# Patient Record
Sex: FEMALE | Race: WHITE | NOT HISPANIC OR LATINO | Employment: FULL TIME | URBAN - METROPOLITAN AREA
[De-identification: names, ages, dates, MRNs, and addresses within clinical notes are randomized per-mention and may not be internally consistent; named-entity substitution may affect disease eponyms.]

---

## 2017-03-27 ENCOUNTER — DOCUMENTATION ONLY (OUTPATIENT)
Dept: FAMILY MEDICINE | Facility: CLINIC | Age: 57
End: 2017-03-27

## 2017-03-27 NOTE — PROGRESS NOTES
Pre-Visit Chart Review  For Appointment Scheduled on 3-28-17    Health Maintenance Due   Topic Date Due    Influenza Vaccine  08/01/2016

## 2017-03-28 ENCOUNTER — OFFICE VISIT (OUTPATIENT)
Dept: FAMILY MEDICINE | Facility: CLINIC | Age: 57
End: 2017-03-28
Payer: COMMERCIAL

## 2017-03-28 VITALS
BODY MASS INDEX: 37.14 KG/M2 | DIASTOLIC BLOOD PRESSURE: 80 MMHG | TEMPERATURE: 98 F | SYSTOLIC BLOOD PRESSURE: 126 MMHG | HEART RATE: 62 BPM | WEIGHT: 231.06 LBS | HEIGHT: 66 IN

## 2017-03-28 DIAGNOSIS — E55.9 VITAMIN D DEFICIENCY: ICD-10-CM

## 2017-03-28 DIAGNOSIS — I10 UNCONTROLLED HYPERTENSION: ICD-10-CM

## 2017-03-28 DIAGNOSIS — E66.9 OBESITY (BMI 30-39.9): ICD-10-CM

## 2017-03-28 DIAGNOSIS — I10 GOOD HYPERTENSION CONTROL: Primary | ICD-10-CM

## 2017-03-28 DIAGNOSIS — J01.00 ACUTE NON-RECURRENT MAXILLARY SINUSITIS: ICD-10-CM

## 2017-03-28 PROCEDURE — 99999 PR PBB SHADOW E&M-EST. PATIENT-LVL III: CPT | Mod: PBBFAC,,, | Performed by: FAMILY MEDICINE

## 2017-03-28 PROCEDURE — 99214 OFFICE O/P EST MOD 30 MIN: CPT | Mod: 25,S$GLB,, | Performed by: FAMILY MEDICINE

## 2017-03-28 PROCEDURE — 96372 THER/PROPH/DIAG INJ SC/IM: CPT | Mod: S$GLB,,, | Performed by: FAMILY MEDICINE

## 2017-03-28 PROCEDURE — 3074F SYST BP LT 130 MM HG: CPT | Mod: S$GLB,,, | Performed by: FAMILY MEDICINE

## 2017-03-28 PROCEDURE — 1160F RVW MEDS BY RX/DR IN RCRD: CPT | Mod: S$GLB,,, | Performed by: FAMILY MEDICINE

## 2017-03-28 PROCEDURE — 3079F DIAST BP 80-89 MM HG: CPT | Mod: S$GLB,,, | Performed by: FAMILY MEDICINE

## 2017-03-28 RX ORDER — METHYLPREDNISOLONE ACETATE 40 MG/ML
60 INJECTION, SUSPENSION INTRA-ARTICULAR; INTRALESIONAL; INTRAMUSCULAR; SOFT TISSUE
Status: COMPLETED | OUTPATIENT
Start: 2017-03-28 | End: 2017-03-28

## 2017-03-28 RX ORDER — OLMESARTAN MEDOXOMIL AND HYDROCHLOROTHIAZIDE 40/12.5 40; 12.5 MG/1; MG/1
1 TABLET ORAL DAILY
Qty: 90 TABLET | Refills: 4 | Status: SHIPPED | OUTPATIENT
Start: 2017-03-28 | End: 2018-04-06 | Stop reason: SDUPTHER

## 2017-03-28 RX ORDER — POTASSIUM CHLORIDE 750 MG/1
10 CAPSULE, EXTENDED RELEASE ORAL DAILY
Qty: 90 CAPSULE | Refills: 4 | Status: SHIPPED | OUTPATIENT
Start: 2017-03-28 | End: 2018-04-06 | Stop reason: SDUPTHER

## 2017-03-28 RX ORDER — AZITHROMYCIN 250 MG/1
TABLET, FILM COATED ORAL
Qty: 6 TABLET | Refills: 0 | Status: SHIPPED | OUTPATIENT
Start: 2017-03-28 | End: 2017-04-02

## 2017-03-28 RX ADMIN — METHYLPREDNISOLONE ACETATE 60 MG: 40 INJECTION, SUSPENSION INTRA-ARTICULAR; INTRALESIONAL; INTRAMUSCULAR; SOFT TISSUE at 05:03

## 2017-03-28 NOTE — PROGRESS NOTES
2 Patent identifiers used (name and ). Administered Depo Medrol 60mg IM. Patient tolerated well. No bleeding at insertion site noted. Pain scale 1/10. Aseptic technique maintained.

## 2017-03-28 NOTE — PATIENT INSTRUCTIONS

## 2017-03-29 NOTE — PROGRESS NOTES
Subjective:       Patient ID: Mercedes Laura is a 57 y.o. female.    Chief Complaint: Establish Care; Cough; and Nasal Congestion    Patient Active Problem List   Diagnosis    Arthritis    Pedal edema    Vitamin D deficiency    Hiatal hernia with gastroesophageal reflux    Panic attacks    Good hypertension control    Anxiety    GERD (gastroesophageal reflux disease)    Gastroesophageal reflux disease with esophagitis    Cardiovascular risk factor, ASCVD 10-year risk 1.9%, 2015    Abdominal obesity    Obesity (BMI 30-39.9)    Venous reflux   Has a deviated septum.  C/o right cheek pain    Cough   This is a new problem. The current episode started 1 to 4 weeks ago. The problem has been gradually worsening. The cough is productive of sputum. Associated symptoms include headaches, nasal congestion, postnasal drip and rhinorrhea. Pertinent negatives include no chest pain, chills, ear congestion, ear pain, fever, sore throat or shortness of breath.     Review of Systems   Constitutional: Negative for chills and fever.   HENT: Positive for postnasal drip and rhinorrhea. Negative for ear pain and sore throat.    Respiratory: Positive for cough. Negative for shortness of breath.    Cardiovascular: Negative for chest pain.   Neurological: Positive for headaches.       Objective:      Physical Exam   Constitutional: She appears well-developed and well-nourished.   HENT:   Right Ear: Tympanic membrane and ear canal normal.   Left Ear: Tympanic membrane and ear canal normal.   Nose: Mucosal edema present. Right sinus exhibits maxillary sinus tenderness. Right sinus exhibits no frontal sinus tenderness. Left sinus exhibits maxillary sinus tenderness. Left sinus exhibits no frontal sinus tenderness.   Mouth/Throat: Mucous membranes are normal. Posterior oropharyngeal erythema present. No oropharyngeal exudate, posterior oropharyngeal edema or tonsillar abscesses.   Cardiovascular: Normal rate, regular rhythm and normal  heart sounds.    Pulmonary/Chest: Effort normal and breath sounds normal.   Skin: Skin is warm.   Psychiatric: She has a normal mood and affect.   Vitals reviewed.      Assessment:       1. Good hypertension control    2. Vitamin D deficiency    3. Acute non-recurrent maxillary sinusitis    4. Uncontrolled hypertension    5. Obesity (BMI 30-39.9)        Plan:         1. Good hypertension control  Controlled on current medications.  Continue current medications.    - Comprehensive metabolic panel; Future  - Lipid panel; Future  - Magnesium; Future  - olmesartan-hydrochlorothiazide (BENICAR HCT) 40-12.5 mg Tab; Take 1 tablet by mouth once daily.  Dispense: 90 tablet; Refill: 4    2. Vitamin D deficiency  Screen and treat as indicated:    - Vitamin D; Future    3. Acute non-recurrent maxillary sinusitis  Treat with:  - methylPREDNISolone acetate injection 60 mg; Inject 1.5 mLs (60 mg total) into the muscle one time.  - azithromycin (Z-BECKY) 250 MG tablet; Take 2 tablets by mouth on day 1; Take 1 tablet by mouth on days 2-5  Dispense: 6 tablet; Refill: 0  I counseled the patient on general home care guidelines for cough and congestion including increasing fluid intake, getting plenty of rest and use of OTC cough and cold medications.  I recommended guafenesin for congestion and dextromethorphan as directed for cough.  A brand like Mucinex DM is recommended.  Avoidance of decongestants is recommended for patients with heart problems and hypertension.  Extra vitamin C may also benefit.  Return to clinic if symptoms last longer than 10 days or sooner if worsen with symptoms like fever > 100.4, severe sinus pain or headache, thick yellow nasal discharge or sputum, dehydration or lethargy.      4. Uncontrolled hypertension  Controlled on current medications.  Continue current medications.    - potassium chloride (MICRO-K) 10 MEQ CpSR; Take 1 capsule (10 mEq total) by mouth once daily.  Dispense: 90 capsule; Refill: 4    5.  "Obesity (BMI 30-39.9)  Counseled patient on his ideal body weight, health consequences of being obese and current recommendations including weekly exercise and a heart healthy diet.  Current BMI is:Estimated body mass index is 37.29 kg/(m^2) as calculated from the following:    Height as of this encounter: 5' 6" (1.676 m).    Weight as of this encounter: 104.8 kg (231 lb 0.7 oz)..  Patient is aware that ideal BMI < 25 or Weight in (lb) to have BMI = 25: 154.6.    [facility] goal documentation:  Patient readiness: acceptance and barriers:readiness  During the course of the visit the patient was educated and counseled about the following: Hypertension:   Dietary sodium restriction.  Regular aerobic exercise.  Obesity:   General weight loss/lifestyle modification strategies discussed (elicit support from others; identify saboteurs; non-food rewards, etc).  Goals: Hypertension: Reduce Blood Pressure and Obesity: Reduce calorie intake and BMI  Goal/Outcomes met:Hypertension  The following self management tools provided:none  Patient Instructions (the written plan) was given to the patient/family: Yes  Time spent with patient: 40 minutes    Patient with be reevaluated in 6 months or sooner prn    Greater than 50% of this visit was spent counseling as described in above documentation:Yes        "

## 2017-04-01 ENCOUNTER — LAB VISIT (OUTPATIENT)
Dept: LAB | Facility: HOSPITAL | Age: 57
End: 2017-04-01
Attending: FAMILY MEDICINE
Payer: COMMERCIAL

## 2017-04-01 DIAGNOSIS — I10 GOOD HYPERTENSION CONTROL: ICD-10-CM

## 2017-04-01 DIAGNOSIS — E55.9 VITAMIN D DEFICIENCY: ICD-10-CM

## 2017-04-01 LAB
ALBUMIN SERPL BCP-MCNC: 3.7 G/DL
ALP SERPL-CCNC: 62 U/L
ALT SERPL W/O P-5'-P-CCNC: 21 U/L
ANION GAP SERPL CALC-SCNC: 8 MMOL/L
AST SERPL-CCNC: 15 U/L
BILIRUB SERPL-MCNC: 0.6 MG/DL
BUN SERPL-MCNC: 16 MG/DL
CALCIUM SERPL-MCNC: 9.5 MG/DL
CHLORIDE SERPL-SCNC: 107 MMOL/L
CHOLEST/HDLC SERPL: 4 {RATIO}
CO2 SERPL-SCNC: 26 MMOL/L
CREAT SERPL-MCNC: 0.8 MG/DL
EST. GFR  (AFRICAN AMERICAN): >60 ML/MIN/1.73 M^2
EST. GFR  (NON AFRICAN AMERICAN): >60 ML/MIN/1.73 M^2
GLUCOSE SERPL-MCNC: 98 MG/DL
HDL/CHOLESTEROL RATIO: 24.7 %
HDLC SERPL-MCNC: 198 MG/DL
HDLC SERPL-MCNC: 49 MG/DL
LDLC SERPL CALC-MCNC: 132 MG/DL
MAGNESIUM SERPL-MCNC: 1.8 MG/DL
NONHDLC SERPL-MCNC: 149 MG/DL
POTASSIUM SERPL-SCNC: 4 MMOL/L
PROT SERPL-MCNC: 7.3 G/DL
SODIUM SERPL-SCNC: 141 MMOL/L
TRIGL SERPL-MCNC: 85 MG/DL

## 2017-04-01 PROCEDURE — 82306 VITAMIN D 25 HYDROXY: CPT

## 2017-04-01 PROCEDURE — 80053 COMPREHEN METABOLIC PANEL: CPT

## 2017-04-01 PROCEDURE — 80061 LIPID PANEL: CPT

## 2017-04-01 PROCEDURE — 83735 ASSAY OF MAGNESIUM: CPT

## 2017-04-01 PROCEDURE — 36415 COLL VENOUS BLD VENIPUNCTURE: CPT | Mod: PO

## 2017-04-04 LAB — 25(OH)D3+25(OH)D2 SERPL-MCNC: 34 NG/ML

## 2017-05-30 ENCOUNTER — TELEPHONE (OUTPATIENT)
Dept: FAMILY MEDICINE | Facility: CLINIC | Age: 57
End: 2017-05-30

## 2017-05-30 DIAGNOSIS — Z12.31 ENCOUNTER FOR SCREENING MAMMOGRAM FOR MALIGNANT NEOPLASM OF BREAST: ICD-10-CM

## 2017-05-30 DIAGNOSIS — I10 GOOD HYPERTENSION CONTROL: Primary | ICD-10-CM

## 2017-05-30 NOTE — TELEPHONE ENCOUNTER
----- Message from Eneida Obregon sent at 5/30/2017  2:49 PM CDT -----  Contact: self  Patient [phone] is asking if Dr Llamas would enter an order in the computer for her annual mammogram/her last screening mammogram was 06 01 16 and then she had a recall mammogram on her left breast on 06 06 16/patient is wanting to have her mammogram in [place]/please advise

## 2017-05-30 NOTE — TELEPHONE ENCOUNTER
Needs orders for Mammogram. Last mammogram was 2016. May we have orders. Patient was told she can have a screening mammogram again this year. Also wants a CBC

## 2017-06-02 ENCOUNTER — HOSPITAL ENCOUNTER (OUTPATIENT)
Dept: RADIOLOGY | Facility: CLINIC | Age: 57
Discharge: HOME | End: 2017-06-02
Attending: FAMILY MEDICINE
Payer: COMMERCIAL

## 2017-06-02 DIAGNOSIS — Z12.31 ENCOUNTER FOR SCREENING MAMMOGRAM FOR MALIGNANT NEOPLASM OF BREAST: ICD-10-CM

## 2017-06-02 PROCEDURE — 77067 SCR MAMMO BI INCL CAD: CPT | Mod: TC

## 2017-06-02 PROCEDURE — 77067 SCR MAMMO BI INCL CAD: CPT | Mod: 26,,, | Performed by: RADIOLOGY

## 2017-06-02 PROCEDURE — 77063 BREAST TOMOSYNTHESIS BI: CPT | Mod: 26,,, | Performed by: RADIOLOGY

## 2017-08-01 ENCOUNTER — HOSPITAL ENCOUNTER (EMERGENCY)
Facility: HOSPITAL | Age: 57
Discharge: HOME | End: 2017-08-01
Attending: EMERGENCY MEDICINE
Payer: COMMERCIAL

## 2017-08-01 VITALS
HEIGHT: 66 IN | HEART RATE: 71 BPM | OXYGEN SATURATION: 95 % | RESPIRATION RATE: 18 BRPM | DIASTOLIC BLOOD PRESSURE: 75 MMHG | SYSTOLIC BLOOD PRESSURE: 148 MMHG | BODY MASS INDEX: 36.96 KG/M2 | TEMPERATURE: 99 F | WEIGHT: 230 LBS

## 2017-08-01 DIAGNOSIS — R07.9 CHEST PAIN: ICD-10-CM

## 2017-08-01 DIAGNOSIS — K21.9 GASTROESOPHAGEAL REFLUX DISEASE, ESOPHAGITIS PRESENCE NOT SPECIFIED: Primary | ICD-10-CM

## 2017-08-01 LAB
ALBUMIN SERPL BCP-MCNC: 4 G/DL
ALP SERPL-CCNC: 68 U/L
ALT SERPL W/O P-5'-P-CCNC: 20 U/L
ANION GAP SERPL CALC-SCNC: 11 MMOL/L
AST SERPL-CCNC: 17 U/L
BASOPHILS # BLD AUTO: 0 K/UL
BASOPHILS NFR BLD: 0.2 %
BILIRUB SERPL-MCNC: 0.3 MG/DL
BUN SERPL-MCNC: 15 MG/DL
CALCIUM SERPL-MCNC: 10 MG/DL
CHLORIDE SERPL-SCNC: 105 MMOL/L
CO2 SERPL-SCNC: 23 MMOL/L
CREAT SERPL-MCNC: 0.9 MG/DL
DIFFERENTIAL METHOD: ABNORMAL
EOSINOPHIL # BLD AUTO: 0.1 K/UL
EOSINOPHIL NFR BLD: 1.6 %
ERYTHROCYTE [DISTWIDTH] IN BLOOD BY AUTOMATED COUNT: 13.5 %
EST. GFR  (AFRICAN AMERICAN): >60 ML/MIN/1.73 M^2
EST. GFR  (NON AFRICAN AMERICAN): >60 ML/MIN/1.73 M^2
GLUCOSE SERPL-MCNC: 104 MG/DL
HCT VFR BLD AUTO: 39.8 %
HGB BLD-MCNC: 13.3 G/DL
LYMPHOCYTES # BLD AUTO: 3.2 K/UL
LYMPHOCYTES NFR BLD: 40.6 %
MAGNESIUM SERPL-MCNC: 1.9 MG/DL
MCH RBC QN AUTO: 28.6 PG
MCHC RBC AUTO-ENTMCNC: 33.5 G/DL
MCV RBC AUTO: 85 FL
MONOCYTES # BLD AUTO: 0.5 K/UL
MONOCYTES NFR BLD: 6.7 %
NEUTROPHILS # BLD AUTO: 4 K/UL
NEUTROPHILS NFR BLD: 50.9 %
PHOSPHATE SERPL-MCNC: 3 MG/DL
PLATELET # BLD AUTO: 266 K/UL
PMV BLD AUTO: 7.3 FL
POTASSIUM SERPL-SCNC: 3.6 MMOL/L
PROT SERPL-MCNC: 7.5 G/DL
RBC # BLD AUTO: 4.66 M/UL
SODIUM SERPL-SCNC: 139 MMOL/L
TROPONIN I SERPL DL<=0.01 NG/ML-MCNC: <0.006 NG/ML
WBC # BLD AUTO: 7.8 K/UL

## 2017-08-01 PROCEDURE — 84100 ASSAY OF PHOSPHORUS: CPT

## 2017-08-01 PROCEDURE — 99284 EMERGENCY DEPT VISIT MOD MDM: CPT

## 2017-08-01 PROCEDURE — 93010 ELECTROCARDIOGRAM REPORT: CPT | Mod: ,,, | Performed by: INTERNAL MEDICINE

## 2017-08-01 PROCEDURE — 84484 ASSAY OF TROPONIN QUANT: CPT

## 2017-08-01 PROCEDURE — 85025 COMPLETE CBC W/AUTO DIFF WBC: CPT

## 2017-08-01 PROCEDURE — 36415 COLL VENOUS BLD VENIPUNCTURE: CPT

## 2017-08-01 PROCEDURE — 93005 ELECTROCARDIOGRAM TRACING: CPT

## 2017-08-01 PROCEDURE — 83735 ASSAY OF MAGNESIUM: CPT

## 2017-08-01 PROCEDURE — 80053 COMPREHEN METABOLIC PANEL: CPT

## 2017-08-01 PROCEDURE — 25000003 PHARM REV CODE 250: Performed by: EMERGENCY MEDICINE

## 2017-08-01 PROCEDURE — 86677 HELICOBACTER PYLORI ANTIBODY: CPT

## 2017-08-01 RX ORDER — RABEPRAZOLE SODIUM 20 MG/1
20 TABLET, DELAYED RELEASE ORAL DAILY
Qty: 30 TABLET | Refills: 11 | Status: SHIPPED | OUTPATIENT
Start: 2017-08-01 | End: 2018-04-06 | Stop reason: SDUPTHER

## 2017-08-01 RX ORDER — PANTOPRAZOLE SODIUM 40 MG/1
40 TABLET, DELAYED RELEASE ORAL
Status: COMPLETED | OUTPATIENT
Start: 2017-08-01 | End: 2017-08-01

## 2017-08-01 RX ADMIN — PANTOPRAZOLE SODIUM 40 MG: 40 TABLET, DELAYED RELEASE ORAL at 06:08

## 2017-08-01 RX ADMIN — LIDOCAINE HYDROCHLORIDE: 20 SOLUTION ORAL; TOPICAL at 06:08

## 2017-08-01 NOTE — ED NOTES
Presents to the ER with c/o palpitations and burning chest discomfort that occurred after eating lunch today at 2pm. Patient reports taking antacid after without any relief. Denies diaphoresis, nausea, vomiting and area is not reproducible. Mucous membranes are pink and moist. Skin is warm, dry and intact. Lungs are clear bilaterally, respirations are regular and unlabored. Denies cough, congestion, rhinorrhea or SOB. BS active x4, no tenderness with palpation, abd is soft and not distended. Denies any appetite or activity change. S1S2, capillary refill is < 2 seconds. Denies dysuria, difficulty urinating, frequency, numbness, tingling or weakness. CK MASONS

## 2017-08-01 NOTE — ED PROVIDER NOTES
"Encounter Date: 8/1/2017    SCRIBE #1 NOTE: IFranchesca, am scribing for, and in the presence of, Dr. Callejas.       History     Chief Complaint   Patient presents with    Palpitations     Belching, burning in chest after large lunch today.       08/01/2017 5:57 PM     Chief Complaint: Chest pain      Mercedes Laura is a 57 y.o. female who presents to the ED with complaints of chest pain and palpitations with an acute onset after eating lunch today at approximately 2PM. The patient endorses having a "burning sensation" in the center of her chest that does not radiate to any other parts of her body. The patient states she took medication for the acid reflux, with improvement to the pain. However, the pain reoccurred at approximately 5PM that was not improved with the same medication as before. She denies vomiting, diarrhea, chest tightness, abdominal pain, SOB, diaphoresis, and history of smoking. Patient's PMHx includes HTN, GERD, PACs, and parental Hx of heart disease. The patient states of taking Benicar HCT for her high blood pressure. SHx includes cholcystectomy and colonoscopy. Known drug allergies includes Augmentin, Bentyl, Naprosyn, and Omeprazole.           The history is provided by the patient.     Review of patient's allergies indicates:   Allergen Reactions    Augmentin [amoxicillin-pot clavulanate] Nausea And Vomiting     Gi upset    Bentyl [dicyclomine] Other (See Comments)     Worsening anxiety    Naprosyn [naproxen]     Omeprazole      Other reaction(s): ABDOMINAL PAIN    Adhesive Rash    Lexapro [escitalopram oxalate] Anxiety     Bad dreams, feeling of doom, sweats    Sulfa (sulfonamide antibiotics) Rash     Other reaction(s): measle like rash     Past Medical History:   Diagnosis Date    Arthritis     GERD (gastroesophageal reflux disease)     Hypertension     Plantar fasciitis     Sinus disorder      Past Surgical History:   Procedure Laterality Date    CHOLECYSTECTOMY  6-1-2015    " Dr Ashraf    COLONOSCOPY N/A 11/23/2015    Procedure: COLONOSCOPY;  Surgeon: Theo Stinson MD;  Location: [facility];  Service: Endoscopy;  Laterality: N/A;    KNEE ARTHROSCOPY W/ MENISCECTOMY  11/15/2000    right      Family History   Problem Relation Age of Onset    Hypertension Mother     Arthritis Mother     Hypertension Father     Cancer Father      melanoma    Melanoma Father     Arthritis Maternal Aunt     Diabetes Maternal Aunt     Hypertension Maternal Aunt     Arthritis Maternal Uncle     Heart disease Neg Hx     Breast cancer Neg Hx     Ovarian cancer Neg Hx     Psoriasis Neg Hx     Lupus Neg Hx     Eczema Neg Hx      Social History   Substance Use Topics    Smoking status: Never Smoker    Smokeless tobacco: Never Used    Alcohol use Yes      Comment: occ     Review of Systems   Constitutional: Negative for activity change, appetite change, chills, fatigue and fever.   HENT: Negative for sore throat.    Eyes: Negative for visual disturbance.   Respiratory: Negative for apnea, chest tightness and shortness of breath.    Cardiovascular: Positive for chest pain and palpitations.   Gastrointestinal: Negative for abdominal distention, abdominal pain, diarrhea and vomiting.   Genitourinary: Negative for difficulty urinating, flank pain and hematuria.   Musculoskeletal: Negative for neck pain.   Skin: Negative for pallor and rash.   Neurological: Negative for headaches.   Psychiatric/Behavioral: Negative for agitation.       Physical Exam     Initial Vitals [08/01/17 1745]   BP Pulse Resp Temp SpO2   (!) 183/97 90 18 99.3 °F (37.4 °C) 100 %      MAP       125.67         Physical Exam    Nursing note and vitals reviewed.  Constitutional: She appears well-developed and well-nourished.   HENT:   Head: Normocephalic and atraumatic.   Eyes: Conjunctivae are normal.   Neck: Normal range of motion. Neck supple.   Cardiovascular: Normal rate. An irregularly irregular rhythm present. Exam reveals no  gallop.    No murmur heard.  Pulmonary/Chest: Effort normal and breath sounds normal. No respiratory distress. She has no wheezes. She has no rales.   Abdominal: Soft. She exhibits no distension. There is no tenderness.   Musculoskeletal: Normal range of motion.   Neurological: She is alert and oriented to person, place, and time.   Skin: Skin is warm and dry. No erythema.   Psychiatric: She has a normal mood and affect.         ED Course   Procedures  Labs Reviewed   CBC W/ AUTO DIFFERENTIAL - Abnormal; Notable for the following:        Result Value    MPV 7.3 (*)     All other components within normal limits   COMPREHENSIVE METABOLIC PANEL   TROPONIN I   MAGNESIUM   PHOSPHORUS   H. PYLORI ANTIBODY, IGG     EKG Readings: (Independently Interpreted)   Rhythm: Normal Sinus Rhythm. Heart Rate: 89. Ectopy: No Ectopy. Conduction: Normal. ST Segments: Normal ST Segments. T Waves: Normal. Axis: Left Axis Deviation. Clinical Impression: Normal Sinus Rhythm          Medical Decision Making:   Independently Interpreted Test(s):   I have ordered and independently interpreted X-rays - see summary below.       <> Summary of X-Ray Reading(s): chest x-ray interpreted by me reveals no infiltrates, effusions or mediastinal widening  Clinical Tests:   Lab Tests: Ordered and Reviewed  The following lab test(s) were unremarkable: CBC, CMP and Troponin  ED Management:  Mercedes Laura is a 57 y.o. female who presents with  retrosternal burning with palpitations.  She has had PACs on the monitor but has no evidence of ischemia/MI.  She is encouraged to follow-up with her primary care physician for consideration of adding a beta blocker to her antihypertensive regimen.  Her chest discomfort is immediately alleviated with a GI cocktail and although not diagnostic strongly suggestive of esophageal reflux.  She has no evidence of cardiac ischemia with a negative troponin.            Scribe Attestation:   Scribe #1: I performed the above  scribed service and the documentation accurately describes the services I performed. I attest to the accuracy of the note.    Attending Attestation:           Physician Attestation for Scribe:  Physician Attestation Statement for Scribe #1: I, Dr. Callejas, reviewed documentation, as scribed by Franchesca Beard in my presence, and it is both accurate and complete.                 ED Course     Clinical Impression:   Diagnoses of Chest pain and Chest pain were pertinent to this visit.                           Bhanu Callejas III, MD  08/01/17 2022       Bhanu Callejas III, MD  08/01/17 0588

## 2017-08-02 LAB — H PYLORI IGG SERPL QL IA: NEGATIVE

## 2017-08-02 NOTE — ED NOTES
Upon discharge, patient is AAOx4, no cardiac or respiratory complications. Follow up care and  Medications have been reviewed with patient and has been instructed to return to the ER if needed. Patient verbalized understanding and ambulated to the lobby without difficulty. PAULINO STOVER.

## 2017-09-01 ENCOUNTER — OFFICE VISIT (OUTPATIENT)
Dept: DERMATOLOGY | Facility: CLINIC | Age: 57
End: 2017-09-01
Payer: COMMERCIAL

## 2017-09-01 VITALS — HEIGHT: 66 IN | BODY MASS INDEX: 36.96 KG/M2 | WEIGHT: 230 LBS

## 2017-09-01 DIAGNOSIS — B35.6 TINEA CRURIS: ICD-10-CM

## 2017-09-01 DIAGNOSIS — D48.9 NEOPLASM OF UNCERTAIN BEHAVIOR: Primary | ICD-10-CM

## 2017-09-01 DIAGNOSIS — L81.4 SOLAR LENTIGO: ICD-10-CM

## 2017-09-01 DIAGNOSIS — D22.9 MULTIPLE BENIGN NEVI: ICD-10-CM

## 2017-09-01 DIAGNOSIS — L82.1 SEBORRHEIC KERATOSES: ICD-10-CM

## 2017-09-01 PROCEDURE — 99999 PR PBB SHADOW E&M-EST. PATIENT-LVL III: CPT | Mod: PBBFAC,,, | Performed by: DERMATOLOGY

## 2017-09-01 PROCEDURE — 99213 OFFICE O/P EST LOW 20 MIN: CPT | Mod: 25,S$GLB,, | Performed by: DERMATOLOGY

## 2017-09-01 PROCEDURE — 88342 IMHCHEM/IMCYTCHM 1ST ANTB: CPT | Mod: 26,,, | Performed by: PATHOLOGY

## 2017-09-01 PROCEDURE — 3008F BODY MASS INDEX DOCD: CPT | Mod: S$GLB,,, | Performed by: DERMATOLOGY

## 2017-09-01 PROCEDURE — 11100 PR BIOPSY OF SKIN LESION: CPT | Mod: S$GLB,,, | Performed by: DERMATOLOGY

## 2017-09-01 PROCEDURE — 88305 TISSUE EXAM BY PATHOLOGIST: CPT | Performed by: PATHOLOGY

## 2017-09-01 PROCEDURE — 88305 TISSUE EXAM BY PATHOLOGIST: CPT | Mod: 26,,, | Performed by: PATHOLOGY

## 2017-09-01 RX ORDER — ECONAZOLE NITRATE 10 MG/G
CREAM TOPICAL
Qty: 85 G | Refills: 2 | Status: SHIPPED | OUTPATIENT
Start: 2017-09-01 | End: 2019-09-15 | Stop reason: SDUPTHER

## 2017-09-01 NOTE — PROGRESS NOTES
"  Subjective:       Patient ID:  Mercedes Laura is a 57 y.o. female who presents for   Chief Complaint   Patient presents with    Skin Check     TBSE    Rash     upper thighs     Patient last seen 8/2016 for TBSE and benign lesions  No h/o skin cancer  Here for FU skin check and new complaint  Father with h/o melanoma age 78, back, treated with wide excision    C/o rash "i get periodically", itchy red inner thighs, using nystatin cream with improvement          Rash  - Initial  Affected locations: right upper leg and left upper leg  Severity: mild  Timing: constant  Aggravated by: friction and sweating  Relieving factors/Treatments tried: OTC antifungals (Nystatin cream)  Improvement on treatment: significant        Review of Systems   Constitutional: Negative for fever, chills, weight loss, weight gain, fatigue, night sweats and malaise.   Skin: Positive for rash, daily sunscreen use, activity-related sunscreen use and wears hat.   Hematologic/Lymphatic: Does not bruise/bleed easily.        Objective:    Physical Exam   Constitutional: She appears well-developed and well-nourished. No distress.   Neurological: She is alert and oriented to person, place, and time. She is not disoriented.   Psychiatric: She has a normal mood and affect.   Skin:   Areas Examined (abnormalities noted in diagram):   Scalp / Hair Palpated and Inspected  Head / Face Inspection Performed  Neck Inspection Performed  Chest / Axilla Inspection Performed  Abdomen Inspection Performed  Genitals / Buttocks / Groin Inspection Performed  Back Inspection Performed  RUE Inspected  LUE Inspection Performed  RLE Inspected  LLE Inspection Performed  Nails and Digits Inspection Performed                       Diagram Legend     Erythematous scaling macule/papule c/w actinic keratosis       Vascular papule c/w angioma      Pigmented verrucoid papule/plaque c/w seborrheic keratosis      Yellow umbilicated papule c/w sebaceous hyperplasia      Irregularly " shaped tan macule c/w lentigo     1-2 mm smooth white papules consistent with Milia      Movable subcutaneous cyst with punctum c/w epidermal inclusion cyst      Subcutaneous movable cyst c/w pilar cyst      Firm pink to brown papule c/w dermatofibroma      Pedunculated fleshy papule(s) c/w skin tag(s)      Evenly pigmented macule c/w junctional nevus     Mildly variegated pigmented, slightly irregular-bordered macule c/w mildly atypical nevus      Flesh colored to evenly pigmented papule c/w intradermal nevus       Pink pearly papule/plaque c/w basal cell carcinoma      Erythematous hyperkeratotic cursted plaque c/w SCC      Surgical scar with no sign of skin cancer recurrence      Open and closed comedones      Inflammatory papules and pustules      Verrucoid papule consistent consistent with wart     Erythematous eczematous patches and plaques     Dystrophic onycholytic nail with subungual debris c/w onychomycosis     Umbilicated papule    Erythematous-base heme-crusted tan verrucoid plaque consistent with inflamed seborrheic keratosis     Erythematous Silvery Scaling Plaque c/w Psoriasis     See annotation          Assessment / Plan:      Pathology Orders:      Normal Orders This Visit    Tissue Specimen To Pathology, Dermatology     Questions:    Directional Terms:  Other(comment)    Clinical information:  Dark melanocytic macule with eccentric black component, rule out dysplasia    Specific Site:  R abdomen        Neoplasm of uncertain behavior  -     Tissue Specimen To Pathology, Dermatology  Shave biopsy procedure note:    Shave biopsy performed after verbal consent including risk of infection, scar, recurrence, need for additional treatment of site. Area prepped with alcohol, anesthetized with approximately 1.0cc of 1% lidocaine with epinephrine. Lesional tissue shaved with razor blade. Hemostasis achieved with application of aluminum chloride followed by hyfrecation. No complications. Dressing applied.  Wound care explained.    Tinea cruris  -     econazole nitrate 1 % cream; Apply to inner thighs and buttocks BID PRN flare  Dispense: 85 g; Refill: 2    Multiple benign nevi  Discussed ABCDE's of nevi.  Monitor for new mole or moles that are becoming bigger, darker, irritated, or developing irregular borders. Brochure provided.    Solar lentigo  This is a benign hyperpigmented sun induced lesion. Daily sun protection will reduce the number of new lesions. Treatment of these benign lesions are considered cosmetic.    Seborrheic keratoses  These are benign inherited growths without a malignant potential. Reassurance given to patient. No treatment is necessary.     Patient instructed in importance in daily sun protection of at least spf 30. Sun avoidance and topical protection discussed.   Recommend Elta MD (physician office) COTZ sensitive (available online) for daily use on face and neck.  Patient encouraged to wear hat for all outdoor exposure.   Also discussed sun protective clothing.           Return in about 1 year (around 9/1/2018).

## 2017-09-01 NOTE — PATIENT INSTRUCTIONS
Shave Biopsy Wound Care    Your doctor has performed a shave biopsy today.  A band aid and vaseline ointment has been placed over the site.  This should remain in place for 24 hours.  It is recommended that you keep the area dry for the first 24 hours.  After 24 hours, you may remove the band aid and wash the area with warm soap and water and apply Vaseline jelly.  Many patients prefer to use Neosporin or Bacitracin ointment.  This is acceptable; however, know that you can develop an allergy to this medication even if you have used it safely for years.  It is important to keep the area moist.  Letting it dry out and get air slows healing time, and will worsen the scar.  Band aid is optional after first 24 hours.      If you notice increasing redness, tenderness, pain, or yellow drainage at the biopsy site, please notify your doctor.  These are signs of an infection.    If your biopsy site is bleeding, apply firm pressure for 15 minutes straight.  Repeat for another 15 minutes, if it is still bleeding.   If the surgical site continues to bleed, then please contact your doctor.       [facility]  [facility]ER[facility]  [address] [place] [id number]  Dept: [phone]

## 2017-09-22 ENCOUNTER — DOCUMENTATION ONLY (OUTPATIENT)
Dept: FAMILY MEDICINE | Facility: CLINIC | Age: 57
End: 2017-09-22

## 2017-09-22 NOTE — PROGRESS NOTES
Pre-Visit Chart Review  For Appointment Scheduled on 9-25-17    Health Maintenance Due   Topic Date Due    Influenza Vaccine  08/01/2017

## 2017-09-25 ENCOUNTER — OFFICE VISIT (OUTPATIENT)
Dept: FAMILY MEDICINE | Facility: CLINIC | Age: 57
End: 2017-09-25
Payer: COMMERCIAL

## 2017-09-25 VITALS
BODY MASS INDEX: 37.14 KG/M2 | HEART RATE: 59 BPM | SYSTOLIC BLOOD PRESSURE: 123 MMHG | TEMPERATURE: 98 F | HEIGHT: 66 IN | DIASTOLIC BLOOD PRESSURE: 79 MMHG | WEIGHT: 231.06 LBS

## 2017-09-25 DIAGNOSIS — R00.2 PALPITATIONS: ICD-10-CM

## 2017-09-25 DIAGNOSIS — E55.9 VITAMIN D DEFICIENCY: ICD-10-CM

## 2017-09-25 DIAGNOSIS — E66.9 OBESITY (BMI 30-39.9): ICD-10-CM

## 2017-09-25 DIAGNOSIS — I10 GOOD HYPERTENSION CONTROL: Primary | ICD-10-CM

## 2017-09-25 DIAGNOSIS — J30.1 ACUTE SEASONAL ALLERGIC RHINITIS DUE TO POLLEN: ICD-10-CM

## 2017-09-25 PROCEDURE — 3008F BODY MASS INDEX DOCD: CPT | Mod: S$GLB,,, | Performed by: FAMILY MEDICINE

## 2017-09-25 PROCEDURE — 99214 OFFICE O/P EST MOD 30 MIN: CPT | Mod: S$GLB,,, | Performed by: FAMILY MEDICINE

## 2017-09-25 PROCEDURE — 99999 PR PBB SHADOW E&M-EST. PATIENT-LVL III: CPT | Mod: PBBFAC,,, | Performed by: FAMILY MEDICINE

## 2017-09-25 PROCEDURE — 3078F DIAST BP <80 MM HG: CPT | Mod: S$GLB,,, | Performed by: FAMILY MEDICINE

## 2017-09-25 PROCEDURE — 3074F SYST BP LT 130 MM HG: CPT | Mod: S$GLB,,, | Performed by: FAMILY MEDICINE

## 2017-09-25 RX ORDER — MONTELUKAST SODIUM 10 MG/1
10 TABLET ORAL NIGHTLY
Qty: 30 TABLET | Status: SHIPPED | OUTPATIENT
Start: 2017-09-25 | End: 2017-10-25

## 2017-09-26 NOTE — PROGRESS NOTES
Subjective:       Patient ID: Mercedes aLura is a 57 y.o. female.    Chief Complaint: Follow-up    Patient Active Problem List   Diagnosis    Arthritis    Pedal edema    Vitamin D deficiency    Hiatal hernia with gastroesophageal reflux    Panic attacks    Good hypertension control    Anxiety    GERD (gastroesophageal reflux disease)    Gastroesophageal reflux disease with esophagitis    Cardiovascular risk factor, ASCVD 10-year risk 1.9%, 2015    Abdominal obesity    Obesity (BMI 30-39.9)    Venous reflux   Has h/o PACs-palpitations.  Has had stress test.  May be anxiety related.  Bothers her at night mainly.  Not sustained-brief flutter in chest.  No pain or lightheadedness.  Denies caffeine use.      C/o head position mild vertigo, nasal stuffiness, right ear pain, allergies, sore throat.  No fever  HPI  Review of Systems   Constitutional: Negative for fatigue and unexpected weight change.   HENT: Positive for ear pain.    Respiratory: Negative for chest tightness and shortness of breath.    Cardiovascular: Negative for chest pain, palpitations and leg swelling.   Gastrointestinal: Negative for abdominal pain.   Musculoskeletal: Negative for arthralgias.   Neurological: Positive for dizziness. Negative for syncope, light-headedness and headaches.       Objective:      Physical Exam   Constitutional: She appears well-developed and well-nourished.   HENT:   Right Ear: Tympanic membrane and ear canal normal.   Left Ear: Tympanic membrane and ear canal normal.   Nose: Mucosal edema and rhinorrhea present. Right sinus exhibits no maxillary sinus tenderness and no frontal sinus tenderness. Left sinus exhibits no maxillary sinus tenderness and no frontal sinus tenderness.   Mouth/Throat: Posterior oropharyngeal erythema present. No oropharyngeal exudate, posterior oropharyngeal edema or tonsillar abscesses.   Cardiovascular: Normal rate, regular rhythm and normal heart sounds.    Pulmonary/Chest: Effort  "normal and breath sounds normal.   Skin: Skin is warm and dry.   Psychiatric: She has a normal mood and affect.   Nursing note and vitals reviewed.      Assessment:       1. Good hypertension control    2. Vitamin D deficiency    3. Acute seasonal allergic rhinitis due to pollen    4. Obesity (BMI 30-39.9)    5. Palpitations        Plan:       1. Good hypertension control  Controlled on current medications.  Continue current medications.    - CBC auto differential; Future  - Comprehensive metabolic panel; Future  - Lipid panel; Future    2. Vitamin D deficiency  Screen and treat as indicated:    - Vitamin D; Future    3. Acute seasonal allergic rhinitis due to pollen  Recommend otc non-sedating antihistamine such as Loratadine and/or steroid nasal spray such as Flonase as directed and as needed.  Please return to clinic if symptoms persist after these interventions.  add  - montelukast (SINGULAIR) 10 mg tablet; Take 1 tablet (10 mg total) by mouth every evening.  Dispense: 30 tablet; Refill: prn    4. Obesity (BMI 30-39.9)  Counseled patient on his ideal body weight, health consequences of being obese and current recommendations including weekly exercise and a heart healthy diet.  Current BMI is:Estimated body mass index is 37.29 kg/m² as calculated from the following:    Height as of this encounter: 5' 6" (1.676 m).    Weight as of this encounter: 104.8 kg (231 lb 0.7 oz)..  Patient is aware that ideal BMI < 25 or Weight in (lb) to have BMI = 25: 154.6.      5. Palpitations  Likely anxiety related PAC or more likely PVCs.  Declines card referral or additional testing at this time.  Counseled patient on reduction of stress, avoidance of stimulants like caffeine.  Consider holter, card referral and/or B blocker therapy if sx persist/worsen    [facility] goal documentation:  Patient readiness: acceptance and barriers:readiness  During the course of the visit the patient was educated and counseled about the following: " Hypertension:   Dietary sodium restriction.  Regular aerobic exercise.  Obesity:   General weight loss/lifestyle modification strategies discussed (elicit support from others; identify saboteurs; non-food rewards, etc).  Goals: Hypertension: Reduce Blood Pressure and Obesity: Reduce calorie intake and BMI  Goal/Outcomes met:Hypertension  The following self management tools provided:none  Patient Instructions (the written plan) was given to the patient/family: Yes  Time spent with patient: 20 minutes    Patient with be reevaluated in 6 months or sooner prn    Greater than 50% of this visit was spent counseling as described in above documentation:Yes

## 2017-10-03 ENCOUNTER — PATIENT MESSAGE (OUTPATIENT)
Dept: FAMILY MEDICINE | Facility: CLINIC | Age: 57
End: 2017-10-03

## 2017-10-03 DIAGNOSIS — B00.1 HERPES LABIALIS: Primary | ICD-10-CM

## 2017-10-05 ENCOUNTER — TELEPHONE (OUTPATIENT)
Dept: FAMILY MEDICINE | Facility: CLINIC | Age: 57
End: 2017-10-05

## 2017-10-05 RX ORDER — ACYCLOVIR 50 MG/G
CREAM TOPICAL
Qty: 5 G | Status: SHIPPED | OUTPATIENT
Start: 2017-10-05

## 2017-10-05 NOTE — TELEPHONE ENCOUNTER
----- Message from Sandrita Velasco sent at 10/5/2017 12:23 PM CDT -----  Contact: self  Patient called regarding missed call. Please contact [phone] (mzzq)

## 2017-10-05 NOTE — TELEPHONE ENCOUNTER
----- Message from Timi Ferguson sent at 10/4/2017  3:36 PM CDT -----  Contact: same  Patient called in and requested a message be sent over to office regarding getting Zovarix for a fever blister on her lip.      [id number] - [place], [place] - [address]  [address] [address]  Phone: [phone] Fax: [phone]    Patient call back number is [phone]

## 2017-10-05 NOTE — TELEPHONE ENCOUNTER
Spoke to patient, she wants a Zovarix ointment. Has not had to use this for 6 years. Under a lot of stress at work and has broke out with severe cold sore. Wants a rx sent to [facility] [address].

## 2017-12-19 ENCOUNTER — TELEPHONE (OUTPATIENT)
Dept: FAMILY MEDICINE | Facility: CLINIC | Age: 57
End: 2017-12-19

## 2017-12-19 NOTE — TELEPHONE ENCOUNTER
----- Message from Megan Rosales sent at 12/18/2017  2:18 PM CST -----  Contact: Patient  Mercedes, patient [phone], Call for a same day appointment for possible flu. Headaches, sinus, sore throat, ear ache and possible fever. Please advise. Thanks.

## 2017-12-19 NOTE — TELEPHONE ENCOUNTER
Returned patient call to schedule an appointment. Patient stated she went to an urgent care where she was diagnosed with Type A flu and is currently being treated

## 2017-12-27 ENCOUNTER — OFFICE VISIT (OUTPATIENT)
Dept: OBSTETRICS AND GYNECOLOGY | Facility: CLINIC | Age: 57
End: 2017-12-27
Payer: COMMERCIAL

## 2017-12-27 VITALS
BODY MASS INDEX: 37.88 KG/M2 | WEIGHT: 235.69 LBS | HEIGHT: 66 IN | DIASTOLIC BLOOD PRESSURE: 74 MMHG | SYSTOLIC BLOOD PRESSURE: 126 MMHG

## 2017-12-27 DIAGNOSIS — Z01.419 ENCOUNTER FOR GYNECOLOGICAL EXAMINATION WITHOUT ABNORMAL FINDING: Primary | ICD-10-CM

## 2017-12-27 DIAGNOSIS — Z12.39 BREAST CANCER SCREENING: ICD-10-CM

## 2017-12-27 DIAGNOSIS — E66.9 OBESITY (BMI 35.0-39.9 WITHOUT COMORBIDITY): ICD-10-CM

## 2017-12-27 DIAGNOSIS — D25.9 UTERINE LEIOMYOMA, UNSPECIFIED LOCATION: ICD-10-CM

## 2017-12-27 PROCEDURE — 99396 PREV VISIT EST AGE 40-64: CPT | Mod: S$GLB,,, | Performed by: OBSTETRICS & GYNECOLOGY

## 2017-12-27 PROCEDURE — 99999 PR PBB SHADOW E&M-EST. PATIENT-LVL III: CPT | Mod: PBBFAC,,, | Performed by: OBSTETRICS & GYNECOLOGY

## 2017-12-27 RX ORDER — NA MG FL/NA PHO/NACL/HA/NA HYP
GEL (GRAM) TOPICAL 2 TIMES DAILY PRN
Status: ON HOLD | COMMUNITY
Start: 2017-12-26 | End: 2021-07-14 | Stop reason: CLARIF

## 2017-12-27 NOTE — PROGRESS NOTES
Chief Complaint   Patient presents with    Well Woman       History of Present Illness: Mercedes Laura is a 57 y.o. female that presents today 12/27/2017 for well gyn visit.    Past Medical History:   Diagnosis Date    Arthritis     GERD (gastroesophageal reflux disease)     Hypertension     Plantar fasciitis     Sinus disorder        Past Surgical History:   Procedure Laterality Date    CHOLECYSTECTOMY  6-1-2015    Dr Ashraf    COLONOSCOPY N/A 11/23/2015    Procedure: COLONOSCOPY;  Surgeon: Theo Stinson MD;  Location: [facility];  Service: Endoscopy;  Laterality: N/A;    KNEE ARTHROSCOPY W/ MENISCECTOMY  11/15/2000    right        Current Outpatient Prescriptions   Medication Sig Dispense Refill    econazole nitrate 1 % cream Apply to inner thighs and buttocks BID PRN flare 85 g 2    mometasone (NASONEX) 50 mcg/actuation nasal spray 2 sprays by Nasal route daily as needed (rhinitis).       multivitamin (THERAGRAN) per tablet Take 1 tablet by mouth once daily.      olmesartan-hydrochlorothiazide (BENICAR HCT) 40-12.5 mg Tab Take 1 tablet by mouth once daily. 90 tablet 4    potassium chloride (MICRO-K) 10 MEQ CpSR Take 1 capsule (10 mEq total) by mouth once daily. 90 capsule 4    rabeprazole (ACIPHEX) 20 mg tablet Take 1 tablet (20 mg total) by mouth once daily. 30 tablet 11    ACUICYN 0.01 % Spry       acyclovir 5% (ZOVIRAX) 5 % Crea Apply topically 5 (five) times daily. 5 g prn    cetirizine (ZYRTEC) 10 MG tablet Take 10 mg by mouth daily as needed for Allergies or Rhinitis.        No current facility-administered medications for this visit.        Review of patient's allergies indicates:   Allergen Reactions    Augmentin [amoxicillin-pot clavulanate] Nausea And Vomiting     Gi upset    Bentyl [dicyclomine] Other (See Comments)     Worsening anxiety    Naprosyn [naproxen]     Omeprazole      Other reaction(s): ABDOMINAL PAIN    Adhesive Rash    Lexapro [escitalopram oxalate] Anxiety     Bad  "dreams, feeling of doom, sweats    Sulfa (sulfonamide antibiotics) Rash     Other reaction(s): measle like rash       Family History   Problem Relation Age of Onset    Hypertension Mother     Arthritis Mother     Hypertension Father     Cancer Father      melanoma    Melanoma Father     Arthritis Maternal Aunt     Diabetes Maternal Aunt     Hypertension Maternal Aunt     Arthritis Maternal Uncle     Heart disease Neg Hx     Breast cancer Neg Hx     Ovarian cancer Neg Hx     Psoriasis Neg Hx     Lupus Neg Hx     Eczema Neg Hx        Social History     Social History    Marital status:      Spouse name: N/A    Number of children: N/A    Years of education: N/A     Social History Main Topics    Smoking status: Never Smoker    Smokeless tobacco: Never Used    Alcohol use Yes      Comment: occ    Drug use: No    Sexual activity: Yes     Partners: Male     Birth control/ protection: None, Post-menopausal     Other Topics Concern    None     Social History Narrative    None       OB History    Para Term  AB Living   2 2 2     2   SAB TAB Ectopic Multiple Live Births           2      # Outcome Date GA Lbr George/2nd Weight Sex Delivery Anes PTL Lv   2 Term 85   3.345 kg (7 lb 6 oz) F Vag-Spont None N CHRISTIANA   1 Term 84   3.289 kg (7 lb 4 oz) M Vag-Spont EPI N CHRISTIANA          Review of Symptoms:  GENERAL: Denies weight gain or weight loss. Feeling well overall.   SKIN: Denies rash or lesions.   HEAD: Denies head injury or headache.   NODES: Denies enlarged lymph nodes.   CHEST: Denies chest pain or shortness of breath.   CARDIOVASCULAR: Denies palpitations or left sided chest pain.   ABDOMEN: No abdominal pain, constipation, diarrhea, nausea, vomiting or rectal bleeding.   URINARY: No frequency, dysuria, hematuria, or burning on urination.  HEMATOLOGIC: No easy bruisability or excessive bleeding.   MUSCULOSKELETAL: Denies joint pain or swelling.     /74   Ht 5' 6" " (1.676 m)   Wt 106.9 kg (235 lb 10.8 oz)   LMP 10/01/2016   Physical Exam:  APPEARANCE: Well nourished, well developed, in no acute distress.  SKIN: Normal skin turgor, no lesions.  NECK: Neck symmetric without masses   RESPIRATORY: Normal respiratory effort with no retractions or use of accessory muscles  CARDIOVASCULAR: Peripheral vascular system with no swelling no varicosities and palpation of pulses normal  LYMPHATIC: No enlargements of the lymph nodes noted in the neck, axillae, or groin  ABDOMEN: Soft. No tenderness or masses. No hepatosplenomegaly. No hernias.  BREASTS: Symmetrical, no skin changes or visible lesions. No palpable masses, nipple discharge or adenopathy bilaterally.  PELVIC: Normal external female genitalia without lesions. Normal hair distribution. Adequate perineal body, normal urethral meatus. Urethra with no masses.  Bladder nontender. Vagina moist and well rugated without lesions or discharge. Cervix pink and without lesions. No significant cystocele or rectocele. Bimanual exam showed uterus normal size, shape, position, mobile and nontender. Adnexa without masses or tenderness. Urethra and bladder normal.   EXTREMITIES: No clubbing cyanosis or edema.    ASSESSMENT/PLAN:  There are no diagnoses linked to this encounter.      Patient was counseled today on Pap guidelines, recommendation for pelvic exams, mammograms every other year after the age of 40 and annually after the age of 50, Colonoscopy after the age of 50, Dexa Bone Scan and calcium and vitamin D supplementation in menopause and to see her PCP for other health maintenance.   FOLLOW-UP:prn

## 2017-12-29 ENCOUNTER — PATIENT MESSAGE (OUTPATIENT)
Dept: SURGERY | Facility: CLINIC | Age: 57
End: 2017-12-29

## 2017-12-29 ENCOUNTER — PATIENT MESSAGE (OUTPATIENT)
Dept: OBSTETRICS AND GYNECOLOGY | Facility: CLINIC | Age: 57
End: 2017-12-29

## 2018-01-02 RX ORDER — MEDROXYPROGESTERONE ACETATE 10 MG/1
TABLET ORAL
Qty: 30 TABLET | Refills: 10 | Status: SHIPPED | OUTPATIENT
Start: 2018-01-02 | End: 2018-01-20

## 2018-01-12 ENCOUNTER — OFFICE VISIT (OUTPATIENT)
Dept: OBSTETRICS AND GYNECOLOGY | Facility: CLINIC | Age: 58
End: 2018-01-12
Payer: COMMERCIAL

## 2018-01-12 VITALS
DIASTOLIC BLOOD PRESSURE: 76 MMHG | WEIGHT: 233.69 LBS | BODY MASS INDEX: 37.72 KG/M2 | SYSTOLIC BLOOD PRESSURE: 128 MMHG

## 2018-01-12 DIAGNOSIS — N95.0 POSTMENOPAUSAL BLEEDING: Primary | ICD-10-CM

## 2018-01-12 DIAGNOSIS — D25.9 UTERINE LEIOMYOMA, UNSPECIFIED LOCATION: ICD-10-CM

## 2018-01-12 DIAGNOSIS — E66.9 OBESITY (BMI 35.0-39.9 WITHOUT COMORBIDITY): ICD-10-CM

## 2018-01-12 PROCEDURE — 99214 OFFICE O/P EST MOD 30 MIN: CPT | Mod: S$GLB,,, | Performed by: OBSTETRICS & GYNECOLOGY

## 2018-01-12 PROCEDURE — 99999 PR PBB SHADOW E&M-EST. PATIENT-LVL III: CPT | Mod: PBBFAC,,, | Performed by: OBSTETRICS & GYNECOLOGY

## 2018-01-12 NOTE — PROGRESS NOTES
Chief Complaint   Patient presents with    Follow-up     patient states she has been having some bleeding (period) and would like to discuss treatment options; currently taking Provera       History of Present Illness: Mercedes Laura is a 57 y.o. female that presents today 1/12/2018 for   Chief Complaint   Patient presents with    Follow-up     patient states she has been having some bleeding (period) and would like to discuss treatment options; currently taking Provera     She reports another period 1/18. She reports having a period 1 year prior around 10/16.  The previous year she had the EMB 3/16 with supp for hyperplasia and repeat 8/16 was benign. She did provera for 6 months.     Past Medical History:   Diagnosis Date    Arthritis     GERD (gastroesophageal reflux disease)     Hypertension     Plantar fasciitis     Sinus disorder        Past Surgical History:   Procedure Laterality Date    CHOLECYSTECTOMY  6-1-2015    Dr Ashraf    COLONOSCOPY N/A 11/23/2015    Procedure: COLONOSCOPY;  Surgeon: Theo Stinson MD;  [facility];  Service: Endoscopy;  Laterality: N/A;    KNEE ARTHROSCOPY W/ MENISCECTOMY  11/15/2000    right        Current Outpatient Prescriptions   Medication Sig Dispense Refill    ACUICYN 0.01 % Spry       acyclovir 5% (ZOVIRAX) 5 % Crea Apply topically 5 (five) times daily. 5 g prn    cetirizine (ZYRTEC) 10 MG tablet Take 10 mg by mouth daily as needed for Allergies or Rhinitis.       econazole nitrate 1 % cream Apply to inner thighs and buttocks BID PRN flare 85 g 2    medroxyPROGESTERone (PROVERA) 10 MG tablet TAKE ONE TABLET BY MOUTH ONCE DAILY 30 tablet 10    mometasone (NASONEX) 50 mcg/actuation nasal spray 2 sprays by Nasal route daily as needed (rhinitis).       multivitamin (THERAGRAN) per tablet Take 1 tablet by mouth once daily.      olmesartan-hydrochlorothiazide (BENICAR HCT) 40-12.5 mg Tab Take 1 tablet by mouth once daily. 90 tablet 4    potassium chloride  (MICRO-K) 10 MEQ CpSR Take 1 capsule (10 mEq total) by mouth once daily. 90 capsule 4    rabeprazole (ACIPHEX) 20 mg tablet Take 1 tablet (20 mg total) by mouth once daily. 30 tablet 11     No current facility-administered medications for this visit.        Review of patient's allergies indicates:   Allergen Reactions    Augmentin [amoxicillin-pot clavulanate] Nausea And Vomiting     Gi upset    Bentyl [dicyclomine] Other (See Comments)     Worsening anxiety    Naprosyn [naproxen]     Omeprazole      Other reaction(s): ABDOMINAL PAIN    Adhesive Rash    Lexapro [escitalopram oxalate] Anxiety     Bad dreams, feeling of doom, sweats    Sulfa (sulfonamide antibiotics) Rash     Other reaction(s): measle like rash       Family History   Problem Relation Age of Onset    Hypertension Mother     Arthritis Mother     Hypertension Father     Cancer Father      melanoma    Melanoma Father     Arthritis Maternal Aunt     Diabetes Maternal Aunt     Hypertension Maternal Aunt     Arthritis Maternal Uncle     Heart disease Neg Hx     Breast cancer Neg Hx     Ovarian cancer Neg Hx     Psoriasis Neg Hx     Lupus Neg Hx     Eczema Neg Hx        Social History   Substance Use Topics    Smoking status: Never Smoker    Smokeless tobacco: Never Used    Alcohol use Yes      Comment: occ       OB History    Para Term  AB Living   2 2 2     2   SAB TAB Ectopic Multiple Live Births           2      # Outcome Date GA Lbr George/2nd Weight Sex Delivery Anes PTL Lv   2 Term 87   3.345 kg (7 lb 6 oz) F Vag-Spont None N CHRISTIANA   1 Term 84   3.289 kg (7 lb 4 oz) M Vag-Spont EPI N CHRISTIANA          Review of Symptoms:  GENERAL: Denies weight gain or weight loss. Feeling well overall.   SKIN: Denies rash or lesions.   HEAD: Denies head injury or headache.   NODES: Denies enlarged lymph nodes.   CHEST: Denies chest pain or shortness of breath.   CARDIOVASCULAR: Denies palpitations or left sided chest pain.    ABDOMEN: No abdominal pain, constipation, diarrhea, nausea, vomiting or rectal bleeding.   URINARY: No frequency, dysuria, hematuria, or burning on urination.  HEMATOLOGIC: No easy bruisability or excessive bleeding.   MUSCULOSKELETAL: Denies joint pain or swelling.     /76   Wt 106 kg (233 lb 11 oz)   LMP 10/01/2016   Physical Exam:  APPEARANCE: Well nourished, well developed, in no acute distress.  SKIN: Normal skin turgor, no lesions.  NECK: Neck symmetric without masses   RESPIRATORY: Normal respiratory effort with no retractions or use of accessory muscles  CARDIOVASCULAR: Peripheral vascular system with no swelling no varicosities and palpation of pulses normal  LYMPHATIC: No enlargements of the lymph nodes noted in the neck, axillae, or groin  ABDOMEN: Soft. No tenderness or masses. No hepatosplenomegaly. No hernias.    ASSESSMENT/PLAN:  Postmenopausal bleeding  -     US Pelvis Comp with Transvag NON-OB (xpd; Future; Expected date: 01/12/2018    Uterine leiomyoma, unspecified location  -     US Pelvis Comp with Transvag NON-OB (xpd; Future; Expected date: 01/12/2018    Obesity (BMI 35.0-39.9 without comorbidity)    We discussed hysterectomy for her repeated bleeding episodes and enlarging fibroids. She desires hysterectomy with oophorectomy.  We discussed laparoscopic hysterectomy and possible laparotomy.      30 minutes spent today with greater than half in counseling.

## 2018-01-16 ENCOUNTER — HOSPITAL ENCOUNTER (OUTPATIENT)
Dept: RADIOLOGY | Facility: HOSPITAL | Age: 58
Discharge: HOME | End: 2018-01-16
Attending: OBSTETRICS & GYNECOLOGY
Payer: COMMERCIAL

## 2018-01-16 ENCOUNTER — OFFICE VISIT (OUTPATIENT)
Dept: OBSTETRICS AND GYNECOLOGY | Facility: CLINIC | Age: 58
End: 2018-01-16
Payer: COMMERCIAL

## 2018-01-16 DIAGNOSIS — N95.0 POSTMENOPAUSAL BLEEDING: ICD-10-CM

## 2018-01-16 DIAGNOSIS — D25.9 UTERINE LEIOMYOMA, UNSPECIFIED LOCATION: Primary | ICD-10-CM

## 2018-01-16 DIAGNOSIS — E66.9 OBESITY (BMI 35.0-39.9 WITHOUT COMORBIDITY): ICD-10-CM

## 2018-01-16 DIAGNOSIS — D25.9 UTERINE LEIOMYOMA, UNSPECIFIED LOCATION: ICD-10-CM

## 2018-01-16 PROCEDURE — 76830 TRANSVAGINAL US NON-OB: CPT | Mod: 26,,, | Performed by: RADIOLOGY

## 2018-01-16 PROCEDURE — 76856 US EXAM PELVIC COMPLETE: CPT | Mod: 26,,, | Performed by: RADIOLOGY

## 2018-01-16 PROCEDURE — 76830 TRANSVAGINAL US NON-OB: CPT | Mod: TC,PN

## 2018-01-16 PROCEDURE — 99999 PR PBB SHADOW E&M-EST. PATIENT-LVL II: CPT | Mod: PBBFAC,,, | Performed by: OBSTETRICS & GYNECOLOGY

## 2018-01-16 PROCEDURE — 99213 OFFICE O/P EST LOW 20 MIN: CPT | Mod: S$GLB,,, | Performed by: OBSTETRICS & GYNECOLOGY

## 2018-01-16 NOTE — PROGRESS NOTES
Chief Complaint   Patient presents with    Advice Only       History of Present Illness: Mercedes Laura is a 57 y.o. female that presents today 1/16/2018 for   Chief Complaint   Patient presents with    Advice Only         Past Medical History:   Diagnosis Date    Arthritis     GERD (gastroesophageal reflux disease)     Hypertension     Plantar fasciitis     Sinus disorder        Past Surgical History:   Procedure Laterality Date    CHOLECYSTECTOMY  6-1-2015    Dr Ashraf    COLONOSCOPY N/A 11/23/2015    Procedure: COLONOSCOPY;  Surgeon: Theo Stinson MD;  Location: [facility];  Service: Endoscopy;  Laterality: N/A;    KNEE ARTHROSCOPY W/ MENISCECTOMY  11/15/2000    right        Current Outpatient Prescriptions   Medication Sig Dispense Refill    ACUICYN 0.01 % Spry       acyclovir 5% (ZOVIRAX) 5 % Crea Apply topically 5 (five) times daily. 5 g prn    cetirizine (ZYRTEC) 10 MG tablet Take 10 mg by mouth daily as needed for Allergies or Rhinitis.       econazole nitrate 1 % cream Apply to inner thighs and buttocks BID PRN flare 85 g 2    medroxyPROGESTERone (PROVERA) 10 MG tablet TAKE ONE TABLET BY MOUTH ONCE DAILY 30 tablet 10    mometasone (NASONEX) 50 mcg/actuation nasal spray 2 sprays by Nasal route daily as needed (rhinitis).       multivitamin (THERAGRAN) per tablet Take 1 tablet by mouth once daily.      olmesartan-hydrochlorothiazide (BENICAR HCT) 40-12.5 mg Tab Take 1 tablet by mouth once daily. 90 tablet 4    potassium chloride (MICRO-K) 10 MEQ CpSR Take 1 capsule (10 mEq total) by mouth once daily. 90 capsule 4    rabeprazole (ACIPHEX) 20 mg tablet Take 1 tablet (20 mg total) by mouth once daily. 30 tablet 11     No current facility-administered medications for this visit.        Review of patient's allergies indicates:   Allergen Reactions    Augmentin [amoxicillin-pot clavulanate] Nausea And Vomiting     Gi upset    Bentyl [dicyclomine] Other (See Comments)     Worsening anxiety     Naprosyn [naproxen]     Omeprazole      Other reaction(s): ABDOMINAL PAIN    Adhesive Rash    Lexapro [escitalopram oxalate] Anxiety     Bad dreams, feeling of doom, sweats    Sulfa (sulfonamide antibiotics) Rash     Other reaction(s): measle like rash       Family History   Problem Relation Age of Onset    Hypertension Mother     Arthritis Mother     Hypertension Father     Cancer Father      melanoma    Melanoma Father     Arthritis Maternal Aunt     Diabetes Maternal Aunt     Hypertension Maternal Aunt     Arthritis Maternal Uncle     Heart disease Neg Hx     Breast cancer Neg Hx     Ovarian cancer Neg Hx     Psoriasis Neg Hx     Lupus Neg Hx     Eczema Neg Hx        Social History   Substance Use Topics    Smoking status: Never Smoker    Smokeless tobacco: Never Used    Alcohol use Yes      Comment: occ       OB History    Para Term  AB Living   2 2 2     2   SAB TAB Ectopic Multiple Live Births           2      # Outcome Date GA Lbr George/2nd Weight Sex Delivery Anes PTL Lv   2 Term 85   3.345 kg (7 lb 6 oz) F Vag-Spont None N CHRISTIANA   1 Term 84   3.289 kg (7 lb 4 oz) M Vag-Spont EPI N CHRISTIANA          Review of Symptoms:  GENERAL: Denies weight gain or weight loss. Feeling well overall.   SKIN: Denies rash or lesions.   HEAD: Denies head injury or headache.   NODES: Denies enlarged lymph nodes.   CHEST: Denies chest pain or shortness of breath.   CARDIOVASCULAR: Denies palpitations or left sided chest pain.   ABDOMEN: No abdominal pain, constipation, diarrhea, nausea, vomiting or rectal bleeding.   URINARY: No frequency, dysuria, hematuria, or burning on urination.  HEMATOLOGIC: No easy bruisability or excessive bleeding.   MUSCULOSKELETAL: Denies joint pain or swelling.     LMP 10/01/2016       ASSESSMENT/PLAN:  Uterine leiomyoma, unspecified location    Obesity (BMI 35.0-39.9 without comorbidity)    Postmenopausal bleeding        15 minutes spent today with this  patient. Greater than half spent in counseling today.

## 2018-01-18 ENCOUNTER — TELEPHONE (OUTPATIENT)
Dept: OBSTETRICS AND GYNECOLOGY | Facility: CLINIC | Age: 58
End: 2018-01-18

## 2018-01-20 ENCOUNTER — HOSPITAL ENCOUNTER (EMERGENCY)
Facility: HOSPITAL | Age: 58
Discharge: HOME | End: 2018-01-20
Attending: EMERGENCY MEDICINE
Payer: COMMERCIAL

## 2018-01-20 VITALS
BODY MASS INDEX: 37.45 KG/M2 | SYSTOLIC BLOOD PRESSURE: 125 MMHG | TEMPERATURE: 98 F | OXYGEN SATURATION: 96 % | WEIGHT: 232 LBS | HEART RATE: 76 BPM | DIASTOLIC BLOOD PRESSURE: 82 MMHG | RESPIRATION RATE: 18 BRPM

## 2018-01-20 DIAGNOSIS — D25.9 UTERINE LEIOMYOMA, UNSPECIFIED LOCATION: ICD-10-CM

## 2018-01-20 DIAGNOSIS — R10.31 RLQ ABDOMINAL PAIN: Primary | ICD-10-CM

## 2018-01-20 LAB
ALBUMIN SERPL BCP-MCNC: 4.1 G/DL
ALP SERPL-CCNC: 61 U/L
ALT SERPL W/O P-5'-P-CCNC: 20 U/L
ANION GAP SERPL CALC-SCNC: 12 MMOL/L
AST SERPL-CCNC: 17 U/L
BASOPHILS # BLD AUTO: 0 K/UL
BASOPHILS NFR BLD: 0.3 %
BILIRUB SERPL-MCNC: 0.6 MG/DL
BILIRUB UR QL STRIP: NEGATIVE
BUN SERPL-MCNC: 13 MG/DL
CALCIUM SERPL-MCNC: 9.8 MG/DL
CHLORIDE SERPL-SCNC: 104 MMOL/L
CLARITY UR: CLEAR
CO2 SERPL-SCNC: 24 MMOL/L
COLOR UR: YELLOW
CREAT SERPL-MCNC: 0.8 MG/DL
DIFFERENTIAL METHOD: ABNORMAL
EOSINOPHIL # BLD AUTO: 0.2 K/UL
EOSINOPHIL NFR BLD: 1.9 %
ERYTHROCYTE [DISTWIDTH] IN BLOOD BY AUTOMATED COUNT: 14.2 %
EST. GFR  (AFRICAN AMERICAN): >60 ML/MIN/1.73 M^2
EST. GFR  (NON AFRICAN AMERICAN): >60 ML/MIN/1.73 M^2
GLUCOSE SERPL-MCNC: 94 MG/DL
GLUCOSE UR QL STRIP: NEGATIVE
HCT VFR BLD AUTO: 40.4 %
HGB BLD-MCNC: 13.4 G/DL
HGB UR QL STRIP: NEGATIVE
KETONES UR QL STRIP: ABNORMAL
LEUKOCYTE ESTERASE UR QL STRIP: NEGATIVE
LIPASE SERPL-CCNC: 21 U/L
LYMPHOCYTES # BLD AUTO: 3.7 K/UL
LYMPHOCYTES NFR BLD: 41.5 %
MCH RBC QN AUTO: 28.3 PG
MCHC RBC AUTO-ENTMCNC: 33.2 G/DL
MCV RBC AUTO: 85 FL
MONOCYTES # BLD AUTO: 0.6 K/UL
MONOCYTES NFR BLD: 6.9 %
NEUTROPHILS # BLD AUTO: 4.3 K/UL
NEUTROPHILS NFR BLD: 49.4 %
NITRITE UR QL STRIP: NEGATIVE
PH UR STRIP: 7 [PH] (ref 5–8)
PLATELET # BLD AUTO: 276 K/UL
PMV BLD AUTO: 7.8 FL
POTASSIUM SERPL-SCNC: 3.7 MMOL/L
PROT SERPL-MCNC: 7.7 G/DL
PROT UR QL STRIP: NEGATIVE
RBC # BLD AUTO: 4.75 M/UL
SODIUM SERPL-SCNC: 140 MMOL/L
SP GR UR STRIP: 1.01 (ref 1–1.03)
URN SPEC COLLECT METH UR: ABNORMAL
UROBILINOGEN UR STRIP-ACNC: NEGATIVE EU/DL
WBC # BLD AUTO: 8.8 K/UL

## 2018-01-20 PROCEDURE — 25500020 PHARM REV CODE 255

## 2018-01-20 PROCEDURE — 81003 URINALYSIS AUTO W/O SCOPE: CPT

## 2018-01-20 PROCEDURE — 83690 ASSAY OF LIPASE: CPT

## 2018-01-20 PROCEDURE — 36415 COLL VENOUS BLD VENIPUNCTURE: CPT

## 2018-01-20 PROCEDURE — 99284 EMERGENCY DEPT VISIT MOD MDM: CPT

## 2018-01-20 PROCEDURE — 80053 COMPREHEN METABOLIC PANEL: CPT

## 2018-01-20 PROCEDURE — 85025 COMPLETE CBC W/AUTO DIFF WBC: CPT

## 2018-01-20 RX ORDER — SODIUM CHLORIDE 9 MG/ML
INJECTION, SOLUTION INTRAVENOUS
Status: DISCONTINUED
Start: 2018-01-20 | End: 2018-01-21 | Stop reason: HOSPADM

## 2018-01-20 RX ORDER — ONDANSETRON 4 MG/1
4 TABLET, ORALLY DISINTEGRATING ORAL EVERY 8 HOURS PRN
Qty: 12 TABLET | Refills: 0 | Status: SHIPPED | OUTPATIENT
Start: 2018-01-20 | End: 2018-04-06

## 2018-01-20 RX ADMIN — IOHEXOL 100 ML: 350 INJECTION, SOLUTION INTRAVENOUS at 09:01

## 2018-01-21 NOTE — ED PROVIDER NOTES
Encounter Date: 1/20/2018    SCRIBE #1 NOTE: Emilia SHAIKH, am scribing for, and in the presence of, Dr. Gomez.       History     Chief Complaint   Patient presents with    Abdominal Pain     right sided for a couple of days. nausea for months       01/20/2018 8:15 PM     Chief complaint: Abdominal pain      Mercedes Laura is a 57 y.o. female with a PMHx of HTN and GERD who presents to the ED with an onset of constant RLQ abdominal pain that started in the morning today. The pain is described as waxing and waning. The patient reports that she has been experiencing intermittent nausea and headaches since she began taking Provera, but that the symptoms have gradually improved since she stopped taking it. She denies nausea and headache at this time. She endorses anxiety at a severity of 9/10, but denies fever, dysuria, hematuria, diarrhea, and all other symptoms at this time. The patient is beginning menopause, and has a large fibroid which will be removed as part of a procedure scheduled for 2/12/2018. Her SHx includes cholecystectomy.        The history is provided by the patient.     Review of patient's allergies indicates:   Allergen Reactions    Augmentin [amoxicillin-pot clavulanate] Nausea And Vomiting     Gi upset    Bentyl [dicyclomine] Other (See Comments)     Worsening anxiety    Naprosyn [naproxen]     Omeprazole      Other reaction(s): ABDOMINAL PAIN    Adhesive Rash    Lexapro [escitalopram oxalate] Anxiety     Bad dreams, feeling of doom, sweats    Sulfa (sulfonamide antibiotics) Rash     Other reaction(s): measle like rash     Past Medical History:   Diagnosis Date    Arthritis     GERD (gastroesophageal reflux disease)     Hypertension     Plantar fasciitis     Sinus disorder      Past Surgical History:   Procedure Laterality Date    CHOLECYSTECTOMY  6-1-2015    Dr Ashraf    COLONOSCOPY N/A 11/23/2015    Procedure: COLONOSCOPY;  Surgeon: Theo Stinson MD;  Location: [facility];  Service:  Endoscopy;  Laterality: N/A;    KNEE ARTHROSCOPY W/ MENISCECTOMY  11/15/2000    right      Family History   Problem Relation Age of Onset    Hypertension Mother     Arthritis Mother     Hypertension Father     Cancer Father      melanoma    Melanoma Father     Arthritis Maternal Aunt     Diabetes Maternal Aunt     Hypertension Maternal Aunt     Arthritis Maternal Uncle     Heart disease Neg Hx     Breast cancer Neg Hx     Ovarian cancer Neg Hx     Psoriasis Neg Hx     Lupus Neg Hx     Eczema Neg Hx      Social History   Substance Use Topics    Smoking status: Never Smoker    Smokeless tobacco: Never Used    Alcohol use Yes      Comment: occ     Review of Systems   Constitutional: Negative for fever.   HENT: Negative for sore throat.    Respiratory: Negative for shortness of breath.    Cardiovascular: Negative for chest pain.   Gastrointestinal: Positive for abdominal pain ( RLQ). Negative for diarrhea and nausea.   Genitourinary: Negative for dysuria and hematuria.   Musculoskeletal: Negative for back pain.   Skin: Negative for rash.   Neurological: Negative for weakness and headaches.   Hematological: Does not bruise/bleed easily.   Psychiatric/Behavioral: The patient is nervous/anxious.        Physical Exam     Initial Vitals [01/20/18 1839]   BP Pulse Resp Temp SpO2   (!) 175/88 82 17 98.8 °F (37.1 °C) 99 %      MAP       117         Physical Exam    Nursing note and vitals reviewed.  Constitutional: She appears well-developed.   HENT:   Head: Normocephalic and atraumatic.   Eyes: EOM are normal. Pupils are equal, round, and reactive to light.   Neck: Neck supple.   Cardiovascular: Normal rate, regular rhythm, normal heart sounds and intact distal pulses.   Pulmonary/Chest: Breath sounds normal. No respiratory distress. She has no decreased breath sounds.   Abdominal: Soft. Bowel sounds are normal. There is tenderness ( RLQ). There is no rebound and no guarding.   Musculoskeletal: Normal range  of motion.   Neurological: She is alert and oriented to person, place, and time.   Skin: Skin is warm and dry.   Psychiatric: She has a normal mood and affect.         ED Course   Procedures  Labs Reviewed   CBC W/ AUTO DIFFERENTIAL - Abnormal; Notable for the following:        Result Value    MPV 7.8 (*)     All other components within normal limits   URINALYSIS - Abnormal; Notable for the following:     Ketones, UA Trace (*)     All other components within normal limits   COMPREHENSIVE METABOLIC PANEL   LIPASE        Imaging Results          CT Abdomen Pelvis With Contrast (In process)                 X-Rays:   Independently Interpreted Readings:   Abdomen:   Abdomen and Pelvis CT with Contrast - No acute abdominopelvic abnormality.     Medical Decision Making:   History:   Old Medical Records: I decided to obtain old medical records.  Initial Assessment:   57-year-old woman presents emergency department complaining of right lower abdominal pain.  She is a history of cholecystectomy and uterine fibroid.  Negative Rovsing.  Tenderness is very minimal and I have low suspicion for ovarian torsion.  CT obtained to rule out appendicitis and was negative.  She has no fever and no leukocytosis I think this is unlikely.  Pain is likely from a uterine fibroid.  No evidence of acute pancreatitis.  I discussed with patient and/or family/caretaker that evaluation in the ED does not suggest any emergent or life threatening condition medical condition requiring immediate intervention beyond what was provided in the ED, and I believe patient is safe for discharge.  Regardless, an unremarkable evaluation in the ED does not preclude the development or presence of a serious of life threatening condition. As such, patient was instructed to return immediately for any worsening or change in current symptoms    Clinical Tests:   Lab Tests: Ordered and Reviewed  Radiological Study: Ordered and Reviewed            Scribe Attestation:    Scribe #1: I performed the above scribed service and the documentation accurately describes the services I performed. I attest to the accuracy of the note.    I, Jason Woods, personally performed the services described in this documentation. All medical record entries made by the scribe were at my direction and in my presence.  I have reviewed the chart and agree that the record reflects my personal performance and is accurate and complete. Elliott Gomez MD.  12:47 AM 01/21/2018          ED Course      Clinical Impression:   The primary encounter diagnosis was RLQ abdominal pain. A diagnosis of Uterine leiomyoma, unspecified location was also pertinent to this visit.    Disposition:   Disposition: Discharged  Condition: Stable                        Elliott Gomez MD  01/21/18 0047

## 2018-01-23 ENCOUNTER — OFFICE VISIT (OUTPATIENT)
Dept: OBSTETRICS AND GYNECOLOGY | Facility: CLINIC | Age: 58
End: 2018-01-23
Payer: COMMERCIAL

## 2018-01-23 VITALS
WEIGHT: 233.25 LBS | BODY MASS INDEX: 37.65 KG/M2 | SYSTOLIC BLOOD PRESSURE: 118 MMHG | DIASTOLIC BLOOD PRESSURE: 70 MMHG

## 2018-01-23 DIAGNOSIS — N95.0 POSTMENOPAUSAL BLEEDING: Primary | ICD-10-CM

## 2018-01-23 PROCEDURE — 99999 PR PBB SHADOW E&M-EST. PATIENT-LVL III: CPT | Mod: PBBFAC,,, | Performed by: OBSTETRICS & GYNECOLOGY

## 2018-01-23 PROCEDURE — 58100 BIOPSY OF UTERUS LINING: CPT | Mod: S$GLB,,, | Performed by: OBSTETRICS & GYNECOLOGY

## 2018-01-23 PROCEDURE — 99499 UNLISTED E&M SERVICE: CPT | Mod: S$GLB,,, | Performed by: OBSTETRICS & GYNECOLOGY

## 2018-01-23 PROCEDURE — 88305 TISSUE EXAM BY PATHOLOGIST: CPT | Performed by: PATHOLOGY

## 2018-01-23 PROCEDURE — 88305 TISSUE EXAM BY PATHOLOGIST: CPT | Mod: 26,,, | Performed by: PATHOLOGY

## 2018-01-29 ENCOUNTER — TELEPHONE (OUTPATIENT)
Dept: OBSTETRICS AND GYNECOLOGY | Facility: CLINIC | Age: 58
End: 2018-01-29

## 2018-01-29 NOTE — TELEPHONE ENCOUNTER
Patient was calling to give available times for [facility] she can do it after 5/25. Will schedule and call back.

## 2018-01-29 NOTE — TELEPHONE ENCOUNTER
----- Message from Yanira Little sent at 1/29/2018  1:03 PM CST -----  Contact: patient  Patient, Mercedes Laura, [phone] is calling - patient needs to reschedule surgery.  Please advise.  Thanks!

## 2018-03-24 ENCOUNTER — LAB VISIT (OUTPATIENT)
Dept: LAB | Facility: HOSPITAL | Age: 58
End: 2018-03-24
Attending: FAMILY MEDICINE
Payer: COMMERCIAL

## 2018-03-24 DIAGNOSIS — E55.9 VITAMIN D DEFICIENCY: ICD-10-CM

## 2018-03-24 DIAGNOSIS — I10 GOOD HYPERTENSION CONTROL: ICD-10-CM

## 2018-03-24 LAB
25(OH)D3+25(OH)D2 SERPL-MCNC: 34 NG/ML
ALBUMIN SERPL BCP-MCNC: 3.8 G/DL
ALP SERPL-CCNC: 71 U/L
ALT SERPL W/O P-5'-P-CCNC: 22 U/L
ANION GAP SERPL CALC-SCNC: 7 MMOL/L
AST SERPL-CCNC: 16 U/L
BASOPHILS # BLD AUTO: 0.03 K/UL
BASOPHILS NFR BLD: 0.5 %
BILIRUB SERPL-MCNC: 0.5 MG/DL
BUN SERPL-MCNC: 16 MG/DL
CALCIUM SERPL-MCNC: 9.9 MG/DL
CHLORIDE SERPL-SCNC: 106 MMOL/L
CHOLEST SERPL-MCNC: 191 MG/DL
CHOLEST/HDLC SERPL: 3.7 {RATIO}
CO2 SERPL-SCNC: 26 MMOL/L
CREAT SERPL-MCNC: 0.8 MG/DL
DIFFERENTIAL METHOD: NORMAL
EOSINOPHIL # BLD AUTO: 0.1 K/UL
EOSINOPHIL NFR BLD: 2.1 %
ERYTHROCYTE [DISTWIDTH] IN BLOOD BY AUTOMATED COUNT: 13.2 %
EST. GFR  (AFRICAN AMERICAN): >60 ML/MIN/1.73 M^2
EST. GFR  (NON AFRICAN AMERICAN): >60 ML/MIN/1.73 M^2
GLUCOSE SERPL-MCNC: 111 MG/DL
HCT VFR BLD AUTO: 40.5 %
HDLC SERPL-MCNC: 52 MG/DL
HDLC SERPL: 27.2 %
HGB BLD-MCNC: 13 G/DL
IMM GRANULOCYTES # BLD AUTO: 0.01 K/UL
IMM GRANULOCYTES NFR BLD AUTO: 0.2 %
LDLC SERPL CALC-MCNC: 123.2 MG/DL
LYMPHOCYTES # BLD AUTO: 2.3 K/UL
LYMPHOCYTES NFR BLD: 39.7 %
MCH RBC QN AUTO: 27.8 PG
MCHC RBC AUTO-ENTMCNC: 32.1 G/DL
MCV RBC AUTO: 87 FL
MONOCYTES # BLD AUTO: 0.5 K/UL
MONOCYTES NFR BLD: 8 %
NEUTROPHILS # BLD AUTO: 2.8 K/UL
NEUTROPHILS NFR BLD: 49.5 %
NONHDLC SERPL-MCNC: 139 MG/DL
NRBC BLD-RTO: 0 /100 WBC
PLATELET # BLD AUTO: 275 K/UL
PMV BLD AUTO: 9.8 FL
POTASSIUM SERPL-SCNC: 4 MMOL/L
PROT SERPL-MCNC: 7.3 G/DL
RBC # BLD AUTO: 4.67 M/UL
SODIUM SERPL-SCNC: 139 MMOL/L
TRIGL SERPL-MCNC: 79 MG/DL
WBC # BLD AUTO: 5.74 K/UL

## 2018-03-24 PROCEDURE — 80053 COMPREHEN METABOLIC PANEL: CPT

## 2018-03-24 PROCEDURE — 36415 COLL VENOUS BLD VENIPUNCTURE: CPT | Mod: PO

## 2018-03-24 PROCEDURE — 82306 VITAMIN D 25 HYDROXY: CPT

## 2018-03-24 PROCEDURE — 80061 LIPID PANEL: CPT

## 2018-03-24 PROCEDURE — 85025 COMPLETE CBC W/AUTO DIFF WBC: CPT

## 2018-03-27 ENCOUNTER — TELEPHONE (OUTPATIENT)
Dept: FAMILY MEDICINE | Facility: CLINIC | Age: 58
End: 2018-03-27

## 2018-03-27 NOTE — TELEPHONE ENCOUNTER
----- Message from Devin Shrestha sent at 3/27/2018 12:51 PM CDT -----  Contact: Patient  Mercedes, [phone]. Returning nurse's call. Please advise. Thanks.

## 2018-03-27 NOTE — TELEPHONE ENCOUNTER
Please review lab and advise. Questions about cbc results. Mild elevation in immature granulocytes is non specific and   can be seen in a variety of conditions including stress response,   acute inflammation, trauma and pregnancy. Correlation with other   laboratory and clinical findings is essential.

## 2018-03-27 NOTE — TELEPHONE ENCOUNTER
Actually her immature granulocytes are not elevated (this was just a reference if it was).  Her blood counts are completely normal.

## 2018-04-05 ENCOUNTER — DOCUMENTATION ONLY (OUTPATIENT)
Dept: FAMILY MEDICINE | Facility: CLINIC | Age: 58
End: 2018-04-05

## 2018-04-05 NOTE — PROGRESS NOTES
Pre-Visit Chart Review  For Appointment Scheduled on 4-6-18    Health Maintenance Due   Topic Date Due    Influenza Vaccine  08/01/2017

## 2018-04-06 ENCOUNTER — OFFICE VISIT (OUTPATIENT)
Dept: FAMILY MEDICINE | Facility: CLINIC | Age: 58
End: 2018-04-06
Payer: COMMERCIAL

## 2018-04-06 VITALS
SYSTOLIC BLOOD PRESSURE: 117 MMHG | BODY MASS INDEX: 36.99 KG/M2 | TEMPERATURE: 98 F | HEART RATE: 62 BPM | WEIGHT: 230.19 LBS | HEIGHT: 66 IN | DIASTOLIC BLOOD PRESSURE: 75 MMHG

## 2018-04-06 DIAGNOSIS — R00.2 PALPITATIONS: ICD-10-CM

## 2018-04-06 DIAGNOSIS — I10 GOOD HYPERTENSION CONTROL: Primary | ICD-10-CM

## 2018-04-06 DIAGNOSIS — E66.9 OBESITY (BMI 30-39.9): ICD-10-CM

## 2018-04-06 DIAGNOSIS — R73.9 HYPERGLYCEMIA: ICD-10-CM

## 2018-04-06 DIAGNOSIS — K21.9 GASTROESOPHAGEAL REFLUX DISEASE, ESOPHAGITIS PRESENCE NOT SPECIFIED: ICD-10-CM

## 2018-04-06 DIAGNOSIS — I10 UNCONTROLLED HYPERTENSION: ICD-10-CM

## 2018-04-06 DIAGNOSIS — E55.9 VITAMIN D DEFICIENCY: ICD-10-CM

## 2018-04-06 DIAGNOSIS — M17.11 OSTEOARTHRITIS OF RIGHT KNEE, UNSPECIFIED OSTEOARTHRITIS TYPE: ICD-10-CM

## 2018-04-06 PROCEDURE — 99214 OFFICE O/P EST MOD 30 MIN: CPT | Mod: S$GLB,,, | Performed by: FAMILY MEDICINE

## 2018-04-06 PROCEDURE — 3074F SYST BP LT 130 MM HG: CPT | Mod: CPTII,S$GLB,, | Performed by: FAMILY MEDICINE

## 2018-04-06 PROCEDURE — 99999 PR PBB SHADOW E&M-EST. PATIENT-LVL III: CPT | Mod: PBBFAC,,, | Performed by: FAMILY MEDICINE

## 2018-04-06 PROCEDURE — 3078F DIAST BP <80 MM HG: CPT | Mod: CPTII,S$GLB,, | Performed by: FAMILY MEDICINE

## 2018-04-06 RX ORDER — MOMETASONE FUROATE 50 UG/1
2 SPRAY, METERED NASAL DAILY PRN
Qty: 1 EACH | Status: SHIPPED | OUTPATIENT
Start: 2018-04-06 | End: 2019-04-26

## 2018-04-06 RX ORDER — RABEPRAZOLE SODIUM 20 MG/1
20 TABLET, DELAYED RELEASE ORAL DAILY
Qty: 30 TABLET | Refills: 11 | Status: SHIPPED | OUTPATIENT
Start: 2018-04-06 | End: 2018-10-08

## 2018-04-06 RX ORDER — BIOTIN 10 MG
100 TABLET ORAL DAILY
COMMUNITY

## 2018-04-06 RX ORDER — POTASSIUM CHLORIDE 750 MG/1
10 CAPSULE, EXTENDED RELEASE ORAL DAILY
Qty: 90 CAPSULE | Refills: 4 | Status: SHIPPED | OUTPATIENT
Start: 2018-04-06 | End: 2019-04-26

## 2018-04-06 RX ORDER — OLOPATADINE HYDROCHLORIDE 2 MG/ML
1 SOLUTION/ DROPS OPHTHALMIC NIGHTLY PRN
COMMUNITY
Start: 2018-03-25 | End: 2019-04-26

## 2018-04-06 RX ORDER — OLMESARTAN MEDOXOMIL AND HYDROCHLOROTHIAZIDE 40/12.5 40; 12.5 MG/1; MG/1
1 TABLET ORAL DAILY
Qty: 90 TABLET | Refills: 4 | Status: SHIPPED | OUTPATIENT
Start: 2018-04-06 | End: 2019-04-26

## 2018-04-06 NOTE — PROGRESS NOTES
Subjective:       Patient ID: Mercedes Laura is a 58 y.o. female.    Chief Complaint: Annual Exam    Patient Active Problem List   Diagnosis    Arthritis    Pedal edema    Hyperglycemia    Vitamin D deficiency    Hiatal hernia with gastroesophageal reflux    Panic attacks    Good hypertension control    Anxiety    GERD (gastroesophageal reflux disease)    Gastroesophageal reflux disease with esophagitis    Cardiovascular risk factor, ASCVD 10-year risk 1.9%, 2015    Abdominal obesity    Obesity (BMI 30-39.9)    Venous reflux   reviewed recent labs  Lab Visit on 03/24/2018   Component Date Value Ref Range Status    WBC 03/24/2018 5.74  3.90 - 12.70 K/uL Final    RBC 03/24/2018 4.67  4.00 - 5.40 M/uL Final    Hemoglobin 03/24/2018 13.0  12.0 - 16.0 g/dL Final    Hematocrit 03/24/2018 40.5  37.0 - 48.5 % Final    MCV 03/24/2018 87  82 - 98 fL Final    MCH 03/24/2018 27.8  27.0 - 31.0 pg Final    MCHC 03/24/2018 32.1  32.0 - 36.0 g/dL Final    RDW 03/24/2018 13.2  11.5 - 14.5 % Final    Platelets 03/24/2018 275  150 - 350 K/uL Final    MPV 03/24/2018 9.8  9.2 - 12.9 fL Final    Immature Granulocytes 03/24/2018 0.2  0.0 - 0.5 % Final    Gran # (ANC) 03/24/2018 2.8  1.8 - 7.7 K/uL Final    Immature Grans (Abs) 03/24/2018 0.01  0.00 - 0.04 K/uL Final    Lymph # 03/24/2018 2.3  1.0 - 4.8 K/uL Final    Mono # 03/24/2018 0.5  0.3 - 1.0 K/uL Final    Eos # 03/24/2018 0.1  0.0 - 0.5 K/uL Final    Baso # 03/24/2018 0.03  0.00 - 0.20 K/uL Final    nRBC 03/24/2018 0  0 /100 WBC Final    Gran% 03/24/2018 49.5  38.0 - 73.0 % Final    Lymph% 03/24/2018 39.7  18.0 - 48.0 % Final    Mono% 03/24/2018 8.0  4.0 - 15.0 % Final    Eosinophil% 03/24/2018 2.1  0.0 - 8.0 % Final    Basophil% 03/24/2018 0.5  0.0 - 1.9 % Final    Differential Method 03/24/2018 Automated   Final    Sodium 03/24/2018 139  136 - 145 mmol/L Final    Potassium 03/24/2018 4.0  3.5 - 5.1 mmol/L Final    Chloride 03/24/2018 106  95  - 110 mmol/L Final    CO2 03/24/2018 26  23 - 29 mmol/L Final    Glucose 03/24/2018 111* 70 - 110 mg/dL Final    BUN, Bld 03/24/2018 16  6 - 20 mg/dL Final    Creatinine 03/24/2018 0.8  0.5 - 1.4 mg/dL Final    Calcium 03/24/2018 9.9  8.7 - 10.5 mg/dL Final    Total Protein 03/24/2018 7.3  6.0 - 8.4 g/dL Final    Albumin 03/24/2018 3.8  3.5 - 5.2 g/dL Final    Total Bilirubin 03/24/2018 0.5  0.1 - 1.0 mg/dL Final    Alkaline Phosphatase 03/24/2018 71  55 - 135 U/L Final    AST 03/24/2018 16  10 - 40 U/L Final    ALT 03/24/2018 22  10 - 44 U/L Final    Anion Gap 03/24/2018 7* 8 - 16 mmol/L Final    eGFR if African American 03/24/2018 >60.0  >60 mL/min/1.73 m^2 Final    eGFR if non African American 03/24/2018 >60.0  >60 mL/min/1.73 m^2 Final    Cholesterol 03/24/2018 191  120 - 199 mg/dL Final    Triglycerides 03/24/2018 79  30 - 150 mg/dL Final    HDL 03/24/2018 52  40 - 75 mg/dL Final    LDL Cholesterol 03/24/2018 123.2  63.0 - 159.0 mg/dL Final    HDL/Chol Ratio 03/24/2018 27.2  20.0 - 50.0 % Final    Total Cholesterol/HDL Ratio 03/24/2018 3.7  2.0 - 5.0 Final    Non-HDL Cholesterol 03/24/2018 139  mg/dL Final    Vit D, 25-Hydroxy 03/24/2018 34  30 - 96 ng/mL Final   H/o LYNETTE and recurrent bouts of squeezing chest pain and skipped beats that worsen anxiety about being heart attack.  Has had stress test neg 2015.  Has h/o hiatal hernia, gerd and frequent PACs. Sx worsen with stress and caffeine.  Relieved with burp .    C/o tender varicose vein left leg    Pans to have hyst 6/18    C/o right knee pain and instability.  Has h/o bone on bone OA.  Asking for knee brace with hinges.  Has difficulty getting one that fits due to large knee size.  Does not want surgery or shots right now    HPI  Review of Systems   Constitutional: Negative for fatigue and unexpected weight change.   Respiratory: Negative for chest tightness and shortness of breath.    Cardiovascular: Negative for chest pain,  palpitations and leg swelling.   Gastrointestinal: Negative for abdominal pain.   Musculoskeletal: Negative for arthralgias.   Neurological: Negative for dizziness, syncope, light-headedness and headaches.       Objective:      Physical Exam   Constitutional: She is oriented to person, place, and time. She appears well-developed and well-nourished.   Cardiovascular: Normal rate, regular rhythm and normal heart sounds.    Pulmonary/Chest: Effort normal and breath sounds normal.   Musculoskeletal: She exhibits no edema.   Neurological: She is alert and oriented to person, place, and time.   Skin: Skin is warm and dry.   Psychiatric: She has a normal mood and affect.   Nursing note and vitals reviewed.      Assessment:       1. Good hypertension control    2. Hyperglycemia    3. Vitamin D deficiency    4. Obesity (BMI 30-39.9)    5. Gastroesophageal reflux disease, esophagitis presence not specified    6. Uncontrolled hypertension    7. Osteoarthritis of right knee, unspecified osteoarthritis type    8. Palpitations        Plan:       1. Good hypertension control  Controlled on current medications.  Continue current medications.    - Comprehensive metabolic panel; Future  - olmesartan-hydrochlorothiazide (BENICAR HCT) 40-12.5 mg Tab; Take 1 tablet by mouth once daily.  Dispense: 90 tablet; Refill: 4    2. Hyperglycemia   Your blood sugar is borderline high.  This means you are at risk for developing type 2 diabetes mellitus.  To lessen your risk you should exercise regularly, avoid excess carbohydrates and work toward a body mass index of less than 25.      - Hemoglobin A1c; Future    3. Vitamin D deficiency  Screen and treat as indicated:      4. Obesity (BMI 30-39.9)  Counseled patient on his ideal body weight, health consequences of being obese and current recommendations including weekly exercise and a heart healthy diet.  Current BMI is:Estimated body mass index is 37.15 kg/m² as calculated from the following:     "Height as of this encounter: 5' 6" (1.676 m).    Weight as of this encounter: 104.4 kg (230 lb 2.6 oz)..  Patient is aware that ideal BMI < 25 or Weight in (lb) to have BMI = 25: 154.6.      5. Gastroesophageal reflux disease, esophagitis presence not specified  Counseled patient on prevention of reflux with changes in diet and behavior.  I recommended avoidance of greasy and spicy foods, caffeine and eating within 3 hours of bedtime.  I counseled the patient to avoid eating large meals and instead eating more frequent small meals.  I also recommended weight loss and elevation of the head of the bed by 6 inches.  If symptoms persist after these changes medication may be needed to control GERD.        6. Uncontrolled hypertension  Controlled on current medications.  Continue current medications.    - potassium chloride (MICRO-K) 10 MEQ CpSR; Take 1 capsule (10 mEq total) by mouth once daily.  Dispense: 90 capsule; Refill: 4    7. Osteoarthritis of right knee, unspecified osteoarthritis type  Refer for  - KNEE BRACE FOR HOME USE  - Ambulatory referral to Orthopedics    8. Palpitations  Suspect frequent PACs/PVCs. R/o MVp.  Consider b blocker.  Refer for  - Transthoracic echo (TTE) complete; Future  Avoid caffeine    [facility] goal documentation:  Patient readiness: acceptance and barriers:readiness  During the course of the visit the patient was educated and counseled about the following: Hypertension:   Dietary sodium restriction.  Regular aerobic exercise.  Obesity:   General weight loss/lifestyle modification strategies discussed (elicit support from others; identify saboteurs; non-food rewards, etc).  Goals: Hypertension: Reduce Blood Pressure and Obesity: Reduce calorie intake and BMI  Goal/Outcomes met:Hypertension  The following self management tools provided:none  Patient Instructions (the written plan) was given to the patient/family: Yes  Time spent with patient: 20 minutes    Patient with be reevaluated in 6 " months or sooner prn    Greater than 50% of this visit was spent counseling as described in above documentation:Yes

## 2018-04-11 ENCOUNTER — HOSPITAL ENCOUNTER (OUTPATIENT)
Dept: CARDIOLOGY | Facility: HOSPITAL | Age: 58
Discharge: HOME | End: 2018-04-11
Attending: FAMILY MEDICINE
Payer: COMMERCIAL

## 2018-04-11 ENCOUNTER — PATIENT MESSAGE (OUTPATIENT)
Dept: FAMILY MEDICINE | Facility: CLINIC | Age: 58
End: 2018-04-11

## 2018-04-11 VITALS
WEIGHT: 228 LBS | BODY MASS INDEX: 34.56 KG/M2 | SYSTOLIC BLOOD PRESSURE: 129 MMHG | HEIGHT: 68 IN | DIASTOLIC BLOOD PRESSURE: 82 MMHG

## 2018-04-11 DIAGNOSIS — R00.2 PALPITATIONS: ICD-10-CM

## 2018-04-11 PROCEDURE — 93306 TTE W/DOPPLER COMPLETE: CPT | Mod: 26,,, | Performed by: FAMILY MEDICINE

## 2018-04-11 PROCEDURE — C8929 TTE W OR WO FOL WCON,DOPPLER: HCPCS

## 2018-04-12 ENCOUNTER — PATIENT MESSAGE (OUTPATIENT)
Dept: FAMILY MEDICINE | Facility: CLINIC | Age: 58
End: 2018-04-12

## 2018-04-16 NOTE — PROCEDURES
MEASUREMENTS:  Aortic root:  2.6.  Left atrium:  4.1.  AV excursion:  1.6.  LVIDD:  4.7.  LVS:  3.2.  IVSD:  1.0.  LVPW:  1.0.    The left atrium is normal in size.  The left ventricular dimensions are also   normal in size.    The left atrium is mildly dilated.  The other cardiac chambers appear normal in   size.  Left ventricular systolic function is preserved with an estimated   ejection fraction of 65%.  The right atrium and right ventricle appear normal in   size.  The mitral, tricuspid, aortic and pulmonic valves structurally appear   unremarkable.  There is no pericardial effusion.  No intracardiac thrombi,   masses or vegetations seen.    Doppler and color flow imaging shows trace tricuspid regurgitation with normal   estimated PA systolic pressure.    CONCLUSION:  1.  Mildly dilated left atrium.  2.  Normal left ventricular cavity dimensions with preserved systolic function,   estimated ejection fraction of 65%.  3.  Trace tricuspid regurgitation with normal estimated PA systolic pressure.      SA/IN  dd: 04/13/2018 16:59:05 (CDT)  td: 04/13/2018 17:34:29 (CDT)  Doc ID   #[id number]  Job ID #[id number]    CC: Chai Balckwell M.D.

## 2018-04-20 DIAGNOSIS — M25.561 RIGHT KNEE PAIN, UNSPECIFIED CHRONICITY: Primary | ICD-10-CM

## 2018-04-23 ENCOUNTER — OFFICE VISIT (OUTPATIENT)
Dept: ORTHOPEDICS | Facility: CLINIC | Age: 58
End: 2018-04-23
Payer: COMMERCIAL

## 2018-04-23 ENCOUNTER — HOSPITAL ENCOUNTER (OUTPATIENT)
Dept: RADIOLOGY | Facility: HOSPITAL | Age: 58
Discharge: HOME | End: 2018-04-23
Attending: ORTHOPAEDIC SURGERY
Payer: COMMERCIAL

## 2018-04-23 VITALS
BODY MASS INDEX: 34.56 KG/M2 | HEART RATE: 62 BPM | WEIGHT: 228 LBS | SYSTOLIC BLOOD PRESSURE: 137 MMHG | HEIGHT: 68 IN | DIASTOLIC BLOOD PRESSURE: 71 MMHG

## 2018-04-23 DIAGNOSIS — M25.561 RIGHT KNEE PAIN, UNSPECIFIED CHRONICITY: ICD-10-CM

## 2018-04-23 DIAGNOSIS — M19.90 ARTHRITIS: Primary | ICD-10-CM

## 2018-04-23 PROCEDURE — 99243 OFF/OP CNSLTJ NEW/EST LOW 30: CPT | Mod: S$GLB,,, | Performed by: ORTHOPAEDIC SURGERY

## 2018-04-23 PROCEDURE — 73562 X-RAY EXAM OF KNEE 3: CPT | Mod: 26,59,LT, | Performed by: RADIOLOGY

## 2018-04-23 PROCEDURE — 73564 X-RAY EXAM KNEE 4 OR MORE: CPT | Mod: 26,RT,, | Performed by: RADIOLOGY

## 2018-04-23 PROCEDURE — 73562 X-RAY EXAM OF KNEE 3: CPT | Mod: TC,PN,LT

## 2018-04-23 PROCEDURE — 99999 PR PBB SHADOW E&M-EST. PATIENT-LVL III: CPT | Mod: PBBFAC,,, | Performed by: ORTHOPAEDIC SURGERY

## 2018-04-23 PROCEDURE — 97760 ORTHOTIC MGMT&TRAING 1ST ENC: CPT | Mod: S$GLB,,, | Performed by: ORTHOPAEDIC SURGERY

## 2018-04-23 NOTE — LETTER
April 23, 2018      Tory Llamas MD  [address]  [facility] [address]           [address] [facility] [address]  [facility] [address]  Phone: [phone]          Patient: Mercedes Laura   MR Number: [id number]   YOB: 1960   Date of Visit: 4/23/2018       Dear Dr. Tory Llamas:    Thank you for referring Mercedes Laura to me for evaluation. Attached you will find relevant portions of my assessment and plan of care.    If you have questions, please do not hesitate to call me. I look forward to following Mercedes Laura along with you.    Sincerely,    Chu Whitman MD    Enclosure  CC:  No Recipients    If you would like to receive this communication electronically, please contact [email or web address] or [phone] to request more information on [employer] access.    For providers and/or their staff who would like to refer a patient to [facility], please contact us through our one-stop-shop provider referral line, [facility], at [phone].    If you feel you have received this communication in error or would no longer like to receive these types of communications, please e-mail [email or web address]

## 2018-04-23 NOTE — PROGRESS NOTES
Past Medical History:   Diagnosis Date    Arthritis     GERD (gastroesophageal reflux disease)     Hypertension     Plantar fasciitis     Sinus disorder        Past Surgical History:   Procedure Laterality Date    CHOLECYSTECTOMY  6-1-2015    Dr Ashraf    COLONOSCOPY N/A 11/23/2015    Procedure: COLONOSCOPY;  Surgeon: Theo Stinson MD;  Location: [facility];  Service: Endoscopy;  Laterality: N/A;    KNEE ARTHROSCOPY W/ MENISCECTOMY  11/15/2000    right        Current Outpatient Prescriptions   Medication Sig    ACUICYN 0.01 % Spry     acyclovir 5% (ZOVIRAX) 5 % Crea Apply topically 5 (five) times daily.    biotin 10 mg Tab Take by mouth.    cetirizine (ZYRTEC) 10 MG tablet Take 10 mg by mouth daily as needed for Allergies or Rhinitis.     econazole nitrate 1 % cream Apply to inner thighs and buttocks BID PRN flare    mometasone (NASONEX) 50 mcg/actuation nasal spray 2 sprays by Nasal route daily as needed (rhinitis).    multivitamin (THERAGRAN) per tablet Take 1 tablet by mouth once daily.    olmesartan-hydrochlorothiazide (BENICAR HCT) 40-12.5 mg Tab Take 1 tablet by mouth once daily.    olopatadine (PATADAY) 0.2 % Drop     potassium chloride (MICRO-K) 10 MEQ CpSR Take 1 capsule (10 mEq total) by mouth once daily.    RABEprazole (ACIPHEX) 20 mg tablet Take 1 tablet (20 mg total) by mouth once daily.     No current facility-administered medications for this visit.        Review of patient's allergies indicates:   Allergen Reactions    Augmentin [amoxicillin-pot clavulanate] Nausea And Vomiting     Gi upset    Bentyl [dicyclomine] Other (See Comments)     Worsening anxiety    Naprosyn [naproxen]     Omeprazole      Other reaction(s): ABDOMINAL PAIN    Adhesive Rash    Lexapro [escitalopram oxalate] Anxiety     Bad dreams, feeling of doom, sweats    Sulfa (sulfonamide antibiotics) Rash     Other reaction(s): measle like rash       Family History   Problem Relation Age of Onset     Hypertension Mother     Arthritis Mother     Hypertension Father     Cancer Father      melanoma    Melanoma Father     Arthritis Maternal Aunt     Diabetes Maternal Aunt     Hypertension Maternal Aunt     Arthritis Maternal Uncle     Heart disease Neg Hx     Breast cancer Neg Hx     Ovarian cancer Neg Hx     Psoriasis Neg Hx     Lupus Neg Hx     Eczema Neg Hx        Social History     Social History    Marital status:      Spouse name: N/A    Number of children: N/A    Years of education: N/A     Occupational History    Not on file.     Social History Main Topics    Smoking status: Never Smoker    Smokeless tobacco: Never Used    Alcohol use Yes      Comment: occ    Drug use: No    Sexual activity: Yes     Partners: Male     Birth control/ protection: None, Post-menopausal     Other Topics Concern    Not on file     Social History Narrative    No narrative on file       Chief Complaint:   Chief Complaint   Patient presents with    Right Knee - Pain       History of present illness: Is a 58-year-old female seen in consultation for Dr. Llamas.  Patient's had right knee pain for several years now.  Symptoms are stable and mild to moderate.  Pain with running and squatting.  Patient had prior right meniscus surgery several years ago.  Pain is currently a 0 out of 10 but up to a 6 out of 10 with climbing.  Pain is laterally located.      Answers for HPI/ROS submitted by the patient on 4/22/2018   Leg pain  unexpected weight change: No  appetite change : No  sleep disturbance: No  IMMUNOCOMPROMISED: No  nervous/ anxious: No  dysphoric mood: No  rash: No  visual disturbance: No  eye redness: No  eye pain: No  ear pain: No  tinnitus: No  hearing loss: No  sinus pressure : No  nosebleeds: No  enviro allergies: No  food allergies: No  cough: No  shortness of breath: No  sweating: No  dysuria: No  frequency: No  difficulty urinating: No  hematuria: No  painful intercourse: No  chest pain:  No  palpitations: Yes  nausea: No  vomiting: No  diarrhea: No  blood in stool: No  constipation: No  headaches: No  numbness: No  seizures: No  joint swelling: No  myalgia: Yes  weakness: No  back pain: No  Pain Chronicity: recurrent  History of trauma: Yes  Onset: more than 1 year ago  Frequency: intermittently  Progression since onset: unchanged  injury location: at home  pain- numeric: 1/10  pain location: left knee, right knee  pain quality: aching, dull  Radiating Pain: No  Aggravating factors: activity, bending, cold, exercise  fever: No  inability to bear weight: No  itching: No  joint locking: No  limited range of motion: No  stiffness: No  tingling: No  Treatments tried: cold, heat, exercise, movement, OTC pain meds, rest  physical therapy: not tried  Improvement on treatment: moderate        Physical Examination:    Vital Signs:    Vitals:    04/23/18 1257   BP: 137/71   Pulse: 62       Body mass index is 34.67 kg/m².    This a well-developed, well nourished patient in no acute distress.  They are alert and oriented and cooperative to examination.  Pt. walks without an antalgic gait.      Examination of bilateral knees shows no rashes or erythema. There are no masses ecchymosis or effusion. Patient has full range of motion from 0-130°. Patient is moderately tender to palpation over lateral joint line and nontender to palpation over the medial joint line. Patient has a - Lachman exam, - anterior drawer exam, and - posterior drawer exam. - Lata's exam. Knee is stable to varus and valgus stress. 5 out of 5 motor strength. Palpable distal pulses. Intact light touch sensation. Negative Patellofemoral crepitus      X-rays: X-rays of the right knee is ordered and reviewed which show severe valgus narrowing of both knees with the right knee slightly worse     Assessment:: Bilateral valgus knee arthritis    Plan:  I reviewed the findings with her today.  We talked about knee replacements.  Patient elected to  just try a brace today.  Follow-up as needed.    We performed a custom orthotic/brace fitting, adjusting and training with the patient. The patient demonstrated understanding and proper care. This was performed for 15 minutes.          This note was created using Dragon voice recognition software that occasionally misinterpreted phrases or words.    Consult note is delivered via Epic messaging service.

## 2018-04-25 ENCOUNTER — PATIENT MESSAGE (OUTPATIENT)
Dept: FAMILY MEDICINE | Facility: CLINIC | Age: 58
End: 2018-04-25

## 2018-04-27 ENCOUNTER — TELEPHONE (OUTPATIENT)
Dept: FAMILY MEDICINE | Facility: CLINIC | Age: 58
End: 2018-04-27

## 2018-04-27 NOTE — TELEPHONE ENCOUNTER
----- Message from Angelo King sent at 4/27/2018  8:58 AM CDT -----  Contact: self   Patient need echo results, please call back at [phone] (home) if patient doesn't answer in the next few minutes, please call back after 11:30.

## 2018-04-27 NOTE — TELEPHONE ENCOUNTER
----- Message from Tg Zaman sent at 4/27/2018  4:25 PM CDT -----  Contact: Self  Emailed several times and called twice now to get the results !!  Type:  Test Results    Who Called:  Patient  Name of Test (Lab/Mammo/Etc):  ECHO  Date of Test:  4/11  Ordering Provider:  Dr Llamas  Where the test was performed:  [facility]  Best Call Back Number: [phone] (home)   Additional Information:  na

## 2018-04-30 ENCOUNTER — PATIENT MESSAGE (OUTPATIENT)
Dept: FAMILY MEDICINE | Facility: CLINIC | Age: 58
End: 2018-04-30

## 2018-05-11 ENCOUNTER — TELEPHONE (OUTPATIENT)
Dept: FAMILY MEDICINE | Facility: CLINIC | Age: 58
End: 2018-05-11

## 2018-05-11 NOTE — TELEPHONE ENCOUNTER
----- Message from Sydnie Rangel LPN sent at 5/11/2018  1:14 PM CDT -----  Still preliminary reports in system but she is calling for the results.   ----- Message -----  From: Jaqui Pinto RN  Sent: 5/10/2018   5:11 PM  To: Lola Layton MA, Sydnie Rangel LPN        ----- Message -----  From: Christina Rangel  Sent: 5/10/2018   4:08 PM  To: Muriel AUGUST Staff    Asking for results from an echo cardiogram .. Pt states that this was done a month ago ? 04/11  Call pt [phone] (home)

## 2018-05-14 ENCOUNTER — PATIENT MESSAGE (OUTPATIENT)
Dept: FAMILY MEDICINE | Facility: CLINIC | Age: 58
End: 2018-05-14

## 2018-05-15 LAB
AORTIC ROOT ANNULUS: 2.81 CM
AORTIC VALVE CUSP SEPERATION: 1.91 CM
AV MEAN GRADIENT: 3.21 MMHG
AV VALVE AREA: 2.14 CM2
BSA FOR ECHO PROCEDURE: 2.23 M2
CV ECHO LV RWT: 0.49 CM
DOP CALC AO PEAK VEL: 1.21 M/S
DOP CALC AO VTI: 0.26 CM
DOP CALC LVOT AREA: 2.32 CM2
DOP CALC LVOT DIAMETER: 1.72 CM
DOP CALC LVOT STROKE VOLUME: 0.56 CM3
DOP CALCLVOT PEAK VEL VTI: 0.24 CM
E WAVE DECELERATION TIME: 235.54 MSEC
E/A RATIO: 1.17
E/E' RATIO: 7.6
ECHO LV POSTERIOR WALL: 1.15 CM (ref 0.6–1.1)
FRACTIONAL SHORTENING: 37 % (ref 28–44)
INTERVENTRICULAR SEPTUM: 1.11 CM (ref 0.6–1.1)
IVRT: 0.06 MSEC
LEFT ATRIUM SIZE: 4.13 CM
LEFT INTERNAL DIMENSION IN SYSTOLE: 2.95 CM (ref 2.1–4)
LEFT VENTRICULAR INTERNAL DIMENSION IN DIASTOLE: 4.65 CM (ref 3.5–6)
LV LATERAL E/E' RATIO: 7.31
LV SEPTAL E/E' RATIO: 7.92
MV PEAK A VEL: 0.81 M/S
MV PEAK E VEL: 0.95 M/S
MV STENOSIS PRESSURE HALF TIME: 68.31 MS
MV VALVE AREA P 1/2 METHOD: 3.22 CM2
PISA TR MAX VEL: 2.08 M/S
PV PEAK GRADIENT: 5.02 MMHG
PV PEAK VELOCITY: 1.12 CM/S
RIGHT VENTRICULAR END-DIASTOLIC DIMENSION: 2.89 CM
TDI LATERAL: 0.13
TDI SEPTAL: 0.12
TDI: 0.13
TR MAX PG: 17.34 MMHG
TRICUSPID ANNULAR PLANE SYSTOLIC EXCURSION: 0.02 CM

## 2018-05-25 ENCOUNTER — OFFICE VISIT (OUTPATIENT)
Dept: OBSTETRICS AND GYNECOLOGY | Facility: CLINIC | Age: 58
End: 2018-05-25
Payer: COMMERCIAL

## 2018-05-25 DIAGNOSIS — N95.0 POSTMENOPAUSAL BLEEDING: Primary | ICD-10-CM

## 2018-05-25 PROCEDURE — 99999 PR PBB SHADOW E&M-EST. PATIENT-LVL II: CPT | Mod: PBBFAC,,, | Performed by: OBSTETRICS & GYNECOLOGY

## 2018-05-25 PROCEDURE — 99024 POSTOP FOLLOW-UP VISIT: CPT | Mod: S$GLB,,, | Performed by: OBSTETRICS & GYNECOLOGY

## 2018-05-25 RX ORDER — SODIUM CHLORIDE, SODIUM LACTATE, POTASSIUM CHLORIDE, CALCIUM CHLORIDE 600; 310; 30; 20 MG/100ML; MG/100ML; MG/100ML; MG/100ML
INJECTION, SOLUTION INTRAVENOUS CONTINUOUS
Status: CANCELLED | OUTPATIENT
Start: 2018-05-25

## 2018-05-25 NOTE — PROGRESS NOTES
Chief Complaint   Patient presents with    Pre-op Exam       History of Present Illness: Mercedes Laura is a 58 y.o. female that presents today 5/25/2018 for   Chief Complaint   Patient presents with    Pre-op Exam         Past Medical History:   Diagnosis Date    Arthritis     GERD (gastroesophageal reflux disease)     Hypertension     Plantar fasciitis     PMB (postmenopausal bleeding) 05/2018    Sinus disorder     Uterine leiomyoma 05/2018       Past Surgical History:   Procedure Laterality Date    CHOLECYSTECTOMY  6-1-2015    Dr Ashraf    COLONOSCOPY N/A 11/23/2015    Procedure: COLONOSCOPY;  Surgeon: Theo Stinson MD;  Location: [facility];  Service: Endoscopy;  Laterality: N/A;    KNEE ARTHROSCOPY W/ MENISCECTOMY  11/15/2000    right        Current Outpatient Prescriptions   Medication Sig Dispense Refill    ACUICYN 0.01 % Spry       acyclovir 5% (ZOVIRAX) 5 % Crea Apply topically 5 (five) times daily. 5 g prn    biotin 10 mg Tab Take by mouth.      cetirizine (ZYRTEC) 10 MG tablet Take 10 mg by mouth daily as needed for Allergies or Rhinitis.       [START ON 6/5/2018] chlorhexidine (PERIDEX) 0.12 % solution Use as directed 10 mLs in the mouth or throat 2 (two) times daily.      econazole nitrate 1 % cream Apply to inner thighs and buttocks BID PRN flare 85 g 2    mometasone (NASONEX) 50 mcg/actuation nasal spray 2 sprays by Nasal route daily as needed (rhinitis). 1 each prn    multivitamin (THERAGRAN) per tablet Take 1 tablet by mouth once daily.      [START ON 6/5/2018] mupirocin (BACTROBAN) 2 % ointment 1 g by Nasal route 2 (two) times daily.      olmesartan-hydrochlorothiazide (BENICAR HCT) 40-12.5 mg Tab Take 1 tablet by mouth once daily. 90 tablet 4    olopatadine (PATADAY) 0.2 % Drop       potassium chloride (MICRO-K) 10 MEQ CpSR Take 1 capsule (10 mEq total) by mouth once daily. 90 capsule 4    RABEprazole (ACIPHEX) 20 mg tablet Take 1 tablet (20 mg total) by mouth once daily.  30 tablet 11     No current facility-administered medications for this visit.        Review of patient's allergies indicates:   Allergen Reactions    Augmentin [amoxicillin-pot clavulanate] Nausea And Vomiting     Gi upset    Bentyl [dicyclomine] Other (See Comments)     Worsening anxiety    Naprosyn [naproxen]     Omeprazole      Other reaction(s): ABDOMINAL PAIN    Adhesive Rash    Lexapro [escitalopram oxalate] Anxiety     Bad dreams, feeling of doom, sweats    Sulfa (sulfonamide antibiotics) Rash     Other reaction(s): measle like rash       Family History   Problem Relation Age of Onset    Hypertension Mother     Arthritis Mother     Hypertension Father     Cancer Father         melanoma    Melanoma Father     Arthritis Maternal Aunt     Diabetes Maternal Aunt     Hypertension Maternal Aunt     Arthritis Maternal Uncle     Heart disease Neg Hx     Breast cancer Neg Hx     Ovarian cancer Neg Hx     Psoriasis Neg Hx     Lupus Neg Hx     Eczema Neg Hx        Social History   Substance Use Topics    Smoking status: Never Smoker    Smokeless tobacco: Never Used    Alcohol use Yes      Comment: occ       OB History    Para Term  AB Living   2 2 2     2   SAB TAB Ectopic Multiple Live Births           2      # Outcome Date GA Lbr George/2nd Weight Sex Delivery Anes PTL Lv   2 Term 87   3.345 kg (7 lb 6 oz) F Vag-Spont None N CHRISTIANA   1 Term 84   3.289 kg (7 lb 4 oz) M Vag-Spont EPI N CHRISTIANA          Review of Symptoms:  GENERAL: Denies weight gain or weight loss. Feeling well overall.   SKIN: Denies rash or lesions.   HEAD: Denies head injury or headache.   NODES: Denies enlarged lymph nodes.   CHEST: Denies chest pain or shortness of breath.   CARDIOVASCULAR: Denies palpitations or left sided chest pain.   ABDOMEN: No abdominal pain, constipation, diarrhea, nausea, vomiting or rectal bleeding.   URINARY: No frequency, dysuria, hematuria, or burning on urination.  HEMATOLOGIC:  No easy bruisability or excessive bleeding.   MUSCULOSKELETAL: Denies joint pain or swelling.     LMP 10/01/2016   Physical Exam:  APPEARANCE: Well nourished, well developed, in no acute distress.  SKIN: Normal skin turgor, no lesions.  NECK: Neck symmetric without masses   RESPIRATORY: Normal respiratory effort with no retractions or use of accessory muscles  CARDIOVASCULAR: Peripheral vascular system with no swelling no varicosities and palpation of pulses normal  LYMPHATIC: No enlargements of the lymph nodes noted in the neck, axillae, or groin  ABDOMEN: Soft. No tenderness or masses. No hepatosplenomegaly. No hernias.    ASSESSMENT/PLAN:  Postmenopausal bleeding  -     Place in Outpatient; Standing  -     Up ad deon; Standing  -     Insert peripheral IV; Standing  -     Parikh to Gravity; Standing  -     POCT glucose; Standing  -     Notify physician if BS > 180 for hysterectomy patients; Standing  -     Chlorhexidine (CHG) 2% Wipes; Standing  -     Notify Physician/Vital Signs Parameters Urine output less than 0.5mL/kg/hr (with indwelling catheter) or 30 mL/hr (without indwelling catheter) or blood glucose greater than 200 mg/dL; Standing  -     Notify physician; Standing  -     Notify Physician - Potential Need of Opioid Reversal; Standing  -     Chlorohexidine Gluconate Bath; Standing  -     Basic metabolic panel; Standing  -     hCG, quantitative; Standing  -     Type And Screen Preop; Future; Expected date: 05/25/2018  -     EKG 12-lead; Standing  -     X-Ray Chest PA And Lateral; Standing  -     Diet NPO; Standing  -     Place sequential compression device; Standing  -     CBC auto differential; Standing    Other orders  -     lactated ringers infusion; Inject into the vein continuous.  -     IP VTE LOW RISK PATIENT; Standing  -     ceFAZolin (ANCEF) 2 g in dextrose 5 % 50 mL IVPB; Inject 2 g into the vein On call Procedure (Surgery).      Risks and benefits discussed for total laparoscopic hysterectomy  including bleeding, infection, injury to bowel, bladdder, ureters, blood vessels, and nerves.  Risks of DVT, PE, anesthesia, death.  We discussed alternatives including do nothing or medical management.  We also discussed oophorectomy.  We discussed risks of surgical menopause including menopausal symptoms such as hot flashes, night sweats, mood changes, skin and hair changes, insomnia.  We discussed alternatives including leaving the ovaries and the associated risks of ovarian cancer and need for additional surgery to remove the ovary at a later time.  She desires to move forward with hysterectomy and oophorectomy.

## 2018-06-08 ENCOUNTER — TELEPHONE (OUTPATIENT)
Dept: OBSTETRICS AND GYNECOLOGY | Facility: CLINIC | Age: 58
End: 2018-06-08

## 2018-06-08 NOTE — TELEPHONE ENCOUNTER
----- Message from Diya Sampson sent at 6/8/2018  8:46 AM CDT -----  Contact: [facility]  Type:  Sooner Apoointment Request    Caller is requesting a sooner appointment.  Caller declined first available appointment listed below.  Caller will not accept being placed on the waitlist and is requesting a message be sent to doctor.    Name of Caller:  [facility]  When is the first available appointment? 6/21/18  Symptoms:  NA  Best Call Back Number:    Additional Information: Calling to schedule a post op in 1 week-  total Laprascopic Hysterectomy. First avail appt is 6/21/18, but it's over a week. Please advise.

## 2018-06-15 ENCOUNTER — TELEPHONE (OUTPATIENT)
Dept: OBSTETRICS AND GYNECOLOGY | Facility: CLINIC | Age: 58
End: 2018-06-15

## 2018-06-15 NOTE — TELEPHONE ENCOUNTER
Advised pt keep incision sites clean and dry, monitor for signs and symptoms of infection which she denies at this time. Advised activity as tolerated.

## 2018-06-15 NOTE — TELEPHONE ENCOUNTER
----- Message from Juana Gates sent at 6/15/2018  8:29 AM CDT -----  Type: Needs Medical Advice    Who Called: Self   Symptoms (please be specific):  NAHow long has patient had these symptoms:  [facility]  Pharmacy name and phone #:  [facility]  Best Call Back Number: [phone]  Additional Information: Patient had surgery a week ago, the bandage on the belly button is coming off.Patient asking if she need to be seen today. The area is not infected.

## 2018-06-18 ENCOUNTER — PATIENT MESSAGE (OUTPATIENT)
Dept: OBSTETRICS AND GYNECOLOGY | Facility: CLINIC | Age: 58
End: 2018-06-18

## 2018-06-19 ENCOUNTER — TELEPHONE (OUTPATIENT)
Dept: OBSTETRICS AND GYNECOLOGY | Facility: CLINIC | Age: 58
End: 2018-06-19

## 2018-06-19 NOTE — TELEPHONE ENCOUNTER
Spoke with pt who states she is having light pink discharge after urination.   Denies pain or bright red bleeding. Advised minimal activity, monitor for s/s of infection, heavy bleeding. She verb understanding.

## 2018-06-19 NOTE — TELEPHONE ENCOUNTER
----- Message from Sheri Rosales sent at 6/19/2018  2:09 PM CDT -----  Contact: patient  Type: Needs Medical Advice    Who Called:  Patient  (spotting):    How long has patient had these symptoms:  Last day or two  Pharmacy name and phone #:    Best Call Back Number: [phone]  Additional Information: requesting a call back to discuss

## 2018-06-21 ENCOUNTER — OFFICE VISIT (OUTPATIENT)
Dept: OBSTETRICS AND GYNECOLOGY | Facility: CLINIC | Age: 58
End: 2018-06-21
Payer: COMMERCIAL

## 2018-06-21 VITALS — WEIGHT: 228.38 LBS | BODY MASS INDEX: 36.86 KG/M2

## 2018-06-21 DIAGNOSIS — Z09 POSTOP CHECK: Primary | ICD-10-CM

## 2018-06-21 PROCEDURE — 99999 PR PBB SHADOW E&M-EST. PATIENT-LVL II: CPT | Mod: PBBFAC,,, | Performed by: OBSTETRICS & GYNECOLOGY

## 2018-06-21 PROCEDURE — 99024 POSTOP FOLLOW-UP VISIT: CPT | Mod: S$GLB,,, | Performed by: OBSTETRICS & GYNECOLOGY

## 2018-06-21 NOTE — PROGRESS NOTES
POST-OP NOTE    6/21/2018    HPI: no complaints    Past Medical History:   Diagnosis Date    Anxiety     this came on since lap kadeem sgy    Arthritis     Deviated septum     GERD (gastroesophageal reflux disease)     Hypertension     PAC (premature atrial contraction)     Plantar fasciitis     PMB (postmenopausal bleeding) 05/2018    Seasonal allergies     Sinus disorder     Uterine leiomyoma 05/2018    Varicose veins of both lower extremities      Past Surgical History:   Procedure Laterality Date    CHOLECYSTECTOMY  6-1-2015    Dr Ashraf    COLONOSCOPY N/A 11/23/2015    Procedure: COLONOSCOPY;  Surgeon: Theo Stinson MD;  Location: [facility] ENDO;  Service: Endoscopy;  Laterality: N/A;    CYSTOSCOPY N/A 6/7/2018    Procedure: CYSTOSCOPY;  Surgeon: Anaya Caldera MD;  Location: [facility] OR;  Service: OB/GYN;  Laterality: N/A;    HYSTERECTOMY      KNEE ARTHROSCOPY W/ MENISCECTOMY  11/15/2000    right     LAPAROSCOPIC SALPINGO-OOPHORECTOMY Bilateral 6/7/2018    Procedure: [id number];  Surgeon: Anaya Caldera MD;  Location: [facility] OR;  Service: OB/GYN;  Laterality: Bilateral;    LAPAROSCOPIC TOTAL HYSTERECTOMY N/A 6/7/2018    Procedure: HYSTERECTOMY-TOTAL LAPAROSCOPIC (TLH);  Surgeon: Anaya Caldera MD;  Location: [facility] OR;  Service: OB/GYN;  Laterality: N/A;    OOPHORECTOMY       Current Outpatient Prescriptions   Medication Sig Dispense Refill    ACUICYN 0.01 % Spry 2 (two) times daily as needed.       acyclovir 5% (ZOVIRAX) 5 % Crea Apply topically 5 (five) times daily. 5 g prn    biotin 10 mg Tab Take 100 mg by mouth once daily.       bisacodyl (DULCOLAX) 5 mg EC tablet Take 5 mg by mouth daily as needed for Constipation.      cetirizine (ZYRTEC) 10 MG tablet Take 10 mg by mouth daily as needed for Allergies or Rhinitis.       econazole nitrate 1 % cream Apply to inner thighs and buttocks BID PRN flare 85 g 2    loratadine (CLARITIN) 10 mg tablet Take  10 mg by mouth daily as needed for Allergies.      mometasone (NASONEX) 50 mcg/actuation nasal spray 2 sprays by Nasal route daily as needed (rhinitis). 1 each prn    multivitamin (THERAGRAN) per tablet Take 1 tablet by mouth once daily.      olmesartan-hydrochlorothiazide (BENICAR HCT) 40-12.5 mg Tab Take 1 tablet by mouth once daily. 90 tablet 4    olopatadine (PATADAY) 0.2 % Drop Place 1 drop into both eyes nightly as needed.       oxyCODONE-acetaminophen (PERCOCET) 5-325 mg per tablet Take 1 tablet by mouth every 4 (four) hours as needed. 30 tablet 0    potassium chloride (MICRO-K) 10 MEQ CpSR Take 1 capsule (10 mEq total) by mouth once daily. 90 capsule 4    RABEprazole (ACIPHEX) 20 mg tablet Take 1 tablet (20 mg total) by mouth once daily. 30 tablet 11     No current facility-administered medications for this visit.      Review of patient's allergies indicates:   Allergen Reactions    Augmentin [amoxicillin-pot clavulanate] Nausea And Vomiting     Gi upset    Bentyl [dicyclomine] Other (See Comments)     Worsening anxiety    Naprosyn [naproxen]     Omeprazole      Other reaction(s): ABDOMINAL PAIN    Adhesive Rash    Lexapro [escitalopram oxalate] Anxiety     Bad dreams, feeling of doom, sweats    Sulfa (sulfonamide antibiotics) Rash     Other reaction(s): measle like rash     Social History   Substance Use Topics    Smoking status: Never Smoker    Smokeless tobacco: Never Used    Alcohol use Yes      Comment: rarely / few times per yr     Wt 103.6 kg (228 lb 6.3 oz)   [facility] 06/07/2018     ROS:  GENERAL: Denies weight gain, weight loss, fatigue, and malaise.   SKIN: Denies rash or lesions.   HEAD: Denies head injury or headache.   NODES: Denies enlarged lymph nodes.   CHEST: Denies chest pain or shortness of breath.   CARDIOVASCULAR: Denies palpitations or left sided chest pain.   ABDOMEN: No abdominal pain, constipation, diarrhea, nausea, vomiting or rectal bleeding.   URINARY: No frequency,  urgency, dysuria, or hematuria  MUSCULOSKELETAL: Denies joint pain or swelling.   NEUROLOGIC: Denies seizures.    PE:   APPEARANCE: Well nourished, well developed, in no acute distress.  SKIN: Normal skin turgor, no lesions.  ABDOMEN: Incision healing fine. Soft. No tenderness or masses. No hepatosplenomegaly. No hernias.EXTREMITIES: NO clubbing cyanosis or edema    ASSESSMENT  Encounter Diagnoses   Name Primary?    Postop check Yes       PLAN  1. RTC: 4 weeks

## 2018-06-23 ENCOUNTER — NURSE TRIAGE (OUTPATIENT)
Dept: ADMINISTRATIVE | Facility: CLINIC | Age: 58
End: 2018-06-23

## 2018-06-23 NOTE — TELEPHONE ENCOUNTER
Reason for Disposition   Other post-op symptom or question (all triage questions negative)    Protocols used: ST POST-OP SYMPTOMS AND [id number]    Mercedes who had a hysterectomy on 6/7/18 reporting about 1 hour ago she was up making breakfast and she had a panty liner on and felt warmth in her underwear and she saw that she had bleeding which went through panty liner and onto her underwear but did not saturate her clothing. She replaced the panty liner with a thin pad at that time and has not had any bleeding since. She can't think of any strenuous activity or straining that could have contributed to this episode of bleeding. She denies feeling weak dizzy or lightheaded. She denies that her abdomen is rigid or tender. She denies pain or fever. Advised to monitor for now and if she has another episode/more bleeding like before or any other new or worsening symptoms she should call back and nurse can reach out to on call provider if necessary. Further recommended she take it easy for today. She agrees to plan.    Addendum: 1016: Spoke to on call provider Dr. Tavarez who agreed with this plan.

## 2018-06-25 ENCOUNTER — PATIENT MESSAGE (OUTPATIENT)
Dept: OBSTETRICS AND GYNECOLOGY | Facility: CLINIC | Age: 58
End: 2018-06-25

## 2018-06-25 ENCOUNTER — OFFICE VISIT (OUTPATIENT)
Dept: OBSTETRICS AND GYNECOLOGY | Facility: CLINIC | Age: 58
End: 2018-06-25
Payer: COMMERCIAL

## 2018-06-25 ENCOUNTER — TELEPHONE (OUTPATIENT)
Dept: OBSTETRICS AND GYNECOLOGY | Facility: CLINIC | Age: 58
End: 2018-06-25

## 2018-06-25 VITALS
DIASTOLIC BLOOD PRESSURE: 88 MMHG | HEART RATE: 72 BPM | WEIGHT: 226.63 LBS | BODY MASS INDEX: 36.42 KG/M2 | HEIGHT: 66 IN | SYSTOLIC BLOOD PRESSURE: 153 MMHG

## 2018-06-25 DIAGNOSIS — Z09 POSTOP CHECK: Primary | ICD-10-CM

## 2018-06-25 PROCEDURE — 99024 POSTOP FOLLOW-UP VISIT: CPT | Mod: S$GLB,,, | Performed by: OBSTETRICS & GYNECOLOGY

## 2018-06-25 PROCEDURE — 99999 PR PBB SHADOW E&M-EST. PATIENT-LVL III: CPT | Mod: PBBFAC,,, | Performed by: OBSTETRICS & GYNECOLOGY

## 2018-06-25 NOTE — PROGRESS NOTES
POST-OP NOTE    6/25/2018    HPI: reports some red spotting this weekend after going to book store, [employer], and [employer].     Past Medical History:   Diagnosis Date    Anxiety     this came on since lap kadeem sgy    Arthritis     Deviated septum     GERD (gastroesophageal reflux disease)     Hypertension     PAC (premature atrial contraction)     Plantar fasciitis     PMB (postmenopausal bleeding) 05/2018    Seasonal allergies     Sinus disorder     Uterine leiomyoma 05/2018    Varicose veins of both lower extremities      Past Surgical History:   Procedure Laterality Date    CHOLECYSTECTOMY  6-1-2015    Dr Ashraf    COLONOSCOPY N/A 11/23/2015    Procedure: COLONOSCOPY;  Surgeon: Theo Stinson MD;  Location: [facility] ENDO;  Service: Endoscopy;  Laterality: N/A;    CYSTOSCOPY N/A 6/7/2018    Procedure: CYSTOSCOPY;  Surgeon: Anaya Caldera MD;  Location: [facility] OR;  Service: OB/GYN;  Laterality: N/A;    HYSTERECTOMY      KNEE ARTHROSCOPY W/ MENISCECTOMY  11/15/2000    right     LAPAROSCOPIC SALPINGO-OOPHORECTOMY Bilateral 6/7/2018    Procedure: [id number];  Surgeon: Anaya Caldera MD;  Location: [facility] OR;  Service: OB/GYN;  Laterality: Bilateral;    LAPAROSCOPIC TOTAL HYSTERECTOMY N/A 6/7/2018    Procedure: HYSTERECTOMY-TOTAL LAPAROSCOPIC (TLH);  Surgeon: Anaya Caldera MD;  Location: [facility] OR;  Service: OB/GYN;  Laterality: N/A;    OOPHORECTOMY       Current Outpatient Prescriptions   Medication Sig Dispense Refill    ACUICYN 0.01 % Spry 2 (two) times daily as needed.       acyclovir 5% (ZOVIRAX) 5 % Crea Apply topically 5 (five) times daily. 5 g prn    biotin 10 mg Tab Take 100 mg by mouth once daily.       bisacodyl (DULCOLAX) 5 mg EC tablet Take 5 mg by mouth daily as needed for Constipation.      cetirizine (ZYRTEC) 10 MG tablet Take 10 mg by mouth daily as needed for Allergies or Rhinitis.       econazole nitrate 1 % cream Apply to inner thighs  "and buttocks BID PRN flare 85 g 2    loratadine (CLARITIN) 10 mg tablet Take 10 mg by mouth daily as needed for Allergies.      mometasone (NASONEX) 50 mcg/actuation nasal spray 2 sprays by Nasal route daily as needed (rhinitis). 1 each prn    multivitamin (THERAGRAN) per tablet Take 1 tablet by mouth once daily.      olmesartan-hydrochlorothiazide (BENICAR HCT) 40-12.5 mg Tab Take 1 tablet by mouth once daily. 90 tablet 4    olopatadine (PATADAY) 0.2 % Drop Place 1 drop into both eyes nightly as needed.       oxyCODONE-acetaminophen (PERCOCET) 5-325 mg per tablet Take 1 tablet by mouth every 4 (four) hours as needed. 30 tablet 0    potassium chloride (MICRO-K) 10 MEQ CpSR Take 1 capsule (10 mEq total) by mouth once daily. 90 capsule 4    RABEprazole (ACIPHEX) 20 mg tablet Take 1 tablet (20 mg total) by mouth once daily. 30 tablet 11     No current facility-administered medications for this visit.      Review of patient's allergies indicates:   Allergen Reactions    Augmentin [amoxicillin-pot clavulanate] Nausea And Vomiting     Gi upset    Bentyl [dicyclomine] Other (See Comments)     Worsening anxiety    Naprosyn [naproxen]     Omeprazole      Other reaction(s): ABDOMINAL PAIN    Adhesive Rash    Lexapro [escitalopram oxalate] Anxiety     Bad dreams, feeling of doom, sweats    Sulfa (sulfonamide antibiotics) Rash     Other reaction(s): measle like rash     Social History   Substance Use Topics    Smoking status: Never Smoker    Smokeless tobacco: Never Used    Alcohol use Yes      Comment: rarely / few times per yr     BP (!) 153/88   Pulse 72   Ht 5' 6" (1.676 m)   Wt 102.8 kg (226 lb 10.1 oz)   LMP 06/07/2018     ROS:  GENERAL: Denies weight gain, weight loss, fatigue, and malaise.   SKIN: Denies rash or lesions.   HEAD: Denies head injury or headache.   NODES: Denies enlarged lymph nodes.   CHEST: Denies chest pain or shortness of breath.   CARDIOVASCULAR: Denies palpitations or left sided " chest pain.   ABDOMEN: No abdominal pain, constipation, diarrhea, nausea, vomiting or rectal bleeding.   URINARY: No frequency, urgency, dysuria, or hematuria  MUSCULOSKELETAL: Denies joint pain or swelling.   NEUROLOGIC: Denies seizures.    PE:   APPEARANCE: Well nourished, well developed, in no acute distress.  SKIN: Normal skin turgor, no lesions.  ABDOMEN: Incision healing fine. Soft. No tenderness or masses. No hepatosplenomegaly. No hernias.  EXTREMITIES: NO clubbing cyanosis or edema    ASSESSMENT  Encounter Diagnoses   Name Primary?    Postop check Yes       PLAN  1. RTC:2 weeks  2. Precautions discussed at length.

## 2018-06-25 NOTE — TELEPHONE ENCOUNTER
----- Message from Radha Munoz sent at 6/25/2018  7:18 AM CDT -----  Type: Needs Medical Advice    Who Called:  Patient   Symptoms (please be specific):  Bleeding over the weekend following surgery  How long has patient had these symptoms:  N/a  Pharmacy name and phone #:  n/a  Best Call Back Number: [phone]  Additional Information:

## 2018-07-17 ENCOUNTER — HOSPITAL ENCOUNTER (OUTPATIENT)
Dept: RADIOLOGY | Facility: CLINIC | Age: 58
Discharge: HOME | End: 2018-07-17
Attending: OBSTETRICS & GYNECOLOGY
Payer: COMMERCIAL

## 2018-07-17 DIAGNOSIS — Z12.39 BREAST CANCER SCREENING: ICD-10-CM

## 2018-07-17 PROCEDURE — 77063 BREAST TOMOSYNTHESIS BI: CPT | Mod: 26,,, | Performed by: RADIOLOGY

## 2018-07-17 PROCEDURE — 77067 SCR MAMMO BI INCL CAD: CPT | Mod: TC,PO

## 2018-07-17 PROCEDURE — 77067 SCR MAMMO BI INCL CAD: CPT | Mod: 26,,, | Performed by: RADIOLOGY

## 2018-07-19 ENCOUNTER — OFFICE VISIT (OUTPATIENT)
Dept: OBSTETRICS AND GYNECOLOGY | Facility: CLINIC | Age: 58
End: 2018-07-19
Payer: COMMERCIAL

## 2018-07-19 VITALS
DIASTOLIC BLOOD PRESSURE: 78 MMHG | SYSTOLIC BLOOD PRESSURE: 112 MMHG | WEIGHT: 228.38 LBS | BODY MASS INDEX: 36.86 KG/M2

## 2018-07-19 DIAGNOSIS — Z09 POSTOP CHECK: Primary | ICD-10-CM

## 2018-07-19 PROCEDURE — 99999 PR PBB SHADOW E&M-EST. PATIENT-LVL III: CPT | Mod: PBBFAC,,, | Performed by: OBSTETRICS & GYNECOLOGY

## 2018-07-19 PROCEDURE — 99024 POSTOP FOLLOW-UP VISIT: CPT | Mod: S$GLB,,, | Performed by: OBSTETRICS & GYNECOLOGY

## 2018-07-19 NOTE — PROGRESS NOTES
POST-OP NOTE    7/19/2018    HPI: no complaints    Past Medical History:   Diagnosis Date    Anxiety     this came on since lap kadeem sgy    Arthritis     Deviated septum     GERD (gastroesophageal reflux disease)     Hypertension     PAC (premature atrial contraction)     Plantar fasciitis     PMB (postmenopausal bleeding) 05/2018    Seasonal allergies     Sinus disorder     Uterine leiomyoma 05/2018    Varicose veins of both lower extremities      Past Surgical History:   Procedure Laterality Date    BREAST BIOPSY Left 2015    benign    CHOLECYSTECTOMY  6-1-2015    Dr Ashraf    COLONOSCOPY N/A 11/23/2015    Procedure: COLONOSCOPY;  Surgeon: Theo Stinson MD;  Location: [facility] ENDO;  Service: Endoscopy;  Laterality: N/A;    CYSTOSCOPY N/A 6/7/2018    Procedure: CYSTOSCOPY;  Surgeon: Anaya Caldera MD;  Location: [facility] OR;  Service: OB/GYN;  Laterality: N/A;    HYSTERECTOMY      KNEE ARTHROSCOPY W/ MENISCECTOMY  11/15/2000    right     LAPAROSCOPIC SALPINGO-OOPHORECTOMY Bilateral 6/7/2018    Procedure: [id number];  Surgeon: Anaya Caldera MD;  Location: [facility] OR;  Service: OB/GYN;  Laterality: Bilateral;    LAPAROSCOPIC TOTAL HYSTERECTOMY N/A 6/7/2018    Procedure: HYSTERECTOMY-TOTAL LAPAROSCOPIC (TLH);  Surgeon: Anaya Caldera MD;  Location: [facility] OR;  Service: OB/GYN;  Laterality: N/A;    OOPHORECTOMY       Current Outpatient Prescriptions   Medication Sig Dispense Refill    ACUICYN 0.01 % Spry 2 (two) times daily as needed.       acyclovir 5% (ZOVIRAX) 5 % Crea Apply topically 5 (five) times daily. 5 g prn    biotin 10 mg Tab Take 100 mg by mouth once daily.       cetirizine (ZYRTEC) 10 MG tablet Take 10 mg by mouth daily as needed for Allergies or Rhinitis.       econazole nitrate 1 % cream Apply to inner thighs and buttocks BID PRN flare 85 g 2    loratadine (CLARITIN) 10 mg tablet Take 10 mg by mouth daily as needed for Allergies.       mometasone (NASONEX) 50 mcg/actuation nasal spray 2 sprays by Nasal route daily as needed (rhinitis). 1 each prn    multivitamin (THERAGRAN) per tablet Take 1 tablet by mouth once daily.      olmesartan-hydrochlorothiazide (BENICAR HCT) 40-12.5 mg Tab Take 1 tablet by mouth once daily. 90 tablet 4    olopatadine (PATADAY) 0.2 % Drop Place 1 drop into both eyes nightly as needed.       potassium chloride (MICRO-K) 10 MEQ CpSR Take 1 capsule (10 mEq total) by mouth once daily. 90 capsule 4    RABEprazole (ACIPHEX) 20 mg tablet Take 1 tablet (20 mg total) by mouth once daily. 30 tablet 11     No current facility-administered medications for this visit.      Review of patient's allergies indicates:   Allergen Reactions    Augmentin [amoxicillin-pot clavulanate] Nausea And Vomiting     Gi upset    Bentyl [dicyclomine] Other (See Comments)     Worsening anxiety    Naprosyn [naproxen]     Omeprazole      Other reaction(s): ABDOMINAL PAIN    Adhesive Rash    Lexapro [escitalopram oxalate] Anxiety     Bad dreams, feeling of doom, sweats    Sulfa (sulfonamide antibiotics) Rash     Other reaction(s): measle like rash     Social History   Substance Use Topics    Smoking status: Never Smoker    Smokeless tobacco: Never Used    Alcohol use Yes      Comment: rarely / few times per yr     /78   Wt 103.6 kg (228 lb 6.3 oz)   LMP 06/07/2018     ROS:  GENERAL: Denies weight gain, weight loss, fatigue, and malaise.   SKIN: Denies rash or lesions.   HEAD: Denies head injury or headache.   NODES: Denies enlarged lymph nodes.   CHEST: Denies chest pain or shortness of breath.   CARDIOVASCULAR: Denies palpitations or left sided chest pain.   ABDOMEN: No abdominal pain, constipation, diarrhea, nausea, vomiting or rectal bleeding.   URINARY: No frequency, urgency, dysuria, or hematuria  MUSCULOSKELETAL: Denies joint pain or swelling.   NEUROLOGIC: Denies seizures.    PE:   APPEARANCE: Well nourished, well developed, in  no acute distress.  SKIN: Normal skin turgor, no lesions.  ABDOMEN: Incision healing fine. Soft. No tenderness or masses. No hepatosplenomegaly. No hernias.EXTREMITIES: NO clubbing cyanosis or edema    ASSESSMENT  Encounter Diagnoses   Name Primary?    Postop check Yes       PLAN  1. RTC:4 weeks and precautions discussed.

## 2018-08-10 ENCOUNTER — TELEPHONE (OUTPATIENT)
Dept: FAMILY MEDICINE | Facility: CLINIC | Age: 58
End: 2018-08-10

## 2018-08-10 NOTE — TELEPHONE ENCOUNTER
----- Message from Katheryn Mcclellan sent at 8/10/2018  1:07 PM CDT -----  Contact: Patient  Type:  Same Day Appointment Request    Caller is requesting a same day appointment.  Caller declined first available appointment listed below.      Name of Caller:  Mercedes, patient  When is the first available appointment?  8/14/18  Symptoms:  Cough, congestion, ear ache  Best Call Back Number:  [phone] will not be able to answer from 1:46-2:40  Additional Information:

## 2018-08-17 NOTE — TELEPHONE ENCOUNTER
Patient called to follow up on message. Patient went to urgent care and is following up with her ENT.

## 2018-08-21 ENCOUNTER — OFFICE VISIT (OUTPATIENT)
Dept: OBSTETRICS AND GYNECOLOGY | Facility: CLINIC | Age: 58
End: 2018-08-21
Payer: COMMERCIAL

## 2018-08-21 VITALS
WEIGHT: 231.25 LBS | DIASTOLIC BLOOD PRESSURE: 78 MMHG | RESPIRATION RATE: 18 BRPM | BODY MASS INDEX: 37.33 KG/M2 | SYSTOLIC BLOOD PRESSURE: 108 MMHG

## 2018-08-21 DIAGNOSIS — N89.8 VAGINAL DISCHARGE: ICD-10-CM

## 2018-08-21 DIAGNOSIS — Z09 POSTOP CHECK: Primary | ICD-10-CM

## 2018-08-21 LAB
CANDIDA RRNA VAG QL PROBE: POSITIVE
G VAGINALIS RRNA GENITAL QL PROBE: NEGATIVE
T VAGINALIS RRNA GENITAL QL PROBE: NEGATIVE

## 2018-08-21 PROCEDURE — 87660 TRICHOMONAS VAGIN DIR PROBE: CPT

## 2018-08-21 PROCEDURE — 99024 POSTOP FOLLOW-UP VISIT: CPT | Mod: S$GLB,,, | Performed by: OBSTETRICS & GYNECOLOGY

## 2018-08-21 PROCEDURE — 99999 PR PBB SHADOW E&M-EST. PATIENT-LVL IV: CPT | Mod: PBBFAC,,, | Performed by: OBSTETRICS & GYNECOLOGY

## 2018-08-21 NOTE — PROGRESS NOTES
POST-OP NOTE    8/21/2018    HPI: no complaints    Past Medical History:   Diagnosis Date    Anxiety     this came on since lap kadeem sgy    Arthritis     Deviated septum     GERD (gastroesophageal reflux disease)     Hypertension     PAC (premature atrial contraction)     Plantar fasciitis     PMB (postmenopausal bleeding) 05/2018    Seasonal allergies     Sinus disorder     Uterine leiomyoma 05/2018    Varicose veins of both lower extremities      Past Surgical History:   Procedure Laterality Date    BREAST BIOPSY Left 2015    benign    CHOLECYSTECTOMY  6-1-2015    Dr Ashraf    HYSTERECTOMY      KNEE ARTHROSCOPY W/ MENISCECTOMY  11/15/2000    right     OOPHORECTOMY       Current Outpatient Medications   Medication Sig Dispense Refill    ACUICYN 0.01 % Spry 2 (two) times daily as needed.       acyclovir 5% (ZOVIRAX) 5 % Crea Apply topically 5 (five) times daily. 5 g prn    biotin 10 mg Tab Take 100 mg by mouth once daily.       cetirizine (ZYRTEC) 10 MG tablet Take 10 mg by mouth daily as needed for Allergies or Rhinitis.       econazole nitrate 1 % cream Apply to inner thighs and buttocks BID PRN flare 85 g 2    loratadine (CLARITIN) 10 mg tablet Take 10 mg by mouth daily as needed for Allergies.      mometasone (NASONEX) 50 mcg/actuation nasal spray 2 sprays by Nasal route daily as needed (rhinitis). 1 each prn    multivitamin (THERAGRAN) per tablet Take 1 tablet by mouth once daily.      olmesartan-hydrochlorothiazide (BENICAR HCT) 40-12.5 mg Tab Take 1 tablet by mouth once daily. 90 tablet 4    olopatadine (PATADAY) 0.2 % Drop Place 1 drop into both eyes nightly as needed.       potassium chloride (MICRO-K) 10 MEQ CpSR Take 1 capsule (10 mEq total) by mouth once daily. 90 capsule 4    RABEprazole (ACIPHEX) 20 mg tablet Take 1 tablet (20 mg total) by mouth once daily. 30 tablet 11     No current facility-administered medications for this visit.      Review of patient's allergies  indicates:   Allergen Reactions    Augmentin [amoxicillin-pot clavulanate] Nausea And Vomiting     Gi upset    Bentyl [dicyclomine] Other (See Comments)     Worsening anxiety    Naprosyn [naproxen]     Omeprazole      Other reaction(s): ABDOMINAL PAIN    Adhesive Rash    Lexapro [escitalopram oxalate] Anxiety     Bad dreams, feeling of doom, sweats    Sulfa (sulfonamide antibiotics) Rash     Other reaction(s): measle like rash     Social History     Tobacco Use    Smoking status: Never Smoker    Smokeless tobacco: Never Used   Substance Use Topics    Alcohol use: Yes     Comment: rarely / few times per yr    Drug use: No     /78   Resp 18   Wt 104.9 kg (231 lb 4.2 oz)   LMP 06/07/2018     ROS:  GENERAL: Denies weight gain, weight loss, fatigue, and malaise.   SKIN: Denies rash or lesions.   HEAD: Denies head injury or headache.   NODES: Denies enlarged lymph nodes.   CHEST: Denies chest pain or shortness of breath.   CARDIOVASCULAR: Denies palpitations or left sided chest pain.   ABDOMEN: No abdominal pain, constipation, diarrhea, nausea, vomiting or rectal bleeding.   URINARY: No frequency, urgency, dysuria, or hematuria  MUSCULOSKELETAL: Denies joint pain or swelling.   NEUROLOGIC: Denies seizures.    PE:   APPEARANCE: Well nourished, well developed, in no acute distress.  SKIN: Normal skin turgor, no lesions.  ABDOMEN: Incision healing fine. Soft. No tenderness or masses. No hepatosplenomegaly. No hernias.  PELVIC: Normal external female genitalia without lesions. Normal hair distribution. Adequate perineal body, normal urethral meatus. Normal palpable bladder and urethra. Vagina moist and well rugated without lesions or discharge.   EXTREMITIES: NO clubbing cyanosis or edema    ASSESSMENT  Encounter Diagnoses   Name Primary?    Postop check Yes    Vaginal discharge        PLAN  1. RTC:prn

## 2018-08-23 ENCOUNTER — PATIENT MESSAGE (OUTPATIENT)
Dept: OBSTETRICS AND GYNECOLOGY | Facility: CLINIC | Age: 58
End: 2018-08-23

## 2018-08-23 RX ORDER — FLUCONAZOLE 150 MG/1
150 TABLET ORAL ONCE
Qty: 1 TABLET | Refills: 0 | Status: SHIPPED | OUTPATIENT
Start: 2018-08-23 | End: 2018-08-23

## 2018-09-24 ENCOUNTER — OFFICE VISIT (OUTPATIENT)
Dept: DERMATOLOGY | Facility: CLINIC | Age: 58
End: 2018-09-24
Payer: COMMERCIAL

## 2018-09-24 VITALS — BODY MASS INDEX: 37.12 KG/M2 | WEIGHT: 231 LBS | HEIGHT: 66 IN

## 2018-09-24 DIAGNOSIS — Z80.8 FAMILY HISTORY OF MALIGNANT MELANOMA: ICD-10-CM

## 2018-09-24 DIAGNOSIS — L81.4 SOLAR LENTIGO: ICD-10-CM

## 2018-09-24 DIAGNOSIS — L82.1 SEBORRHEIC KERATOSES: ICD-10-CM

## 2018-09-24 DIAGNOSIS — D22.9 MULTIPLE BENIGN NEVI: Primary | ICD-10-CM

## 2018-09-24 DIAGNOSIS — D18.01 CHERRY ANGIOMA: ICD-10-CM

## 2018-09-24 PROCEDURE — 99213 OFFICE O/P EST LOW 20 MIN: CPT | Mod: S$GLB,,, | Performed by: DERMATOLOGY

## 2018-09-24 PROCEDURE — 99999 PR PBB SHADOW E&M-EST. PATIENT-LVL II: CPT | Mod: PBBFAC,,, | Performed by: DERMATOLOGY

## 2018-09-24 PROCEDURE — 3008F BODY MASS INDEX DOCD: CPT | Mod: CPTII,S$GLB,, | Performed by: DERMATOLOGY

## 2018-09-24 NOTE — PROGRESS NOTES
Subjective:       Patient ID:  Mercedes Laura is a 58 y.o. female who presents for   Chief Complaint   Patient presents with    Skin Check     TBSE    Spot     abdomen     Patient last seen 9/2017 for TBSE and benign lesions, bx of mildly atypical nevus on abdomen    No h/o skin cancer  Here for FU skin check and new complaint  Father with h/o melanoma age 78, back, treated with wide excision              Spot  - Initial  Affected locations: abdomen  Duration: years.  Signs / symptoms: redness  Severity: mild  Timing: constant  Aggravated by: nothing  Relieving factors/Treatments tried: nothing        Review of Systems   Constitutional: Negative.  Negative for fever, chills and fatigue.   Skin: Positive for daily sunscreen use, activity-related sunscreen use and wears hat.   Hematologic/Lymphatic: Does not bruise/bleed easily.        Objective:    Physical Exam   Constitutional: She appears well-developed and well-nourished. No distress.   Neurological: She is alert and oriented to person, place, and time. She is not disoriented.   Psychiatric: She has a normal mood and affect.   Skin:   Areas Examined (abnormalities noted in diagram):   Scalp / Hair Palpated and Inspected  Head / Face Inspection Performed  Neck Inspection Performed  Chest / Axilla Inspection Performed  Abdomen Inspection Performed  Genitals / Buttocks / Groin Inspection Performed  Back Inspection Performed  RUE Inspected  LUE Inspection Performed  RLE Inspected  LLE Inspection Performed  Nails and Digits Inspection Performed                       Diagram Legend     Erythematous scaling macule/papule c/w actinic keratosis       Vascular papule c/w angioma      Pigmented verrucoid papule/plaque c/w seborrheic keratosis      Yellow umbilicated papule c/w sebaceous hyperplasia      Irregularly shaped tan macule c/w lentigo     1-2 mm smooth white papules consistent with Milia      Movable subcutaneous cyst with punctum c/w epidermal inclusion cyst       Subcutaneous movable cyst c/w pilar cyst      Firm pink to brown papule c/w dermatofibroma      Pedunculated fleshy papule(s) c/w skin tag(s)      Evenly pigmented macule c/w junctional nevus     Mildly variegated pigmented, slightly irregular-bordered macule c/w mildly atypical nevus      Flesh colored to evenly pigmented papule c/w intradermal nevus       Pink pearly papule/plaque c/w basal cell carcinoma      Erythematous hyperkeratotic cursted plaque c/w SCC      Surgical scar with no sign of skin cancer recurrence      Open and closed comedones      Inflammatory papules and pustules      Verrucoid papule consistent consistent with wart     Erythematous eczematous patches and plaques     Dystrophic onycholytic nail with subungual debris c/w onychomycosis     Umbilicated papule    Erythematous-base heme-crusted tan verrucoid plaque consistent with inflamed seborrheic keratosis     Erythematous Silvery Scaling Plaque c/w Psoriasis     See annotation      Assessment / Plan:        Multiple benign nevi  Discussed ABCDE's of nevi.  Monitor for new mole or moles that are becoming bigger, darker, irritated, or developing irregular borders. Brochure provided.    Solar lentigo  This is a benign hyperpigmented sun induced lesion. Daily sun protection will reduce the number of new lesions. Treatment of these benign lesions are considered cosmetic.    Seborrheic keratoses  These are benign inherited growths without a malignant potential. Reassurance given to patient. No treatment is necessary.     Family history of malignant melanoma (father, 70s)  Total body skin examination performed today including at least 12 points as noted in physical examination. No lesions suspicious for malignancy noted.    Cherry angioma  This is a benign vascular lesion. Reassurance given. No treatment required.     Patient instructed in importance in daily sun protection of at least spf 30. Sun avoidance and topical protection discussed.    Recommend Elta MD (physician office) COTZ sensitive (available online) for daily use on face and neck.  Patient encouraged to wear hat for all outdoor exposure.   Also discussed sun protective clothing.         Follow-up if symptoms worsen or fail to improve.

## 2018-10-06 ENCOUNTER — LAB VISIT (OUTPATIENT)
Dept: LAB | Facility: HOSPITAL | Age: 58
End: 2018-10-06
Attending: FAMILY MEDICINE
Payer: COMMERCIAL

## 2018-10-06 DIAGNOSIS — R73.9 HYPERGLYCEMIA: ICD-10-CM

## 2018-10-06 DIAGNOSIS — I10 GOOD HYPERTENSION CONTROL: ICD-10-CM

## 2018-10-06 LAB
ALBUMIN SERPL BCP-MCNC: 4.1 G/DL
ALP SERPL-CCNC: 69 U/L
ALT SERPL W/O P-5'-P-CCNC: 19 U/L
ANION GAP SERPL CALC-SCNC: 12 MMOL/L
AST SERPL-CCNC: 18 U/L
BILIRUB SERPL-MCNC: 0.7 MG/DL
BUN SERPL-MCNC: 18 MG/DL
CALCIUM SERPL-MCNC: 10.2 MG/DL
CHLORIDE SERPL-SCNC: 106 MMOL/L
CO2 SERPL-SCNC: 22 MMOL/L
CREAT SERPL-MCNC: 0.8 MG/DL
EST. GFR  (AFRICAN AMERICAN): >60 ML/MIN/1.73 M^2
EST. GFR  (NON AFRICAN AMERICAN): >60 ML/MIN/1.73 M^2
ESTIMATED AVG GLUCOSE: 111 MG/DL
GLUCOSE SERPL-MCNC: 107 MG/DL
HBA1C MFR BLD HPLC: 5.5 %
POTASSIUM SERPL-SCNC: 4.1 MMOL/L
PROT SERPL-MCNC: 7.6 G/DL
SODIUM SERPL-SCNC: 140 MMOL/L

## 2018-10-06 PROCEDURE — 80053 COMPREHEN METABOLIC PANEL: CPT

## 2018-10-06 PROCEDURE — 36415 COLL VENOUS BLD VENIPUNCTURE: CPT | Mod: PO

## 2018-10-06 PROCEDURE — 83036 HEMOGLOBIN GLYCOSYLATED A1C: CPT

## 2018-10-08 ENCOUNTER — OFFICE VISIT (OUTPATIENT)
Dept: FAMILY MEDICINE | Facility: CLINIC | Age: 58
End: 2018-10-08
Payer: COMMERCIAL

## 2018-10-08 VITALS
HEART RATE: 58 BPM | OXYGEN SATURATION: 96 % | WEIGHT: 227.06 LBS | BODY MASS INDEX: 36.49 KG/M2 | SYSTOLIC BLOOD PRESSURE: 131 MMHG | HEIGHT: 66 IN | RESPIRATION RATE: 12 BRPM | TEMPERATURE: 99 F | DIASTOLIC BLOOD PRESSURE: 79 MMHG

## 2018-10-08 DIAGNOSIS — R73.9 HYPERGLYCEMIA: ICD-10-CM

## 2018-10-08 DIAGNOSIS — K21.9 GASTROESOPHAGEAL REFLUX DISEASE, ESOPHAGITIS PRESENCE NOT SPECIFIED: ICD-10-CM

## 2018-10-08 DIAGNOSIS — E55.9 VITAMIN D DEFICIENCY: ICD-10-CM

## 2018-10-08 DIAGNOSIS — E66.9 OBESITY (BMI 30-39.9): ICD-10-CM

## 2018-10-08 DIAGNOSIS — I10 GOOD HYPERTENSION CONTROL: Primary | ICD-10-CM

## 2018-10-08 PROCEDURE — 3075F SYST BP GE 130 - 139MM HG: CPT | Mod: CPTII,S$GLB,, | Performed by: FAMILY MEDICINE

## 2018-10-08 PROCEDURE — 3008F BODY MASS INDEX DOCD: CPT | Mod: CPTII,S$GLB,, | Performed by: FAMILY MEDICINE

## 2018-10-08 PROCEDURE — 99999 PR PBB SHADOW E&M-EST. PATIENT-LVL III: CPT | Mod: PBBFAC,,, | Performed by: FAMILY MEDICINE

## 2018-10-08 PROCEDURE — 3078F DIAST BP <80 MM HG: CPT | Mod: CPTII,S$GLB,, | Performed by: FAMILY MEDICINE

## 2018-10-08 PROCEDURE — 99214 OFFICE O/P EST MOD 30 MIN: CPT | Mod: S$GLB,,, | Performed by: FAMILY MEDICINE

## 2018-10-08 RX ORDER — PANTOPRAZOLE SODIUM 40 MG/1
40 TABLET, DELAYED RELEASE ORAL DAILY
Qty: 90 TABLET | Refills: 3 | Status: SHIPPED | OUTPATIENT
Start: 2018-10-08 | End: 2018-12-04

## 2018-10-08 NOTE — PROGRESS NOTES
Subjective:       Patient ID: Mercedes Laura is a 58 y.o. female.    Chief Complaint: No chief complaint on file.    HPI  Review of Systems   Constitutional: Negative for fatigue and unexpected weight change.   Respiratory: Negative for chest tightness and shortness of breath.    Cardiovascular: Negative for chest pain, palpitations and leg swelling.   Gastrointestinal: Negative for abdominal pain.   Musculoskeletal: Negative for arthralgias.   Neurological: Negative for dizziness, syncope, light-headedness and headaches.       Patient Active Problem List   Diagnosis    Arthritis    Pedal edema    Hyperglycemia    Vitamin D deficiency    Hiatal hernia with gastroesophageal reflux    Panic attacks    Good hypertension control    Anxiety    GERD (gastroesophageal reflux disease)    Gastroesophageal reflux disease with esophagitis    Cardiovascular risk factor, ASCVD 10-year risk 1.9%, 2015    Abdominal obesity    Obesity (BMI 30-39.9)    Venous reflux    Postmenopausal bleeding     Patient is here for a chronic conditions follow up.    Lab Visit on 10/06/2018   Component Date Value Ref Range Status    Sodium 10/06/2018 140  136 - 145 mmol/L Final    Potassium 10/06/2018 4.1  3.5 - 5.1 mmol/L Final    Chloride 10/06/2018 106  95 - 110 mmol/L Final    CO2 10/06/2018 22* 23 - 29 mmol/L Final    Glucose 10/06/2018 107  70 - 110 mg/dL Final    BUN, Bld 10/06/2018 18  6 - 20 mg/dL Final    Creatinine 10/06/2018 0.8  0.5 - 1.4 mg/dL Final    Calcium 10/06/2018 10.2  8.7 - 10.5 mg/dL Final    Total Protein 10/06/2018 7.6  6.0 - 8.4 g/dL Final    Albumin 10/06/2018 4.1  3.5 - 5.2 g/dL Final    Total Bilirubin 10/06/2018 0.7  0.1 - 1.0 mg/dL Final    Alkaline Phosphatase 10/06/2018 69  55 - 135 U/L Final    AST 10/06/2018 18  10 - 40 U/L Final    ALT 10/06/2018 19  10 - 44 U/L Final    Anion Gap 10/06/2018 12  8 - 16 mmol/L Final    eGFR if African American 10/06/2018 >60.0  >60 mL/min/1.73 m^2  "Final    eGFR if non African American 10/06/2018 >60.0  >60 mL/min/1.73 m^2 Final    Hemoglobin A1C 10/06/2018 5.5  4.0 - 5.6 % Final    Estimated Avg Glucose 10/06/2018 111  68 - 131 mg/dL Final   }  Objective:      Physical Exam   Constitutional: She is oriented to person, place, and time. She appears well-developed and well-nourished.   Cardiovascular: Normal rate, regular rhythm and normal heart sounds.   Pulmonary/Chest: Effort normal and breath sounds normal.   Musculoskeletal: She exhibits no edema.   Neurological: She is alert and oriented to person, place, and time.   Skin: Skin is warm and dry.   Psychiatric: She has a normal mood and affect.   Nursing note and vitals reviewed.      Assessment:       1. Good hypertension control    2. Hyperglycemia    3. Vitamin D deficiency    4. Obesity (BMI 30-39.9)    5. Gastroesophageal reflux disease, esophagitis presence not specified        Plan:       1. Good hypertension control  Controlled on current medications.  Continue current medications.    - CBC auto differential; Future  - Comprehensive metabolic panel; Future    2. Hyperglycemia   Your blood sugar is borderline high.  This means you are at risk for developing type 2 diabetes mellitus.  To lessen your risk you should exercise regularly, avoid excess carbohydrates and work toward a body mass index of less than 25.      - Hemoglobin A1c; Future    3. Vitamin D deficiency  Screen and treat as indicated:    - Vitamin D; Future    4. Obesity (BMI 30-39.9)  Counseled patient on his ideal body weight, health consequences of being obese and current recommendations including weekly exercise and a heart healthy diet.  Current BMI is:Estimated body mass index is 36.65 kg/m² as calculated from the following:    Height as of this encounter: 5' 6" (1.676 m).    Weight as of this encounter: 103 kg (227 lb 1.2 oz)..  Patient is aware that ideal BMI < 25 or Weight in (lb) to have BMI = 25: 154.6.        5. " Gastroesophageal reflux disease, esophagitis presence not specified  Counseled patient on prevention of reflux with changes in diet and behavior.  I recommended avoidance of greasy and spicy foods, caffeine and eating within 3 hours of bedtime.  I counseled the patient to avoid eating large meals and instead eating more frequent small meals.  I also recommended weight loss and elevation of the head of the bed by 6 inches.  If symptoms persist after these changes medication may be needed to control GERD.  Aciphex not working. Change to     - pantoprazole (PROTONIX) 40 MG tablet; Take 1 tablet (40 mg total) by mouth once daily.  Dispense: 90 tablet; Refill: 3        Time spent with patient: 20 minutes    Patient with be reevaluated in 6 months or sooner prn    Greater than 50% of this visit was spent counseling as described in above documentation:Yes

## 2018-10-08 NOTE — PATIENT INSTRUCTIONS

## 2018-10-27 ENCOUNTER — PATIENT MESSAGE (OUTPATIENT)
Dept: FAMILY MEDICINE | Facility: CLINIC | Age: 58
End: 2018-10-27

## 2018-11-05 ENCOUNTER — TELEPHONE (OUTPATIENT)
Dept: GASTROENTEROLOGY | Facility: CLINIC | Age: 58
End: 2018-11-05

## 2018-11-05 PROBLEM — N95.0 POSTMENOPAUSAL BLEEDING: Status: RESOLVED | Noted: 2018-05-25 | Resolved: 2018-11-05

## 2018-11-05 NOTE — TELEPHONE ENCOUNTER
----- Message from Justyn Pérez sent at 11/5/2018  3:43 PM CST -----  Type:  Sooner Apoointment Request    Caller is requesting a sooner appointment.  Caller declined first available appointment listed below.  Caller will not accept being placed on the waitlist and is requesting a message be sent to doctor.    Name of Caller:  Patient  When is the first available appointment?  12.12  Symptoms:  Acid reflux  Best Call Back Number:  [phone] (home)

## 2018-11-05 NOTE — TELEPHONE ENCOUNTER
Patient offered sooner with NP needs late afternoon appointment because of work given first with NP 12/4 at 330pm. And placed on wait list.

## 2018-12-04 ENCOUNTER — OFFICE VISIT (OUTPATIENT)
Dept: GASTROENTEROLOGY | Facility: CLINIC | Age: 58
End: 2018-12-04
Payer: COMMERCIAL

## 2018-12-04 VITALS
HEIGHT: 67 IN | HEART RATE: 74 BPM | WEIGHT: 221.31 LBS | BODY MASS INDEX: 34.73 KG/M2 | SYSTOLIC BLOOD PRESSURE: 135 MMHG | DIASTOLIC BLOOD PRESSURE: 65 MMHG

## 2018-12-04 DIAGNOSIS — K44.9 HIATAL HERNIA: ICD-10-CM

## 2018-12-04 DIAGNOSIS — R12 HEARTBURN: Primary | ICD-10-CM

## 2018-12-04 PROCEDURE — 3075F SYST BP GE 130 - 139MM HG: CPT | Mod: CPTII,S$GLB,, | Performed by: NURSE PRACTITIONER

## 2018-12-04 PROCEDURE — 99203 OFFICE O/P NEW LOW 30 MIN: CPT | Mod: S$GLB,,, | Performed by: NURSE PRACTITIONER

## 2018-12-04 PROCEDURE — 3008F BODY MASS INDEX DOCD: CPT | Mod: CPTII,S$GLB,, | Performed by: NURSE PRACTITIONER

## 2018-12-04 PROCEDURE — 3078F DIAST BP <80 MM HG: CPT | Mod: CPTII,S$GLB,, | Performed by: NURSE PRACTITIONER

## 2018-12-04 PROCEDURE — 99999 PR PBB SHADOW E&M-EST. PATIENT-LVL IV: CPT | Mod: PBBFAC,,, | Performed by: NURSE PRACTITIONER

## 2018-12-04 RX ORDER — SUCRALFATE 1 G/1
1 TABLET ORAL
Qty: 120 TABLET | Refills: 0 | Status: SHIPPED | OUTPATIENT
Start: 2018-12-04 | End: 2019-02-12 | Stop reason: ALTCHOICE

## 2018-12-04 RX ORDER — RABEPRAZOLE SODIUM 20 MG/1
TABLET, DELAYED RELEASE ORAL
COMMUNITY
Start: 2018-11-18 | End: 2018-12-12 | Stop reason: ALTCHOICE

## 2018-12-04 NOTE — PROGRESS NOTES
"Subjective:       Patient ID: Mercedes Larua is a 58 y.o. female Body mass index is 34.67 kg/m².    Chief Complaint: Gastroesophageal Reflux    This patient is new to me.  Referring Provider:  Dr. Llamas for GERD  Established patient of Dr. Cabrera (last in 11/2015).    Gastroesophageal Reflux   She complains of belching (increased over the past week; chews gum often), globus sensation (occasional), heartburn (rarely), a hoarse voice (with prolonged talking) and a sore throat (mild with prolonged talking). She reports no abdominal pain, no chest pain, no choking, no coughing, no dysphagia, no early satiety or no nausea. This is a chronic problem. The current episode started more than 1 year ago (started several years ago; reports in 8/2018 had ear and throat infection and treated with antibiotics; saw ENT and given clindamycin and symptoms resolved but told had signs of LPRD/GERD). Associated symptoms include weight loss (lost a few since hysterectomy in 6/2018 due to dieting; lost about 7-10 lbs). Pertinent negatives include no fatigue or melena. Risk factors include obesity and hiatal hernia (rare nsaid use). She has tried a histamine-2 antagonist, a PPI, head elevation and a diet change (aciphex 20 mg once daily- restarted about a month ago and GERD is significantly improved, zantac 150 mg once daily for sure and occasional BID use; diet changes no caffeine, no lactose, & no chocolate; PAST: protonix x 4-6 weeks- no relief) for the symptoms. The treatment provided moderate (PAST: omeprazole "gave her gastritis", prevacid) relief. Past procedures include an EGD. saw ENT in the past few months and told had signs of LPRD/GERD.     Review of Systems   Constitutional: Positive for weight loss (lost a few since hysterectomy in 6/2018 due to dieting; lost about 7-10 lbs). Negative for appetite change, chills, fatigue and fever.   HENT: Positive for congestion (occasional), hoarse voice (with prolonged talking) and sore " throat (mild with prolonged talking). Negative for trouble swallowing.    Respiratory: Negative for cough, choking and shortness of breath.    Cardiovascular: Negative for chest pain.   Gastrointestinal: Positive for heartburn (rarely). Negative for abdominal pain, anal bleeding, blood in stool, constipation, diarrhea, dysphagia, melena, nausea, rectal pain and vomiting.   Neurological: Negative for weakness.       Past Medical History:   Diagnosis Date    Anxiety     this came on since lap kadeem sgy    Arthritis     Colon polyp     Deviated septum     GERD (gastroesophageal reflux disease)     Hypertension     PAC (premature atrial contraction)     Plantar fasciitis     PMB (postmenopausal bleeding) 05/2018    Seasonal allergies     Sinus disorder     Uterine leiomyoma 05/2018    Varicose veins of both lower extremities      Past Surgical History:   Procedure Laterality Date    BREAST BIOPSY Left 2015    benign    CHOLECYSTECTOMY  6-1-2015    Dr Ashraf    CHOLECYSTECTOMY-LAPAROSCOPIC N/A 6/1/2015    [facility]    COLONOSCOPY N/A 11/23/2015    Procedure: COLONOSCOPY;  Surgeon: Theo Stinson MD;  Location: [facility];  Service: Endoscopy;  Laterality: N/A; repeat in 5 years for surveillance    COLONOSCOPY N/A 11/23/2015    [facility] by [facility] at [facility] ENDO    CYSTOSCOPY N/A 6/7/2018    Procedure: CYSTOSCOPY;  Surgeon: Anaya Caldera MD;  Location: [facility] OR;  Service: OB/GYN;  Laterality: N/A;    CYSTOSCOPY N/A 6/7/2018    [facility]    ESOPHAGOGASTRODUODENOSCOPY (EGD) N/A 6/22/2015    [facility] ENDO    ESOPHAGOGASTRODUODENOSCOPY (EGD) N/A 6/9/2014    [facility] at [facility] ENDO    HYSTERECTOMY      HYSTERECTOMY-TOTAL LAPAROSCOPIC ([facility]) N/A 6/7/2018    [facility] OR    KNEE ARTHROSCOPY W/ MENISCECTOMY  11/15/2000    right      LAPAROSCOPIC SALPINGO-OOPHORECTOMY Bilateral 6/7/2018    Procedure: [id number];  Surgeon: Anaya Caldera MD;  Location: [facility] OR;  Service: OB/GYN;  Laterality: Bilateral;    LAPAROSCOPIC TOTAL HYSTERECTOMY N/A 6/7/2018    Procedure: HYSTERECTOMY-TOTAL LAPAROSCOPIC (TLH);  Surgeon: Anaya Caldera MD;  Location: [facility] OR;  Service: OB/GYN;  Laterality: N/A;    OOPHORECTOMY      [id number] Bilateral 6/7/2018    Per[facility] at [facility] OR    UPPER GASTROINTESTINAL ENDOSCOPY  06/22/2015    Dr. Cabrera     Family History   Problem Relation Age of Onset    Hypertension Mother     Arthritis Mother     Hypertension Father     Cancer Father         melanoma    Melanoma Father     COPD Father     Heart disease Father         chf    Diverticulitis Maternal Grandmother     Arthritis Maternal Aunt     Diabetes Maternal Aunt     Hypertension Maternal Aunt     Arthritis Maternal Uncle     Breast cancer Neg Hx     Ovarian cancer Neg Hx     Psoriasis Neg Hx     Lupus Neg Hx     Eczema Neg Hx     Colon cancer Neg Hx     Colon polyps Neg Hx     Crohn's disease Neg Hx     Esophageal cancer Neg Hx     Stomach cancer Neg Hx      Wt Readings from Last 10 Encounters:   12/04/18 100.4 kg (221 lb 5.5 oz)   10/08/18 103 kg (227 lb 1.2 oz)   09/24/18 104.8 kg (231 lb)   08/21/18 104.9 kg (231 lb 4.2 oz)   07/19/18 103.6 kg (228 lb 6.3 oz)   06/25/18 102.8 kg (226 lb 10.1 oz)   06/21/18 103.6 kg (228 lb 6.3 oz)   06/07/18 104.8 kg (231 lb)   05/25/18 105.1 kg (231 lb 11.3 oz)   04/23/18 103.4 kg (228 lb)     Lab Results   Component Value Date    WBC 8.43 06/08/2018    HGB 11.5 (L) 06/08/2018    HCT 35.2 (L) 06/08/2018    MCV 86 06/08/2018     06/08/2018     CMP  Sodium   Date Value Ref Range Status   10/06/2018 140 136 - 145 mmol/L Final     Potassium   Date Value Ref Range Status   10/06/2018 4.1 3.5 - 5.1 mmol/L Final      Chloride   Date Value Ref Range Status   10/06/2018 106 95 - 110 mmol/L Final     CO2   Date Value Ref Range Status   10/06/2018 22 (L) 23 - 29 mmol/L Final     Glucose   Date Value Ref Range Status   10/06/2018 107 70 - 110 mg/dL Final     BUN, Bld   Date Value Ref Range Status   10/06/2018 18 6 - 20 mg/dL Final     Creatinine   Date Value Ref Range Status   10/06/2018 0.8 0.5 - 1.4 mg/dL Final     Calcium   Date Value Ref Range Status   10/06/2018 10.2 8.7 - 10.5 mg/dL Final     Total Protein   Date Value Ref Range Status   10/06/2018 7.6 6.0 - 8.4 g/dL Final     Albumin   Date Value Ref Range Status   10/06/2018 4.1 3.5 - 5.2 g/dL Final     Total Bilirubin   Date Value Ref Range Status   10/06/2018 0.7 0.1 - 1.0 mg/dL Final     Comment:     For infants and newborns, interpretation of results should be based  on gestational age, weight and in agreement with clinical  observations.  Premature Infant recommended reference ranges:  Up to 24 hours.............<8.0 mg/dL  Up to 48 hours............<12.0 mg/dL  3-5 days..................<15.0 mg/dL  6-29 days.................<15.0 mg/dL       Alkaline Phosphatase   Date Value Ref Range Status   10/06/2018 69 55 - 135 U/L Final     AST   Date Value Ref Range Status   10/06/2018 18 10 - 40 U/L Final     ALT   Date Value Ref Range Status   10/06/2018 19 10 - 44 U/L Final     Anion Gap   Date Value Ref Range Status   10/06/2018 12 8 - 16 mmol/L Final     eGFR if    Date Value Ref Range Status   10/06/2018 >60.0 >60 mL/min/1.73 m^2 Final     eGFR if non    Date Value Ref Range Status   10/06/2018 >60.0 >60 mL/min/1.73 m^2 Final     Comment:     Calculation used to obtain the estimated glomerular filtration  rate (eGFR) is the CKD-EPI equation.        Lab Results   Component Value Date    AMYLASE 48 05/31/2015     Lab Results   Component Value Date    LIPASE 21 01/20/2018     Lab Results   Component Value Date    TSH 0.851 03/31/2015  "    Reviewed prior medical records including radiology report of 1/20/18 ct abdomen pelvis & endoscopy history (see surgical history).    11/23/15 Colonoscopy was reviewed and procedure report states:   " Findings:       The perianal examination was normal.       Non-bleeding internal hemorrhoids were found during retroflexion.        The hemorrhoids were medium-sized.       A 3 mm polyp was found in the transverse colon. The polyp was        sessile. The polyp was removed with a cold biopsy forceps. Resection        and retrieval were complete.       A 7 mm polyp was found in the ascending colon. The polyp was        sessile. The polyp was removed with a cold snare. Resection and        retrieval were complete.       The rectum, sigmoid colon, descending colon, cecum, appendiceal        orifice and ileocecal valve appeared normal.       The terminal ileum appeared normal.  Impression:          - Non-bleeding internal hemorrhoids.                       - One 3 mm polyp in the transverse colon. Resected                        and retrieved.                       - One 7 mm polyp in the ascending colon. Resected                        and retrieved.                       - The rectum, sigmoid colon, descending colon,                        cecum, appendiceal orifice and ileocecal valve are                        normal.                       - The examined portion of the ileum was normal.  Recommendation:      - Patient has a contact number available for                        emergencies. The signs and symptoms of potential                        delayed complications were discussed with the                        patient. Return to normal activities tomorrow.                        Written discharge instructions were provided to the                        patient.                       - High fiber diet.                       - Continue present medications.                       - Await pathology results.           " "            - Repeat colonoscopy in 5-10 years for surveillance.                       - Discharge patient to home (ambulatory).                       - Return to my office after studies are complete.                       - If the pathology report reveals adenomatous                        tissue, then repeat the colonoscopy for surveillance                        in 5 years. ".  Biopsy results:   "FINAL PATHOLOGIC DIAGNOSIS  1. COLON MUCOSA: TUBULAR ADENOMATOUS POLYP.  2. COLON MUCOSA: TUBULAR ADENOMATOUS POLYP."    6/22/15 EGD was reviewed and procedure report states:   " Findings:       The examined esophagus was normal.       A small hiatus hernia was present.       Patchy mildly erythematous mucosa without bleeding was found in the        gastric body and in the gastric antrum. Biopsies were taken with a        cold forceps for histology.       Diffuse mild inflammation characterized by congestion (edema) and        erythema was found in the duodenal bulb. Biopsies were taken with a        cold forceps for histology.       The 2nd part of the duodenum was normal.  Impression:          - Normal esophagus.                       - Small hiatus hernia.                       - Erythematous mucosa in the gastric body and                        antrum. Biopsied.                       - Duodenitis. Biopsied.                       - Normal 2nd part of the duodenum.  Recommendation:      - Resume regular diet.                       - Continue present medications.                       - No aspirin, ibuprofen, naproxen, or other                        non-steroidal anti-inflammatory drugs.                       - Await pathology results.                       - [facility] care.                       - Use Protonix (pantoprazole) 40 mg PO daily.".  Biopsy results:   "Supplemental Diagnosis  Specimen #2: NEGATIVE for H. pylori species by immunostain. Stain controls are reviewed and are " "appropriately  reactive.  [facility].  (Electronically Signed: 2015-06-25 15:40:01 )  Diagnosed by: Yennifer Fitzgerald M.D.  FINAL PATHOLOGIC DIAGNOSIS  1. Duodenum, biopsy:  Largely unremarkable small bowel mucosa with adequate villi.  Gastric foveolar metaplasia.  Negative for active inflammation.  Negative for dysplasia.  2. Stomach, antrum/body, biopsy:  Chronic nonactive gastritis.  Immunostain for H. pylori is pending; supplemental report to follow.  Negative for dysplasia."  Objective:      Physical Exam   Constitutional: She is oriented to person, place, and time. She appears well-developed and well-nourished. No distress.   HENT:   Mouth/Throat: Oropharynx is clear and moist and mucous membranes are normal. No oral lesions. No oropharyngeal exudate.   Eyes: Conjunctivae are normal. Pupils are equal, round, and reactive to light. No scleral icterus.   Cardiovascular: Normal rate.   Pulmonary/Chest: Effort normal and breath sounds normal. No respiratory distress. She has no wheezes.   Abdominal: Soft. Normal appearance and bowel sounds are normal. She exhibits no distension, no abdominal bruit and no mass. There is no hepatosplenomegaly. There is no tenderness. There is no rigidity, no rebound, no guarding, no tenderness at McBurney's point and negative Long's sign.   Neurological: She is alert and oriented to person, place, and time.   Skin: Skin is warm and dry. No rash noted. She is not diaphoretic. No erythema. No pallor.   Non-jaundiced   Psychiatric: She has a normal mood and affect. Her behavior is normal. Judgment and thought content normal.   Nursing note and vitals reviewed.      Assessment:       1. Heartburn    2. Hiatal hernia        Plan:       Heartburn  -     FL Upper GI Air Contrast With KUB; Future; Expected date: 12/04/2018  -   START  sucralfate (CARAFATE) 1 gram tablet; Take 1 tablet (1 g total) by mouth 4 (four) times daily before meals and nightly.  Dispense: 120 tablet; Refill: 0  -  CHANGE TO  "  ranitidine (ZANTAC) 300 MG tablet; Take 1 tablet (300 mg total) by mouth every evening. 30-60 minutes before bedtime.  Dispense: 30 tablet; Refill: 5  - CONTINUE ACIPHEX 20 MG ONCE DAILY AS DIRECTED, take in the morning 30-60 minutes before breakfast, discussed about possible long term use of medication (prefer to use lowest effective dose or discontinuing if possible) and discussed the risks & benefits with taking a reflux medication long term, and to take OTC calcium and vitamin d supplements as directed (such as Citracal +D), pt verbalized understanding  -discussed about the different types of medications used to treat reflux and how to use them, antacids can be used PRN for breakthrough heartburn symptoms by reducing stomach acid that is already produced, H2 blockers (zantac) work by limiting the amount acid production, & PPI's work to block acid production and are taken daily, patient verbalized understanding.  -Educated patient on lifestyle modifications to help control/reduce reflux/abdominal pain including: avoid large meals, avoid eating within 2-3 hours of bedtime (avoid late night eating & lying down soon after eating), elevate head of bed if nocturnal symptoms are present, smoking cessation (if current smoker), & weight loss (if overweight).   -Educated to avoid known foods which trigger reflux symptoms & to minimize/avoid high-fat foods, chocolate, caffeine, citrus, alcohol, & tomato products.  -Advised to avoid/limit use of NSAID's, since they can cause GI upset, bleeding, and/or ulcers. If needed, take with food.   - schedule EGD, discussed procedure with patient, patient verbalized understanding but patient declined EGD    Hiatal hernia  -     FL Upper GI Air Contrast With KUB; Future; Expected date: 12/04/2018  -  START   sucralfate (CARAFATE) 1 gram tablet; Take 1 tablet (1 g total) by mouth 4 (four) times daily before meals and nightly.  Dispense: 120 tablet; Refill: 0  -  CHANGE TO   ranitidine  (ZANTAC) 300 MG tablet; Take 1 tablet (300 mg total) by mouth every evening. 30-60 minutes before bedtime.  Dispense: 30 tablet; Refill: 5  -discussed diagnosis with patient & that it is usually managed by controlling reflux symptoms, surgery is an option, but usually performed if reflux is uncontrolled by medication management and lifestyle/dietary modifications; if symptoms persist despite medication management and lifestyle/dietary modifications, we can refer to general surgery to consult about surgical options, patient verbalized understanding  - schedule EGD, discussed procedure with patient, patient verbalized understanding but patient declined EGD    Follow-up in about 1 month (around 1/4/2019), or if symptoms worsen or fail to improve.      If no improvement in symptoms or symptoms worsen, call/follow-up at clinic or go to ER.

## 2018-12-04 NOTE — LETTER
December 12, 2018      Tory Llamas MD  [address]  [place] [address]           [facility] [facility]  [address]  [place] [address]  Phone: [phone]          Patient: Mercedes Laura   MR Number: [id number]   YOB: 1960   Date of Visit: 12/4/2018       Dear Dr. Tory Llamas:    Thank you for referring Mercedes Laura to me for evaluation. Attached you will find relevant portions of my assessment and plan of care.    If you have questions, please do not hesitate to call me. I look forward to following Mercedes Laura along with you.    Sincerely,    Marti Sow, [facility]    Enclosure  CC:  No Recipients    If you would like to receive this communication electronically, please contact [email or web address] or [phone] to request more information on [facility] Link access.    For providers and/or their staff who would like to refer a patient to [facility], please contact us through our one-stop-shop provider referral line, [facility], at [phone].    If you feel you have received this communication in error or would no longer like to receive these types of communications, please e-mail [email or web address]

## 2018-12-04 NOTE — PATIENT INSTRUCTIONS

## 2018-12-05 ENCOUNTER — TELEPHONE (OUTPATIENT)
Dept: GASTROENTEROLOGY | Facility: CLINIC | Age: 58
End: 2018-12-05

## 2018-12-05 NOTE — TELEPHONE ENCOUNTER
----- Message from Sheri Rosales sent at 12/5/2018  8:49 AM CST -----  Contact: sandra  Type: Needs Medical Advice    Who Called:  Sandar with [facility]  Symptoms (please be specific):    How long has patient had these symptoms:    [facility] and phone #:  [facility] [phone]  Best Call Back Number:   Additional Information: called to verify if patient medication can be changed to liquid,patient having trouble swallowing tablets sucralfate (CARAFATE) 1 gram tablet.requesting a call back

## 2018-12-11 ENCOUNTER — TELEPHONE (OUTPATIENT)
Dept: GASTROENTEROLOGY | Facility: CLINIC | Age: 58
End: 2018-12-11

## 2018-12-11 NOTE — TELEPHONE ENCOUNTER
----- Message from Leesa Zee sent at 12/11/2018  8:33 AM CST -----  Contact: self  Type: Needs Medical Advice    Who Called:  self  [facility]  Best Call Back Number: [phone]  Additional Information: patient states Dexilant is covered under her insurance and she would like to try it. Please call patient. Thanks!   [id number] - [place] - [address]  [address] [address]  Phone: [phone] Fax: [phone]

## 2018-12-12 ENCOUNTER — PATIENT MESSAGE (OUTPATIENT)
Dept: GASTROENTEROLOGY | Facility: CLINIC | Age: 58
End: 2018-12-12

## 2018-12-12 RX ORDER — DEXLANSOPRAZOLE 60 MG/1
60 CAPSULE, DELAYED RELEASE ORAL DAILY
Qty: 30 CAPSULE | Refills: 2 | Status: SHIPPED | OUTPATIENT
Start: 2018-12-12 | End: 2019-02-12 | Stop reason: ALTCHOICE

## 2019-02-12 ENCOUNTER — OFFICE VISIT (OUTPATIENT)
Dept: GASTROENTEROLOGY | Facility: CLINIC | Age: 59
End: 2019-02-12
Payer: COMMERCIAL

## 2019-02-12 VITALS
SYSTOLIC BLOOD PRESSURE: 140 MMHG | HEART RATE: 61 BPM | DIASTOLIC BLOOD PRESSURE: 79 MMHG | WEIGHT: 217.13 LBS | BODY MASS INDEX: 34.01 KG/M2

## 2019-02-12 DIAGNOSIS — R09.A2 GLOBUS SENSATION: ICD-10-CM

## 2019-02-12 DIAGNOSIS — R12 HEARTBURN: Primary | ICD-10-CM

## 2019-02-12 DIAGNOSIS — K44.9 HIATAL HERNIA: ICD-10-CM

## 2019-02-12 DIAGNOSIS — R49.0 HOARSENESS OF VOICE: ICD-10-CM

## 2019-02-12 DIAGNOSIS — J02.9 SORE THROAT: ICD-10-CM

## 2019-02-12 PROCEDURE — 99214 PR OFFICE/OUTPT VISIT, EST, LEVL IV, 30-39 MIN: ICD-10-PCS | Mod: S$GLB,,, | Performed by: NURSE PRACTITIONER

## 2019-02-12 PROCEDURE — 99999 PR PBB SHADOW E&M-EST. PATIENT-LVL IV: ICD-10-PCS | Mod: PBBFAC,,, | Performed by: NURSE PRACTITIONER

## 2019-02-12 PROCEDURE — 3077F SYST BP >= 140 MM HG: CPT | Mod: CPTII,S$GLB,, | Performed by: NURSE PRACTITIONER

## 2019-02-12 PROCEDURE — 3008F BODY MASS INDEX DOCD: CPT | Mod: CPTII,S$GLB,, | Performed by: NURSE PRACTITIONER

## 2019-02-12 PROCEDURE — 3078F PR MOST RECENT DIASTOLIC BLOOD PRESSURE < 80 MM HG: ICD-10-PCS | Mod: CPTII,S$GLB,, | Performed by: NURSE PRACTITIONER

## 2019-02-12 PROCEDURE — 3077F PR MOST RECENT SYSTOLIC BLOOD PRESSURE >= 140 MM HG: ICD-10-PCS | Mod: CPTII,S$GLB,, | Performed by: NURSE PRACTITIONER

## 2019-02-12 PROCEDURE — 99214 OFFICE O/P EST MOD 30 MIN: CPT | Mod: S$GLB,,, | Performed by: NURSE PRACTITIONER

## 2019-02-12 PROCEDURE — 3008F PR BODY MASS INDEX (BMI) DOCUMENTED: ICD-10-PCS | Mod: CPTII,S$GLB,, | Performed by: NURSE PRACTITIONER

## 2019-02-12 PROCEDURE — 99999 PR PBB SHADOW E&M-EST. PATIENT-LVL IV: CPT | Mod: PBBFAC,,, | Performed by: NURSE PRACTITIONER

## 2019-02-12 PROCEDURE — 3078F DIAST BP <80 MM HG: CPT | Mod: CPTII,S$GLB,, | Performed by: NURSE PRACTITIONER

## 2019-02-12 RX ORDER — RABEPRAZOLE SODIUM 20 MG/1
20 TABLET, DELAYED RELEASE ORAL
Qty: 30 TABLET | Refills: 6 | Status: SHIPPED | OUTPATIENT
Start: 2019-02-12 | End: 2019-04-26

## 2019-02-12 RX ORDER — CALCIUM CARBONATE 200(500)MG
1 TABLET,CHEWABLE ORAL DAILY PRN
COMMUNITY

## 2019-02-12 NOTE — H&P (VIEW-ONLY)
"Subjective:       Patient ID: Mercedes Laura is a 58 y.o. female Body mass index is 34.01 kg/m².    Chief Complaint: Gastroesophageal Reflux (Soreness, Occasional heartburn)    Established patient of Dr. Cabrera & myself.    Gastroesophageal Reflux   She complains of belching (improved; chews gum often), globus sensation (occasional), heartburn (occasional; maybe once a week), a hoarse voice (with prolonged talking- unchanged; reports had hysterectomy 6/2018 & was intubated for it; worse on Mondays) and a sore throat (mild with prolonged talking). She reports no abdominal pain, no chest pain, no choking, no coughing, no dysphagia, no early satiety or no nausea. This is a chronic problem. The current episode started more than 1 year ago (started several years ago; reports in 8/2018 had ear and throat infection and treated with antibiotics; saw ENT and given clindamycin and symptoms resolved but told had signs of LPRD/GERD). The problem has been gradually improving. Associated symptoms include weight loss (lost a few since hysterectomy in 6/2018 due to dieting; lost about 10-15 lbs). Pertinent negatives include no fatigue or melena. Risk factors include obesity and hiatal hernia (rare nsaid use). She has tried a histamine-2 antagonist, a PPI, head elevation and a diet change (dexilant 60 mg once daily; zantac 300 mg once daily at night; diet changes no caffeine, no lactose, & no chocolate; PAST: protonix- no relief, aciphex 20 mg daily- worked best, carafate x 1 month- helped) for the symptoms. The treatment provided moderate (PAST: omeprazole "gave her gastritis", prevacid) relief. Past procedures include an EGD. saw ENT in the past few months and told had signs of LPRD/GERD.     Review of Systems   Constitutional: Positive for weight loss (lost a few since hysterectomy in 6/2018 due to dieting; lost about 10-15 lbs). Negative for appetite change, chills, fatigue and fever.   HENT: Positive for congestion (occasional, " currently), hoarse voice (with prolonged talking- unchanged; reports had hysterectomy 6/2018 & was intubated for it; worse on Mondays), postnasal drip and sore throat (mild with prolonged talking). Negative for trouble swallowing.    Respiratory: Negative for cough, choking and shortness of breath.    Cardiovascular: Negative for chest pain.   Gastrointestinal: Positive for heartburn (occasional; maybe once a week). Negative for abdominal pain, anal bleeding, blood in stool, constipation, diarrhea, dysphagia, melena, nausea, rectal pain and vomiting.   Neurological: Negative for weakness.       Past Medical History:   Diagnosis Date    Anxiety     this came on since lap kadeem sgy    Arthritis     Colon polyp     Deviated septum     GERD (gastroesophageal reflux disease)     Hypertension     PAC (premature atrial contraction)     Plantar fasciitis     PMB (postmenopausal bleeding) 05/2018    Seasonal allergies     Sinus disorder     Uterine leiomyoma 05/2018    Varicose veins of both lower extremities      Past Surgical History:   Procedure Laterality Date    BREAST BIOPSY Left 2015    benign    CHOLECYSTECTOMY  6-1-2015    Dr Ashraf    CHOLECYSTECTOMY-LAPAROSCOPIC N/A 6/1/2015    [facility]    COLONOSCOPY N/A 11/23/2015    [facility] by [facility] ENDO    CYSTOSCOPY N/A 6/7/2018    [facility]    ESOPHAGOGASTRODUODENOSCOPY (EGD) N/A 6/22/2015    [facility] ENDO    ESOPHAGOGASTRODUODENOSCOPY (EGD) N/A 6/9/2014    [facility] ENDO    HYSTERECTOMY      HYSTERECTOMY-TOTAL LAPAROSCOPIC (TLH) N/A 6/7/2018    [facility]    KNEE ARTHROSCOPY W/ MENISCECTOMY  11/15/2000    right     OOPHORECTOMY      [id number] Bilateral 6/7/2018    [facility]    UPPER  GASTROINTESTINAL ENDOSCOPY  06/22/2015    Dr. Cabrera     Family History   Problem Relation Age of Onset    Hypertension Mother     Arthritis Mother     Hypertension Father     Cancer Father         melanoma    Melanoma Father     COPD Father     Heart disease Father         chf    Diverticulitis Maternal Grandmother     Arthritis Maternal Aunt     Diabetes Maternal Aunt     Hypertension Maternal Aunt     Arthritis Maternal Uncle     Breast cancer Neg Hx     Ovarian cancer Neg Hx     Psoriasis Neg Hx     Lupus Neg Hx     Eczema Neg Hx     Colon cancer Neg Hx     Colon polyps Neg Hx     Crohn's disease Neg Hx     Esophageal cancer Neg Hx     Stomach cancer Neg Hx      Wt Readings from Last 10 Encounters:   02/12/19 98.5 kg (217 lb 2.5 oz)   12/04/18 100.4 kg (221 lb 5.5 oz)   10/08/18 103 kg (227 lb 1.2 oz)   09/24/18 104.8 kg (231 lb)   08/21/18 104.9 kg (231 lb 4.2 oz)   07/19/18 103.6 kg (228 lb 6.3 oz)   06/25/18 102.8 kg (226 lb 10.1 oz)   06/21/18 103.6 kg (228 lb 6.3 oz)   06/07/18 104.8 kg (231 lb)   05/25/18 105.1 kg (231 lb 11.3 oz)     Lab Results   Component Value Date    WBC 8.43 06/08/2018    HGB 11.5 (L) 06/08/2018    HCT 35.2 (L) 06/08/2018    MCV 86 06/08/2018     06/08/2018     CMP  Sodium   Date Value Ref Range Status   10/06/2018 140 136 - 145 mmol/L Final     Potassium   Date Value Ref Range Status   10/06/2018 4.1 3.5 - 5.1 mmol/L Final     Chloride   Date Value Ref Range Status   10/06/2018 106 95 - 110 mmol/L Final     CO2   Date Value Ref Range Status   10/06/2018 22 (L) 23 - 29 mmol/L Final     Glucose   Date Value Ref Range Status   10/06/2018 107 70 - 110 mg/dL Final     BUN, Bld   Date Value Ref Range Status   10/06/2018 18 6 - 20 mg/dL Final     Creatinine   Date Value Ref Range Status   10/06/2018 0.8 0.5 - 1.4 mg/dL Final     Calcium   Date Value Ref Range Status   10/06/2018 10.2 8.7 - 10.5 mg/dL Final     Total Protein   Date Value Ref Range Status  "  10/06/2018 7.6 6.0 - 8.4 g/dL Final     Albumin   Date Value Ref Range Status   10/06/2018 4.1 3.5 - 5.2 g/dL Final     Total Bilirubin   Date Value Ref Range Status   10/06/2018 0.7 0.1 - 1.0 mg/dL Final     Comment:     For infants and newborns, interpretation of results should be based  on gestational age, weight and in agreement with clinical  observations.  Premature Infant recommended reference ranges:  Up to 24 hours.............<8.0 mg/dL  Up to 48 hours............<12.0 mg/dL  3-5 days..................<15.0 mg/dL  6-29 days.................<15.0 mg/dL       Alkaline Phosphatase   Date Value Ref Range Status   10/06/2018 69 55 - 135 U/L Final     AST   Date Value Ref Range Status   10/06/2018 18 10 - 40 U/L Final     ALT   Date Value Ref Range Status   10/06/2018 19 10 - 44 U/L Final     Anion Gap   Date Value Ref Range Status   10/06/2018 12 8 - 16 mmol/L Final     eGFR if    Date Value Ref Range Status   10/06/2018 >60.0 >60 mL/min/1.73 m^2 Final     eGFR if non    Date Value Ref Range Status   10/06/2018 >60.0 >60 mL/min/1.73 m^2 Final     Comment:     Calculation used to obtain the estimated glomerular filtration  rate (eGFR) is the CKD-EPI equation.        Lab Results   Component Value Date    AMYLASE 48 05/31/2015     Lab Results   Component Value Date    LIPASE 21 01/20/2018     Lab Results   Component Value Date    TSH 0.851 03/31/2015     Reviewed prior medical records including radiology report of 1/20/18 ct abdomen pelvis & endoscopy history (see surgical history).    11/23/15 Colonoscopy was reviewed and procedure report states:   " Findings:       The perianal examination was normal.       Non-bleeding internal hemorrhoids were found during retroflexion.        The hemorrhoids were medium-sized.       A 3 mm polyp was found in the transverse colon. The polyp was        sessile. The polyp was removed with a cold biopsy forceps. Resection        and retrieval " "were complete.       A 7 mm polyp was found in the ascending colon. The polyp was        sessile. The polyp was removed with a cold snare. Resection and        retrieval were complete.       The rectum, sigmoid colon, descending colon, cecum, appendiceal        orifice and ileocecal valve appeared normal.       The terminal ileum appeared normal.  Impression:          - Non-bleeding internal hemorrhoids.                       - One 3 mm polyp in the transverse colon. Resected                        and retrieved.                       - One 7 mm polyp in the ascending colon. Resected                        and retrieved.                       - The rectum, sigmoid colon, descending colon,                        cecum, appendiceal orifice and ileocecal valve are                        normal.                       - The examined portion of the ileum was normal.  Recommendation:      - Patient has a contact number available for                        emergencies. The signs and symptoms of potential                        delayed complications were discussed with the                        patient. Return to normal activities tomorrow.                        Written discharge instructions were provided to the                        patient.                       - High fiber diet.                       - Continue present medications.                       - Await pathology results.                       - Repeat colonoscopy in 5-10 years for surveillance.                       - Discharge patient to home (ambulatory).                       - Return to my office after studies are complete.                       - If the pathology report reveals adenomatous                        tissue, then repeat the colonoscopy for surveillance                        in 5 years. ".  Biopsy results:   "FINAL PATHOLOGIC DIAGNOSIS  1. COLON MUCOSA: TUBULAR ADENOMATOUS POLYP.  2. COLON MUCOSA: TUBULAR ADENOMATOUS POLYP."    6/22/15 EGD " "was reviewed and procedure report states:   " Findings:       The examined esophagus was normal.       A small hiatus hernia was present.       Patchy mildly erythematous mucosa without bleeding was found in the        gastric body and in the gastric antrum. Biopsies were taken with a        cold forceps for histology.       Diffuse mild inflammation characterized by congestion (edema) and        erythema was found in the duodenal bulb. Biopsies were taken with a        cold forceps for histology.       The 2nd part of the duodenum was normal.  Impression:          - Normal esophagus.                       - Small hiatus hernia.                       - Erythematous mucosa in the gastric body and                        antrum. Biopsied.                       - Duodenitis. Biopsied.                       - Normal 2nd part of the duodenum.  Recommendation:      - Resume regular diet.                       - Continue present medications.                       - No aspirin, ibuprofen, naproxen, or other                        non-steroidal anti-inflammatory drugs.                       - Await pathology results.                       - [facility] to [facility] for [facility] care.                       - Use Protonix (pantoprazole) 40 mg PO daily.".  Biopsy results:   "Supplemental Diagnosis  Specimen #2: NEGATIVE for H. pylori species by immunostain. Stain controls are reviewed and are appropriately  reactive.  [facility].  (Electronically Signed: 2015-06-25 15:40:01 )  Diagnosed by: Yennifer Fitzgerald M.D.  FINAL PATHOLOGIC DIAGNOSIS  1. Duodenum, biopsy:  Largely unremarkable small bowel mucosa with adequate villi.  Gastric foveolar metaplasia.  Negative for active inflammation.  Negative for dysplasia.  2. Stomach, antrum/body, biopsy:  Chronic nonactive gastritis.  Immunostain for H. pylori is pending; supplemental report to follow.  Negative for dysplasia."  Objective:      Physical Exam   Constitutional: She is oriented " to person, place, and time. She appears well-developed and well-nourished. No distress.   HENT:   Mouth/Throat: Mucous membranes are normal. No oral lesions.   Eyes: No scleral icterus.   Cardiovascular: Normal rate.   Pulmonary/Chest: Effort normal and breath sounds normal. No respiratory distress. She has no wheezes.   Abdominal: Soft. Normal appearance and bowel sounds are normal. She exhibits no distension, no abdominal bruit and no mass. There is no tenderness. There is no rigidity, no rebound, no guarding, no tenderness at McBurney's point and negative Long's sign.   Neurological: She is alert and oriented to person, place, and time.   Skin: Skin is warm and dry. No rash noted. She is not diaphoretic. No erythema. No pallor.   Non-jaundiced   Psychiatric: She has a normal mood and affect. Her behavior is normal. Judgment and thought content normal.   Nursing note and vitals reviewed.      Assessment:       1. Heartburn    2. Hiatal hernia    3. Hoarseness of voice    4. Globus sensation    5. Sore throat        Plan:       Heartburn  - DISCONTINUE CARAFATE- THERAPY COMPLETED  - CONTINUE ZANTAC 300 MG NIGHTLY AS DIRECTED  -  RESTART   RABEprazole (ACIPHEX) 20 mg tablet; Take 1 tablet (20 mg total) by mouth before breakfast.  Dispense: 30 tablet; Refill: 6, take in the morning 30-60 minutes before breakfast, discussed about possible long term use of medication (prefer to use lowest effective dose or discontinuing if possible) and discussed the risks & benefits with taking a reflux medication long term, and to take OTC calcium and vitamin d supplements as directed (such as Citracal +D), pt verbalized understanding  -discussed about the different types of medications used to treat reflux and how to use them, antacids can be used PRN for breakthrough heartburn symptoms by reducing stomach acid that is already produced, H2 blockers (zantac) work by limiting the amount acid production, & PPI's work to block acid  production and are taken daily, patient verbalized understanding.  -Educated patient on lifestyle modifications to help control/reduce reflux/abdominal pain including: avoid large meals, avoid eating within 2-3 hours of bedtime (avoid late night eating & lying down soon after eating), elevate head of bed if nocturnal symptoms are present, smoking cessation (if current smoker), & weight loss (if overweight).   -Educated to avoid known foods which trigger reflux symptoms & to minimize/avoid high-fat foods, chocolate, caffeine, citrus, alcohol, & tomato products.  -Advised to avoid/limit use of NSAID's, since they can cause GI upset, bleeding, and/or ulcers. If needed, take with food.   -     FL Upper GI Air Contrast With KUB; Future; Expected date: 12/04/2018  - schedule EGD, discussed procedure with patient, patient verbalized understanding    Hiatal hernia  - RESTART    RABEprazole (ACIPHEX) 20 mg tablet; Take 1 tablet (20 mg total) by mouth before breakfast.  Dispense: 30 tablet; Refill: 6  -discussed diagnosis with patient & that it is usually managed by controlling reflux symptoms, surgery is an option, but usually performed if reflux is uncontrolled by medication management and lifestyle/dietary modifications; if symptoms persist despite medication management and lifestyle/dietary modifications, we can refer to general surgery to consult about surgical options, patient verbalized understanding  - schedule EGD, discussed procedure with patient, patient verbalized understanding    Hoarseness of voice & Sore throat  -     Ambulatory referral to ENT    Globus sensation  - schedule EGD, discussed procedure with patient, patient verbalized understanding    Follow-up in about 1 month (around 3/12/2019), or if symptoms worsen or fail to improve.      If no improvement in symptoms or symptoms worsen, call/follow-up at clinic or go to ER.

## 2019-02-12 NOTE — PATIENT INSTRUCTIONS

## 2019-02-12 NOTE — PROGRESS NOTES
"Subjective:       Patient ID: Mercedes Laura is a 58 y.o. female Body mass index is 34.01 kg/m².    Chief Complaint: Gastroesophageal Reflux (Soreness, Occasional heartburn)    Established patient of Dr. Cabrera & myself.    Gastroesophageal Reflux   She complains of belching (improved; chews gum often), globus sensation (occasional), heartburn (occasional; maybe once a week), a hoarse voice (with prolonged talking- unchanged; reports had hysterectomy 6/2018 & was intubated for it; worse on Mondays) and a sore throat (mild with prolonged talking). She reports no abdominal pain, no chest pain, no choking, no coughing, no dysphagia, no early satiety or no nausea. This is a chronic problem. The current episode started more than 1 year ago (started several years ago; reports in 8/2018 had ear and throat infection and treated with antibiotics; saw ENT and given clindamycin and symptoms resolved but told had signs of LPRD/GERD). The problem has been gradually improving. Associated symptoms include weight loss (lost a few since hysterectomy in 6/2018 due to dieting; lost about 10-15 lbs). Pertinent negatives include no fatigue or melena. Risk factors include obesity and hiatal hernia (rare nsaid use). She has tried a histamine-2 antagonist, a PPI, head elevation and a diet change (dexilant 60 mg once daily; zantac 300 mg once daily at night; diet changes no caffeine, no lactose, & no chocolate; PAST: protonix- no relief, aciphex 20 mg daily- worked best, carafate x 1 month- helped) for the symptoms. The treatment provided moderate (PAST: omeprazole "gave her gastritis", prevacid) relief. Past procedures include an EGD. saw ENT in the past few months and told had signs of LPRD/GERD.     Review of Systems   Constitutional: Positive for weight loss (lost a few since hysterectomy in 6/2018 due to dieting; lost about 10-15 lbs). Negative for appetite change, chills, fatigue and fever.   HENT: Positive for congestion (occasional, " currently), hoarse voice (with prolonged talking- unchanged; reports had hysterectomy 6/2018 & was intubated for it; worse on Mondays), postnasal drip and sore throat (mild with prolonged talking). Negative for trouble swallowing.    Respiratory: Negative for cough, choking and shortness of breath.    Cardiovascular: Negative for chest pain.   Gastrointestinal: Positive for heartburn (occasional; maybe once a week). Negative for abdominal pain, anal bleeding, blood in stool, constipation, diarrhea, dysphagia, melena, nausea, rectal pain and vomiting.   Neurological: Negative for weakness.       Past Medical History:   Diagnosis Date    Anxiety     this came on since lap kadeem sgy    Arthritis     Colon polyp     Deviated septum     GERD (gastroesophageal reflux disease)     Hypertension     PAC (premature atrial contraction)     Plantar fasciitis     PMB (postmenopausal bleeding) 05/2018    Seasonal allergies     Sinus disorder     Uterine leiomyoma 05/2018    Varicose veins of both lower extremities      Past Surgical History:   Procedure Laterality Date    BREAST BIOPSY Left 2015    benign    CHOLECYSTECTOMY  6-1-2015    Dr Ashraf    CHOLECYSTECTOMY-LAPAROSCOPIC N/A 6/1/2015    [facility]    COLONOSCOPY N/A 11/23/2015    [facility]formed [facility] Theo Stinson [facility] at [facility] ENDO    CYSTOSCOPY N/A 6/7/2018    [facility] OR    ESOPHAGOGASTRODUODENOSCOPY (EGD) N/A 6/22/2015    [facility] at [facility] ENDO    ESOPHAGOGASTRODUODENOSCOPY (EGD) N/A 6/9/2014    [facility] at [facility] ENDO    HYSTERECTOMY      HYSTERECTOMY-TOTAL LAPAROSCOPIC (TLH) N/A 6/7/2018    [facility] OR    KNEE ARTHROSCOPY W/ MENISCECTOMY  11/15/2000    right     OOPHORECTOMY      FVJQCENY-VLTCDZPRHRLP-TRROMBPKYSNN Bilateral 6/7/2018    [facility] OR    UPPER  GASTROINTESTINAL ENDOSCOPY  06/22/2015    Dr. Cabrera     Family History   Problem Relation Age of Onset    Hypertension Mother     Arthritis Mother     Hypertension Father     Cancer Father         melanoma    Melanoma Father     COPD Father     Heart disease Father         chf    Diverticulitis Maternal Grandmother     Arthritis Maternal Aunt     Diabetes Maternal Aunt     Hypertension Maternal Aunt     Arthritis Maternal Uncle     Breast cancer Neg Hx     Ovarian cancer Neg Hx     Psoriasis Neg Hx     Lupus Neg Hx     Eczema Neg Hx     Colon cancer Neg Hx     Colon polyps Neg Hx     Crohn's disease Neg Hx     Esophageal cancer Neg Hx     Stomach cancer Neg Hx      Wt Readings from Last 10 Encounters:   02/12/19 98.5 kg (217 lb 2.5 oz)   12/04/18 100.4 kg (221 lb 5.5 oz)   10/08/18 103 kg (227 lb 1.2 oz)   09/24/18 104.8 kg (231 lb)   08/21/18 104.9 kg (231 lb 4.2 oz)   07/19/18 103.6 kg (228 lb 6.3 oz)   06/25/18 102.8 kg (226 lb 10.1 oz)   06/21/18 103.6 kg (228 lb 6.3 oz)   06/07/18 104.8 kg (231 lb)   05/25/18 105.1 kg (231 lb 11.3 oz)     Lab Results   Component Value Date    WBC 8.43 06/08/2018    HGB 11.5 (L) 06/08/2018    HCT 35.2 (L) 06/08/2018    MCV 86 06/08/2018     06/08/2018     CMP  Sodium   Date Value Ref Range Status   10/06/2018 140 136 - 145 mmol/L Final     Potassium   Date Value Ref Range Status   10/06/2018 4.1 3.5 - 5.1 mmol/L Final     Chloride   Date Value Ref Range Status   10/06/2018 106 95 - 110 mmol/L Final     CO2   Date Value Ref Range Status   10/06/2018 22 (L) 23 - 29 mmol/L Final     Glucose   Date Value Ref Range Status   10/06/2018 107 70 - 110 mg/dL Final     BUN, Bld   Date Value Ref Range Status   10/06/2018 18 6 - 20 mg/dL Final     Creatinine   Date Value Ref Range Status   10/06/2018 0.8 0.5 - 1.4 mg/dL Final     Calcium   Date Value Ref Range Status   10/06/2018 10.2 8.7 - 10.5 mg/dL Final     Total Protein   Date Value Ref Range Status  "  10/06/2018 7.6 6.0 - 8.4 g/dL Final     Albumin   Date Value Ref Range Status   10/06/2018 4.1 3.5 - 5.2 g/dL Final     Total Bilirubin   Date Value Ref Range Status   10/06/2018 0.7 0.1 - 1.0 mg/dL Final     Comment:     For infants and newborns, interpretation of results should be based  on gestational age, weight and in agreement with clinical  observations.  Premature Infant recommended reference ranges:  Up to 24 hours.............<8.0 mg/dL  Up to 48 hours............<12.0 mg/dL  3-5 days..................<15.0 mg/dL  6-29 days.................<15.0 mg/dL       Alkaline Phosphatase   Date Value Ref Range Status   10/06/2018 69 55 - 135 U/L Final     AST   Date Value Ref Range Status   10/06/2018 18 10 - 40 U/L Final     ALT   Date Value Ref Range Status   10/06/2018 19 10 - 44 U/L Final     Anion Gap   Date Value Ref Range Status   10/06/2018 12 8 - 16 mmol/L Final     eGFR if    Date Value Ref Range Status   10/06/2018 >60.0 >60 mL/min/1.73 m^2 Final     eGFR if non    Date Value Ref Range Status   10/06/2018 >60.0 >60 mL/min/1.73 m^2 Final     Comment:     Calculation used to obtain the estimated glomerular filtration  rate (eGFR) is the CKD-EPI equation.        Lab Results   Component Value Date    AMYLASE 48 05/31/2015     Lab Results   Component Value Date    LIPASE 21 01/20/2018     Lab Results   Component Value Date    TSH 0.851 03/31/2015     Reviewed prior medical records including radiology report of 1/20/18 ct abdomen pelvis & endoscopy history (see surgical history).    11/23/15 Colonoscopy was reviewed and procedure report states:   " Findings:       The perianal examination was normal.       Non-bleeding internal hemorrhoids were found during retroflexion.        The hemorrhoids were medium-sized.       A 3 mm polyp was found in the transverse colon. The polyp was        sessile. The polyp was removed with a cold biopsy forceps. Resection        and retrieval " "were complete.       A 7 mm polyp was found in the ascending colon. The polyp was        sessile. The polyp was removed with a cold snare. Resection and        retrieval were complete.       The rectum, sigmoid colon, descending colon, cecum, appendiceal        orifice and ileocecal valve appeared normal.       The terminal ileum appeared normal.  Impression:          - Non-bleeding internal hemorrhoids.                       - One 3 mm polyp in the transverse colon. Resected                        and retrieved.                       - One 7 mm polyp in the ascending colon. Resected                        and retrieved.                       - The rectum, sigmoid colon, descending colon,                        cecum, appendiceal orifice and ileocecal valve are                        normal.                       - The examined portion of the ileum was normal.  Recommendation:      - Patient has a contact number available for                        emergencies. The signs and symptoms of potential                        delayed complications were discussed with the                        patient. Return to normal activities tomorrow.                        Written discharge instructions were provided to the                        patient.                       - High fiber diet.                       - Continue present medications.                       - Await pathology results.                       - Repeat colonoscopy in 5-10 years for surveillance.                       - Discharge patient to home (ambulatory).                       - Return to my office after studies are complete.                       - If the pathology report reveals adenomatous                        tissue, then repeat the colonoscopy for surveillance                        in 5 years. ".  Biopsy results:   "FINAL PATHOLOGIC DIAGNOSIS  1. COLON MUCOSA: TUBULAR ADENOMATOUS POLYP.  2. COLON MUCOSA: TUBULAR ADENOMATOUS POLYP."    6/22/15 EGD " "was reviewed and procedure report states:   " Findings:       The examined esophagus was normal.       A small hiatus hernia was present.       Patchy mildly erythematous mucosa without bleeding was found in the        gastric body and in the gastric antrum. Biopsies were taken with a        cold forceps for histology.       Diffuse mild inflammation characterized by congestion (edema) and        erythema was found in the duodenal bulb. Biopsies were taken with a        cold forceps for histology.       The 2nd part of the duodenum was normal.  Impression:          - Normal esophagus.                       - Small hiatus hernia.                       - Erythematous mucosa in the gastric body and                        antrum. Biopsied.                       - Duodenitis. Biopsied.                       - Normal 2nd part of the duodenum.  Recommendation:      - Resume regular diet.                       - Continue present medications.                       - No aspirin, ibuprofen, naproxen, or other                        non-steroidal anti-inflammatory drugs.                       - Await pathology results.                       - [facility] to [facility] for [facility] care.                       - Use Protonix (pantoprazole) 40 mg PO daily.".  Biopsy results:   "Supplemental Diagnosis  Specimen #2: NEGATIVE for H. pylori species by immunostain. Stain controls are reviewed and are appropriately  reactive.  [facility].  (Electronically Signed: 2015-06-25 15:40:01 )  Diagnosed by: Yennifer Fitzgerald M.D.  FINAL PATHOLOGIC DIAGNOSIS  1. Duodenum, biopsy:  Largely unremarkable small bowel mucosa with adequate villi.  Gastric foveolar metaplasia.  Negative for active inflammation.  Negative for dysplasia.  2. Stomach, antrum/body, biopsy:  Chronic nonactive gastritis.  Immunostain for H. pylori is pending; supplemental report to follow.  Negative for dysplasia."  Objective:      Physical Exam   Constitutional: She is oriented " to person, place, and time. She appears well-developed and well-nourished. No distress.   HENT:   Mouth/Throat: Mucous membranes are normal. No oral lesions.   Eyes: No scleral icterus.   Cardiovascular: Normal rate.   Pulmonary/Chest: Effort normal and breath sounds normal. No respiratory distress. She has no wheezes.   Abdominal: Soft. Normal appearance and bowel sounds are normal. She exhibits no distension, no abdominal bruit and no mass. There is no tenderness. There is no rigidity, no rebound, no guarding, no tenderness at McBurney's point and negative Long's sign.   Neurological: She is alert and oriented to person, place, and time.   Skin: Skin is warm and dry. No rash noted. She is not diaphoretic. No erythema. No pallor.   Non-jaundiced   Psychiatric: She has a normal mood and affect. Her behavior is normal. Judgment and thought content normal.   Nursing note and vitals reviewed.      Assessment:       1. Heartburn    2. Hiatal hernia    3. Hoarseness of voice    4. Globus sensation    5. Sore throat        Plan:       Heartburn  - DISCONTINUE CARAFATE- THERAPY COMPLETED  - CONTINUE ZANTAC 300 MG NIGHTLY AS DIRECTED  -  RESTART   RABEprazole (ACIPHEX) 20 mg tablet; Take 1 tablet (20 mg total) by mouth before breakfast.  Dispense: 30 tablet; Refill: 6, take in the morning 30-60 minutes before breakfast, discussed about possible long term use of medication (prefer to use lowest effective dose or discontinuing if possible) and discussed the risks & benefits with taking a reflux medication long term, and to take OTC calcium and vitamin d supplements as directed (such as Citracal +D), pt verbalized understanding  -discussed about the different types of medications used to treat reflux and how to use them, antacids can be used PRN for breakthrough heartburn symptoms by reducing stomach acid that is already produced, H2 blockers (zantac) work by limiting the amount acid production, & PPI's work to block acid  production and are taken daily, patient verbalized understanding.  -Educated patient on lifestyle modifications to help control/reduce reflux/abdominal pain including: avoid large meals, avoid eating within 2-3 hours of bedtime (avoid late night eating & lying down soon after eating), elevate head of bed if nocturnal symptoms are present, smoking cessation (if current smoker), & weight loss (if overweight).   -Educated to avoid known foods which trigger reflux symptoms & to minimize/avoid high-fat foods, chocolate, caffeine, citrus, alcohol, & tomato products.  -Advised to avoid/limit use of NSAID's, since they can cause GI upset, bleeding, and/or ulcers. If needed, take with food.   -     FL Upper GI Air Contrast With KUB; Future; Expected date: 12/04/2018  - schedule EGD, discussed procedure with patient, patient verbalized understanding    Hiatal hernia  - RESTART    RABEprazole (ACIPHEX) 20 mg tablet; Take 1 tablet (20 mg total) by mouth before breakfast.  Dispense: 30 tablet; Refill: 6  -discussed diagnosis with patient & that it is usually managed by controlling reflux symptoms, surgery is an option, but usually performed if reflux is uncontrolled by medication management and lifestyle/dietary modifications; if symptoms persist despite medication management and lifestyle/dietary modifications, we can refer to general surgery to consult about surgical options, patient verbalized understanding  - schedule EGD, discussed procedure with patient, patient verbalized understanding    Hoarseness of voice & Sore throat  -     Ambulatory referral to ENT    Globus sensation  - schedule EGD, discussed procedure with patient, patient verbalized understanding    Follow-up in about 1 month (around 3/12/2019), or if symptoms worsen or fail to improve.      If no improvement in symptoms or symptoms worsen, call/follow-up at clinic or go to ER.

## 2019-03-04 ENCOUNTER — ANESTHESIA (OUTPATIENT)
Dept: ENDOSCOPY | Facility: HOSPITAL | Age: 59
End: 2019-03-04
Payer: COMMERCIAL

## 2019-03-04 ENCOUNTER — HOSPITAL ENCOUNTER (OUTPATIENT)
Facility: HOSPITAL | Age: 59
Discharge: HOME | End: 2019-03-04
Attending: INTERNAL MEDICINE | Admitting: INTERNAL MEDICINE
Payer: COMMERCIAL

## 2019-03-04 ENCOUNTER — ANESTHESIA EVENT (OUTPATIENT)
Dept: ENDOSCOPY | Facility: HOSPITAL | Age: 59
End: 2019-03-04
Payer: COMMERCIAL

## 2019-03-04 DIAGNOSIS — K21.9 ACID REFLUX: ICD-10-CM

## 2019-03-04 DIAGNOSIS — K44.9 HIATAL HERNIA: ICD-10-CM

## 2019-03-04 DIAGNOSIS — K29.70 GASTRITIS, PRESENCE OF BLEEDING UNSPECIFIED, UNSPECIFIED CHRONICITY, UNSPECIFIED GASTRITIS TYPE: Primary | ICD-10-CM

## 2019-03-04 PROCEDURE — 25000003 PHARM REV CODE 250: Performed by: INTERNAL MEDICINE

## 2019-03-04 PROCEDURE — D9220A PRA ANESTHESIA: Mod: ANES,,, | Performed by: ANESTHESIOLOGY

## 2019-03-04 PROCEDURE — D9220A PRA ANESTHESIA: ICD-10-PCS | Mod: CRNA,,, | Performed by: NURSE ANESTHETIST, CERTIFIED REGISTERED

## 2019-03-04 PROCEDURE — 43239 EGD BIOPSY SINGLE/MULTIPLE: CPT | Mod: ,,, | Performed by: INTERNAL MEDICINE

## 2019-03-04 PROCEDURE — 43239 EGD BIOPSY SINGLE/MULTIPLE: CPT | Performed by: INTERNAL MEDICINE

## 2019-03-04 PROCEDURE — 88305 TISSUE EXAM BY PATHOLOGIST: CPT | Performed by: PATHOLOGY

## 2019-03-04 PROCEDURE — 27201012 HC FORCEPS, HOT/COLD, DISP: Performed by: INTERNAL MEDICINE

## 2019-03-04 PROCEDURE — D9220A PRA ANESTHESIA: ICD-10-PCS | Mod: ANES,,, | Performed by: ANESTHESIOLOGY

## 2019-03-04 PROCEDURE — 37000008 HC ANESTHESIA 1ST 15 MINUTES: Performed by: INTERNAL MEDICINE

## 2019-03-04 PROCEDURE — D9220A PRA ANESTHESIA: Mod: CRNA,,, | Performed by: NURSE ANESTHETIST, CERTIFIED REGISTERED

## 2019-03-04 PROCEDURE — 88305 TISSUE SPECIMEN TO PATHOLOGY - SURGERY: ICD-10-PCS | Mod: 26,,, | Performed by: PATHOLOGY

## 2019-03-04 PROCEDURE — 43239 PR EGD, FLEX, W/BIOPSY, SGL/MULTI: ICD-10-PCS | Mod: ,,, | Performed by: INTERNAL MEDICINE

## 2019-03-04 PROCEDURE — 63600175 PHARM REV CODE 636 W HCPCS: Performed by: NURSE ANESTHETIST, CERTIFIED REGISTERED

## 2019-03-04 RX ORDER — LIDOCAINE HCL/PF 100 MG/5ML
SYRINGE (ML) INTRAVENOUS
Status: DISCONTINUED | OUTPATIENT
Start: 2019-03-04 | End: 2019-03-04

## 2019-03-04 RX ORDER — PROPOFOL 10 MG/ML
VIAL (ML) INTRAVENOUS
Status: DISCONTINUED | OUTPATIENT
Start: 2019-03-04 | End: 2019-03-04

## 2019-03-04 RX ORDER — SODIUM CHLORIDE 9 MG/ML
INJECTION, SOLUTION INTRAVENOUS CONTINUOUS
Status: DISCONTINUED | OUTPATIENT
Start: 2019-03-04 | End: 2019-03-04 | Stop reason: HOSPADM

## 2019-03-04 RX ADMIN — SODIUM CHLORIDE 1000 ML: 0.9 INJECTION, SOLUTION INTRAVENOUS at 07:03

## 2019-03-04 RX ADMIN — PROPOFOL 100 MG: 10 INJECTION, EMULSION INTRAVENOUS at 07:03

## 2019-03-04 RX ADMIN — LIDOCAINE HYDROCHLORIDE 75 MG: 20 INJECTION, SOLUTION INTRAVENOUS at 07:03

## 2019-03-04 RX ADMIN — PROPOFOL 30 MG: 10 INJECTION, EMULSION INTRAVENOUS at 07:03

## 2019-03-04 RX ADMIN — PROPOFOL 40 MG: 10 INJECTION, EMULSION INTRAVENOUS at 07:03

## 2019-03-04 NOTE — [employer] PATIENT INSTRUCTIONS
Discharge Summary/Instructions after an Endoscopic Procedure  Patient Name: Mercedes Laura  Patient MRN: [id number]  Patient YOB: 1960     Monday, March 04, 2019  Theo Cabrera MD  RESTRICTIONS:  During your procedure today, you received medications for sedation.  These   medications may affect your judgment, balance and coordination.  Therefore,   for 24 hours, you have the following restrictions:   - DO NOT drive a car, operate machinery, make legal/financial decisions,   sign important papers or drink alcohol.    ACTIVITY:  Today: no heavy lifting, straining or running due to procedural   sedation/anesthesia.  The following day: return to full activity including work.  DIET:  Eat and drink normally unless instructed otherwise.     TREATMENT FOR COMMON SIDE EFFECTS:  - Mild abdominal pain, nausea, belching, bloating or excessive gas:  rest,   eat lightly and use a heating pad.  - Sore Throat: treat with throat lozenges and/or gargle with warm salt   water.  - Because air was used during the procedure, expelling large amounts of air   from your rectum or belching is normal.  - If a bowel prep was taken, you may not have a bowel movement for 1-3 days.    This is normal.  SYMPTOMS TO WATCH FOR AND REPORT TO YOUR PHYSICIAN:  1. Abdominal pain or bloating, other than gas cramps.  2. Chest pain.  3. Back pain.  4. Signs of infection such as: chills or fever occurring within 24 hours   after the procedure.  5. Rectal bleeding, which would show as bright red, maroon, or black stools.   (A tablespoon of blood from the rectum is not serious, especially if   hemorrhoids are present.)  6. Vomiting.  7. Weakness or dizziness.  GO DIRECTLY TO THE NEAREST EMERGENCY ROOM IF YOU HAVE ANY OF THE FOLLOWING:      Difficulty breathing              Chills and/or fever over 101 F   Persistent vomiting and/or vomiting blood   Severe abdominal pain   Severe chest pain   Black, tarry stools   Bleeding- more than one tablespoon   Any  other symptom or condition that you feel may need urgent attention  Your doctor recommends these additional instructions:  If any biopsies were taken, your doctors clinic will contact you in 1 to 2   weeks with any results.  - Patient has a contact number available for emergencies.  The signs and   symptoms of potential delayed complications were discussed with the   patient.  Return to normal activities tomorrow.  Written discharge   instructions were provided to the patient.   - Resume previous diet.   - Continue present medications.   - No aspirin, ibuprofen, naproxen, or other non-steroidal anti-inflammatory   drugs.   - Await pathology results.   - Discharge patient to home (ambulatory).   - Follow an antireflux regimen.   - Return to nurse practitioner in 6 weeks.   - Refer to an ENT specialist at appointment to be scheduled. Symptoms   unlikely to be due to reflux.  For questions, problems or results please call your physician - Theo Cabrera MD at Work:  [phone].  [facility], EMERGENCY ROOM PHONE NUMBER: [phone]  IF A COMPLICATION OR EMERGENCY SITUATION ARISES AND YOU ARE UNABLE TO REACH   YOUR PHYSICIAN - GO DIRECTLY TO THE EMERGENCY ROOM.  Theo Cabrera MD  3/4/2019 7:48:40 AM  This report has been verified and signed electronically.  [employer]

## 2019-03-04 NOTE — INTERVAL H&P NOTE
The patient has been examined and the H&P has been reviewed:    I concur with the findings and no changes have occurred since H&P was written.    Anesthesia/Surgery risks, benefits and alternative options discussed and understood by patient/family.          Active Hospital Problems    Diagnosis  POA    Acid reflux [K21.9]  Yes      Resolved Hospital Problems   No resolved problems to display.

## 2019-03-04 NOTE — ANESTHESIA POSTPROCEDURE EVALUATION
"Anesthesia Post Evaluation    Patient: Mercedes Laura    Procedure(s) Performed: Procedure(s) (LRB):  EGD (ESOPHAGOGASTRODUODENOSCOPY) (N/A)    Final Anesthesia Type: general  Patient location during evaluation: PACU  Patient participation: Yes- Able to Participate  Level of consciousness: awake and alert  Post-procedure vital signs: reviewed and stable  Pain management: adequate  Airway patency: patent  PONV status at discharge: No PONV  Anesthetic complications: no      Cardiovascular status: blood pressure returned to baseline  Respiratory status: unassisted  Hydration status: euvolemic  Follow-up not needed.        Visit Vitals  /71   Pulse (!) 53   Temp 36.3 °C (97.3 °F) (Temporal)   Resp 16   Ht 5' 6" (1.676 m)   Wt 96.6 kg (213 lb)   LMP 06/07/2018   SpO2 98%   Breastfeeding? No   BMI 34.38 kg/m²       Pain/Isabela Score: Isabela Score: 10 (3/4/2019  8:48 AM)        "

## 2019-03-04 NOTE — TRANSFER OF CARE
"Anesthesia Transfer of Care Note    Patient: Mercedes Laura    Procedure(s) Performed: Procedure(s) (LRB):  EGD (ESOPHAGOGASTRODUODENOSCOPY) (N/A)    Patient location: PACU    Anesthesia Type: general    Transport from OR: Transported from OR on room air with adequate spontaneous ventilation    Post pain: adequate analgesia    Post assessment: no apparent anesthetic complications    Post vital signs: stable    Level of consciousness: awake    Nausea/Vomiting: no nausea/vomiting    Complications: none    Transfer of care protocol was followed      Last vitals:   Visit Vitals  BP (!) 148/79 (BP Location: Left arm, Patient Position: Lying)   Pulse 80   Temp 36.3 °C (97.3 °F) (Temporal)   Resp 15   Ht 5' 6" (1.676 m)   Wt 96.6 kg (213 lb)   LMP 06/07/2018   SpO2 100%   Breastfeeding? No   BMI 34.38 kg/m²     "

## 2019-03-04 NOTE — ANESTHESIA PREPROCEDURE EVALUATION
03/04/2019  Mercedes Laura is a 58 y.o., female.    Pre-op Assessment    I have reviewed the Patient Summary Reports.     I have reviewed the Nursing Notes.   I have reviewed the Medications.     Review of Systems  Anesthesia Hx:  No problems with previous Anesthesia  Denies Family Hx of Anesthesia complications.   Denies Personal Hx of Anesthesia complications.   Social:  Non-Smoker, No Alcohol Use    Hematology/Oncology:  Hematology Normal        EENT/Dental:   Intermittent hoarseness   Cardiovascular:   Hypertension    Pulmonary:  Pulmonary Normal    Renal/:  Renal/ Normal     Hepatic/GI:   GERD    Neurological:  Neurology Normal    Endocrine:  Endocrine Normal    Psych:   anxiety          Physical Exam  General:  Obesity    Airway/Jaw/Neck:  Airway Findings: Mouth Opening: Normal Tongue: Normal  General Airway Assessment: Adult, Average  Mallampati: II  Jaw/Neck Findings:  Neck ROM: Normal ROM       Chest/Lungs:  Chest/Lungs Findings: Clear to auscultation, Normal Respiratory Rate     Heart/Vascular:  Heart Findings: Rate: Normal  Rhythm: Regular Rhythm  Sounds: Normal  Heart murmur: negative       Mental Status:  Mental Status Findings:  Cooperative, Alert and Oriented         Anesthesia Plan  Type of Anesthesia, risks & benefits discussed:  Anesthesia Type:  general  Patient's Preference:   Intra-op Monitoring Plan: standard ASA monitors  Intra-op Monitoring Plan Comments:   Post Op Pain Control Plan:   Post Op Pain Control Plan Comments:   Induction:   IV  Beta Blocker:  Patient is not currently on a Beta-Blocker (No further documentation required).       Informed Consent: Patient understands risks and agrees with Anesthesia plan.  Questions answered. Anesthesia consent signed with patient.  ASA Score: 2     Day of Surgery Review of History & Physical:    H&P update referred to the provider.          Ready For Surgery From Anesthesia Perspective.

## 2019-03-04 NOTE — DISCHARGE INSTRUCTIONS
"Discharge Instructions: After Your Surgery/Procedure  Youve just had surgery. During surgery you were given medicine called anesthesia to keep you relaxed and free of pain. After surgery you may have some pain or nausea. This is common. Here are some tips for feeling better and getting well after surgery.     Stay on schedule with your medication.   Going home  Your doctor or nurse will show you how to take care of yourself when you go home. He or she will also answer your questions. Have an adult family member or friend drive you home.      For your safety we recommend these precaution for the first 24 hours after your procedure:  · Do not drive or use heavy equipment.  · Do not make important decisions or sign legal papers.  · Do not drink alcohol.  · Have someone stay with you, if needed. He or she can watch for problems and help keep you safe.  · Your concentration, balance, coordination, and judgement may be impaired for many hours after anesthesia.  Use caution when ambulating or standing up.     · You may feel weak and "washed out" after anesthesia and surgery.      Subtle residual effects of general anesthesia or sedation with regional / local anesthesia can last more than 24 hours.  Rest for the remainder of the day or longer if your Doctor/Surgeon has advised you to do so.  Although you may feel normal within the first 24 hours, your reflexes and mental ability may be impaired without you realizing it.  You may feel dizzy, lightheaded or sleepy for 24 hours or longer.      Be sure to go to all follow-up visits with your doctor. And rest after your surgery for as long as your doctor tells you to.  Coping with pain  If you have pain after surgery, pain medicine will help you feel better. Take it as told, before pain becomes severe. Also, ask your doctor or pharmacist about other ways to control pain. This might be with heat, ice, or relaxation. And follow any other instructions your surgeon or nurse gives " you.  Tips for taking pain medicine  To get the best relief possible, remember these points:  · Pain medicines can upset your stomach. Taking them with a little food may help.  · Most pain relievers taken by mouth need at least 20 to 30 minutes to start to work.  · Taking medicine on a schedule can help you remember to take it. Try to time your medicine so that you can take it before starting an activity. This might be before you get dressed, go for a walk, or sit down for dinner.  · Constipation is a common side effect of pain medicines. Call your doctor before taking any medicines such as laxatives or stool softeners to help ease constipation. Also ask if you should skip any foods. Drinking lots of fluids and eating foods such as fruits and vegetables that are high in fiber can also help. Remember, do not take laxatives unless your surgeon has prescribed them.  · Drinking alcohol and taking pain medicine can cause dizziness and slow your breathing. It can even be deadly. Do not drink alcohol while taking pain medicine.  · Pain medicine can make you react more slowly to things. Do not drive or run machinery while taking pain medicine.  Your health care provider may tell you to take acetaminophen to help ease your pain. Ask him or her how much you are supposed to take each day. Acetaminophen or other pain relievers may interact with your prescription medicines or other over-the-counter (OTC) drugs. Some prescription medicines have acetaminophen and other ingredients. Using both prescription and OTC acetaminophen for pain can cause you to overdose. Read the labels on your OTC medicines with care. This will help you to clearly know the list of ingredients, how much to take, and any warnings. It may also help you not take too much acetaminophen. If you have questions or do not understand the information, ask your pharmacist or health care provider to explain it to you before you take the OTC medicine.    Managing  nausea  Some people have an upset stomach after surgery. This is often because of anesthesia, pain, or pain medicine, or the stress of surgery. These tips will help you handle nausea and eat healthy foods as you get better. If you were on a special food plan before surgery, ask your doctor if you should follow it while you get better. These tips may help:  · Do not push yourself to eat. Your body will tell you when to eat and how much.  · Start off with clear liquids and soup. They are easier to digest.  · Next try semi-solid foods, such as mashed potatoes, applesauce, and gelatin, as you feel ready.  · Slowly move to solid foods. Dont eat fatty, rich, or spicy foods at first.  · Do not force yourself to have 3 large meals a day. Instead eat smaller amounts more often.  · Take pain medicines with a small amount of solid food, such as crackers or toast, to avoid nausea.     Call your surgeon if  · You still have pain an hour after taking medicine. The medicine may not be strong enough.  · You feel too sleepy, dizzy, or groggy. The medicine may be too strong.  · You have side effects like nausea, vomiting, or skin changes, such as rash, itching, or hives.       If you have obstructive sleep apnea  You were given anesthesia medicine during surgery to keep you comfortable and free of pain. After surgery, you may have more apnea spells because of this medicine and other medicines you were given. The spells may last longer than usual.   At home:  · Keep using the continuous positive airway pressure (CPAP) device when you sleep. Unless your health care provider tells you not to, use it when you sleep, day or night. CPAP is a common device used to treat obstructive sleep apnea.  · Talk with your provider before taking any pain medicine, muscle relaxants, or sedatives. Your provider will tell you about the possible dangers of taking these medicines.  © 2438-1126 [employer]. [address] [place] [address]  [place] [address]. All rights reserved. This information is not intended as a substitute for professional medical care. Always follow your healthcare professional's instructions.        Gastritis (Adult)    Gastritis is inflammation and irritation of the stomach lining. It can be present for a short time (acute) or be long lasting (chronic). Gastritis is often caused by infection with bacteria called H pylori. More than a third of people in the [place] have this bacteria in their bodies. In many cases, H pylori causes no problems or symptoms. In some people, though, the infection irritates the stomach lining and causes gastritis. Other causes of stomach irritation include drinking alcohol or taking pain-relieving medicines called NSAIDs (such as aspirin or ibuprofen).   Symptoms of gastritis can include:  · Abdominal pain or bloating  · Loss of appetite  · Nausea or vomiting  · Vomiting blood or having black stools  · Feeling more tired than usual  An inflamed and irritated stomach lining is more likely to develop a sore called an ulcer. To help prevent this, gastritis should be treated.  Home care  If needed, medicines may be prescribed. If you have H pylori infection, treating it will likely relieve your symptoms. Other changes can help reduce stomach irritation and help it heal.  · If you have been prescribed medicines for H pylori infection, take them as directed. Take all of the medicine until it is finished or your healthcare provider tells you to stop, even if you feel better.  · Your healthcare provider may recommend avoiding NSAIDs. If you take daily aspirin for your heart or other medical reasons, do not stop without talking to your healthcare provider first.  · Avoid drinking alcohol.  · Stop smoking. Smoking can irritate the stomach and delay healing. As much as possible, stay away from second hand smoke.  Follow-up care  Follow up with your healthcare provider, or as advised by our staff. Testing may be needed to  check for inflammation or an ulcer.  When to seek medical advice  Call your healthcare provider for any of the following:  · Stomach pain that gets worse or moves to the lower right abdomen (appendix area)  · Chest pain that appears or gets worse, or spreads to the back, neck, shoulder, or arm  · Frequent vomiting (cant keep down liquids)  · Blood in the stool or vomit (red or black in color)  · Feeling weak or dizzy  · Fever of 100.4ºF (38ºC) or higher, or as directed by your healthcare provider  Date Last Reviewed: 6/22/2015 © 2000-2017 [employer]. [address] [place] [address]. All rights reserved. This information is not intended as a substitute for professional medical care. Always follow your healthcare professional's instructions.        Tips to Control Acid Reflux    To control acid reflux, youll need to make some basic diet and lifestyle changes. The simple steps outlined below may be all youll need to ease discomfort.  Watch what you eat  · Avoid fatty foods and spicy foods.  · Eat fewer acidic foods, such as citrus and tomato-based foods. These can increase symptoms.  · Limit drinking alcohol, caffeine, and fizzy beverages. All increase acid reflux.  · Try limiting chocolate, peppermint, and spearmint. These can worsen acid reflux in some people.  Watch when you eat  · Avoid lying down for 3 hours after eating.  · Do not snack before going to bed.  Raise your head  Raising your head and upper body by 4 to 6 inches helps limit reflux when youre lying down. Put blocks under the head of your bed frame to raise it.  Other changes  · Lose weight, if you need to  · Dont exercise near bedtime  · Avoid tight-fitting clothes  · Limit aspirin and ibuprofen  · Stop smoking   Date Last Reviewed: 7/1/2016 © 2000-2017 [employer]. [address] [place] [id number]. All rights reserved. This information is not intended as a substitute for professional medical care.  Always follow your healthcare professional's instructions.      What Is a Hiatal Hernia?    Hiatal hernia is when the area where the stomach and esophagus meet bulges up through the diaphragm into the chest cavity. In some cases, part of the stomach may bulge above the diaphragm. Stomach acid may move up into the esophagus and cause symptoms. The symptoms are often blamed on gastroesophageal reflux disease (GERD). You may only know about the hernia when it shows up on an X-ray taken for other reasons.   What you may feel  The hiatus is a normal hole in the diaphragm. The esophagus passes through this hole and leads to the stomach. In some cases, part of the stomach may bulge above the diaphragm. This bulge is called a hernia. Stomach acid may move up into the esophagus and cause symptoms.  When you eat, the muscle at the hiatus relaxes to allow food to pass into the stomach. It tightens again to keep food and digestive acids in the stomach.  Many people with hiatal hernias have mild symptoms. You may notice the following GERD symptoms:  · Heartburn or other chest discomfort  · A feeling of chest fullness after a meal  · Frequent burping  · Acid taste in the mouth  · Trouble swallowing  Treating symptoms  If you have been diagnosed with hiatal hernia, these suggestions may help improve symptoms:  · Lose excess weight. Extra weight puts pressure on the stomach and esophagus.  · Dont lie down after eating. Sit up for at least an hour after eating. Lying down after eating can increase symptoms.  · Avoid certain foods and drinks. These include fatty foods, chocolate, coffee, mint, and other foods that cause symptoms for you.  · Dont smoke or drink alcohol. These can worsen symptoms.  · Look at your medicines. Discuss your medicines with your healthcare provider. Many medicines can cause symptoms.  · Consider an antacid medicine. Ask your healthcare provider about over-the-counter and prescription medicines that may  help.  · Ask about surgery, if needed. Surgery is a treatment choice for some people. Your healthcare provider can determine if surgery is an option for you.    Date Last Reviewed: 10/1/2016  © 1014-9959 [employer]. [address] [place] [address], [place] [address]. All rights reserved. This information is not intended as a substitute for professional medical care. Always follow your healthcare professional's instructions.      We hope your stay was comfortable as you heal now, mend and rest.    For we have enjoyed taking care of you by giving your our best.    And as you get better, by regaining your health and strength;   We count it as a privilege to have served you and hope your time at [facility] was well spent.      Thank  You!!!

## 2019-03-04 NOTE — PLAN OF CARE
Pt. Awake and alert, vital signs stable.  Tolerated liquids without nausea.  Denies pain. Ambulated readily to bathroom and voided spontaneously. IV removed per policy, catheter intact. Discharge instructions reviewed with patient and written instructions given to patient, who verbalized understanding.  Dr. Stinson spoke with pt. And answered questions and states pt. Is ready to discharge. Discharge home with family per wheelchair to lobby.

## 2019-03-06 VITALS
DIASTOLIC BLOOD PRESSURE: 71 MMHG | HEART RATE: 53 BPM | RESPIRATION RATE: 16 BRPM | HEIGHT: 66 IN | OXYGEN SATURATION: 98 % | TEMPERATURE: 97 F | WEIGHT: 213 LBS | BODY MASS INDEX: 34.23 KG/M2 | SYSTOLIC BLOOD PRESSURE: 124 MMHG

## 2019-03-28 ENCOUNTER — PATIENT MESSAGE (OUTPATIENT)
Dept: GASTROENTEROLOGY | Facility: CLINIC | Age: 59
End: 2019-03-28

## 2019-04-05 ENCOUNTER — PATIENT OUTREACH (OUTPATIENT)
Dept: ADMINISTRATIVE | Facility: HOSPITAL | Age: 59
End: 2019-04-05

## 2019-04-13 ENCOUNTER — LAB VISIT (OUTPATIENT)
Dept: LAB | Facility: HOSPITAL | Age: 59
End: 2019-04-13
Attending: FAMILY MEDICINE
Payer: COMMERCIAL

## 2019-04-13 DIAGNOSIS — I10 GOOD HYPERTENSION CONTROL: ICD-10-CM

## 2019-04-13 DIAGNOSIS — R73.9 HYPERGLYCEMIA: ICD-10-CM

## 2019-04-13 DIAGNOSIS — E55.9 VITAMIN D DEFICIENCY: ICD-10-CM

## 2019-04-13 LAB
25(OH)D3+25(OH)D2 SERPL-MCNC: 38 NG/ML (ref 30–96)
ALBUMIN SERPL BCP-MCNC: 3.9 G/DL (ref 3.5–5.2)
ALP SERPL-CCNC: 69 U/L (ref 55–135)
ALT SERPL W/O P-5'-P-CCNC: 15 U/L (ref 10–44)
ANION GAP SERPL CALC-SCNC: 11 MMOL/L (ref 8–16)
AST SERPL-CCNC: 15 U/L (ref 10–40)
BASOPHILS # BLD AUTO: 0.02 K/UL (ref 0–0.2)
BASOPHILS NFR BLD: 0.3 % (ref 0–1.9)
BILIRUB SERPL-MCNC: 0.7 MG/DL (ref 0.1–1)
BUN SERPL-MCNC: 18 MG/DL (ref 6–20)
CALCIUM SERPL-MCNC: 9.9 MG/DL (ref 8.7–10.5)
CHLORIDE SERPL-SCNC: 108 MMOL/L (ref 95–110)
CO2 SERPL-SCNC: 23 MMOL/L (ref 23–29)
CREAT SERPL-MCNC: 0.8 MG/DL (ref 0.5–1.4)
DIFFERENTIAL METHOD: ABNORMAL
EOSINOPHIL # BLD AUTO: 0.1 K/UL (ref 0–0.5)
EOSINOPHIL NFR BLD: 1.4 % (ref 0–8)
ERYTHROCYTE [DISTWIDTH] IN BLOOD BY AUTOMATED COUNT: 13.2 % (ref 11.5–14.5)
EST. GFR  (AFRICAN AMERICAN): >60 ML/MIN/1.73 M^2
EST. GFR  (NON AFRICAN AMERICAN): >60 ML/MIN/1.73 M^2
ESTIMATED AVG GLUCOSE: 111 MG/DL (ref 68–131)
GLUCOSE SERPL-MCNC: 90 MG/DL (ref 70–110)
HBA1C MFR BLD HPLC: 5.5 % (ref 4–5.6)
HCT VFR BLD AUTO: 40.8 % (ref 37–48.5)
HGB BLD-MCNC: 12.8 G/DL (ref 12–16)
IMM GRANULOCYTES # BLD AUTO: 0.01 K/UL (ref 0–0.04)
IMM GRANULOCYTES NFR BLD AUTO: 0.2 % (ref 0–0.5)
LYMPHOCYTES # BLD AUTO: 2.2 K/UL (ref 1–4.8)
LYMPHOCYTES NFR BLD: 34.4 % (ref 18–48)
MCH RBC QN AUTO: 28.1 PG (ref 27–31)
MCHC RBC AUTO-ENTMCNC: 31.4 G/DL (ref 32–36)
MCV RBC AUTO: 90 FL (ref 82–98)
MONOCYTES # BLD AUTO: 0.4 K/UL (ref 0.3–1)
MONOCYTES NFR BLD: 6.9 % (ref 4–15)
NEUTROPHILS # BLD AUTO: 3.7 K/UL (ref 1.8–7.7)
NEUTROPHILS NFR BLD: 56.8 % (ref 38–73)
NRBC BLD-RTO: 0 /100 WBC
PLATELET # BLD AUTO: 274 K/UL (ref 150–350)
PMV BLD AUTO: 9.9 FL (ref 9.2–12.9)
POTASSIUM SERPL-SCNC: 4.1 MMOL/L (ref 3.5–5.1)
PROT SERPL-MCNC: 7.2 G/DL (ref 6–8.4)
RBC # BLD AUTO: 4.56 M/UL (ref 4–5.4)
SODIUM SERPL-SCNC: 142 MMOL/L (ref 136–145)
WBC # BLD AUTO: 6.42 K/UL (ref 3.9–12.7)

## 2019-04-13 PROCEDURE — 85025 COMPLETE CBC W/AUTO DIFF WBC: CPT

## 2019-04-13 PROCEDURE — 80061 LIPID PANEL: CPT

## 2019-04-13 PROCEDURE — 82306 VITAMIN D 25 HYDROXY: CPT

## 2019-04-13 PROCEDURE — 83036 HEMOGLOBIN GLYCOSYLATED A1C: CPT

## 2019-04-13 PROCEDURE — 80053 COMPREHEN METABOLIC PANEL: CPT

## 2019-04-13 PROCEDURE — 36415 COLL VENOUS BLD VENIPUNCTURE: CPT | Mod: PO

## 2019-04-14 ENCOUNTER — PATIENT MESSAGE (OUTPATIENT)
Dept: FAMILY MEDICINE | Facility: CLINIC | Age: 59
End: 2019-04-14

## 2019-04-14 DIAGNOSIS — Z00.00 ROUTINE ADULT HEALTH MAINTENANCE: Primary | ICD-10-CM

## 2019-04-15 ENCOUNTER — PATIENT MESSAGE (OUTPATIENT)
Dept: FAMILY MEDICINE | Facility: CLINIC | Age: 59
End: 2019-04-15

## 2019-04-15 LAB
CHOLEST SERPL-MCNC: 178 MG/DL (ref 120–199)
CHOLEST/HDLC SERPL: 3.6 {RATIO} (ref 2–5)
HDLC SERPL-MCNC: 49 MG/DL (ref 40–75)
HDLC SERPL: 27.5 % (ref 20–50)
LDLC SERPL CALC-MCNC: 115 MG/DL (ref 63–159)
NONHDLC SERPL-MCNC: 129 MG/DL
TRIGL SERPL-MCNC: 70 MG/DL (ref 30–150)

## 2019-04-22 ENCOUNTER — TELEPHONE (OUTPATIENT)
Dept: FAMILY MEDICINE | Facility: CLINIC | Age: 59
End: 2019-04-22

## 2019-04-22 ENCOUNTER — PATIENT MESSAGE (OUTPATIENT)
Dept: FAMILY MEDICINE | Facility: CLINIC | Age: 59
End: 2019-04-22

## 2019-04-26 ENCOUNTER — LAB VISIT (OUTPATIENT)
Dept: LAB | Facility: HOSPITAL | Age: 59
End: 2019-04-26
Attending: FAMILY MEDICINE
Payer: COMMERCIAL

## 2019-04-26 ENCOUNTER — OFFICE VISIT (OUTPATIENT)
Dept: FAMILY MEDICINE | Facility: CLINIC | Age: 59
End: 2019-04-26
Payer: COMMERCIAL

## 2019-04-26 VITALS
DIASTOLIC BLOOD PRESSURE: 80 MMHG | WEIGHT: 216.06 LBS | BODY MASS INDEX: 34.72 KG/M2 | OXYGEN SATURATION: 97 % | RESPIRATION RATE: 12 BRPM | SYSTOLIC BLOOD PRESSURE: 121 MMHG | TEMPERATURE: 98 F | HEART RATE: 62 BPM | HEIGHT: 66 IN

## 2019-04-26 DIAGNOSIS — R73.9 HYPERGLYCEMIA: ICD-10-CM

## 2019-04-26 DIAGNOSIS — K44.9 HIATAL HERNIA: ICD-10-CM

## 2019-04-26 DIAGNOSIS — R53.83 FATIGUE, UNSPECIFIED TYPE: ICD-10-CM

## 2019-04-26 DIAGNOSIS — R12 HEARTBURN: ICD-10-CM

## 2019-04-26 DIAGNOSIS — Z83.49 FAMILY HISTORY OF THYROID DISEASE IN MOTHER: ICD-10-CM

## 2019-04-26 DIAGNOSIS — N95.9 MENOPAUSAL AND PERIMENOPAUSAL DISORDER: ICD-10-CM

## 2019-04-26 DIAGNOSIS — E55.9 VITAMIN D DEFICIENCY: ICD-10-CM

## 2019-04-26 DIAGNOSIS — I10 ESSENTIAL HYPERTENSION: ICD-10-CM

## 2019-04-26 DIAGNOSIS — E66.9 OBESITY (BMI 30-39.9): ICD-10-CM

## 2019-04-26 DIAGNOSIS — K21.9 GASTROESOPHAGEAL REFLUX DISEASE, ESOPHAGITIS PRESENCE NOT SPECIFIED: ICD-10-CM

## 2019-04-26 DIAGNOSIS — I10 GOOD HYPERTENSION CONTROL: Primary | ICD-10-CM

## 2019-04-26 LAB
T4 FREE SERPL-MCNC: 1.02 NG/DL (ref 0.71–1.51)
TSH SERPL DL<=0.005 MIU/L-ACNC: 1.26 UIU/ML (ref 0.4–4)

## 2019-04-26 PROCEDURE — 3079F PR MOST RECENT DIASTOLIC BLOOD PRESSURE 80-89 MM HG: ICD-10-PCS | Mod: CPTII,S$GLB,, | Performed by: FAMILY MEDICINE

## 2019-04-26 PROCEDURE — 99999 PR PBB SHADOW E&M-EST. PATIENT-LVL IV: ICD-10-PCS | Mod: PBBFAC,,, | Performed by: FAMILY MEDICINE

## 2019-04-26 PROCEDURE — 3008F BODY MASS INDEX DOCD: CPT | Mod: CPTII,S$GLB,, | Performed by: FAMILY MEDICINE

## 2019-04-26 PROCEDURE — 3074F PR MOST RECENT SYSTOLIC BLOOD PRESSURE < 130 MM HG: ICD-10-PCS | Mod: CPTII,S$GLB,, | Performed by: FAMILY MEDICINE

## 2019-04-26 PROCEDURE — 99214 OFFICE O/P EST MOD 30 MIN: CPT | Mod: S$GLB,,, | Performed by: FAMILY MEDICINE

## 2019-04-26 PROCEDURE — 84439 ASSAY OF FREE THYROXINE: CPT

## 2019-04-26 PROCEDURE — 3008F PR BODY MASS INDEX (BMI) DOCUMENTED: ICD-10-PCS | Mod: CPTII,S$GLB,, | Performed by: FAMILY MEDICINE

## 2019-04-26 PROCEDURE — 3074F SYST BP LT 130 MM HG: CPT | Mod: CPTII,S$GLB,, | Performed by: FAMILY MEDICINE

## 2019-04-26 PROCEDURE — 3079F DIAST BP 80-89 MM HG: CPT | Mod: CPTII,S$GLB,, | Performed by: FAMILY MEDICINE

## 2019-04-26 PROCEDURE — 99214 PR OFFICE/OUTPT VISIT, EST, LEVL IV, 30-39 MIN: ICD-10-PCS | Mod: S$GLB,,, | Performed by: FAMILY MEDICINE

## 2019-04-26 PROCEDURE — 36415 COLL VENOUS BLD VENIPUNCTURE: CPT | Mod: PO

## 2019-04-26 PROCEDURE — 84443 ASSAY THYROID STIM HORMONE: CPT

## 2019-04-26 PROCEDURE — 99999 PR PBB SHADOW E&M-EST. PATIENT-LVL IV: CPT | Mod: PBBFAC,,, | Performed by: FAMILY MEDICINE

## 2019-04-26 RX ORDER — RABEPRAZOLE SODIUM 20 MG/1
20 TABLET, DELAYED RELEASE ORAL DAILY
Qty: 90 TABLET | Refills: 3 | Status: SHIPPED | OUTPATIENT
Start: 2019-04-26 | End: 2020-04-28

## 2019-04-26 RX ORDER — OLMESARTAN MEDOXOMIL AND HYDROCHLOROTHIAZIDE 40/12.5 40; 12.5 MG/1; MG/1
1 TABLET ORAL DAILY
Qty: 90 TABLET | Refills: 4 | Status: SHIPPED | OUTPATIENT
Start: 2019-04-26 | End: 2019-04-26

## 2019-04-26 RX ORDER — OLMESARTAN MEDOXOMIL AND HYDROCHLOROTHIAZIDE 40/12.5 40; 12.5 MG/1; MG/1
1 TABLET ORAL DAILY
Qty: 90 TABLET | Refills: 4 | Status: SHIPPED | OUTPATIENT
Start: 2019-04-26 | End: 2020-04-13 | Stop reason: SDUPTHER

## 2019-04-26 RX ORDER — OLOPATADINE HYDROCHLORIDE 2 MG/ML
1 SOLUTION/ DROPS OPHTHALMIC NIGHTLY PRN
Qty: 1 BOTTLE | Status: SHIPPED | OUTPATIENT
Start: 2019-04-26 | End: 2021-01-29

## 2019-04-26 RX ORDER — POTASSIUM CHLORIDE 750 MG/1
10 CAPSULE, EXTENDED RELEASE ORAL DAILY
Qty: 90 CAPSULE | Refills: 4 | Status: SHIPPED | OUTPATIENT
Start: 2019-04-26 | End: 2019-04-26

## 2019-04-26 RX ORDER — RABEPRAZOLE SODIUM 20 MG/1
20 TABLET, DELAYED RELEASE ORAL DAILY
COMMUNITY
End: 2019-04-26

## 2019-04-26 RX ORDER — MOMETASONE FUROATE 50 UG/1
2 SPRAY, METERED NASAL DAILY PRN
Qty: 1 EACH | Status: SHIPPED | OUTPATIENT
Start: 2019-04-26 | End: 2020-08-31

## 2019-04-26 RX ORDER — POTASSIUM CHLORIDE 750 MG/1
10 CAPSULE, EXTENDED RELEASE ORAL DAILY
Qty: 90 CAPSULE | Refills: 4 | Status: SHIPPED | OUTPATIENT
Start: 2019-04-26 | End: 2020-04-13 | Stop reason: SDUPTHER

## 2019-04-26 NOTE — PROGRESS NOTES
Subjective:       Patient ID: Mercedes Laura is a 59 y.o. female.    Chief Complaint: Follow-up (6mth f/u hypertension)    HPI  Review of Systems   Constitutional: Positive for fatigue. Negative for unexpected weight change.   Respiratory: Negative for chest tightness and shortness of breath.    Cardiovascular: Negative for chest pain, palpitations and leg swelling.   Gastrointestinal: Negative for abdominal pain.   Endocrine: Positive for cold intolerance.   Musculoskeletal: Negative for arthralgias.   Neurological: Negative for dizziness, syncope, light-headedness and headaches.       Patient Active Problem List   Diagnosis    Arthritis    Pedal edema    Hyperglycemia    Vitamin D deficiency    Hiatal hernia with gastroesophageal reflux    Panic attacks    Good hypertension control    Anxiety    GERD (gastroesophageal reflux disease)    Cardiovascular risk factor, ASCVD 10-year risk 1.9%, 2015    Obesity (BMI 30-39.9)    Venous reflux     Patient is here for a chronic conditions follow up.    Reviewed labs 4/19    Hyst 1 year ago with BSO.  Hot flashes , sweats, fatigue, cold intolerance  Objective:      Physical Exam   Constitutional: She is oriented to person, place, and time. She appears well-developed and well-nourished.   Cardiovascular: Normal rate, regular rhythm and normal heart sounds.   Pulmonary/Chest: Effort normal and breath sounds normal.   Musculoskeletal: She exhibits no edema.   Neurological: She is alert and oriented to person, place, and time.   Skin: Skin is warm and dry.   Psychiatric: She has a normal mood and affect.   Nursing note and vitals reviewed.      Assessment:       1. Good hypertension control    2. Hyperglycemia    3. Vitamin D deficiency    4. Obesity (BMI 30-39.9)    5. Gastroesophageal reflux disease, esophagitis presence not specified    6. Fatigue, unspecified type    7. Family history of thyroid disease in mother    8. Uncontrolled hypertension    9. Heartburn   "  10. Hiatal hernia    11. Menopausal and perimenopausal disorder        Plan:         1. Good hypertension control  Controlled on current medications.  Continue current medications.    - CBC auto differential; Future  - Comprehensive metabolic panel; Future  - Lipid panel; Future  - olmesartan-hydrochlorothiazide (BENICAR HCT) 40-12.5 mg Tab; Take 1 tablet by mouth once daily.  Dispense: 90 tablet; Refill: 4    2. Hyperglycemia   Your blood sugar is borderline high.  This means you are at risk for developing type 2 diabetes mellitus.  To lessen your risk you should exercise regularly, avoid excess carbohydrates and work toward a body mass index of less than 25.      - Hemoglobin A1c; Future    3. Vitamin D deficiency  Screen and treat as indicated:    - Vitamin D; Future    4. Obesity (BMI 30-39.9)  Counseled patient on his ideal body weight, health consequences of being obese and current recommendations including weekly exercise and a heart healthy diet.  Current BMI is:Estimated body mass index is 34.87 kg/m² as calculated from the following:    Height as of this encounter: 5' 6" (1.676 m).    Weight as of this encounter: 98 kg (216 lb 0.8 oz)..  Patient is aware that ideal BMI < 25 or Weight in (lb) to have BMI = 25: 154.6.        5. Gastroesophageal reflux disease, esophagitis presence not specified  Controlled on current medications.  Continue current medications.      6. Fatigue, unspecified type  Screen and treat as indicated:    - TSH; Future  - T4, free; Future    7. Family history of thyroid disease in mother  Screen and treat as indicated:    - TSH; Future  - T4, free; Future    8. Uncontrolled hypertension  Controlled on current medications.  Continue current medications.    - potassium chloride (MICRO-K) 10 MEQ CpSR; Take 1 capsule (10 mEq total) by mouth once daily.  Dispense: 90 capsule; Refill: 4    9. Heartburn  Controlled on current medications.  Continue current medications.    - ranitidine " (ZANTAC) 300 MG tablet; Take 1 tablet (300 mg total) by mouth every evening. 30-60 minutes before bedtime.  Dispense: 90 tablet; Refill: 3    10. Hiatal hernia  Controlled on current medications.  Continue current medications.    - ranitidine (ZANTAC) 300 MG tablet; Take 1 tablet (300 mg total) by mouth every evening. 30-60 minutes before bedtime.  Dispense: 90 tablet; Refill: 3    11. Menopausal and perimenopausal disorder  otc black cohosh. Discussed pros and cons of HRT including benefits of bone strength and reduction of menopausal symptoms.  Risks include clotting risks such as DVT and PE as well as increased cancer risk-breast and endometrial.  Patient elected not to proceed with HRT therapy.  All questions were answered.      Time spent with patient: 20 minutes    Patient with be reevaluated in 1 year or sooner prn    Greater than 50% of this visit was spent counseling as described in above documentation:Yes

## 2019-06-06 ENCOUNTER — PATIENT MESSAGE (OUTPATIENT)
Dept: OBSTETRICS AND GYNECOLOGY | Facility: CLINIC | Age: 59
End: 2019-06-06

## 2019-06-06 DIAGNOSIS — Z12.31 VISIT FOR SCREENING MAMMOGRAM: Primary | ICD-10-CM

## 2019-06-26 ENCOUNTER — OFFICE VISIT (OUTPATIENT)
Dept: OBSTETRICS AND GYNECOLOGY | Facility: CLINIC | Age: 59
End: 2019-06-26
Payer: COMMERCIAL

## 2019-06-26 VITALS
WEIGHT: 222 LBS | HEIGHT: 66 IN | BODY MASS INDEX: 35.68 KG/M2 | DIASTOLIC BLOOD PRESSURE: 80 MMHG | SYSTOLIC BLOOD PRESSURE: 120 MMHG

## 2019-06-26 DIAGNOSIS — Z01.419 ENCOUNTER FOR GYNECOLOGICAL EXAMINATION WITHOUT ABNORMAL FINDING: Primary | ICD-10-CM

## 2019-06-26 DIAGNOSIS — Z12.39 BREAST CANCER SCREENING: ICD-10-CM

## 2019-06-26 DIAGNOSIS — E66.9 OBESITY (BMI 35.0-39.9 WITHOUT COMORBIDITY): ICD-10-CM

## 2019-06-26 PROCEDURE — 99999 PR PBB SHADOW E&M-EST. PATIENT-LVL III: CPT | Mod: PBBFAC,,, | Performed by: OBSTETRICS & GYNECOLOGY

## 2019-06-26 PROCEDURE — 3074F SYST BP LT 130 MM HG: CPT | Mod: CPTII,S$GLB,, | Performed by: OBSTETRICS & GYNECOLOGY

## 2019-06-26 PROCEDURE — 3074F PR MOST RECENT SYSTOLIC BLOOD PRESSURE < 130 MM HG: ICD-10-PCS | Mod: CPTII,S$GLB,, | Performed by: OBSTETRICS & GYNECOLOGY

## 2019-06-26 PROCEDURE — 3079F PR MOST RECENT DIASTOLIC BLOOD PRESSURE 80-89 MM HG: ICD-10-PCS | Mod: CPTII,S$GLB,, | Performed by: OBSTETRICS & GYNECOLOGY

## 2019-06-26 PROCEDURE — 99999 PR PBB SHADOW E&M-EST. PATIENT-LVL III: ICD-10-PCS | Mod: PBBFAC,,, | Performed by: OBSTETRICS & GYNECOLOGY

## 2019-06-26 PROCEDURE — 3079F DIAST BP 80-89 MM HG: CPT | Mod: CPTII,S$GLB,, | Performed by: OBSTETRICS & GYNECOLOGY

## 2019-06-26 PROCEDURE — 99396 PREV VISIT EST AGE 40-64: CPT | Mod: S$GLB,,, | Performed by: OBSTETRICS & GYNECOLOGY

## 2019-06-26 PROCEDURE — 99396 PR PREVENTIVE VISIT,EST,40-64: ICD-10-PCS | Mod: S$GLB,,, | Performed by: OBSTETRICS & GYNECOLOGY

## 2019-06-26 RX ORDER — ERGOCALCIFEROL 1.25 MG/1
50000 CAPSULE ORAL
COMMUNITY
End: 2020-10-08

## 2019-06-26 RX ORDER — VITAMIN B COMPLEX
1 CAPSULE ORAL DAILY
COMMUNITY

## 2019-06-26 NOTE — PROGRESS NOTES
Chief Complaint   Patient presents with    Well Woman    mmg due after 7/17/19     History of Present Illness: Mercedes Laura is a 59 y.o. female that presents today 6/26/2019 for well gyn visit.    Past Medical History:   Diagnosis Date    Anxiety     this came on since lap kadeem sgy    Arthritis     Colon polyp     Deviated septum     GERD (gastroesophageal reflux disease)     Hypertension     PAC (premature atrial contraction)     Plantar fasciitis     PMB (postmenopausal bleeding) 05/2018    Seasonal allergies     Sinus disorder     Uterine leiomyoma 05/2018    Varicose veins of both lower extremities        Past Surgical History:   Procedure Laterality Date    BREAST BIOPSY Left 2015    benign    CHOLECYSTECTOMY  6-1-2015    Dr Ashraf    CHOLECYSTECTOMY-LAPAROSCOPIC N/A 6/1/2015    [facility]    COLONOSCOPY N/A 11/23/2015    [facility] ENDO    CYSTOSCOPY N/A 6/7/2018    [facility]    EGD (ESOPHAGOGASTRODUODENOSCOPY) N/A 3/4/2019    [facility] ENDO    ESOPHAGOGASTRODUODENOSCOPY (EGD) N/A 6/22/2015    [facility]    ESOPHAGOGASTRODUODENOSCOPY (EGD) N/A 6/9/2014    [facility] END[facility]    HYSTERECTOMY      HYSTERECTOMY-TOTAL LAPAROSCOPIC (TLH) N/A 6/7/2018    [facility]    KNEE ARTHROSCOPY W/ MENISCECTOMY  11/15/2000    right     OOPHORECTOMY      [id number] Bilateral 6/7/2018    [facility]    UPPER GASTROINTESTINAL ENDOSCOPY  06/22/2015    Dr. Stinson       Current Outpatient Medications   Medication Sig Dispense Refill    b complex vitamins capsule Take 1 capsule by mouth once daily.      biotin 10 mg Tab Take 100 mg by mouth once daily.       cetirizine (ZYRTEC) 10 MG tablet Take 10 mg by mouth  daily as needed for Allergies or Rhinitis.       ergocalciferol (VITAMIN D2) 50,000 unit Cap Take 50,000 Units by mouth every 7 days.      Lactobacillus rhamnosus GG (CULTURELLE) 10 billion cell capsule Take 1 capsule by mouth once daily.      mometasone (NASONEX) 50 mcg/actuation nasal spray 2 sprays by Nasal route daily as needed (rhinitis). 1 each prn    multivitamin (THERAGRAN) per tablet Take 1 tablet by mouth once daily.      olmesartan-hydrochlorothiazide (BENICAR HCT) 40-12.5 mg Tab Take 1 tablet by mouth once daily. 90 tablet 4    olopatadine (PATADAY) 0.2 % Drop Place 1 drop into both eyes nightly as needed. 1 Bottle prn    potassium chloride (MICRO-K) 10 MEQ CpSR Take 1 capsule (10 mEq total) by mouth once daily. 90 capsule 4    RABEprazole (ACIPHEX) 20 mg tablet Take 1 tablet (20 mg total) by mouth once daily. 90 tablet 3    ranitidine (ZANTAC) 300 MG tablet Take 1 tablet (300 mg total) by mouth every evening. 30-60 minutes before bedtime. 90 tablet 3    ACUICYN 0.01 % Spry 2 (two) times daily as needed.       acyclovir 5% (ZOVIRAX) 5 % Crea Apply topically 5 (five) times daily. 5 g prn    calcium carbonate (TUMS) 200 mg calcium (500 mg) chewable tablet Take 1 tablet by mouth daily as needed for Heartburn.      econazole nitrate 1 % cream Apply to inner thighs and buttocks BID PRN flare 85 g 2    loratadine (CLARITIN) 10 mg tablet Take 10 mg by mouth daily as needed for Allergies.       No current facility-administered medications for this visit.        Review of patient's allergies indicates:   Allergen Reactions    Augmentin [amoxicillin-pot clavulanate] Nausea And Vomiting     Gi upset    Bentyl [dicyclomine] Other (See Comments)     Worsening anxiety    Naprosyn [naproxen]     Omeprazole      Other reaction(s): ABDOMINAL PAIN    Adhesive Rash    Lexapro [escitalopram oxalate] Anxiety     Bad dreams, feeling of doom, sweats    Sulfa (sulfonamide antibiotics) Rash     Other  reaction(s): measle like rash       Family History   Problem Relation Age of Onset    Hypertension Mother     Arthritis Mother     Hypertension Father     Cancer Father         melanoma    Melanoma Father     COPD Father     Heart disease Father         chf    Diverticulitis Maternal Grandmother     Arthritis Maternal Aunt     Diabetes Maternal Aunt     Hypertension Maternal Aunt     Arthritis Maternal Uncle     Breast cancer Neg Hx     Ovarian cancer Neg Hx     Psoriasis Neg Hx     Lupus Neg Hx     Eczema Neg Hx     Colon cancer Neg Hx     Colon polyps Neg Hx     Crohn's disease Neg Hx     Esophageal cancer Neg Hx     Stomach cancer Neg Hx        Social History     Socioeconomic History    Marital status:      Spouse name: Not on file    Number of children: Not on file    Years of education: Not on file    Highest education level: Not on file   Occupational History    Not on file   Social Needs    Financial resource strain: Not on file    Food insecurity:     Worry: Not on file     Inability: Not on file    Transportation needs:     Medical: Not on file     Non-medical: Not on file   Tobacco Use    Smoking status: Never Smoker    Smokeless tobacco: Never Used   Substance and Sexual Activity    Alcohol use: Yes     Comment: rarely / few times per yr    Drug use: No    Sexual activity: Yes     Partners: Male     Birth control/protection: None, Post-menopausal   Lifestyle    Physical activity:     Days per week: Not on file     Minutes per session: Not on file    Stress: Not on file   Relationships    Social connections:     Talks on phone: Not on file     Gets together: Not on file     Attends Pent[employer]al service: Not on file     Active member of club or organization: Not on file     Attends meetings of clubs or organizations: Not on file     Relationship status: Not on file   Other Topics Concern    Are you pregnant or think you may be? Not Asked    Breast-feeding Not  "Asked   Social History Narrative    Not on file       OB History    Para Term  AB Living   2 2 2     2   SAB TAB Ectopic Multiple Live Births           2      # Outcome Date GA Lbr George/2nd Weight Sex Delivery Anes PTL Lv   2 Term 87   3.345 kg (7 lb 6 oz) F Vag-Spont None N CHRISTIANA   1 Term 84   3.289 kg (7 lb 4 oz) M Vag-Spont EPI N CHRISTIANA       Review of Symptoms:  GENERAL: Denies weight gain or weight loss. Feeling well overall.   SKIN: Denies rash or lesions.   HEAD: Denies head injury or headache.   NODES: Denies enlarged lymph nodes.   CHEST: Denies chest pain or shortness of breath.   CARDIOVASCULAR: Denies palpitations or left sided chest pain.   ABDOMEN: No abdominal pain, constipation, diarrhea, nausea, vomiting or rectal bleeding.   URINARY: No frequency, dysuria, hematuria, or burning on urination.  HEMATOLOGIC: No easy bruisability or excessive bleeding.   MUSCULOSKELETAL: Denies joint pain or swelling.     /80   Ht 5' 6" (1.676 m)   Wt 100.7 kg (222 lb 0.1 oz)   LMP 2018   Physical Exam:  APPEARANCE: Well nourished, well developed, in no acute distress.  SKIN: Normal skin turgor, no lesions.  NECK: Neck symmetric without masses   RESPIRATORY: Normal respiratory effort with no retractions or use of accessory muscles  CARDIOVASCULAR: Peripheral vascular system with no swelling no varicosities and palpation of pulses normal  LYMPHATIC: No enlargements of the lymph nodes noted in the neck, axillae, or groin  ABDOMEN: Soft. No tenderness or masses. No hepatosplenomegaly. No hernias.  BREASTS: Symmetrical, no skin changes or visible lesions. No palpable masses, nipple discharge or adenopathy bilaterally.  PELVIC: Normal external female genitalia without lesions. Normal hair distribution. Adequate perineal body, normal urethral meatus. Urethra with no masses.  Bladder nontender. Vagina moist and well rugated without lesions or discharge. No significant cystocele or " rectocele.  Adnexa without masses or tenderness. Urethra and bladder normal.   EXTREMITIES: No clubbing cyanosis or edema.    ASSESSMENT/PLAN:  Encounter for gynecological examination without abnormal finding    Obesity (BMI 35.0-39.9 without comorbidity)    Breast cancer screening          Patient was counseled today on Pap guidelines. We discussed the discontinuing the pap smear after hysterectomy except in certain high risk cases.  We discussed the need for pelvic exams.   We discussed STD screening if at high risk for an STD.  We discussed breast cancer screening with mammograms every other year after the age of 40 and annually after the age of 50.    We discussed colon cancer screening.   Osteoporosis screening with the Dexa Bone Scan discussed when indicated.   She will see her PCP for other health maintenance.       FOLLOW-UP:prn

## 2019-07-02 DIAGNOSIS — M25.561 PAIN IN BOTH KNEES, UNSPECIFIED CHRONICITY: Primary | ICD-10-CM

## 2019-07-02 DIAGNOSIS — M25.562 PAIN IN BOTH KNEES, UNSPECIFIED CHRONICITY: Primary | ICD-10-CM

## 2019-07-03 ENCOUNTER — HOSPITAL ENCOUNTER (OUTPATIENT)
Dept: RADIOLOGY | Facility: HOSPITAL | Age: 59
Discharge: HOME | End: 2019-07-03
Attending: ORTHOPAEDIC SURGERY
Payer: COMMERCIAL

## 2019-07-03 ENCOUNTER — OFFICE VISIT (OUTPATIENT)
Dept: ORTHOPEDICS | Facility: CLINIC | Age: 59
End: 2019-07-03
Payer: COMMERCIAL

## 2019-07-03 VITALS
DIASTOLIC BLOOD PRESSURE: 79 MMHG | BODY MASS INDEX: 35.68 KG/M2 | HEART RATE: 64 BPM | SYSTOLIC BLOOD PRESSURE: 132 MMHG | HEIGHT: 66 IN | WEIGHT: 222 LBS

## 2019-07-03 DIAGNOSIS — M25.562 PAIN IN BOTH KNEES, UNSPECIFIED CHRONICITY: ICD-10-CM

## 2019-07-03 DIAGNOSIS — M25.561 PAIN IN BOTH KNEES, UNSPECIFIED CHRONICITY: ICD-10-CM

## 2019-07-03 DIAGNOSIS — M19.90 ARTHRITIS: Primary | ICD-10-CM

## 2019-07-03 PROCEDURE — 99213 OFFICE O/P EST LOW 20 MIN: CPT | Mod: 25,S$GLB,, | Performed by: ORTHOPAEDIC SURGERY

## 2019-07-03 PROCEDURE — 3078F DIAST BP <80 MM HG: CPT | Mod: CPTII,S$GLB,, | Performed by: ORTHOPAEDIC SURGERY

## 2019-07-03 PROCEDURE — 99999 PR PBB SHADOW E&M-EST. PATIENT-LVL III: CPT | Mod: PBBFAC,,, | Performed by: ORTHOPAEDIC SURGERY

## 2019-07-03 PROCEDURE — 3075F SYST BP GE 130 - 139MM HG: CPT | Mod: CPTII,S$GLB,, | Performed by: ORTHOPAEDIC SURGERY

## 2019-07-03 PROCEDURE — 3008F BODY MASS INDEX DOCD: CPT | Mod: CPTII,S$GLB,, | Performed by: ORTHOPAEDIC SURGERY

## 2019-07-03 PROCEDURE — 97760 ORTHOTIC MGMT&TRAING 1ST ENC: CPT | Mod: GP,S$GLB,, | Performed by: ORTHOPAEDIC SURGERY

## 2019-07-03 PROCEDURE — 3075F PR MOST RECENT SYSTOLIC BLOOD PRESS GE 130-139MM HG: ICD-10-PCS | Mod: CPTII,S$GLB,, | Performed by: ORTHOPAEDIC SURGERY

## 2019-07-03 PROCEDURE — 73564 X-RAY EXAM KNEE 4 OR MORE: CPT | Mod: 26,,, | Performed by: RADIOLOGY

## 2019-07-03 PROCEDURE — 99999 PR PBB SHADOW E&M-EST. PATIENT-LVL III: ICD-10-PCS | Mod: PBBFAC,,, | Performed by: ORTHOPAEDIC SURGERY

## 2019-07-03 PROCEDURE — 3008F PR BODY MASS INDEX (BMI) DOCUMENTED: ICD-10-PCS | Mod: CPTII,S$GLB,, | Performed by: ORTHOPAEDIC SURGERY

## 2019-07-03 PROCEDURE — 20610 DRAIN/INJ JOINT/BURSA W/O US: CPT | Mod: 50,S$GLB,, | Performed by: ORTHOPAEDIC SURGERY

## 2019-07-03 PROCEDURE — 20610 LARGE JOINT ASPIRATION/INJECTION: R KNEE, L KNEE: ICD-10-PCS | Mod: 50,S$GLB,, | Performed by: ORTHOPAEDIC SURGERY

## 2019-07-03 PROCEDURE — 3078F PR MOST RECENT DIASTOLIC BLOOD PRESSURE < 80 MM HG: ICD-10-PCS | Mod: CPTII,S$GLB,, | Performed by: ORTHOPAEDIC SURGERY

## 2019-07-03 PROCEDURE — 73564 XR KNEE ORTHO BILAT WITH FLEXION: ICD-10-PCS | Mod: 26,,, | Performed by: RADIOLOGY

## 2019-07-03 PROCEDURE — 97760 PR ORTHOTIC MGMT&TRAINJ INITIAL ENC EA 15 MINS: ICD-10-PCS | Mod: GP,S$GLB,, | Performed by: ORTHOPAEDIC SURGERY

## 2019-07-03 PROCEDURE — 99213 PR OFFICE/OUTPT VISIT, EST, LEVL III, 20-29 MIN: ICD-10-PCS | Mod: 25,S$GLB,, | Performed by: ORTHOPAEDIC SURGERY

## 2019-07-03 PROCEDURE — 73564 X-RAY EXAM KNEE 4 OR MORE: CPT | Mod: TC,50,PO

## 2019-07-03 RX ORDER — TRIAMCINOLONE ACETONIDE 40 MG/ML
40 INJECTION, SUSPENSION INTRA-ARTICULAR; INTRAMUSCULAR
Status: DISCONTINUED | OUTPATIENT
Start: 2019-07-03 | End: 2019-07-03 | Stop reason: HOSPADM

## 2019-07-03 RX ADMIN — TRIAMCINOLONE ACETONIDE 40 MG: 40 INJECTION, SUSPENSION INTRA-ARTICULAR; INTRAMUSCULAR at 09:07

## 2019-07-03 NOTE — PROCEDURES
Large Joint Aspiration/Injection: R knee, L knee  Date/Time: 7/3/2019 9:47 AM  Performed by: Chu Whitman MD  Authorized by: Chu Whitman MD     Consent Done?:  Yes (Verbal)  Indications:  Pain  Procedure site marked: Yes    Timeout: Prior to procedure the correct patient, procedure, and site was verified      Location:  Knee  Site:  R knee and L knee  Prep: Patient was prepped and draped in usual sterile fashion    Needle size:  20 G  Approach:  Anterolateral  Medications:  40 mg triamcinolone acetonide 40 mg/mL; 40 mg triamcinolone acetonide 40 mg/mL  Patient tolerance:  Patient tolerated the procedure well with no immediate complications

## 2019-07-03 NOTE — PROGRESS NOTES
Past Medical History:   Diagnosis Date    Anxiety     this came on since lap kadeem sgy    Arthritis     Colon polyp     Deviated septum     GERD (gastroesophageal reflux disease)     Hypertension     PAC (premature atrial contraction)     Plantar fasciitis     PMB (postmenopausal bleeding) 05/2018    Seasonal allergies     Sinus disorder     Uterine leiomyoma 05/2018    Varicose veins of both lower extremities        Past Surgical History:   Procedure Laterality Date    BREAST BIOPSY Left 2015    benign    CHOLECYSTECTOMY  6-1-2015    Dr Ashraf    CHOLECYSTECTOMY-LAPAROSCOPIC N/A 6/1/2015    [facility]    COLONOSCOPY N/A 11/23/2015    [facility] ENDO    CYSTOSCOPY N/A 6/7/2018    [facility]    EGD (ESOPHAGOGASTRODUODENOSCOPY) N/A 3/4/2019    [facility] ENDO    ESOPHAGOGASTRODUODENOSCOPY (EGD) N/A 6/22/2015    [facility] END[facility]    ESOPHAGOGASTRODUODENOSCOPY (EGD) N/A 6/9/2014    [facility] ENDO    HYSTERECTOMY      HYSTERECTOMY-TOTAL LAPAROSCOPIC (TLH) N/A 6/7/2018    [facility] OR    KNEE ARTHROSCOPY W/ MENISCECTOMY  11/15/2000    right     OOPHORECTOMY      [id number] Bilateral 6/7/2018    [facility] OR    UPPER GASTROINTESTINAL ENDOSCOPY  06/22/2015    Dr. Stinson       Current Outpatient Medications   Medication Sig    ACUICYN 0.01 % Spry 2 (two) times daily as needed.     acyclovir 5% (ZOVIRAX) 5 % Crea Apply topically 5 (five) times daily.    b complex vitamins capsule Take 1 capsule by mouth once daily.    biotin 10 mg Tab Take 100 mg by mouth once daily.     calcium carbonate (TUMS) 200 mg calcium (500 mg) chewable tablet Take 1 tablet by mouth daily as needed for Heartburn.    cetirizine (ZYRTEC) 10  MG tablet Take 10 mg by mouth daily as needed for Allergies or Rhinitis.     econazole nitrate 1 % cream Apply to inner thighs and buttocks BID PRN flare    ergocalciferol (VITAMIN D2) 50,000 unit Cap Take 50,000 Units by mouth every 7 days.    Lactobacillus rhamnosus GG (CULTURELLE) 10 billion cell capsule Take 1 capsule by mouth once daily.    loratadine (CLARITIN) 10 mg tablet Take 10 mg by mouth daily as needed for Allergies.    mometasone (NASONEX) 50 mcg/actuation nasal spray 2 sprays by Nasal route daily as needed (rhinitis).    multivitamin (THERAGRAN) per tablet Take 1 tablet by mouth once daily.    olmesartan-hydrochlorothiazide (BENICAR HCT) 40-12.5 mg Tab Take 1 tablet by mouth once daily.    olopatadine (PATADAY) 0.2 % Drop Place 1 drop into both eyes nightly as needed.    potassium chloride (MICRO-K) 10 MEQ CpSR Take 1 capsule (10 mEq total) by mouth once daily.    RABEprazole (ACIPHEX) 20 mg tablet Take 1 tablet (20 mg total) by mouth once daily.    ranitidine (ZANTAC) 300 MG tablet Take 1 tablet (300 mg total) by mouth every evening. 30-60 minutes before bedtime.     No current facility-administered medications for this visit.        Review of patient's allergies indicates:   Allergen Reactions    Augmentin [amoxicillin-pot clavulanate] Nausea And Vomiting     Gi upset    Bentyl [dicyclomine] Other (See Comments)     Worsening anxiety    Naprosyn [naproxen]     Omeprazole      Other reaction(s): ABDOMINAL PAIN    Adhesive Rash    Lexapro [escitalopram oxalate] Anxiety     Bad dreams, feeling of doom, sweats    Sulfa (sulfonamide antibiotics) Rash     Other reaction(s): measle like rash       Family History   Problem Relation Age of Onset    Hypertension Mother     Arthritis Mother     Hypertension Father     Cancer Father         melanoma    Melanoma Father     COPD Father     Heart disease Father         chf    Diverticulitis Maternal Grandmother     Arthritis Maternal  Aunt     Diabetes Maternal Aunt     Hypertension Maternal Aunt     Arthritis Maternal Uncle     Breast cancer Neg Hx     Ovarian cancer Neg Hx     Psoriasis Neg Hx     Lupus Neg Hx     Eczema Neg Hx     Colon cancer Neg Hx     Colon polyps Neg Hx     Crohn's disease Neg Hx     Esophageal cancer Neg Hx     Stomach cancer Neg Hx        Social History     Socioeconomic History    Marital status:      Spouse name: Not on file    Number of children: Not on file    Years of education: Not on file    Highest education level: Not on file   Occupational History    Not on file   Social Needs    Financial resource strain: Not on file    Food insecurity:     Worry: Not on file     Inability: Not on file    Transportation needs:     Medical: Not on file     Non-medical: Not on file   Tobacco Use    Smoking status: Never Smoker    Smokeless tobacco: Never Used   Substance and Sexual Activity    Alcohol use: Yes     Comment: rarely / few times per yr    Drug use: No    Sexual activity: Yes     Partners: Male     Birth control/protection: None, Post-menopausal   Lifestyle    Physical activity:     Days per week: Not on file     Minutes per session: Not on file    Stress: Not on file   Relationships    Social connections:     Talks on phone: Not on file     Gets together: Not on file     Attends [employer] service: Not on file     Active member of club or organization: Not on file     Attends meetings of clubs or organizations: Not on file     Relationship status: Not on file   Other Topics Concern    Are you pregnant or think you may be? Not Asked    Breast-feeding Not Asked   Social History Narrative    Not on file       Chief Complaint:   Chief Complaint   Patient presents with    Knee Pain     bilateral        History of present illness: Is a 59-year-old female seen in consultation for Dr. Llamas.  Patient's had knee pain for several years now.  Symptoms are stable and mild to moderate.   Pain with running and squatting.  Patient had prior right meniscus surgery several years ago.  Pain is currently a 0 out of 10 but up to a 6 out of 10 with climbing.  Pain is laterally located.  She has been in bio skin brace is previously.  Right knee is worse than left.      Answers for HPI/ROS submitted by the patient on 4/22/2018   Leg pain  unexpected weight change: No  appetite change : No  sleep disturbance: No  IMMUNOCOMPROMISED: No  nervous/ anxious: No  dysphoric mood: No  rash: No  visual disturbance: No  eye redness: No  eye pain: No  ear pain: No  tinnitus: No  hearing loss: No  sinus pressure : No  nosebleeds: No  enviro allergies: No  food allergies: No  cough: No  shortness of breath: No  sweating: No  dysuria: No  frequency: No  difficulty urinating: No  hematuria: No  painful intercourse: No  chest pain: No  palpitations: Yes  nausea: No  vomiting: No  diarrhea: No  blood in stool: No  constipation: No  headaches: No  numbness: No  seizures: No  joint swelling: No  myalgia: Yes  weakness: No  back pain: No  Pain Chronicity: recurrent  History of trauma: Yes  Onset: more than 1 year ago  Frequency: intermittently  Progression since onset: unchanged  injury location: at home  pain- numeric: 1/10  pain location: left knee, right knee  pain quality: aching, dull  Radiating Pain: No  Aggravating factors: activity, bending, cold, exercise  fever: No  inability to bear weight: No  itching: No  joint locking: No  limited range of motion: No  stiffness: No  tingling: No  Treatments tried: cold, heat, exercise, movement, OTC pain meds, rest  physical therapy: not tried  Improvement on treatment: moderate        Physical Examination:    Vital Signs:    Vitals:    07/03/19 0907   BP: 132/79   Pulse: 64       Body mass index is 35.83 kg/m².    This a well-developed, well nourished patient in no acute distress.  They are alert and oriented and cooperative to examination.  Pt. walks without an antalgic gait.       Examination of bilateral knees shows no rashes or erythema. There are no masses ecchymosis or effusion. Patient has full range of motion from 0-130°. Patient is moderately tender to palpation over lateral joint line and nontender to palpation over the medial joint line. Patient has a - Lachman exam, - anterior drawer exam, and - posterior drawer exam. - Lata's exam. Knee is stable to varus and valgus stress. 5 out of 5 motor strength. Palpable distal pulses. Intact light touch sensation. Negative Patellofemoral crepitus      X-rays: X-rays of the right knee is ordered and reviewed which show severe valgus narrowing of both knees with the right knee slightly worse.  No significant progression.     Assessment:: Bilateral valgus knee arthritis    Plan:  I reviewed the findings with her today.  We talked about knee replacements.  Patient elected to just try cortisone injections and a new set of braces today.  Follow-up as needed.    We performed a custom orthotic/brace fitting, adjusting and training with the patient. The patient demonstrated understanding and proper care. This was performed for 15 minutes.          This note was created using  voice recognition software that occasionally misinterpreted phrases or words.    Consult note is delivered via Epic messaging service.

## 2019-07-19 ENCOUNTER — HOSPITAL ENCOUNTER (OUTPATIENT)
Dept: RADIOLOGY | Facility: CLINIC | Age: 59
Discharge: HOME | End: 2019-07-19
Attending: OBSTETRICS & GYNECOLOGY
Payer: COMMERCIAL

## 2019-07-19 DIAGNOSIS — Z12.31 VISIT FOR SCREENING MAMMOGRAM: ICD-10-CM

## 2019-07-19 PROCEDURE — 77063 BREAST TOMOSYNTHESIS BI: CPT | Mod: 26,,, | Performed by: RADIOLOGY

## 2019-07-19 PROCEDURE — 77063 MAMMO DIGITAL SCREENING BILAT WITH TOMOSYNTHESIS_CAD: ICD-10-PCS | Mod: 26,,, | Performed by: RADIOLOGY

## 2019-07-19 PROCEDURE — 77067 SCR MAMMO BI INCL CAD: CPT | Mod: TC,PO

## 2019-07-19 PROCEDURE — 77067 MAMMO DIGITAL SCREENING BILAT WITH TOMOSYNTHESIS_CAD: ICD-10-PCS | Mod: 26,,, | Performed by: RADIOLOGY

## 2019-07-19 PROCEDURE — 77067 SCR MAMMO BI INCL CAD: CPT | Mod: 26,,, | Performed by: RADIOLOGY

## 2019-09-15 DIAGNOSIS — B35.6 TINEA CRURIS: ICD-10-CM

## 2019-09-16 RX ORDER — ECONAZOLE NITRATE 10 MG/G
CREAM TOPICAL
Qty: 85 G | Refills: 1 | Status: SHIPPED | OUTPATIENT
Start: 2019-09-16 | End: 2021-01-29

## 2019-10-04 ENCOUNTER — OFFICE VISIT (OUTPATIENT)
Dept: DERMATOLOGY | Facility: CLINIC | Age: 59
End: 2019-10-04
Payer: COMMERCIAL

## 2019-10-04 VITALS — HEIGHT: 66 IN | WEIGHT: 222 LBS | BODY MASS INDEX: 35.68 KG/M2

## 2019-10-04 DIAGNOSIS — L82.1 SEBORRHEIC KERATOSES: ICD-10-CM

## 2019-10-04 DIAGNOSIS — Z86.018 HISTORY OF DYSPLASTIC NEVUS: ICD-10-CM

## 2019-10-04 DIAGNOSIS — D22.9 MULTIPLE BENIGN NEVI: Primary | ICD-10-CM

## 2019-10-04 DIAGNOSIS — L73.8 SEBACEOUS HYPERPLASIA OF FACE: ICD-10-CM

## 2019-10-04 DIAGNOSIS — L81.4 SOLAR LENTIGO: ICD-10-CM

## 2019-10-04 DIAGNOSIS — L30.9 DERMATITIS: ICD-10-CM

## 2019-10-04 PROCEDURE — 3008F PR BODY MASS INDEX (BMI) DOCUMENTED: ICD-10-PCS | Mod: CPTII,S$GLB,, | Performed by: DERMATOLOGY

## 2019-10-04 PROCEDURE — 3008F BODY MASS INDEX DOCD: CPT | Mod: CPTII,S$GLB,, | Performed by: DERMATOLOGY

## 2019-10-04 PROCEDURE — 99214 PR OFFICE/OUTPT VISIT, EST, LEVL IV, 30-39 MIN: ICD-10-PCS | Mod: S$GLB,,, | Performed by: DERMATOLOGY

## 2019-10-04 PROCEDURE — 99999 PR PBB SHADOW E&M-EST. PATIENT-LVL II: CPT | Mod: PBBFAC,,, | Performed by: DERMATOLOGY

## 2019-10-04 PROCEDURE — 99214 OFFICE O/P EST MOD 30 MIN: CPT | Mod: S$GLB,,, | Performed by: DERMATOLOGY

## 2019-10-04 PROCEDURE — 99999 PR PBB SHADOW E&M-EST. PATIENT-LVL II: ICD-10-PCS | Mod: PBBFAC,,, | Performed by: DERMATOLOGY

## 2019-10-04 RX ORDER — TRIAMCINOLONE ACETONIDE 1 MG/G
CREAM TOPICAL
Qty: 80 G | Refills: 3 | Status: SHIPPED | OUTPATIENT
Start: 2019-10-04 | End: 2021-01-29 | Stop reason: ALTCHOICE

## 2019-10-04 NOTE — PROGRESS NOTES
Subjective:       Patient ID:  Mercedes Laura is a 59 y.o. female who presents for   Chief Complaint   Patient presents with    Skin Check     TBSE    Rash     Chest, years, recurring, slightly itchy, previously tx with econazole     Pt last seen 9-24-18 for TBSE with benign lesions    Here today for TBSE and rash to chest for years. Recurring, slight itch, active today.   States appears after concurrent recurring rash on abdomen (not active today) appears, which is itchy.   Possibly exacerbated by heat?    Previously tx with econazole cream and PO Bendaryl, no improvement.  Unable to identify any triggers. Denies changes in topical regimen or medications.    No h/o skin cancer  Here for FU skin check and new complaint  Father with h/o melanoma age 78, back, treated with wide excision    H/o mildly DN abdomen, neg bx margins        Review of Systems   Constitutional: Negative for fever, chills and fatigue.   Skin: Positive for rash and daily sunscreen use. Negative for itching, activity-related sunscreen use and wears hat.   Hematologic/Lymphatic: Does not bruise/bleed easily.        Objective:    Physical Exam   Constitutional: She appears well-developed and well-nourished. No distress.   Neurological: She is alert and oriented to person, place, and time. She is not disoriented.   Psychiatric: She has a normal mood and affect.   Skin:   Areas Examined (abnormalities noted in diagram):   Scalp / Hair Palpated and Inspected  Head / Face Inspection Performed  Neck Inspection Performed  Chest / Axilla Inspection Performed  Abdomen Inspection Performed  Genitals / Buttocks / Groin Inspection Performed  Back Inspection Performed  RUE Inspected  LUE Inspection Performed  RLE Inspected  LLE Inspection Performed  Nails and Digits Inspection Performed                           Diagram Legend     Erythematous scaling macule/papule c/w actinic keratosis       Vascular papule c/w angioma      Pigmented verrucoid papule/plaque  c/w seborrheic keratosis      Yellow umbilicated papule c/w sebaceous hyperplasia      Irregularly shaped tan macule c/w lentigo     1-2 mm smooth white papules consistent with Milia      Movable subcutaneous cyst with punctum c/w epidermal inclusion cyst      Subcutaneous movable cyst c/w pilar cyst      Firm pink to brown papule c/w dermatofibroma      Pedunculated fleshy papule(s) c/w skin tag(s)      Evenly pigmented macule c/w junctional nevus     Mildly variegated pigmented, slightly irregular-bordered macule c/w mildly atypical nevus      Flesh colored to evenly pigmented papule c/w intradermal nevus       Pink pearly papule/plaque c/w basal cell carcinoma      Erythematous hyperkeratotic cursted plaque c/w SCC      Surgical scar with no sign of skin cancer recurrence      Open and closed comedones      Inflammatory papules and pustules      Verrucoid papule consistent consistent with wart     Erythematous eczematous patches and plaques     Dystrophic onycholytic nail with subungual debris c/w onychomycosis     Umbilicated papule    Erythematous-base heme-crusted tan verrucoid plaque consistent with inflamed seborrheic keratosis     Erythematous Silvery Scaling Plaque c/w Psoriasis     See annotation      Assessment / Plan:        Multiple benign nevi  Discussed ABCDE's of nevi.  Monitor for new mole or moles that are becoming bigger, darker, irritated, or developing irregular borders. Brochure provided.    History of dysplastic nevus  Area of previous DN (R abdomen) examined. Site well healed with no signs of recurrence.  Total body skin examination performed today including at least 12 points as noted in physical examination. No lesions suspicious for malignancy noted.    Seborrheic keratoses  These are benign inherited growths without a malignant potential. Reassurance given to patient. No treatment is necessary.     Solar lentigo  This is a benign hyperpigmented sun induced lesion. Daily sun protection  will reduce the number of new lesions. Treatment of these benign lesions are considered cosmetic.    Sebaceous hyperplasia of face  This is a common condition representing benign enlargement of the sebaceous lobule. It typically occurs in adulthood. Reassurance given to patient.     Dermatitis  Mils today, R chest only, slightly itchy, Unable to identify trigger  Follows sensitive skin care routine  TAC cream BID x few days PRN flare    Patient instructed in importance in daily sun protection of at least spf 30. Mineral sunscreen ingredients preferred (Zinc +/- Titanium).   Recommend Elta MD for daily use on face and neck.  Patient encouraged to wear hat for all outdoor exposure.   Also discussed sun avoidance and use of protective clothing.             Follow up in about 1 year (around 10/4/2020), or if symptoms worsen or fail to improve.

## 2020-04-13 ENCOUNTER — OFFICE VISIT (OUTPATIENT)
Dept: FAMILY MEDICINE | Facility: CLINIC | Age: 60
End: 2020-04-13
Payer: COMMERCIAL

## 2020-04-13 VITALS — SYSTOLIC BLOOD PRESSURE: 120 MMHG | DIASTOLIC BLOOD PRESSURE: 78 MMHG

## 2020-04-13 DIAGNOSIS — K21.9 GASTROESOPHAGEAL REFLUX DISEASE, ESOPHAGITIS PRESENCE NOT SPECIFIED: ICD-10-CM

## 2020-04-13 DIAGNOSIS — I10 GOOD HYPERTENSION CONTROL: Primary | ICD-10-CM

## 2020-04-13 DIAGNOSIS — E66.9 OBESITY (BMI 30-39.9): ICD-10-CM

## 2020-04-13 DIAGNOSIS — R73.9 HYPERGLYCEMIA: ICD-10-CM

## 2020-04-13 DIAGNOSIS — I10 ESSENTIAL HYPERTENSION: ICD-10-CM

## 2020-04-13 DIAGNOSIS — E55.9 VITAMIN D DEFICIENCY: ICD-10-CM

## 2020-04-13 PROCEDURE — 3078F PR MOST RECENT DIASTOLIC BLOOD PRESSURE < 80 MM HG: ICD-10-PCS | Mod: CPTII,,, | Performed by: FAMILY MEDICINE

## 2020-04-13 PROCEDURE — 99214 PR OFFICE/OUTPT VISIT, EST, LEVL IV, 30-39 MIN: ICD-10-PCS | Mod: 95,,, | Performed by: FAMILY MEDICINE

## 2020-04-13 PROCEDURE — 3074F PR MOST RECENT SYSTOLIC BLOOD PRESSURE < 130 MM HG: ICD-10-PCS | Mod: CPTII,,, | Performed by: FAMILY MEDICINE

## 2020-04-13 PROCEDURE — 3078F DIAST BP <80 MM HG: CPT | Mod: CPTII,,, | Performed by: FAMILY MEDICINE

## 2020-04-13 PROCEDURE — 99214 OFFICE O/P EST MOD 30 MIN: CPT | Mod: 95,,, | Performed by: FAMILY MEDICINE

## 2020-04-13 PROCEDURE — 3074F SYST BP LT 130 MM HG: CPT | Mod: CPTII,,, | Performed by: FAMILY MEDICINE

## 2020-04-13 RX ORDER — POTASSIUM CHLORIDE 750 MG/1
10 CAPSULE, EXTENDED RELEASE ORAL DAILY
Qty: 90 CAPSULE | Refills: 4 | Status: SHIPPED | OUTPATIENT
Start: 2020-04-13 | End: 2021-06-14

## 2020-04-13 RX ORDER — OLMESARTAN MEDOXOMIL AND HYDROCHLOROTHIAZIDE 40/12.5 40; 12.5 MG/1; MG/1
1 TABLET ORAL DAILY
Qty: 90 TABLET | Refills: 4 | Status: SHIPPED | OUTPATIENT
Start: 2020-04-13 | End: 2021-06-14

## 2020-04-13 NOTE — PROGRESS NOTES
Subjective:       Patient ID: Mercedes Laura is a 60 y.o. female.    Chief Complaint: No chief complaint on file.    HPI  Review of Systems   Constitutional: Negative for fatigue and unexpected weight change.   Respiratory: Negative for chest tightness and shortness of breath.    Cardiovascular: Negative for chest pain, palpitations and leg swelling.   Gastrointestinal: Negative for abdominal pain.   Musculoskeletal: Negative for arthralgias.   Neurological: Negative for dizziness, syncope, light-headedness and headaches.       Patient Active Problem List   Diagnosis    Arthritis    Pedal edema    Hyperglycemia    Vitamin D deficiency    Hiatal hernia with gastroesophageal reflux    Panic attacks    Good hypertension control    Anxiety    GERD (gastroesophageal reflux disease)    Cardiovascular risk factor, ASCVD 10-year risk 1.9%, 2015    Obesity (BMI 30-39.9)    Venous reflux     Patient is here for telemedicine visit  The patient location is: home in [place], [place]  The chief complaint leading to consultation is: f/u chronic conditions  Visit type: Virtual visit with synchronous audio and video  Total time spent with patient: 10-20 min  Each patient to whom he or she provides medical services by telemedicine is:  (1) informed of the relationship between the physician and patient and the respective role of any other health care provider with respect to management of the patient; and (2) notified that he or she may decline to receive medical services by telemedicine and may withdraw from such care at any time.    Notes: see below   AUDIO VISIT ONLY? no      /78. Avg 100-120s/70-80 s  Objective:      Physical Exam   Constitutional: She appears well-developed and well-nourished.   Pulmonary/Chest: Effort normal.   Neurological: She is alert.   Psychiatric: She has a normal mood and affect. Her behavior is normal. Thought content normal.       Assessment:       1. Good hypertension control    2.  "Hyperglycemia    3. Vitamin D deficiency    4. Obesity (BMI 30-39.9)    5. Gastroesophageal reflux disease, esophagitis presence not specified    6. Essential hypertension        Plan:       1. Good hypertension control  Controlled on current medications.  Continue current medications.    - olmesartan-hydrochlorothiazide (BENICAR HCT) 40-12.5 mg Tab; Take 1 tablet by mouth once daily.  Dispense: 90 tablet; Refill: 4    2. Hyperglycemia   Your blood sugar is borderline high.  This means you are at risk for developing type 2 diabetes mellitus.  To lessen your risk you should exercise regularly, avoid excess carbohydrates and work toward a body mass index of less than 25.      - CBC auto differential; Future  - Comprehensive metabolic panel; Future  - Hemoglobin A1c; Future    3. Vitamin D deficiency  Cont supplement and monitor  - Vitamin D; Future    4. Obesity (BMI 30-39.9)  Counseled patient on his ideal body weight, health consequences of being obese and current recommendations including weekly exercise and a heart healthy diet.  Current BMI is:Estimated body mass index is 35.83 kg/m² as calculated from the following:    Height as of 10/4/19: 5' 6" (1.676 m).    Weight as of 10/4/19: 100.7 kg (222 lb)..  Patient is aware that ideal BMI < 25 or  .        5. Gastroesophageal reflux disease, esophagitis presence not specified  Controlled on current medications.  Continue current medications.      6. Essential hypertension  Controlled on current medications.  Continue current medications.    - potassium chloride (MICRO-K) 10 MEQ CpSR; Take 1 capsule (10 mEq total) by mouth once daily.  Dispense: 90 capsule; Refill: 4  - Lipid panel; Future        Time spent with patient: 20 minutes    Patient with be reevaluated in 6 months or sooner prn    Greater than 50% of this visit was spent counseling as described in above documentation:Yes  "

## 2020-04-27 ENCOUNTER — PATIENT MESSAGE (OUTPATIENT)
Dept: OBSTETRICS AND GYNECOLOGY | Facility: CLINIC | Age: 60
End: 2020-04-27

## 2020-04-27 RX ORDER — FLUCONAZOLE 150 MG/1
150 TABLET ORAL ONCE
Qty: 1 TABLET | Refills: 0 | Status: SHIPPED | OUTPATIENT
Start: 2020-04-27 | End: 2020-04-27

## 2020-04-28 RX ORDER — RABEPRAZOLE SODIUM 20 MG/1
TABLET, DELAYED RELEASE ORAL
Qty: 90 TABLET | Refills: 2 | Status: SHIPPED | OUTPATIENT
Start: 2020-04-28 | End: 2020-09-01

## 2020-05-05 ENCOUNTER — PATIENT MESSAGE (OUTPATIENT)
Dept: ADMINISTRATIVE | Facility: HOSPITAL | Age: 60
End: 2020-05-05

## 2020-08-19 ENCOUNTER — PATIENT MESSAGE (OUTPATIENT)
Dept: FAMILY MEDICINE | Facility: CLINIC | Age: 60
End: 2020-08-19

## 2020-08-21 ENCOUNTER — OFFICE VISIT (OUTPATIENT)
Dept: FAMILY MEDICINE | Facility: CLINIC | Age: 60
End: 2020-08-21
Payer: COMMERCIAL

## 2020-08-21 ENCOUNTER — LAB VISIT (OUTPATIENT)
Dept: PRIMARY CARE CLINIC | Facility: CLINIC | Age: 60
End: 2020-08-21
Payer: COMMERCIAL

## 2020-08-21 ENCOUNTER — TELEPHONE (OUTPATIENT)
Dept: FAMILY MEDICINE | Facility: CLINIC | Age: 60
End: 2020-08-21

## 2020-08-21 DIAGNOSIS — R50.9 FEVER: ICD-10-CM

## 2020-08-21 DIAGNOSIS — U07.1 COVID-19 VIRUS INFECTION: Primary | ICD-10-CM

## 2020-08-21 DIAGNOSIS — R11.0 NAUSEA: Primary | ICD-10-CM

## 2020-08-21 DIAGNOSIS — Z20.822 CLOSE EXPOSURE TO COVID-19 VIRUS: ICD-10-CM

## 2020-08-21 PROCEDURE — 99213 OFFICE O/P EST LOW 20 MIN: CPT | Mod: 95,,, | Performed by: NURSE PRACTITIONER

## 2020-08-21 PROCEDURE — U0003 INFECTIOUS AGENT DETECTION BY NUCLEIC ACID (DNA OR RNA); SEVERE ACUTE RESPIRATORY SYNDROME CORONAVIRUS 2 (SARS-COV-2) (CORONAVIRUS DISEASE [COVID-19]), AMPLIFIED PROBE TECHNIQUE, MAKING USE OF HIGH THROUGHPUT TECHNOLOGIES AS DESCRIBED BY CMS-2020-01-R: HCPCS

## 2020-08-21 PROCEDURE — 99213 PR OFFICE/OUTPT VISIT, EST, LEVL III, 20-29 MIN: ICD-10-PCS | Mod: 95,,, | Performed by: NURSE PRACTITIONER

## 2020-08-21 RX ORDER — ONDANSETRON 4 MG/1
4 TABLET, FILM COATED ORAL EVERY 6 HOURS PRN
Qty: 20 TABLET | Refills: 0 | Status: SHIPPED | OUTPATIENT
Start: 2020-08-21 | End: 2021-01-29 | Stop reason: ALTCHOICE

## 2020-08-21 NOTE — TELEPHONE ENCOUNTER
----- Message from Dari Monroy sent at 2020  1:44 PM CDT -----  Regardinnd message  Contact: [phone]  PT would like to get orders put in for Covid tested.  She had fever last night, and her  tested positive for Covid. # [phone]

## 2020-08-21 NOTE — PROGRESS NOTES
Subjective:       Patient ID: Mercedes Laura is a 60 y.o. female.    Chief Complaint: No chief complaint on file.    HPI    The patient location is: home in [place], [place]  The chief complaint leading to consultation is: Illness    Visit type: audiovisual    Face to Face time with patient: 12 minutes  20 minutes of total time spent on the encounter, which includes face to face time and non-face to face time preparing to see the patient (eg, review of tests), Obtaining and/or reviewing separately obtained history, Documenting clinical information in the electronic or other health record, Independently interpreting results (not separately reported) and communicating results to the patient/family/caregiver, or Care coordination (not separately reported).         Each patient to whom he or she provides medical services by telemedicine is:  (1) informed of the relationship between the physician and patient and the respective role of any other health care provider with respect to management of the patient; and (2) notified that he or she may decline to receive medical services by telemedicine and may withdraw from such care at any time.    Ms. Laura is a 61 yo female who presents today via telemedicine visit for concern for Covid-19 infection.  She reports  That her  recently tested positive.  She did have a Covid-19 test completed today that was ordered by another provider.  She reports a temperature of 100-101 yesterday.  Other signs and symptoms include : nausea, loose stools (not diarrhea), itching/runny nose.    She has not taken anything for her symptoms.      There were no vitals filed for this visit.  Review of Systems   Constitutional: Positive for fever. Negative for chills, fatigue and unexpected weight change.   HENT: Positive for congestion and rhinorrhea. Negative for hearing loss, nosebleeds, postnasal drip, sinus pressure, sinus pain and sore throat.    Eyes: Negative for pain, discharge, redness and  visual disturbance.   Respiratory: Negative for cough, chest tightness and shortness of breath.    Cardiovascular: Negative for chest pain and palpitations.   Gastrointestinal: Positive for diarrhea and nausea. Negative for abdominal pain, constipation and vomiting.   Endocrine: Negative for cold intolerance and heat intolerance.   Musculoskeletal: Negative for back pain.   Neurological: Negative for dizziness, syncope, light-headedness and headaches.   Psychiatric/Behavioral: Negative for agitation and dysphoric mood. The patient is not nervous/anxious.        Objective:      Physical Exam  Constitutional:       Appearance: She is well-developed.   HENT:      Head: Normocephalic and atraumatic.      Nose: Nose normal.   Eyes:      General: Lids are normal.      Conjunctiva/sclera: Conjunctivae normal.   Neck:      Musculoskeletal: Full passive range of motion without pain.   Pulmonary:      Effort: Pulmonary effort is normal.   Neurological:      Mental Status: She is alert and oriented to person, place, and time.   Psychiatric:         Speech: Speech normal.         Behavior: Behavior normal.         Assessment & Plan:       Nausea  -     ondansetron (ZOFRAN) 4 MG tablet; Take 1 tablet (4 mg total) by mouth every 6 (six) hours as needed for Nausea.  Dispense: 20 tablet; Refill: 0         Close Exposure to Covid-19 Virus  -Results of Covid-19 testing pending  -Discussed with patient supportive treatments for symptoms including : OTC pain relievers, cough medicine as needed, rest, fluids and  vitamin C  -Discussed with patient that she should remain home and quarantined while awaiting results  -Ed precautions given         Follow up if symptoms worsen or fail to improve.

## 2020-08-21 NOTE — TELEPHONE ENCOUNTER
Pt states that this has been handled already, confimed.     ----- Message from Britney Feliciano sent at 8/21/2020  4:21 PM CDT -----  Contact: self [phone]  Pt is requesting her Rx be sent to the [facility]. Please call and advise

## 2020-08-21 NOTE — TELEPHONE ENCOUNTER
----- Message from Susu Eng sent at 8/21/2020 11:47 AM CDT -----  Regarding: [place]  Contact: PT  PT would like to get orders put in for Covid tested.  She had fever last night, and her  tested positive for Covid.    [phone]

## 2020-08-21 NOTE — TELEPHONE ENCOUNTER
Patient's  was seen today for truly for follow-up of COVID-19.   I was told that patient has developed fever and cough as of this morning.  I am placing an order for COVID-19 testing.  Please call her to schedule at [facility]

## 2020-08-23 DIAGNOSIS — U07.1 COVID-19 VIRUS DETECTED: ICD-10-CM

## 2020-08-23 LAB — SARS-COV-2 RNA RESP QL NAA+PROBE: DETECTED

## 2020-08-24 ENCOUNTER — NURSE TRIAGE (OUTPATIENT)
Dept: ADMINISTRATIVE | Facility: CLINIC | Age: 60
End: 2020-08-24

## 2020-08-24 ENCOUNTER — OFFICE VISIT (OUTPATIENT)
Dept: FAMILY MEDICINE | Facility: CLINIC | Age: 60
End: 2020-08-24
Payer: COMMERCIAL

## 2020-08-24 ENCOUNTER — PATIENT MESSAGE (OUTPATIENT)
Dept: FAMILY MEDICINE | Facility: CLINIC | Age: 60
End: 2020-08-24

## 2020-08-24 DIAGNOSIS — U07.1 COVID-19: Primary | ICD-10-CM

## 2020-08-24 PROCEDURE — 99213 PR OFFICE/OUTPT VISIT, EST, LEVL III, 20-29 MIN: ICD-10-PCS | Mod: 95,,, | Performed by: PHYSICIAN ASSISTANT

## 2020-08-24 PROCEDURE — 99213 OFFICE O/P EST LOW 20 MIN: CPT | Mod: 95,,, | Performed by: PHYSICIAN ASSISTANT

## 2020-08-24 NOTE — TELEPHONE ENCOUNTER
Reason for Disposition   [1] COVID-19 infection diagnosed or suspected AND [2] mild symptoms (fever, cough) AND [3] no trouble breathing or other complications   COVID-19 Home Isolation, questions about   COVID-19 Prevention and Healthy Living, questions about    Additional Information   Negative: Severe difficulty breathing (e.g., struggling for each breath, speaks in single words)   Negative: Difficult to awaken or acting confused (e.g., disoriented, slurred speech)   Negative: Bluish (or gray) lips or face now   Negative: Shock suspected (e.g., cold/pale/clammy skin, too weak to stand, low BP, rapid pulse)   Negative: Sounds like a life-threatening emergency to the triager   Negative: [1] COVID-19 suspected (e.g., cough, fever, shortness of breath) AND [2] mild symptoms AND [3] public health department recommends testing   Negative: [1] COVID-19 exposure AND [2] no symptoms   Negative: COVID-19 and Breastfeeding, questions about   Negative: SEVERE or constant chest pain (Exception: mild central chest pain, present only when coughing)   Negative: MODERATE difficulty breathing (e.g., speaks in phrases, SOB even at rest, pulse 100-120)   Negative: Patient sounds very sick or weak to the triager   Negative: MILD difficulty breathing (e.g., minimal/no SOB at rest, SOB with walking, pulse <100)   Negative: Chest pain   Negative: Fever > 103 F (39.4 C)   Negative: [1] Fever > 101 F (38.3 C) AND [2] age > 60   Negative: [1] Fever > 100.0 F (37.8 C) AND [2] bedridden (e.g., nursing home patient, CVA, chronic illness, recovering from surgery)   Negative: HIGH RISK patient (e.g., age > 64 years, diabetes, heart or lung disease, weak immune system)   Negative: Fever present > 3 days (72 hours)   Negative: [1] Fever returns after gone for over 24 hours AND [2] symptoms worse or not improved   Negative: [1] Continuous (nonstop) coughing interferes with work or school AND [2] no improvement using cough  treatment per protocol   Negative: Cough present > 3 weeks    Protocols used: CORONAVIRUS (COVID-19) - DIAGNOSED OR [id number]

## 2020-08-24 NOTE — TELEPHONE ENCOUNTER
First contact  Conatcted patient on behalf of [facility]'s Covid 19 Surveillance program enrollment and orientation process.  Welcome call completed  Patient chose to participate in program.  Questions answered.  Patient has pulse ox and my chart cindy   Patient stated this morning her temp was 99.1 and she has taken Ibuprofen since.  Patient denies cough, SOB or CP but has been having diarrhea and a sinus headache  Patient rates H/A a 2/10 right now.  Patient has OOC number if needs arise and access to 911 for emergenciesCalled patient to review COVID-19 Surveillance Program enrollment process.    Smartphone: Yes    [facility]: Yes    Program consent: Yes  Pulse oximeter status: Yes  Verified emergency contacts: Verified    Program Overview: Reviewed , no response process, and importance of correct emergency contacts in event that well-being check is warranted. Encouraged patient to call [phone] 24/7 to speak with an OnCall nurse, if needed.    Patient had no further questions.  Sp02: 97     Pulse: 68  Temperature:99.1  SOB at rest (0-5): 0    SOB with activity (0-5): 0    Cough: no    Is cough worsening?:no    Fever symptoms: yes    Increased home oxygen?:no

## 2020-08-24 NOTE — PROGRESS NOTES
Subjective:       Patient ID: Mercedes Laura is a 60 y.o. female.    Chief Complaint: COVID-19 Concerns    [place]  The chief complaint leading to consultation is: covid     Visit type: audiovisual    Face to Face time with patient: 11  15 minutes of total time spent on the encounter, which includes face to face time and non-face to face time preparing to see the patient (eg, review of tests), Obtaining and/or reviewing separately obtained history, Documenting clinical information in the electronic or other health record, Independently interpreting results (not separately reported) and communicating results to the patient/family/caregiver, or Care coordination (not separately reported).         Each patient to whom he or she provides medical services by telemedicine is:  (1) informed of the relationship between the physician and patient and the respective role of any other health care provider with respect to management of the patient; and (2) notified that he or she may decline to receive medical services by telemedicine and may withdraw from such care at any time.    Notes: patient is seen for follow up of covid.  She is on day 5 of illness.  Main complaint is body aches.  Fever resolved.  Mild cough and post nasal drip.  No chest pain. No sob.   home with covid as well.  Home monitoring program initiated and pulse ox dispensed (to )         Review of Systems   Constitutional: Positive for fatigue (slightl). Negative for activity change, appetite change, chills, diaphoresis and fever.   HENT: Positive for congestion and postnasal drip.    Respiratory: Positive for cough (mild). Negative for chest tightness and shortness of breath.    Cardiovascular: Negative for chest pain, palpitations and leg swelling.   Gastrointestinal: Positive for diarrhea (once per day in a m). Negative for abdominal pain.       Objective:      Physical Exam  Constitutional:       General: She is not in acute  distress.     Appearance: She is not ill-appearing.   HENT:      Head: Normocephalic and atraumatic.   Pulmonary:      Effort: Pulmonary effort is normal.   Skin:     Coloration: Skin is not pale.   Neurological:      Mental Status: She is alert and oriented to person, place, and time.   Psychiatric:         Mood and Affect: Mood normal.         Assessment:       1. COVID-19        Plan:       Mercedes was seen today for covid-19 concerns.    Diagnoses and all orders for this visit:    COVID-19           Continue symptomatic care     You should home quarantine until further notice   If you  Develop shortness of breath, chest pain, or any other concerning symptom go to ER

## 2020-08-26 ENCOUNTER — HOSPITAL ENCOUNTER (EMERGENCY)
Facility: HOSPITAL | Age: 60
Discharge: HOME | End: 2020-08-26
Attending: EMERGENCY MEDICINE
Payer: COMMERCIAL

## 2020-08-26 ENCOUNTER — NURSE TRIAGE (OUTPATIENT)
Dept: ADMINISTRATIVE | Facility: CLINIC | Age: 60
End: 2020-08-26

## 2020-08-26 ENCOUNTER — TELEPHONE (OUTPATIENT)
Dept: FAMILY MEDICINE | Facility: CLINIC | Age: 60
End: 2020-08-26

## 2020-08-26 ENCOUNTER — TELEPHONE (OUTPATIENT)
Dept: ADMINISTRATIVE | Facility: CLINIC | Age: 60
End: 2020-08-26

## 2020-08-26 VITALS
BODY MASS INDEX: 36.96 KG/M2 | SYSTOLIC BLOOD PRESSURE: 122 MMHG | HEART RATE: 65 BPM | OXYGEN SATURATION: 97 % | DIASTOLIC BLOOD PRESSURE: 74 MMHG | RESPIRATION RATE: 18 BRPM | TEMPERATURE: 99 F | HEIGHT: 66 IN | WEIGHT: 230 LBS

## 2020-08-26 DIAGNOSIS — U07.1 COVID-19: Primary | ICD-10-CM

## 2020-08-26 PROCEDURE — 99284 EMERGENCY DEPT VISIT MOD MDM: CPT | Mod: 25

## 2020-08-26 PROCEDURE — 93005 ELECTROCARDIOGRAM TRACING: CPT

## 2020-08-26 NOTE — TELEPHONE ENCOUNTER
----- Message from Juana Gates sent at 8/26/2020  3:13 PM CDT -----  Regarding: advice  Contact: self  Type:  Patient Returning Call    Who Called:  self  Who Left Message for Patient:  Jose  Does the patient know what this is regarding?:    Best Call Back Number:  [phone]  Additional Information:

## 2020-08-26 NOTE — ED PROVIDER NOTES
"Encounter Date: 8/26/2020    SCRIBE #1 NOTE: I, Tg Brito, am scribing for, and in the presence of, Dr. Carl.       History     Chief Complaint   Patient presents with    Chest Pain     "may be stress, here with my  who has covid"       Time seen by provider: 10:35 PM on 08/26/2020    Mercedes Laura is a 60 y.o. female with HTN, GERD and PAC who presents to the ED with an onset of unchanged coughing and SOB s/p testing positive for COVID-19 approximately 5 days ago.  She reports while out riding her bike this morning that she experienced worsening SOB and coughing that is better at rest.  She states that she is mainly here because her  is here and had low oxygen levels.  Patient notes some associated SOB and anxiety with her  current condition.  She figured while she was here with him she ought to get checked as well.  Patient denies fever, N/V, chills, abdominal pain, constipation, bloody urine or any other symptoms at this time.  No PSHx.      The history is provided by the patient.     Review of patient's allergies indicates:   Allergen Reactions    Augmentin [amoxicillin-pot clavulanate] Nausea And Vomiting     Gi upset    Bentyl [dicyclomine] Other (See Comments)     Worsening anxiety    Naprosyn [naproxen]     Omeprazole      Other reaction(s): ABDOMINAL PAIN    Adhesive Rash    Lexapro [escitalopram oxalate] Anxiety     Bad dreams, feeling of doom, sweats    Sulfa (sulfonamide antibiotics) Rash     Other reaction(s): measle like rash     Past Medical History:   Diagnosis Date    Anxiety     this came on since lap kadeem sgy    Arthritis     Colon polyp     Deviated septum     GERD (gastroesophageal reflux disease)     Hypertension     PAC (premature atrial contraction)     Plantar fasciitis     PMB (postmenopausal bleeding) 05/2018    Seasonal allergies     Sinus disorder     Uterine leiomyoma 05/2018    Varicose veins of both lower extremities      Past Surgical " History:   Procedure Laterality Date    BREAST BIOPSY Left 2015    benign    CHOLECYSTECTOMY  6-1-2015    Dr Ashraf    COLONOSCOPY N/A 11/23/2015    Procedure: COLONOSCOPY;  Surgeon: Theo Stinson MD;  Location: [facility] ENDO;  Service: Endoscopy;  Laterality: N/A; repeat in 5 years for surveillance    CYSTOSCOPY N/A 6/7/2018    Procedure: CYSTOSCOPY;  Surgeon: Anaya Caldera MD;  Location: [facility] OR;  Service: OB/GYN;  Laterality: N/A;    ESOPHAGOGASTRODUODENOSCOPY N/A 3/4/2019    Procedure: EGD (ESOPHAGOGASTRODUODENOSCOPY);  Surgeon: Theo Stinson MD;  Location: [facility] ENDO;  Service: Endoscopy;  Laterality: N/A;    HYSTERECTOMY      KNEE ARTHROSCOPY W/ MENISCECTOMY  11/15/2000    right     LAPAROSCOPIC SALPINGO-OOPHORECTOMY Bilateral 6/7/2018    Procedure: [id number]-AMPRYOUEIUMD;  Surgeon: Anaya Caldera MD;  Location: [facility] OR;  Service: OB/GYN;  Laterality: Bilateral;    LAPAROSCOPIC TOTAL HYSTERECTOMY N/A 6/7/2018    Procedure: HYSTERECTOMY-TOTAL LAPAROSCOPIC (TLH);  Surgeon: Anaya Caldera MD;  Location: [facility] OR;  Service: OB/GYN;  Laterality: N/A;    OOPHORECTOMY      UPPER GASTROINTESTINAL ENDOSCOPY  06/22/2015    Dr. Stinson     Family History   Problem Relation Age of Onset    Hypertension Mother     Arthritis Mother     Hypertension Father     Cancer Father         melanoma    Melanoma Father     COPD Father     Heart disease Father         chf    Diverticulitis Maternal Grandmother     Arthritis Maternal Aunt     Diabetes Maternal Aunt     Hypertension Maternal Aunt     Arthritis Maternal Uncle     Breast cancer Neg Hx     Ovarian cancer Neg Hx     Psoriasis Neg Hx     Lupus Neg Hx     Eczema Neg Hx     Colon cancer Neg Hx     Colon polyps Neg Hx     Crohn's disease Neg Hx     Esophageal cancer Neg Hx     Stomach cancer Neg Hx      Social History     Tobacco Use    Smoking status: Never Smoker    Smokeless tobacco: Never Used   Substance  Use Topics    Alcohol use: Yes     Comment: rarely / few times per yr    Drug use: No     Review of Systems   Constitutional: Negative for activity change, chills, diaphoresis and fever.   HENT: Negative for ear pain, rhinorrhea, sore throat and trouble swallowing.    Eyes: Negative for pain and visual disturbance.   Respiratory: Positive for cough and shortness of breath. Negative for stridor.    Cardiovascular: Negative for chest pain.   Gastrointestinal: Negative for abdominal pain, blood in stool, constipation, diarrhea, nausea and vomiting.   Genitourinary: Negative for dysuria, hematuria, vaginal bleeding and vaginal discharge.   Musculoskeletal: Negative for gait problem.   Skin: Negative for rash and wound.   Neurological: Negative for seizures and headaches.   Psychiatric/Behavioral: Negative for hallucinations and suicidal ideas. The patient is nervous/anxious.        Physical Exam     Initial Vitals [08/26/20 1039]   BP Pulse Resp Temp SpO2   (!) 140/85 88 18 99.4 °F (37.4 °C) 97 %      MAP       --         Physical Exam    Nursing note and vitals reviewed.  Constitutional: She appears well-developed. She is not diaphoretic. No distress.   HENT:   Head: Normocephalic and atraumatic.   Nose: Nose normal.   Eyes: EOM are normal. No scleral icterus.   Neck: Normal range of motion. Neck supple.   Cardiovascular: Normal rate, regular rhythm, normal heart sounds and intact distal pulses. Exam reveals no gallop and no friction rub.    No murmur heard.  Pulmonary/Chest: Breath sounds normal. No stridor. No respiratory distress. She has no wheezes. She has no rhonchi. She has no rales.   Abdominal: Soft. Bowel sounds are normal. There is no abdominal tenderness.   Musculoskeletal: Normal range of motion.   Neurological: She is alert and oriented to person, place, and time. No cranial nerve deficit.   Skin: Skin is warm and dry. Capillary refill takes less than 2 seconds. No rash noted.   Psychiatric: She has a  normal mood and affect.         ED Course   Procedures  Labs Reviewed - No data to display  EKG Readings: (Independently Interpreted)   Initial Reading: No STEMI. Rhythm: Normal Sinus Rhythm. Heart Rate: 80 bpm. Conduction: Normal. ST Segments: Normal ST Segments. T Waves: Normal. Axis: Normal.       Imaging Results          X-Ray Chest AP Portable (Final result)  Result time 08/26/20 10:56:07    Final result by Mahesh Cervantes MD (08/26/20 10:56:07)                 Impression:      Negative chest.  No significant change      Electronically signed by: Mahesh Cervantes MD  Date:    08/26/2020  Time:    10:56             Narrative:    EXAMINATION:  XR CHEST AP PORTABLE    CLINICAL HISTORY:  COVID-19    TECHNIQUE:  Single frontal view of the chest was performed.    COMPARISON:  05/25/2018    FINDINGS:  The cardiomediastinal silhouette is within normal limits.  The lungs are well expanded without consolidation or pleural effusion.                                 Medical Decision Making:   History:   Old Medical Records: I decided to obtain old medical records.  Independently Interpreted Test(s):   I have ordered and independently interpreted EKG Reading(s) - see prior notes  Clinical Tests:   Radiological Study: Ordered and Reviewed  Medical Tests: Ordered and Reviewed  ED Management:  Patient was seen in the Emergency Department with symptoms consistent with a viral respiratory illness. The patient was tested for covid recently and positive. Chest xray neg. Based on the most recent recommendations from our hospital administration/infectious disease department at this time, the patient does NOT meet admission criteria for COVID-19. This was explained to the patient. Vital signs do not indicate sepsis, hypoxia nor respiratory distress, and in my professional opinion the patient is well enough for discharge home. The patient was provided with discharge instructions on self-care and how to quarantine at home. I  reinforced this advice and the dangers to family and public with failure to comply. We will proceed with symptomatic treatment. The patient was also given a return to work note, if applicable. Return precautions discussed with the patient. The patient expressed understanding to my instructions.             Scribe Attestation:   Scribe #1: I performed the above scribed service and the documentation accurately describes the services I performed. I attest to the accuracy of the note.      Attending Attestation:     Physician Attestation for Scribe:    I, Dr. George Carl, personally performed the services described in this documentation.   All medical record entries made by the scribe were at my direction and in my presence.   I have reviewed the chart and agree that the record is accurate and complete.   George Carl MD  12:17 AM 08/27/2020     DISCLAIMER: This note was prepared with [employer] Naturally Speaking voice recognition transcription software. Garbled syntax, mangled pronouns, and other bizarre constructions may be attributed to that software system.          ED Course as of Aug 27 0018   Wed Aug 26, 2020   1100 Impression:     Negative chest.  No significant change        Electronically signed by: Mahesh Cervantes MD  Date:                                            08/26/2020  Time:                                           10:56    [BD]      ED Course User Index  [BD] George Carl MD                Clinical Impression:       ICD-10-CM ICD-9-CM   1. COVID-19  U07.1          Disposition:   Disposition: Discharged  Condition: Stable     ED Disposition Condition    Discharge Stable        ED Prescriptions     None        Follow-up Information     Follow up With Specialties Details Why Contact Info    Tory Llamas MD Family Medicine Go in 1 day  [address] [facility] [address]  [phone]      [facility] Emergency Medicine Go to  If symptoms worsen, As needed [address] [place] [address]  Drive  [facility] [id number]  [phone]                                     George Carl MD  08/27/20 0019

## 2020-08-26 NOTE — TELEPHONE ENCOUNTER
Pt called and verified. Temp 100.9 now she said that she doesn't feel bad but they admitted her  in the ED for respiratory failure and pneumonia due to COVID. Pt said maybe its warm in the house and she went check and house temp was 73. I will do RN to cindy escalations. Due to symptoms. Pt said that she thinks may have sinus infection. She was seen in the ED earlier today when  was seen as well

## 2020-08-26 NOTE — TELEPHONE ENCOUNTER
"COVID Surveillance Program: Provider Telephone Note    Called patient due to RN escalation in COVID Surveillance program. Pt escalated due to sinus pressure and fever.     Patient location: [place] (home)     Most recent vitals: Sp02: 99%. P: 76. Temp: 100.5 F    60 y.o. female with pertinent PMHx GERD, obesity, anxiety on day 7 or 8 of Covid symptoms. Positive Covid screen 8/21/20. CXR on 8/26/20: "Negative chest.  No significant change." Home oxygen: no. COVID-19 Hospitalization History: none (ED visit today 8/26/20 with negative CXR).    ROS: Denies worsening cough, light headedness, fever, chills, diaphoresis, chest pain, abdominal pain, emesis, diarrhea or further symptoms.     HPI: Patient was seen in the ED today with her  who is also ill with covid-19. Has had diarrhea but none the last two days. She reports that her right ear hurts. States it "feels like a bad head cold". Taking claritin for her allergies which has been helping. Has tried nasonex, saline rinses, and sudafed PE with minimal improvement. Took mucinex and tylenol last night with more improvement and was able to rest. Denies SOB at rest or with activity. Reports increased stress and anxiety due to  being hospitalized.    Assessment: Vitals appear WNL. During phone call, patient appears alert and oriented. Able to speak in full sentences without difficulty. No audible wheezing or increased work of breathing heard during call.     Plan:    Advised patient to continue supportive care at home, increase hydration and get adequate rest. Reviewed with patient the reasons for seeking emergency care. Pt aware that if Sp02 <90%, they need to go to the ED. If any new or worsening symptoms arise, go to ED or Urgent. Otherwise, patient will continue to submit data as scheduled. Reviewed importance of wearing mask if self or family members leave the house.  "

## 2020-08-26 NOTE — TELEPHONE ENCOUNTER
O2 sat 97-98% with breathing air will feel heavy- was able to complete bike ride today.      Frequent coughing present. Clear productive sputum occassionally.     Chest discomfort located center between breast and neck. Is constant. Rated as very mild. -- pt states not sure if is discomfort.      Slept well last night. No headache today.  Is hydrating.    O2 sat drops to 95% with exertion.     Taking mucinex,tylenol,ibuprofen.     protocol advice given and pt verbalizes understanding.     Reason for Disposition   Chest pain    Additional Information   Negative: Severe difficulty breathing (e.g., struggling for each breath, speaks in single words)   Negative: Difficult to awaken or acting confused (e.g., disoriented, slurred speech)   Negative: Bluish (or gray) lips or face now   Negative: Shock suspected (e.g., cold/pale/clammy skin, too weak to stand, low BP, rapid pulse)   Negative: Sounds like a life-threatening emergency to the triager   Negative: SEVERE or constant chest pain or pressure (Exception: mild central chest pain, present only when coughing)   Negative: MODERATE difficulty breathing (e.g., speaks in phrases, SOB even at rest, pulse 100-120)   Negative: MILD difficulty breathing (e.g., minimal/no SOB at rest, SOB with walking, pulse <100)    Protocols used: CORONAVIRUS (COVID-19) DIAGNOSED OR [id number]

## 2020-08-26 NOTE — TELEPHONE ENCOUNTER
Reason for Disposition   [1] COVID-19 infection suspected by caller or triager AND [2] mild symptoms (cough, fever, or others) AND [3] no complications or SOB    Additional Information   Negative: Severe difficulty breathing (e.g., struggling for each breath, speaks in single words)   Negative: Difficult to awaken or acting confused (e.g., disoriented, slurred speech)   Negative: Bluish (or gray) lips or face now   Negative: Shock suspected (e.g., cold/pale/clammy skin, too weak to stand, low BP, rapid pulse)   Negative: Sounds like a life-threatening emergency to the triager   Negative: [1] COVID-19 exposure AND [2] no symptoms   Negative: COVID-19 and Breastfeeding, questions about   Negative: [1] Adult with possible COVID-19 symptoms AND [2] triager concerned about severity of symptoms or other causes   Negative: SEVERE or constant chest pain or pressure (Exception: mild central chest pain, present only when coughing)   Negative: MODERATE difficulty breathing (e.g., speaks in phrases, SOB even at rest, pulse 100-120)   Negative: MILD difficulty breathing (e.g., minimal/no SOB at rest, SOB with walking, pulse <100)   Negative: Chest pain   Negative: Patient sounds very sick or weak to the triager   Negative: Fever > 103 F (39.4 C)   Negative: [1] Fever > 101 F (38.3 C) AND [2] age > 60   Negative: [1] Fever > 100.0 F (37.8 C) AND [2] bedridden (e.g., nursing home patient, CVA, chronic illness, recovering from surgery)   Negative: HIGH RISK patient (e.g., age > 64 years, diabetes, heart or lung disease, weak immune system) (Exception: has already been evaluated by healthcare provider and has no new or worsening symptoms)    Protocols used: CORONAVIRUS (COVID-19) DIAGNOSED OR [id number]

## 2020-08-26 NOTE — TELEPHONE ENCOUNTER
Patient stated she is feeling okay now. She stated her fever is high, but she is waiting on the covid  team to reach back out with suggestions. Patient stated she will call back if she needs anything further.

## 2020-08-29 ENCOUNTER — HOSPITAL ENCOUNTER (EMERGENCY)
Facility: HOSPITAL | Age: 60
Discharge: HOME | End: 2020-08-29
Attending: EMERGENCY MEDICINE
Payer: COMMERCIAL

## 2020-08-29 VITALS
RESPIRATION RATE: 16 BRPM | HEIGHT: 66 IN | WEIGHT: 228 LBS | OXYGEN SATURATION: 98 % | HEART RATE: 71 BPM | TEMPERATURE: 99 F | DIASTOLIC BLOOD PRESSURE: 81 MMHG | BODY MASS INDEX: 36.64 KG/M2 | SYSTOLIC BLOOD PRESSURE: 141 MMHG

## 2020-08-29 DIAGNOSIS — R07.9 CHEST PAIN: ICD-10-CM

## 2020-08-29 DIAGNOSIS — K21.9 GASTROESOPHAGEAL REFLUX DISEASE, ESOPHAGITIS PRESENCE NOT SPECIFIED: Primary | ICD-10-CM

## 2020-08-29 DIAGNOSIS — U07.1 COVID-19 VIRUS INFECTION: ICD-10-CM

## 2020-08-29 PROCEDURE — 99283 EMERGENCY DEPT VISIT LOW MDM: CPT | Mod: 25

## 2020-08-29 PROCEDURE — 93005 ELECTROCARDIOGRAM TRACING: CPT

## 2020-08-29 PROCEDURE — 25000003 PHARM REV CODE 250: Performed by: EMERGENCY MEDICINE

## 2020-08-29 RX ORDER — MAG HYDROX/ALUMINUM HYD/SIMETH 200-200-20
30 SUSPENSION, ORAL (FINAL DOSE FORM) ORAL
Status: COMPLETED | OUTPATIENT
Start: 2020-08-29 | End: 2020-08-29

## 2020-08-29 RX ORDER — SUCRALFATE 1 G/10ML
1 SUSPENSION ORAL 4 TIMES DAILY
Qty: 200 ML | Refills: 0 | Status: SHIPPED | OUTPATIENT
Start: 2020-08-29 | End: 2020-09-03

## 2020-08-29 RX ORDER — LIDOCAINE HYDROCHLORIDE 20 MG/ML
5 SOLUTION OROPHARYNGEAL
Status: COMPLETED | OUTPATIENT
Start: 2020-08-29 | End: 2020-08-29

## 2020-08-29 RX ADMIN — LIDOCAINE HYDROCHLORIDE 5 ML: 20 SOLUTION ORAL; TOPICAL at 01:08

## 2020-08-29 RX ADMIN — ALUMINUM HYDROXIDE, MAGNESIUM HYDROXIDE, AND SIMETHICONE 30 ML: 200; 200; 20 SUSPENSION ORAL at 01:08

## 2020-08-29 NOTE — ED PROVIDER NOTES
Chief complaint:  Heartburn (Day 10 covid +)      HPI:  Mercedes Laura is a 60 y.o. female presenting with chest burning consistent with known history of reflux.  She is on AcipHex and had mild relief with Mylanta at home.  She describes a burning pain from the bottom midline of her chest coming up the chest similar to prior reflux but slightly more persistent, prompting visit.  She denies other migration.  She has no pleuritic pain.  She has improving nonproductive cough with known coronavirus diagnosis about 10 days ago.  She denies hemoptysis.  No persistent fever.  Pain was worse laying down than sitting up.  It is nonexertional.  There is no associated emesis, blood in the stools, or dark stools.  She has had occasional loose nonbloody stools with coronavirus as well.    ROS: As per HPI and below:  No headache, neck pain, syncope, pleuritic pain, abdominal pain, fever, swelling, blood in the stools, rashes, dyspnea.    Review of patient's allergies indicates:   Allergen Reactions    Augmentin [amoxicillin-pot clavulanate] Nausea And Vomiting     Gi upset    Bentyl [dicyclomine] Other (See Comments)     Worsening anxiety    Naprosyn [naproxen]     Omeprazole      Other reaction(s): ABDOMINAL PAIN    Adhesive Rash    Lexapro [escitalopram oxalate] Anxiety     Bad dreams, feeling of doom, sweats    Sulfa (sulfonamide antibiotics) Rash     Other reaction(s): measle like rash       Discharge Medication List as of 8/29/2020  2:01 PM      START taking these medications    Details   sucralfate (CARAFATE) 100 mg/mL suspension Take 10 mLs (1 g total) by mouth 4 (four) times daily. for 5 days, Starting Sat 8/29/2020, Until Thu 9/3/2020, Normal         CONTINUE these medications which have NOT CHANGED    Details   ACUICYN 0.01 % Spry 2 (two) times daily as needed. , Starting Tue 12/26/2017, Historical Med      acyclovir 5% (ZOVIRAX) 5 % Crea Apply topically 5 (five) times daily., Starting Thu 10/5/2017, Normal      b  complex vitamins capsule Take 1 capsule by mouth once daily., Historical Med      biotin 10 mg Tab Take 100 mg by mouth once daily. , Historical Med      calcium carbonate (TUMS) 200 mg calcium (500 mg) chewable tablet Take 1 tablet by mouth daily as needed for Heartburn., Historical Med      cetirizine (ZYRTEC) 10 MG tablet Take 10 mg by mouth daily as needed for Allergies or Rhinitis. , Historical Med      econazole nitrate 1 % cream APPLY TO INNER THIGHS AND BUTTOCKS TWICE A DAY AS NEEDED FOR FLARE., Normal      ergocalciferol (VITAMIN D2) 50,000 unit Cap Take 50,000 Units by mouth every 7 days., Historical Med      Lactobacillus rhamnosus GG (CULTURELLE) 10 billion cell capsule Take 1 capsule by mouth once daily., Historical Med      loratadine (CLARITIN) 10 mg tablet Take 10 mg by mouth daily as needed for Allergies., Historical Med      mometasone (NASONEX) 50 mcg/actuation nasal spray 2 sprays by Nasal route daily as needed (rhinitis)., Starting Fri 4/26/2019, Normal      multivitamin (THERAGRAN) per tablet Take 1 tablet by mouth once daily., Until Discontinued, Historical Med      olmesartan-hydrochlorothiazide (BENICAR HCT) 40-12.5 mg Tab Take 1 tablet by mouth once daily., Starting Mon 4/13/2020, Normal      olopatadine (PATADAY) 0.2 % Drop Place 1 drop into both eyes nightly as needed., Starting Fri 4/26/2019, Normal      ondansetron (ZOFRAN) 4 MG tablet Take 1 tablet (4 mg total) by mouth every 6 (six) hours as needed for Nausea., Starting Fri 8/21/2020, Normal      potassium chloride (MICRO-K) 10 MEQ CpSR Take 1 capsule (10 mEq total) by mouth once daily., Starting Mon 4/13/2020, Normal      RABEprazole (ACIPHEX) 20 mg tablet TAKE ONE TABLET BY MOUTH ONCE DAILY, Normal      ranitidine (ZANTAC) 300 MG tablet Take 1 tablet (300 mg total) by mouth every evening. 30-60 minutes before bedtime., Starting Fri 4/26/2019, Until Sat 4/25/2020, Normal      triamcinolone acetonide 0.1% (KENALOG) 0.1 % cream AAA  body bid PRN flare, Normal             PMH:  As per HPI and below:  Past Medical History:   Diagnosis Date    Anxiety     this came on since lap kadeem sgy    Arthritis     Colon polyp     Deviated septum     GERD (gastroesophageal reflux disease)     Hypertension     PAC (premature atrial contraction)     Plantar fasciitis     PMB (postmenopausal bleeding) 05/2018    Seasonal allergies     Sinus disorder     Uterine leiomyoma 05/2018    Varicose veins of both lower extremities      Past Surgical History:   Procedure Laterality Date    BREAST BIOPSY Left 2015    benign    CHOLECYSTECTOMY  6-1-2015    Dr Ashraf    COLONOSCOPY N/A 11/23/2015    Procedure: COLONOSCOPY;  Surgeon: Theo Stinson MD;  Location: [facility];  Service: Endoscopy;  Laterality: N/A; repeat in 5 years for surveillance    CYSTOSCOPY N/A 6/7/2018    Procedure: CYSTOSCOPY;  Surgeon: Anaya Caldera MD;  Location: [facility] OR;  Service: OB/GYN;  Laterality: N/A;    ESOPHAGOGASTRODUODENOSCOPY N/A 3/4/2019    Procedure: EGD (ESOPHAGOGASTRODUODENOSCOPY);  Surgeon: Theo Stinson MD;  Location: [facility];  Service: Endoscopy;  Laterality: N/A;    HYSTERECTOMY      KNEE ARTHROSCOPY W/ MENISCECTOMY  11/15/2000    right     LAPAROSCOPIC SALPINGO-OOPHORECTOMY Bilateral 6/7/2018    Procedure: [id number];  Surgeon: Anaya Caldera MD;  Location: [facility] OR;  Service: OB/GYN;  Laterality: Bilateral;    LAPAROSCOPIC TOTAL HYSTERECTOMY N/A 6/7/2018    Procedure: HYSTERECTOMY-TOTAL LAPAROSCOPIC (TLH);  Surgeon: Anaya Caldera MD;  Location: [facility] OR;  Service: OB/GYN;  Laterality: N/A;    OOPHORECTOMY      UPPER GASTROINTESTINAL ENDOSCOPY  06/22/2015    Dr. Stinson       Social History     Socioeconomic History    Marital status:      Spouse name: Not on file    Number of children: Not on file    Years of education: Not on file    Highest education level: Not on file   Occupational History     Not on file   Social Needs    Financial resource strain: Not on file    Food insecurity     Worry: Not on file     Inability: Not on file    Transportation needs     Medical: Not on file     Non-medical: Not on file   Tobacco Use    Smoking status: Never Smoker    Smokeless tobacco: Never Used   Substance and Sexual Activity    Alcohol use: Yes     Comment: rarely / few times per yr    Drug use: No    Sexual activity: Yes     Partners: Male     Birth control/protection: None, Post-menopausal   Lifestyle    Physical activity     Days per week: Not on file     Minutes per session: Not on file    Stress: Not on file   Relationships    Social connections     Talks on phone: Not on file     Gets together: Not on file     Attends Bahai service: Not on file     Active member of club or organization: Not on file     Attends meetings of clubs or organizations: Not on file     Relationship status: Not on file   Other Topics Concern    Are you pregnant or think you may be? Not Asked    Breast-feeding Not Asked   Social History Narrative    Not on file       Family History   Problem Relation Age of Onset    Hypertension Mother     Arthritis Mother     Hypertension Father     Cancer Father         melanoma    Melanoma Father     COPD Father     Heart disease Father         chf    Diverticulitis Maternal Grandmother     Arthritis Maternal Aunt     Diabetes Maternal Aunt     Hypertension Maternal Aunt     Arthritis Maternal Uncle     Breast cancer Neg Hx     Ovarian cancer Neg Hx     Psoriasis Neg Hx     Lupus Neg Hx     Eczema Neg Hx     Colon cancer Neg Hx     Colon polyps Neg Hx     Crohn's disease Neg Hx     Esophageal cancer Neg Hx     Stomach cancer Neg Hx        Physical Exam:    Vitals:    08/29/20 1257   BP: (!) 141/81   Pulse: 71   Resp: 16   Temp: 98.9 °F (37.2 °C)     GENERAL:  No apparent distress.  Alert.  Large body habitus.  HEENT:  Moist mucous membranes.  Normocephalic  and atraumatic.    NECK:  No swelling.  Midline trachea.  No JVD.  CARDIOVASCULAR:  Regular rate and rhythm.  2+ radial pulses.  No murmurs or rubs auscultated.  PULMONARY:  Occasional scattered rhonchi with no rales or wheezes.  No increased work of breathing, tachypnea, or accessory muscle use.  ABDOMEN:  Non-tender and non-distended.    EXTREMITIES:  Warm and well perfused.  Brisk capillary refill.  2+ DP and PT pulses.  Leg symmetric and nontender to palpation.  Minimal peripheral edema.  NEUROLOGICAL:  Normal mental status.  Appropriate and conversant.    SKIN:  No rashes or ecchymoses.    BACK:  Atraumatic.  No CVA tenderness to palpation.      Labs Reviewed - No data to display    Discharge Medication List as of 8/29/2020  2:01 PM      START taking these medications    Details   sucralfate (CARAFATE) 100 mg/mL suspension Take 10 mLs (1 g total) by mouth 4 (four) times daily. for 5 days, Starting Sat 8/29/2020, Until Thu 9/3/2020, Normal         CONTINUE these medications which have NOT CHANGED    Details   ACUICYN 0.01 % Spry 2 (two) times daily as needed. , Starting Tue 12/26/2017, Historical Med      acyclovir 5% (ZOVIRAX) 5 % Crea Apply topically 5 (five) times daily., Starting Thu 10/5/2017, Normal      b complex vitamins capsule Take 1 capsule by mouth once daily., Historical Med      biotin 10 mg Tab Take 100 mg by mouth once daily. , Historical Med      calcium carbonate (TUMS) 200 mg calcium (500 mg) chewable tablet Take 1 tablet by mouth daily as needed for Heartburn., Historical Med      cetirizine (ZYRTEC) 10 MG tablet Take 10 mg by mouth daily as needed for Allergies or Rhinitis. , Historical Med      econazole nitrate 1 % cream APPLY TO INNER THIGHS AND BUTTOCKS TWICE A DAY AS NEEDED FOR FLARE., Normal      ergocalciferol (VITAMIN D2) 50,000 unit Cap Take 50,000 Units by mouth every 7 days., Historical Med      Lactobacillus rhamnosus GG (CULTURELLE) 10 billion cell capsule Take 1 capsule by  mouth once daily., Historical Med      loratadine (CLARITIN) 10 mg tablet Take 10 mg by mouth daily as needed for Allergies., Historical Med      mometasone (NASONEX) 50 mcg/actuation nasal spray 2 sprays by Nasal route daily as needed (rhinitis)., Starting Fri 4/26/2019, Normal      multivitamin (THERAGRAN) per tablet Take 1 tablet by mouth once daily., Until Discontinued, Historical Med      olmesartan-hydrochlorothiazide (BENICAR HCT) 40-12.5 mg Tab Take 1 tablet by mouth once daily., Starting Mon 4/13/2020, Normal      olopatadine (PATADAY) 0.2 % Drop Place 1 drop into both eyes nightly as needed., Starting Fri 4/26/2019, Normal      ondansetron (ZOFRAN) 4 MG tablet Take 1 tablet (4 mg total) by mouth every 6 (six) hours as needed for Nausea., Starting Fri 8/21/2020, Normal      potassium chloride (MICRO-K) 10 MEQ CpSR Take 1 capsule (10 mEq total) by mouth once daily., Starting Mon 4/13/2020, Normal      RABEprazole (ACIPHEX) 20 mg tablet TAKE ONE TABLET BY MOUTH ONCE DAILY, Normal      ranitidine (ZANTAC) 300 MG tablet Take 1 tablet (300 mg total) by mouth every evening. 30-60 minutes before bedtime., Starting Fri 4/26/2019, Until Sat 4/25/2020, Normal      triamcinolone acetonide 0.1% (KENALOG) 0.1 % cream AAA body bid PRN flare, Normal             Orders Placed This Encounter   Procedures    EKG 12-lead       Imaging Results    None         ED Course as of Aug 29 1830   Sat Aug 29, 2020   1338 EKG:  Sinus bradycardia, rate of 59, normal intervals ands axis.  There are no acute ST or T wave changes suggestive of acute ischemia or infarction.  No FL depression or other sign of pericarditis.      [MR]      ED Course User Index  [MR] Rishabh Darling MD       MDM:    60 y.o. female with burning chest pain consistent with GERD.  Screening EKG performed. EKG was reviewed with no sign of acute ischemia or infarction.  I doubt ACS.  I do not think further troponin enzyme assay or provocative testing is  indicated.  I have very low suspicion for other emergent intrathoracic process such as myocarditis, PE, CHF.  I doubt bacterial pneumonia.  I do not think further diagnostic studies including chest imaging are indicated.  Aortic dissection is very unlikely.  Antacid given here for symptomatic relief.  Continue PPI with antacids as necessary at home.  Follow-up with PCP or GI.  Sucralfate QID for short course prescribed at patient request. Detailed return precautions reviewed.    Diagnoses:    1. GERD  2. Known COVID-19 infection     Rishabh Darling MD  08/29/20 7955

## 2020-08-31 ENCOUNTER — TELEPHONE (OUTPATIENT)
Dept: FAMILY MEDICINE | Facility: CLINIC | Age: 60
End: 2020-08-31

## 2020-08-31 NOTE — TELEPHONE ENCOUNTER
----- Message from Malathi Holden sent at 8/31/2020 11:41 AM CDT -----  Regarding: schedule appt  Contact: [phone]  Pt is calling to schedule a in office visit. Pt tested positive for COVID -19 8/21. Appointments are available but didn't schedule due to her positive test. Please assist pt

## 2020-09-01 ENCOUNTER — OFFICE VISIT (OUTPATIENT)
Dept: FAMILY MEDICINE | Facility: CLINIC | Age: 60
End: 2020-09-01
Payer: COMMERCIAL

## 2020-09-01 DIAGNOSIS — K21.9 GASTROESOPHAGEAL REFLUX DISEASE, ESOPHAGITIS PRESENCE NOT SPECIFIED: ICD-10-CM

## 2020-09-01 DIAGNOSIS — U07.1 COVID-19: Primary | ICD-10-CM

## 2020-09-01 PROCEDURE — 99213 OFFICE O/P EST LOW 20 MIN: CPT | Mod: 95,,, | Performed by: PHYSICIAN ASSISTANT

## 2020-09-01 PROCEDURE — 99213 PR OFFICE/OUTPT VISIT, EST, LEVL III, 20-29 MIN: ICD-10-PCS | Mod: 95,,, | Performed by: PHYSICIAN ASSISTANT

## 2020-09-01 RX ORDER — RABEPRAZOLE SODIUM 20 MG/1
20 TABLET, DELAYED RELEASE ORAL 2 TIMES DAILY
Qty: 60 TABLET | Refills: 0 | Status: SHIPPED | OUTPATIENT
Start: 2020-09-01 | End: 2021-02-21

## 2020-09-01 NOTE — PROGRESS NOTES
Subjective:       Patient ID: Mercedes Laura is a 60 y.o. female.    Chief Complaint: COVID-19 Concerns    [place]   The chief complaint leading to consultation is: covid follow up     Visit type: audiovisual    Face to Face time with patient: 15 min   20  minutes of total time spent on the encounter, which includes face to face time and non-face to face time preparing to see the patient (eg, review of tests), Obtaining and/or reviewing separately obtained history, Documenting clinical information in the electronic or other health record, Independently interpreting results (not separately reported) and communicating results to the patient/family/caregiver, or Care coordination (not separately reported).         Each patient to whom he or she provides medical services by telemedicine is:  (1) informed of the relationship between the physician and patient and the respective role of any other health care provider with respect to management of the patient; and (2) notified that he or she may decline to receive medical services by telemedicine and may withdraw from such care at any time.    Notes: patient presents for follow  Up  Of covid 19.  She tested positive 8/21/2020.  Her symptoms onset was 8/19/2020.  She has noticed increase in her reflux symptoms since taking azithromycin.  She is taking her PPI and carafate.  States that zantac has worked best in the past but has been off since the med was recalled months ago.   Patients patient medical/surgical, social and family histories have been reviewed        Review of Systems   Constitutional: Negative for activity change, appetite change, chills, diaphoresis, fatigue and fever.   HENT: Negative for congestion.    Respiratory: Negative for cough and shortness of breath.    Cardiovascular: Negative for chest pain, palpitations and leg swelling.   Gastrointestinal:        Positive for heartburn        Objective:      Physical Exam  Constitutional:        General: She is not in acute distress.     Appearance: Normal appearance. She is not ill-appearing.   HENT:      Head: Normocephalic and atraumatic.   Pulmonary:      Effort: Pulmonary effort is normal.   Neurological:      Mental Status: She is alert and oriented to person, place, and time.   Psychiatric:         Mood and Affect: Mood normal.         Speech: Speech normal.         Assessment:       1. COVID-19    2. Gastroesophageal reflux disease, esophagitis presence not specified        Plan:       Mercedes was seen today for covid-19 concerns.    Diagnoses and all orders for this visit:    COVID-19, clinically resolved     Gastroesophageal reflux disease, esophagitis presence not specified    Other orders  -     ranitidine (ZANTAC) 15 mg/mL syrup; Take 10 mLs (150 mg total) by mouth every 12 (twelve) hours.  -  Increase    RABEprazole (ACIPHEX) 20 mg tablet; Take 1 tablet (20 mg total) by mouth 2 (two) times daily.           No follow-ups on file.

## 2020-09-07 ENCOUNTER — NURSE TRIAGE (OUTPATIENT)
Dept: ADMINISTRATIVE | Facility: CLINIC | Age: 60
End: 2020-09-07

## 2020-09-07 NOTE — TELEPHONE ENCOUNTER
Patient initially escalated due to no response, however patient entered vitals prior to phone call. Vital signs and symptoms did not trigger a second escalation; therefore, no follow up call is needed at this time.    Reason for Disposition   Caller has cancelled the call before the first contact    Additional Information   Negative: Caller has already spoken with the PCP (or office), and has no further questions   Negative: Caller has already spoken with another triager and has no further questions   Negative: Caller has already spoken with another triager or PCP (or office), and has further questions and triager able to answer questions.    Protocols used: NO CONTACT OR DUPLICATE CONTACT CALL-A-[place]

## 2020-09-26 ENCOUNTER — LAB VISIT (OUTPATIENT)
Dept: LAB | Facility: HOSPITAL | Age: 60
End: 2020-09-26
Attending: FAMILY MEDICINE
Payer: COMMERCIAL

## 2020-09-26 DIAGNOSIS — I10 ESSENTIAL HYPERTENSION: ICD-10-CM

## 2020-09-26 DIAGNOSIS — E55.9 VITAMIN D DEFICIENCY: ICD-10-CM

## 2020-09-26 DIAGNOSIS — R73.9 HYPERGLYCEMIA: ICD-10-CM

## 2020-09-26 LAB
25(OH)D3+25(OH)D2 SERPL-MCNC: 48 NG/ML (ref 30–96)
ALBUMIN SERPL BCP-MCNC: 4.2 G/DL (ref 3.5–5.2)
ALP SERPL-CCNC: 70 U/L (ref 55–135)
ALT SERPL W/O P-5'-P-CCNC: 24 U/L (ref 10–44)
ANION GAP SERPL CALC-SCNC: 11 MMOL/L (ref 8–16)
AST SERPL-CCNC: 18 U/L (ref 10–40)
BASOPHILS # BLD AUTO: 0.03 K/UL (ref 0–0.2)
BASOPHILS NFR BLD: 0.6 % (ref 0–1.9)
BILIRUB SERPL-MCNC: 0.5 MG/DL (ref 0.1–1)
BUN SERPL-MCNC: 17 MG/DL (ref 6–20)
CALCIUM SERPL-MCNC: 9.7 MG/DL (ref 8.7–10.5)
CHLORIDE SERPL-SCNC: 104 MMOL/L (ref 95–110)
CHOLEST SERPL-MCNC: 194 MG/DL (ref 120–199)
CHOLEST/HDLC SERPL: 3.6 {RATIO} (ref 2–5)
CO2 SERPL-SCNC: 24 MMOL/L (ref 23–29)
CREAT SERPL-MCNC: 0.8 MG/DL (ref 0.5–1.4)
DIFFERENTIAL METHOD: ABNORMAL
EOSINOPHIL # BLD AUTO: 0.1 K/UL (ref 0–0.5)
EOSINOPHIL NFR BLD: 2.3 % (ref 0–8)
ERYTHROCYTE [DISTWIDTH] IN BLOOD BY AUTOMATED COUNT: 13.8 % (ref 11.5–14.5)
EST. GFR  (AFRICAN AMERICAN): >60 ML/MIN/1.73 M^2
EST. GFR  (NON AFRICAN AMERICAN): >60 ML/MIN/1.73 M^2
ESTIMATED AVG GLUCOSE: 123 MG/DL (ref 68–131)
GLUCOSE SERPL-MCNC: 107 MG/DL (ref 70–110)
HBA1C MFR BLD HPLC: 5.9 % (ref 4–5.6)
HCT VFR BLD AUTO: 42.1 % (ref 37–48.5)
HDLC SERPL-MCNC: 54 MG/DL (ref 40–75)
HDLC SERPL: 27.8 % (ref 20–50)
HGB BLD-MCNC: 13.3 G/DL (ref 12–16)
IMM GRANULOCYTES # BLD AUTO: 0.01 K/UL (ref 0–0.04)
IMM GRANULOCYTES NFR BLD AUTO: 0.2 % (ref 0–0.5)
LDLC SERPL CALC-MCNC: 120 MG/DL (ref 63–159)
LYMPHOCYTES # BLD AUTO: 2 K/UL (ref 1–4.8)
LYMPHOCYTES NFR BLD: 38 % (ref 18–48)
MCH RBC QN AUTO: 28.5 PG (ref 27–31)
MCHC RBC AUTO-ENTMCNC: 31.6 G/DL (ref 32–36)
MCV RBC AUTO: 90 FL (ref 82–98)
MONOCYTES # BLD AUTO: 0.4 K/UL (ref 0.3–1)
MONOCYTES NFR BLD: 8.3 % (ref 4–15)
NEUTROPHILS # BLD AUTO: 2.6 K/UL (ref 1.8–7.7)
NEUTROPHILS NFR BLD: 50.6 % (ref 38–73)
NONHDLC SERPL-MCNC: 140 MG/DL
NRBC BLD-RTO: 0 /100 WBC
PLATELET # BLD AUTO: 292 K/UL (ref 150–350)
PMV BLD AUTO: 9.8 FL (ref 9.2–12.9)
POTASSIUM SERPL-SCNC: 3.9 MMOL/L (ref 3.5–5.1)
PROT SERPL-MCNC: 7.6 G/DL (ref 6–8.4)
RBC # BLD AUTO: 4.66 M/UL (ref 4–5.4)
SODIUM SERPL-SCNC: 139 MMOL/L (ref 136–145)
TRIGL SERPL-MCNC: 100 MG/DL (ref 30–150)
WBC # BLD AUTO: 5.21 K/UL (ref 3.9–12.7)

## 2020-09-26 PROCEDURE — 83036 HEMOGLOBIN GLYCOSYLATED A1C: CPT

## 2020-09-26 PROCEDURE — 36415 COLL VENOUS BLD VENIPUNCTURE: CPT | Mod: PO

## 2020-09-26 PROCEDURE — 85025 COMPLETE CBC W/AUTO DIFF WBC: CPT

## 2020-09-26 PROCEDURE — 80061 LIPID PANEL: CPT

## 2020-09-26 PROCEDURE — 80053 COMPREHEN METABOLIC PANEL: CPT

## 2020-09-26 PROCEDURE — 82306 VITAMIN D 25 HYDROXY: CPT

## 2020-10-02 ENCOUNTER — TELEPHONE (OUTPATIENT)
Dept: FAMILY MEDICINE | Facility: CLINIC | Age: 60
End: 2020-10-02

## 2020-10-02 NOTE — TELEPHONE ENCOUNTER
Called patient regarding appointment on today 10/2/2020 with Dr. Llamas, no answer left detail voice message that Dr. Llamas is out today and that her appointment needs to be rescheduled.

## 2020-10-08 ENCOUNTER — OFFICE VISIT (OUTPATIENT)
Dept: FAMILY MEDICINE | Facility: CLINIC | Age: 60
End: 2020-10-08
Payer: COMMERCIAL

## 2020-10-08 VITALS
RESPIRATION RATE: 14 BRPM | HEART RATE: 64 BPM | WEIGHT: 233.69 LBS | SYSTOLIC BLOOD PRESSURE: 120 MMHG | OXYGEN SATURATION: 97 % | TEMPERATURE: 97 F | HEIGHT: 66 IN | DIASTOLIC BLOOD PRESSURE: 77 MMHG | BODY MASS INDEX: 37.56 KG/M2

## 2020-10-08 DIAGNOSIS — U07.1 COVID-19: ICD-10-CM

## 2020-10-08 DIAGNOSIS — E55.9 VITAMIN D DEFICIENCY: ICD-10-CM

## 2020-10-08 DIAGNOSIS — K21.9 GASTROESOPHAGEAL REFLUX DISEASE, UNSPECIFIED WHETHER ESOPHAGITIS PRESENT: ICD-10-CM

## 2020-10-08 DIAGNOSIS — I10 GOOD HYPERTENSION CONTROL: ICD-10-CM

## 2020-10-08 DIAGNOSIS — I10 ESSENTIAL HYPERTENSION: ICD-10-CM

## 2020-10-08 DIAGNOSIS — E66.9 OBESITY (BMI 30-39.9): ICD-10-CM

## 2020-10-08 DIAGNOSIS — Z00.00 ANNUAL PHYSICAL EXAM: Primary | ICD-10-CM

## 2020-10-08 DIAGNOSIS — R73.9 HYPERGLYCEMIA: ICD-10-CM

## 2020-10-08 PROCEDURE — 99214 OFFICE O/P EST MOD 30 MIN: CPT | Mod: S$GLB,,, | Performed by: FAMILY MEDICINE

## 2020-10-08 PROCEDURE — 99214 PR OFFICE/OUTPT VISIT, EST, LEVL IV, 30-39 MIN: ICD-10-PCS | Mod: S$GLB,,, | Performed by: FAMILY MEDICINE

## 2020-10-08 PROCEDURE — 3008F BODY MASS INDEX DOCD: CPT | Mod: CPTII,S$GLB,, | Performed by: FAMILY MEDICINE

## 2020-10-08 PROCEDURE — 3074F SYST BP LT 130 MM HG: CPT | Mod: CPTII,S$GLB,, | Performed by: FAMILY MEDICINE

## 2020-10-08 PROCEDURE — 3008F PR BODY MASS INDEX (BMI) DOCUMENTED: ICD-10-PCS | Mod: CPTII,S$GLB,, | Performed by: FAMILY MEDICINE

## 2020-10-08 PROCEDURE — 99999 PR PBB SHADOW E&M-EST. PATIENT-LVL IV: CPT | Mod: PBBFAC,,, | Performed by: FAMILY MEDICINE

## 2020-10-08 PROCEDURE — 99999 PR PBB SHADOW E&M-EST. PATIENT-LVL IV: ICD-10-PCS | Mod: PBBFAC,,, | Performed by: FAMILY MEDICINE

## 2020-10-08 PROCEDURE — 3078F DIAST BP <80 MM HG: CPT | Mod: CPTII,S$GLB,, | Performed by: FAMILY MEDICINE

## 2020-10-08 PROCEDURE — 3074F PR MOST RECENT SYSTOLIC BLOOD PRESSURE < 130 MM HG: ICD-10-PCS | Mod: CPTII,S$GLB,, | Performed by: FAMILY MEDICINE

## 2020-10-08 PROCEDURE — 3078F PR MOST RECENT DIASTOLIC BLOOD PRESSURE < 80 MM HG: ICD-10-PCS | Mod: CPTII,S$GLB,, | Performed by: FAMILY MEDICINE

## 2020-10-08 NOTE — PROGRESS NOTES
Subjective:       Patient ID: Mercedes Laura is a 60 y.o. female.    Chief Complaint: Follow-up (6mth f/u )    HPI  Review of Systems   Constitutional: Negative for fatigue and unexpected weight change.   Respiratory: Negative for chest tightness and shortness of breath.    Cardiovascular: Negative for chest pain, palpitations and leg swelling.   Gastrointestinal: Negative for abdominal pain.   Musculoskeletal: Negative for arthralgias.   Neurological: Negative for dizziness, syncope, light-headedness and headaches.       Patient Active Problem List   Diagnosis    Arthritis    Pedal edema    Hyperglycemia    Vitamin D deficiency    Hiatal hernia with gastroesophageal reflux    Panic attacks    Good hypertension control    Anxiety    GERD (gastroesophageal reflux disease)    Cardiovascular risk factor, ASCVD 10-year risk 1.9%, 2015    Obesity (BMI 30-39.9)    Venous reflux     Patient is here for a chronic conditions follow up.    Had COVID 19 8/2020    Reviewed labs 9/2020  Objective:      Physical Exam  Vitals signs and nursing note reviewed.   Constitutional:       Appearance: She is well-developed.   Cardiovascular:      Rate and Rhythm: Normal rate and regular rhythm.      Heart sounds: Normal heart sounds.   Pulmonary:      Effort: Pulmonary effort is normal.      Breath sounds: Normal breath sounds.   Skin:     General: Skin is warm and dry.   Neurological:      Mental Status: She is alert and oriented to person, place, and time.         Assessment:       1. Annual physical exam    2. Vitamin D deficiency    3. Obesity (BMI 30-39.9)    4. Essential hypertension    5. COVID-19    6. Hyperglycemia    7. Gastroesophageal reflux disease, unspecified whether esophagitis present    8. Good hypertension control        Plan:         1. Annual physical exam  Discussed health maintenance guidelines appropriate for age.        2. Vitamin D deficiency  Controlled on current medications.  Continue current  "medications.    - Vitamin D; Future    3. Obesity (BMI 30-39.9)  Counseled patient on his ideal body weight, health consequences of being obese and current recommendations including weekly exercise and a heart healthy diet.  Current BMI is:Estimated body mass index is 37.72 kg/m² as calculated from the following:    Height as of this encounter: 5' 6" (1.676 m).    Weight as of this encounter: 106 kg (233 lb 11 oz)..  Patient is aware that ideal BMI < 25 or Weight in (lb) to have BMI = 25: 154.6.        4. Essential hypertension  Controlled on current medications.  Continue current medications.    - CBC auto differential; Future  - Comprehensive Metabolic Panel; Future  - Lipid Panel; Future    5. COVID-19  resolved    6. Hyperglycemia  Cont current mgmt  - Hemoglobin A1C; Future    7. Gastroesophageal reflux disease, unspecified whether esophagitis present  Counseled patient on prevention of reflux with changes in diet and behavior.  I recommended avoidance of greasy and spicy foods, caffeine and eating within 3 hours of bedtime.  I counseled the patient to avoid eating large meals and instead eating more frequent small meals.  I also recommended weight loss and elevation of the head of the bed by 6 inches.  If symptoms persist after these changes medication may be needed to control GERD.        8. Good hypertension control  Controlled on current medications.  Continue current medications.          Time spent with patient: 20 minutes    Patient with be reevaluated in 6 months or sooner prn    Greater than 50% of this visit was spent counseling as described in above documentation:Yes  "

## 2021-01-19 ENCOUNTER — PATIENT MESSAGE (OUTPATIENT)
Dept: OBSTETRICS AND GYNECOLOGY | Facility: CLINIC | Age: 61
End: 2021-01-19

## 2021-01-20 ENCOUNTER — TELEPHONE (OUTPATIENT)
Dept: OBSTETRICS AND GYNECOLOGY | Facility: CLINIC | Age: 61
End: 2021-01-20

## 2021-01-21 DIAGNOSIS — Z12.31 OTHER SCREENING MAMMOGRAM: ICD-10-CM

## 2021-01-28 ENCOUNTER — PATIENT OUTREACH (OUTPATIENT)
Dept: ADMINISTRATIVE | Facility: OTHER | Age: 61
End: 2021-01-28

## 2021-01-29 ENCOUNTER — OFFICE VISIT (OUTPATIENT)
Dept: OBSTETRICS AND GYNECOLOGY | Facility: CLINIC | Age: 61
End: 2021-01-29
Payer: COMMERCIAL

## 2021-01-29 VITALS
DIASTOLIC BLOOD PRESSURE: 80 MMHG | BODY MASS INDEX: 37.23 KG/M2 | HEIGHT: 66 IN | SYSTOLIC BLOOD PRESSURE: 120 MMHG | WEIGHT: 231.69 LBS

## 2021-01-29 DIAGNOSIS — N89.8 VAGINAL DRYNESS: ICD-10-CM

## 2021-01-29 DIAGNOSIS — N89.8 VAGINAL DISCHARGE: Primary | ICD-10-CM

## 2021-01-29 DIAGNOSIS — N93.0 POSTCOITAL BLEEDING: ICD-10-CM

## 2021-01-29 PROCEDURE — 99214 PR OFFICE/OUTPT VISIT, EST, LEVL IV, 30-39 MIN: ICD-10-PCS | Mod: S$GLB,,, | Performed by: OBSTETRICS & GYNECOLOGY

## 2021-01-29 PROCEDURE — 3008F PR BODY MASS INDEX (BMI) DOCUMENTED: ICD-10-PCS | Mod: CPTII,S$GLB,, | Performed by: OBSTETRICS & GYNECOLOGY

## 2021-01-29 PROCEDURE — 3079F PR MOST RECENT DIASTOLIC BLOOD PRESSURE 80-89 MM HG: ICD-10-PCS | Mod: CPTII,S$GLB,, | Performed by: OBSTETRICS & GYNECOLOGY

## 2021-01-29 PROCEDURE — 3074F SYST BP LT 130 MM HG: CPT | Mod: CPTII,S$GLB,, | Performed by: OBSTETRICS & GYNECOLOGY

## 2021-01-29 PROCEDURE — 99999 PR PBB SHADOW E&M-EST. PATIENT-LVL III: ICD-10-PCS | Mod: PBBFAC,,, | Performed by: OBSTETRICS & GYNECOLOGY

## 2021-01-29 PROCEDURE — 3074F PR MOST RECENT SYSTOLIC BLOOD PRESSURE < 130 MM HG: ICD-10-PCS | Mod: CPTII,S$GLB,, | Performed by: OBSTETRICS & GYNECOLOGY

## 2021-01-29 PROCEDURE — 1126F PR PAIN SEVERITY QUANTIFIED, NO PAIN PRESENT: ICD-10-PCS | Mod: S$GLB,,, | Performed by: OBSTETRICS & GYNECOLOGY

## 2021-01-29 PROCEDURE — 99214 OFFICE O/P EST MOD 30 MIN: CPT | Mod: S$GLB,,, | Performed by: OBSTETRICS & GYNECOLOGY

## 2021-01-29 PROCEDURE — 87481 CANDIDA DNA AMP PROBE: CPT | Mod: 59

## 2021-01-29 PROCEDURE — 1126F AMNT PAIN NOTED NONE PRSNT: CPT | Mod: S$GLB,,, | Performed by: OBSTETRICS & GYNECOLOGY

## 2021-01-29 PROCEDURE — 3079F DIAST BP 80-89 MM HG: CPT | Mod: CPTII,S$GLB,, | Performed by: OBSTETRICS & GYNECOLOGY

## 2021-01-29 PROCEDURE — 99999 PR PBB SHADOW E&M-EST. PATIENT-LVL III: CPT | Mod: PBBFAC,,, | Performed by: OBSTETRICS & GYNECOLOGY

## 2021-01-29 PROCEDURE — 3008F BODY MASS INDEX DOCD: CPT | Mod: CPTII,S$GLB,, | Performed by: OBSTETRICS & GYNECOLOGY

## 2021-01-29 RX ORDER — ESTRADIOL 0.1 MG/G
1 CREAM VAGINAL
Qty: 42.5 G | Refills: 1 | Status: SHIPPED | OUTPATIENT
Start: 2021-02-01 | End: 2021-08-05 | Stop reason: SDUPTHER

## 2021-01-29 RX ORDER — LACTOBACILLUS COMBINATION NO.4 3B CELL
1 CAPSULE ORAL 2 TIMES DAILY
COMMUNITY
Start: 2021-01-29

## 2021-01-29 RX ORDER — CHOLECALCIFEROL (VITAMIN D3) 25 MCG
2 TABLET ORAL DAILY
COMMUNITY

## 2021-02-01 LAB
BACTERIAL VAGINOSIS DNA: NEGATIVE
CANDIDA GLABRATA DNA: NEGATIVE
CANDIDA KRUSEI DNA: NEGATIVE
CANDIDA RRNA VAG QL PROBE: NEGATIVE
T VAGINALIS RRNA GENITAL QL PROBE: NEGATIVE

## 2021-02-02 ENCOUNTER — PATIENT MESSAGE (OUTPATIENT)
Dept: OBSTETRICS AND GYNECOLOGY | Facility: CLINIC | Age: 61
End: 2021-02-02

## 2021-02-27 ENCOUNTER — IMMUNIZATION (OUTPATIENT)
Dept: PRIMARY CARE CLINIC | Facility: CLINIC | Age: 61
End: 2021-02-27
Payer: COMMERCIAL

## 2021-02-27 DIAGNOSIS — Z23 NEED FOR VACCINATION: Primary | ICD-10-CM

## 2021-02-27 PROCEDURE — 0001A COVID-19, MRNA, LNP-S, PF, 30 MCG/0.3 ML DOSE VACCINE: ICD-10-PCS | Mod: S$GLB,,, | Performed by: FAMILY MEDICINE

## 2021-02-27 PROCEDURE — 91300 COVID-19, MRNA, LNP-S, PF, 30 MCG/0.3 ML DOSE VACCINE: CPT | Mod: S$GLB,,, | Performed by: FAMILY MEDICINE

## 2021-02-27 PROCEDURE — 91300 COVID-19, MRNA, LNP-S, PF, 30 MCG/0.3 ML DOSE VACCINE: ICD-10-PCS | Mod: S$GLB,,, | Performed by: FAMILY MEDICINE

## 2021-02-27 PROCEDURE — 0001A COVID-19, MRNA, LNP-S, PF, 30 MCG/0.3 ML DOSE VACCINE: CPT | Mod: S$GLB,,, | Performed by: FAMILY MEDICINE

## 2021-03-20 ENCOUNTER — IMMUNIZATION (OUTPATIENT)
Dept: PRIMARY CARE CLINIC | Facility: CLINIC | Age: 61
End: 2021-03-20

## 2021-03-20 DIAGNOSIS — Z23 NEED FOR VACCINATION: Primary | ICD-10-CM

## 2021-03-20 PROCEDURE — 0002A COVID-19, MRNA, LNP-S, PF, 30 MCG/0.3 ML DOSE VACCINE: ICD-10-PCS | Mod: CV19,S$GLB,, | Performed by: FAMILY MEDICINE

## 2021-03-20 PROCEDURE — 0002A COVID-19, MRNA, LNP-S, PF, 30 MCG/0.3 ML DOSE VACCINE: CPT | Mod: CV19,S$GLB,, | Performed by: FAMILY MEDICINE

## 2021-03-20 PROCEDURE — 91300 COVID-19, MRNA, LNP-S, PF, 30 MCG/0.3 ML DOSE VACCINE: CPT | Mod: S$GLB,,, | Performed by: FAMILY MEDICINE

## 2021-03-20 PROCEDURE — 91300 COVID-19, MRNA, LNP-S, PF, 30 MCG/0.3 ML DOSE VACCINE: ICD-10-PCS | Mod: S$GLB,,, | Performed by: FAMILY MEDICINE

## 2021-03-27 ENCOUNTER — LAB VISIT (OUTPATIENT)
Dept: LAB | Facility: HOSPITAL | Age: 61
End: 2021-03-27
Attending: FAMILY MEDICINE
Payer: COMMERCIAL

## 2021-03-27 DIAGNOSIS — R73.9 HYPERGLYCEMIA: ICD-10-CM

## 2021-03-27 DIAGNOSIS — E55.9 VITAMIN D DEFICIENCY: ICD-10-CM

## 2021-03-27 DIAGNOSIS — I10 ESSENTIAL HYPERTENSION: ICD-10-CM

## 2021-03-27 LAB
25(OH)D3+25(OH)D2 SERPL-MCNC: 45 NG/ML (ref 30–96)
ALBUMIN SERPL BCP-MCNC: 3.9 G/DL (ref 3.5–5.2)
ALP SERPL-CCNC: 65 U/L (ref 55–135)
ALT SERPL W/O P-5'-P-CCNC: 22 U/L (ref 10–44)
ANION GAP SERPL CALC-SCNC: 10 MMOL/L (ref 8–16)
AST SERPL-CCNC: 19 U/L (ref 10–40)
BASOPHILS # BLD AUTO: 0.03 K/UL (ref 0–0.2)
BASOPHILS NFR BLD: 0.6 % (ref 0–1.9)
BILIRUB SERPL-MCNC: 0.6 MG/DL (ref 0.1–1)
BUN SERPL-MCNC: 17 MG/DL (ref 6–20)
CALCIUM SERPL-MCNC: 9.4 MG/DL (ref 8.7–10.5)
CHLORIDE SERPL-SCNC: 106 MMOL/L (ref 95–110)
CHOLEST SERPL-MCNC: 205 MG/DL (ref 120–199)
CHOLEST/HDLC SERPL: 3.7 {RATIO} (ref 2–5)
CO2 SERPL-SCNC: 23 MMOL/L (ref 23–29)
CREAT SERPL-MCNC: 0.8 MG/DL (ref 0.5–1.4)
DIFFERENTIAL METHOD: ABNORMAL
EOSINOPHIL # BLD AUTO: 0.1 K/UL (ref 0–0.5)
EOSINOPHIL NFR BLD: 2.5 % (ref 0–8)
ERYTHROCYTE [DISTWIDTH] IN BLOOD BY AUTOMATED COUNT: 13.4 % (ref 11.5–14.5)
EST. GFR  (AFRICAN AMERICAN): >60 ML/MIN/1.73 M^2
EST. GFR  (NON AFRICAN AMERICAN): >60 ML/MIN/1.73 M^2
ESTIMATED AVG GLUCOSE: 128 MG/DL (ref 68–131)
GLUCOSE SERPL-MCNC: 100 MG/DL (ref 70–110)
HBA1C MFR BLD: 6.1 % (ref 4–5.6)
HCT VFR BLD AUTO: 43.2 % (ref 37–48.5)
HDLC SERPL-MCNC: 55 MG/DL (ref 40–75)
HDLC SERPL: 26.8 % (ref 20–50)
HGB BLD-MCNC: 13.8 G/DL (ref 12–16)
IMM GRANULOCYTES # BLD AUTO: 0.01 K/UL (ref 0–0.04)
IMM GRANULOCYTES NFR BLD AUTO: 0.2 % (ref 0–0.5)
LDLC SERPL CALC-MCNC: 133.8 MG/DL (ref 63–159)
LYMPHOCYTES # BLD AUTO: 2.3 K/UL (ref 1–4.8)
LYMPHOCYTES NFR BLD: 42.8 % (ref 18–48)
MCH RBC QN AUTO: 28.5 PG (ref 27–31)
MCHC RBC AUTO-ENTMCNC: 31.9 G/DL (ref 32–36)
MCV RBC AUTO: 89 FL (ref 82–98)
MONOCYTES # BLD AUTO: 0.3 K/UL (ref 0.3–1)
MONOCYTES NFR BLD: 6.3 % (ref 4–15)
NEUTROPHILS # BLD AUTO: 2.5 K/UL (ref 1.8–7.7)
NEUTROPHILS NFR BLD: 47.6 % (ref 38–73)
NONHDLC SERPL-MCNC: 150 MG/DL
NRBC BLD-RTO: 0 /100 WBC
PLATELET # BLD AUTO: 250 K/UL (ref 150–350)
PMV BLD AUTO: 10.5 FL (ref 9.2–12.9)
POTASSIUM SERPL-SCNC: 4.1 MMOL/L (ref 3.5–5.1)
PROT SERPL-MCNC: 7.5 G/DL (ref 6–8.4)
RBC # BLD AUTO: 4.85 M/UL (ref 4–5.4)
SODIUM SERPL-SCNC: 139 MMOL/L (ref 136–145)
TRIGL SERPL-MCNC: 81 MG/DL (ref 30–150)
WBC # BLD AUTO: 5.28 K/UL (ref 3.9–12.7)

## 2021-03-27 PROCEDURE — 80053 COMPREHEN METABOLIC PANEL: CPT | Performed by: FAMILY MEDICINE

## 2021-03-27 PROCEDURE — 36415 COLL VENOUS BLD VENIPUNCTURE: CPT | Mod: PO | Performed by: FAMILY MEDICINE

## 2021-03-27 PROCEDURE — 83036 HEMOGLOBIN GLYCOSYLATED A1C: CPT | Performed by: FAMILY MEDICINE

## 2021-03-27 PROCEDURE — 80061 LIPID PANEL: CPT | Performed by: FAMILY MEDICINE

## 2021-03-27 PROCEDURE — 82306 VITAMIN D 25 HYDROXY: CPT | Performed by: FAMILY MEDICINE

## 2021-03-27 PROCEDURE — 85025 COMPLETE CBC W/AUTO DIFF WBC: CPT | Performed by: FAMILY MEDICINE

## 2021-04-05 ENCOUNTER — OFFICE VISIT (OUTPATIENT)
Dept: FAMILY MEDICINE | Facility: CLINIC | Age: 61
End: 2021-04-05
Payer: COMMERCIAL

## 2021-04-05 ENCOUNTER — PATIENT MESSAGE (OUTPATIENT)
Dept: ADMINISTRATIVE | Facility: HOSPITAL | Age: 61
End: 2021-04-05

## 2021-04-05 VITALS
BODY MASS INDEX: 37.56 KG/M2 | HEART RATE: 60 BPM | WEIGHT: 233.69 LBS | RESPIRATION RATE: 16 BRPM | OXYGEN SATURATION: 95 % | DIASTOLIC BLOOD PRESSURE: 70 MMHG | TEMPERATURE: 98 F | HEIGHT: 66 IN | SYSTOLIC BLOOD PRESSURE: 122 MMHG

## 2021-04-05 DIAGNOSIS — E78.5 HYPERLIPIDEMIA, UNSPECIFIED HYPERLIPIDEMIA TYPE: ICD-10-CM

## 2021-04-05 DIAGNOSIS — Z12.11 COLON CANCER SCREENING: ICD-10-CM

## 2021-04-05 DIAGNOSIS — I10 GOOD HYPERTENSION CONTROL: Primary | ICD-10-CM

## 2021-04-05 DIAGNOSIS — R73.9 HYPERGLYCEMIA: ICD-10-CM

## 2021-04-05 DIAGNOSIS — B35.6 TINEA CRURIS: ICD-10-CM

## 2021-04-05 DIAGNOSIS — Z12.31 ENCOUNTER FOR SCREENING MAMMOGRAM FOR MALIGNANT NEOPLASM OF BREAST: ICD-10-CM

## 2021-04-05 DIAGNOSIS — E66.9 OBESITY (BMI 30-39.9): ICD-10-CM

## 2021-04-05 DIAGNOSIS — E55.9 VITAMIN D DEFICIENCY: ICD-10-CM

## 2021-04-05 PROCEDURE — 3008F BODY MASS INDEX DOCD: CPT | Mod: CPTII,S$GLB,, | Performed by: FAMILY MEDICINE

## 2021-04-05 PROCEDURE — 3078F DIAST BP <80 MM HG: CPT | Mod: CPTII,S$GLB,, | Performed by: FAMILY MEDICINE

## 2021-04-05 PROCEDURE — 3074F PR MOST RECENT SYSTOLIC BLOOD PRESSURE < 130 MM HG: ICD-10-PCS | Mod: CPTII,S$GLB,, | Performed by: FAMILY MEDICINE

## 2021-04-05 PROCEDURE — 3008F PR BODY MASS INDEX (BMI) DOCUMENTED: ICD-10-PCS | Mod: CPTII,S$GLB,, | Performed by: FAMILY MEDICINE

## 2021-04-05 PROCEDURE — 99999 PR PBB SHADOW E&M-EST. PATIENT-LVL IV: ICD-10-PCS | Mod: PBBFAC,,, | Performed by: FAMILY MEDICINE

## 2021-04-05 PROCEDURE — 99999 PR PBB SHADOW E&M-EST. PATIENT-LVL IV: CPT | Mod: PBBFAC,,, | Performed by: FAMILY MEDICINE

## 2021-04-05 PROCEDURE — 99214 OFFICE O/P EST MOD 30 MIN: CPT | Mod: S$GLB,,, | Performed by: FAMILY MEDICINE

## 2021-04-05 PROCEDURE — 1126F PR PAIN SEVERITY QUANTIFIED, NO PAIN PRESENT: ICD-10-PCS | Mod: S$GLB,,, | Performed by: FAMILY MEDICINE

## 2021-04-05 PROCEDURE — 3074F SYST BP LT 130 MM HG: CPT | Mod: CPTII,S$GLB,, | Performed by: FAMILY MEDICINE

## 2021-04-05 PROCEDURE — 3078F PR MOST RECENT DIASTOLIC BLOOD PRESSURE < 80 MM HG: ICD-10-PCS | Mod: CPTII,S$GLB,, | Performed by: FAMILY MEDICINE

## 2021-04-05 PROCEDURE — 1126F AMNT PAIN NOTED NONE PRSNT: CPT | Mod: S$GLB,,, | Performed by: FAMILY MEDICINE

## 2021-04-05 PROCEDURE — 99214 PR OFFICE/OUTPT VISIT, EST, LEVL IV, 30-39 MIN: ICD-10-PCS | Mod: S$GLB,,, | Performed by: FAMILY MEDICINE

## 2021-04-05 RX ORDER — ECONAZOLE NITRATE 10 MG/G
CREAM TOPICAL
Qty: 85 G | Refills: 2 | Status: SHIPPED | OUTPATIENT
Start: 2021-04-05 | End: 2021-10-01 | Stop reason: SDUPTHER

## 2021-04-06 ENCOUNTER — PATIENT MESSAGE (OUTPATIENT)
Dept: GASTROENTEROLOGY | Facility: CLINIC | Age: 61
End: 2021-04-06

## 2021-04-14 ENCOUNTER — PATIENT OUTREACH (OUTPATIENT)
Dept: ADMINISTRATIVE | Facility: OTHER | Age: 61
End: 2021-04-14

## 2021-04-14 DIAGNOSIS — M79.673 PAIN OF FOOT, UNSPECIFIED LATERALITY: Primary | ICD-10-CM

## 2021-04-15 ENCOUNTER — HOSPITAL ENCOUNTER (OUTPATIENT)
Dept: RADIOLOGY | Facility: HOSPITAL | Age: 61
Discharge: HOME | End: 2021-04-15
Attending: ORTHOPAEDIC SURGERY
Payer: COMMERCIAL

## 2021-04-15 ENCOUNTER — OFFICE VISIT (OUTPATIENT)
Dept: ORTHOPEDICS | Facility: CLINIC | Age: 61
End: 2021-04-15
Payer: COMMERCIAL

## 2021-04-15 VITALS — BODY MASS INDEX: 37.45 KG/M2 | WEIGHT: 233 LBS | HEIGHT: 66 IN | RESPIRATION RATE: 18 BRPM

## 2021-04-15 DIAGNOSIS — M79.673 PAIN OF FOOT, UNSPECIFIED LATERALITY: Primary | ICD-10-CM

## 2021-04-15 DIAGNOSIS — M79.673 PAIN OF FOOT, UNSPECIFIED LATERALITY: ICD-10-CM

## 2021-04-15 PROCEDURE — 99213 PR OFFICE/OUTPT VISIT, EST, LEVL III, 20-29 MIN: ICD-10-PCS | Mod: S$GLB,,, | Performed by: ORTHOPAEDIC SURGERY

## 2021-04-15 PROCEDURE — 73630 X-RAY EXAM OF FOOT: CPT | Mod: 26,LT,, | Performed by: RADIOLOGY

## 2021-04-15 PROCEDURE — 1125F PR PAIN SEVERITY QUANTIFIED, PAIN PRESENT: ICD-10-PCS | Mod: S$GLB,,, | Performed by: ORTHOPAEDIC SURGERY

## 2021-04-15 PROCEDURE — 99999 PR PBB SHADOW E&M-EST. PATIENT-LVL III: CPT | Mod: PBBFAC,,, | Performed by: ORTHOPAEDIC SURGERY

## 2021-04-15 PROCEDURE — 3008F BODY MASS INDEX DOCD: CPT | Mod: CPTII,S$GLB,, | Performed by: ORTHOPAEDIC SURGERY

## 2021-04-15 PROCEDURE — 1125F AMNT PAIN NOTED PAIN PRSNT: CPT | Mod: S$GLB,,, | Performed by: ORTHOPAEDIC SURGERY

## 2021-04-15 PROCEDURE — 73630 PR  X-RAY FOOT 3+ VW: ICD-10-PCS | Mod: 26,LT,, | Performed by: RADIOLOGY

## 2021-04-15 PROCEDURE — 99999 PR PBB SHADOW E&M-EST. PATIENT-LVL III: ICD-10-PCS | Mod: PBBFAC,,, | Performed by: ORTHOPAEDIC SURGERY

## 2021-04-15 PROCEDURE — 73630 X-RAY EXAM OF FOOT: CPT | Mod: TC,50,PN

## 2021-04-15 PROCEDURE — 3008F PR BODY MASS INDEX (BMI) DOCUMENTED: ICD-10-PCS | Mod: CPTII,S$GLB,, | Performed by: ORTHOPAEDIC SURGERY

## 2021-04-15 PROCEDURE — 99213 OFFICE O/P EST LOW 20 MIN: CPT | Mod: S$GLB,,, | Performed by: ORTHOPAEDIC SURGERY

## 2021-04-15 PROCEDURE — 73630 X-RAY EXAM OF FOOT: CPT | Mod: 26,RT,, | Performed by: RADIOLOGY

## 2021-05-07 ENCOUNTER — OFFICE VISIT (OUTPATIENT)
Dept: ORTHOPEDICS | Facility: CLINIC | Age: 61
End: 2021-05-07
Payer: COMMERCIAL

## 2021-05-07 VITALS
DIASTOLIC BLOOD PRESSURE: 78 MMHG | SYSTOLIC BLOOD PRESSURE: 139 MMHG | BODY MASS INDEX: 37.45 KG/M2 | WEIGHT: 233 LBS | HEIGHT: 66 IN | HEART RATE: 64 BPM

## 2021-05-07 DIAGNOSIS — G57.62 MORTON'S NEUROMA OF THIRD INTERSPACE OF LEFT FOOT: Primary | ICD-10-CM

## 2021-05-07 PROCEDURE — 99999 PR PBB SHADOW E&M-EST. PATIENT-LVL IV: CPT | Mod: PBBFAC,,, | Performed by: ORTHOPAEDIC SURGERY

## 2021-05-07 PROCEDURE — 99204 OFFICE O/P NEW MOD 45 MIN: CPT | Mod: 25,S$GLB,, | Performed by: ORTHOPAEDIC SURGERY

## 2021-05-07 PROCEDURE — 64455 PR INJECT ANES/STEROID PLANTAR COMMON DIGITAL NERVE: ICD-10-PCS | Mod: LT,S$GLB,, | Performed by: ORTHOPAEDIC SURGERY

## 2021-05-07 PROCEDURE — 64455 NJX AA&/STRD PLTR COM DG NRV: CPT | Mod: LT,S$GLB,, | Performed by: ORTHOPAEDIC SURGERY

## 2021-05-07 PROCEDURE — 1125F AMNT PAIN NOTED PAIN PRSNT: CPT | Mod: S$GLB,,, | Performed by: ORTHOPAEDIC SURGERY

## 2021-05-07 PROCEDURE — 3008F BODY MASS INDEX DOCD: CPT | Mod: CPTII,S$GLB,, | Performed by: ORTHOPAEDIC SURGERY

## 2021-05-07 PROCEDURE — 1125F PR PAIN SEVERITY QUANTIFIED, PAIN PRESENT: ICD-10-PCS | Mod: S$GLB,,, | Performed by: ORTHOPAEDIC SURGERY

## 2021-05-07 PROCEDURE — 3008F PR BODY MASS INDEX (BMI) DOCUMENTED: ICD-10-PCS | Mod: CPTII,S$GLB,, | Performed by: ORTHOPAEDIC SURGERY

## 2021-05-07 PROCEDURE — 99204 PR OFFICE/OUTPT VISIT, NEW, LEVL IV, 45-59 MIN: ICD-10-PCS | Mod: 25,S$GLB,, | Performed by: ORTHOPAEDIC SURGERY

## 2021-05-07 PROCEDURE — 99999 PR PBB SHADOW E&M-EST. PATIENT-LVL IV: ICD-10-PCS | Mod: PBBFAC,,, | Performed by: ORTHOPAEDIC SURGERY

## 2021-05-07 RX ORDER — TRIAMCINOLONE ACETONIDE 40 MG/ML
40 INJECTION, SUSPENSION INTRA-ARTICULAR; INTRAMUSCULAR
Status: COMPLETED | OUTPATIENT
Start: 2021-05-07 | End: 2021-05-07

## 2021-05-07 RX ADMIN — TRIAMCINOLONE ACETONIDE 40 MG: 40 INJECTION, SUSPENSION INTRA-ARTICULAR; INTRAMUSCULAR at 11:05

## 2021-06-14 ENCOUNTER — TELEPHONE (OUTPATIENT)
Dept: GASTROENTEROLOGY | Facility: CLINIC | Age: 61
End: 2021-06-14

## 2021-06-14 DIAGNOSIS — Z86.010 HISTORY OF COLON POLYPS: Primary | ICD-10-CM

## 2021-07-07 ENCOUNTER — PATIENT MESSAGE (OUTPATIENT)
Dept: ENDOSCOPY | Facility: HOSPITAL | Age: 61
End: 2021-07-07

## 2021-07-07 ENCOUNTER — PATIENT MESSAGE (OUTPATIENT)
Dept: ADMINISTRATIVE | Facility: HOSPITAL | Age: 61
End: 2021-07-07

## 2021-07-08 ENCOUNTER — HOSPITAL ENCOUNTER (OUTPATIENT)
Dept: RADIOLOGY | Facility: CLINIC | Age: 61
Discharge: HOME | End: 2021-07-08
Attending: FAMILY MEDICINE
Payer: COMMERCIAL

## 2021-07-08 DIAGNOSIS — Z12.31 ENCOUNTER FOR SCREENING MAMMOGRAM FOR MALIGNANT NEOPLASM OF BREAST: ICD-10-CM

## 2021-07-08 PROCEDURE — 77067 MAMMO DIGITAL SCREENING BILAT WITH TOMO: ICD-10-PCS | Mod: 26,,, | Performed by: RADIOLOGY

## 2021-07-08 PROCEDURE — 77067 SCR MAMMO BI INCL CAD: CPT | Mod: TC,PO

## 2021-07-08 PROCEDURE — 77063 MAMMO DIGITAL SCREENING BILAT WITH TOMO: ICD-10-PCS | Mod: 26,,, | Performed by: RADIOLOGY

## 2021-07-08 PROCEDURE — 77063 BREAST TOMOSYNTHESIS BI: CPT | Mod: 26,,, | Performed by: RADIOLOGY

## 2021-07-08 PROCEDURE — 77067 SCR MAMMO BI INCL CAD: CPT | Mod: 26,,, | Performed by: RADIOLOGY

## 2021-07-14 ENCOUNTER — ANESTHESIA (OUTPATIENT)
Dept: ENDOSCOPY | Facility: HOSPITAL | Age: 61
End: 2021-07-14
Payer: COMMERCIAL

## 2021-07-14 ENCOUNTER — ANESTHESIA EVENT (OUTPATIENT)
Dept: ENDOSCOPY | Facility: HOSPITAL | Age: 61
End: 2021-07-14
Payer: COMMERCIAL

## 2021-07-14 ENCOUNTER — HOSPITAL ENCOUNTER (OUTPATIENT)
Facility: HOSPITAL | Age: 61
Discharge: HOME | End: 2021-07-14
Attending: INTERNAL MEDICINE | Admitting: INTERNAL MEDICINE
Payer: COMMERCIAL

## 2021-07-14 VITALS
WEIGHT: 230 LBS | RESPIRATION RATE: 15 BRPM | OXYGEN SATURATION: 95 % | DIASTOLIC BLOOD PRESSURE: 65 MMHG | TEMPERATURE: 99 F | SYSTOLIC BLOOD PRESSURE: 115 MMHG | BODY MASS INDEX: 37.12 KG/M2 | HEART RATE: 62 BPM

## 2021-07-14 DIAGNOSIS — K64.8 INTERNAL HEMORRHOIDS: ICD-10-CM

## 2021-07-14 DIAGNOSIS — Z86.010 HX OF COLONIC POLYPS: ICD-10-CM

## 2021-07-14 DIAGNOSIS — K63.5 POLYP OF COLON, UNSPECIFIED PART OF COLON, UNSPECIFIED TYPE: Primary | ICD-10-CM

## 2021-07-14 PROBLEM — Z86.0100 HX OF COLONIC POLYPS: Status: ACTIVE | Noted: 2021-07-14

## 2021-07-14 PROCEDURE — D9220A PRA ANESTHESIA: ICD-10-PCS | Mod: 33,,, | Performed by: NURSE ANESTHETIST, CERTIFIED REGISTERED

## 2021-07-14 PROCEDURE — 37000008 HC ANESTHESIA 1ST 15 MINUTES: Performed by: INTERNAL MEDICINE

## 2021-07-14 PROCEDURE — 25000003 PHARM REV CODE 250: Performed by: INTERNAL MEDICINE

## 2021-07-14 PROCEDURE — D9220A PRA ANESTHESIA: Mod: 33,,, | Performed by: ANESTHESIOLOGY

## 2021-07-14 PROCEDURE — 88305 TISSUE EXAM BY PATHOLOGIST: CPT | Mod: 26,,, | Performed by: PATHOLOGY

## 2021-07-14 PROCEDURE — 88305 TISSUE EXAM BY PATHOLOGIST: CPT | Performed by: PATHOLOGY

## 2021-07-14 PROCEDURE — D9220A PRA ANESTHESIA: Mod: 33,,, | Performed by: NURSE ANESTHETIST, CERTIFIED REGISTERED

## 2021-07-14 PROCEDURE — 63600175 PHARM REV CODE 636 W HCPCS: Performed by: NURSE ANESTHETIST, CERTIFIED REGISTERED

## 2021-07-14 PROCEDURE — 37000009 HC ANESTHESIA EA ADD 15 MINS: Performed by: INTERNAL MEDICINE

## 2021-07-14 PROCEDURE — 88305 TISSUE EXAM BY PATHOLOGIST: ICD-10-PCS | Mod: 26,,, | Performed by: PATHOLOGY

## 2021-07-14 PROCEDURE — 45385 COLONOSCOPY W/LESION REMOVAL: CPT | Performed by: INTERNAL MEDICINE

## 2021-07-14 PROCEDURE — 45385 COLONOSCOPY W/LESION REMOVAL: CPT | Mod: 33,,, | Performed by: INTERNAL MEDICINE

## 2021-07-14 PROCEDURE — D9220A PRA ANESTHESIA: ICD-10-PCS | Mod: 33,,, | Performed by: ANESTHESIOLOGY

## 2021-07-14 PROCEDURE — 45385 PR COLONOSCOPY,REMV LESN,SNARE: ICD-10-PCS | Mod: 33,,, | Performed by: INTERNAL MEDICINE

## 2021-07-14 PROCEDURE — 25000003 PHARM REV CODE 250: Performed by: NURSE ANESTHETIST, CERTIFIED REGISTERED

## 2021-07-14 PROCEDURE — 27201089 HC SNARE, DISP (ANY): Performed by: INTERNAL MEDICINE

## 2021-07-14 RX ORDER — PROPOFOL 10 MG/ML
VIAL (ML) INTRAVENOUS
Status: DISCONTINUED | OUTPATIENT
Start: 2021-07-14 | End: 2021-07-14

## 2021-07-14 RX ORDER — LIDOCAINE HCL/PF 100 MG/5ML
SYRINGE (ML) INTRAVENOUS
Status: DISCONTINUED | OUTPATIENT
Start: 2021-07-14 | End: 2021-07-14

## 2021-07-14 RX ORDER — SODIUM CHLORIDE 9 MG/ML
INJECTION, SOLUTION INTRAVENOUS CONTINUOUS
Status: DISCONTINUED | OUTPATIENT
Start: 2021-07-14 | End: 2021-07-14 | Stop reason: HOSPADM

## 2021-07-14 RX ADMIN — LIDOCAINE HYDROCHLORIDE 75 MG: 20 INJECTION INTRAVENOUS at 08:07

## 2021-07-14 RX ADMIN — PROPOFOL 40 MG: 10 INJECTION, EMULSION INTRAVENOUS at 08:07

## 2021-07-14 RX ADMIN — SODIUM CHLORIDE: 0.9 INJECTION, SOLUTION INTRAVENOUS at 07:07

## 2021-07-14 RX ADMIN — PROPOFOL 100 MG: 10 INJECTION, EMULSION INTRAVENOUS at 08:07

## 2021-07-16 LAB
FINAL PATHOLOGIC DIAGNOSIS: NORMAL
GROSS: NORMAL
Lab: NORMAL

## 2021-08-05 DIAGNOSIS — N89.8 VAGINAL DRYNESS: ICD-10-CM

## 2021-08-05 RX ORDER — ESTRADIOL 0.1 MG/G
1 CREAM VAGINAL
Qty: 42.5 G | Refills: 1 | Status: SHIPPED | OUTPATIENT
Start: 2021-08-05 | End: 2022-10-04 | Stop reason: SDUPTHER

## 2021-09-21 DIAGNOSIS — G89.29 BILATERAL CHRONIC KNEE PAIN: ICD-10-CM

## 2021-09-21 DIAGNOSIS — M25.561 PAIN IN BOTH KNEES, UNSPECIFIED CHRONICITY: Primary | ICD-10-CM

## 2021-09-21 DIAGNOSIS — M25.562 PAIN IN BOTH KNEES, UNSPECIFIED CHRONICITY: Primary | ICD-10-CM

## 2021-09-21 DIAGNOSIS — M25.562 BILATERAL CHRONIC KNEE PAIN: ICD-10-CM

## 2021-09-21 DIAGNOSIS — M25.561 BILATERAL CHRONIC KNEE PAIN: ICD-10-CM

## 2021-09-24 ENCOUNTER — PATIENT OUTREACH (OUTPATIENT)
Dept: ADMINISTRATIVE | Facility: OTHER | Age: 61
End: 2021-09-24

## 2021-09-27 ENCOUNTER — OFFICE VISIT (OUTPATIENT)
Dept: ORTHOPEDICS | Facility: CLINIC | Age: 61
End: 2021-09-27
Payer: COMMERCIAL

## 2021-09-27 ENCOUNTER — HOSPITAL ENCOUNTER (OUTPATIENT)
Dept: RADIOLOGY | Facility: HOSPITAL | Age: 61
Discharge: HOME | End: 2021-09-27
Attending: ORTHOPAEDIC SURGERY
Payer: COMMERCIAL

## 2021-09-27 VITALS — WEIGHT: 230 LBS | BODY MASS INDEX: 36.96 KG/M2 | HEIGHT: 66 IN | RESPIRATION RATE: 17 BRPM

## 2021-09-27 DIAGNOSIS — M25.561 BILATERAL CHRONIC KNEE PAIN: ICD-10-CM

## 2021-09-27 DIAGNOSIS — M25.561 PAIN IN BOTH KNEES, UNSPECIFIED CHRONICITY: ICD-10-CM

## 2021-09-27 DIAGNOSIS — M17.10 ARTHRITIS OF KNEE: Primary | ICD-10-CM

## 2021-09-27 DIAGNOSIS — M25.562 PAIN IN BOTH KNEES, UNSPECIFIED CHRONICITY: ICD-10-CM

## 2021-09-27 DIAGNOSIS — G89.29 BILATERAL CHRONIC KNEE PAIN: ICD-10-CM

## 2021-09-27 DIAGNOSIS — M25.562 BILATERAL CHRONIC KNEE PAIN: ICD-10-CM

## 2021-09-27 PROCEDURE — 3008F PR BODY MASS INDEX (BMI) DOCUMENTED: ICD-10-PCS | Mod: CPTII,S$GLB,, | Performed by: ORTHOPAEDIC SURGERY

## 2021-09-27 PROCEDURE — 73564 X-RAY EXAM KNEE 4 OR MORE: CPT | Mod: 26,,, | Performed by: RADIOLOGY

## 2021-09-27 PROCEDURE — 73564 XR KNEE ORTHO BILAT WITH FLEXION: ICD-10-PCS | Mod: 26,,, | Performed by: RADIOLOGY

## 2021-09-27 PROCEDURE — 73564 X-RAY EXAM KNEE 4 OR MORE: CPT | Mod: TC,50,PN

## 2021-09-27 PROCEDURE — 99999 PR PBB SHADOW E&M-EST. PATIENT-LVL III: CPT | Mod: PBBFAC,,, | Performed by: ORTHOPAEDIC SURGERY

## 2021-09-27 PROCEDURE — 99213 OFFICE O/P EST LOW 20 MIN: CPT | Mod: 25,S$GLB,, | Performed by: ORTHOPAEDIC SURGERY

## 2021-09-27 PROCEDURE — 99213 PR OFFICE/OUTPT VISIT, EST, LEVL III, 20-29 MIN: ICD-10-PCS | Mod: 25,S$GLB,, | Performed by: ORTHOPAEDIC SURGERY

## 2021-09-27 PROCEDURE — 1160F RVW MEDS BY RX/DR IN RCRD: CPT | Mod: CPTII,S$GLB,, | Performed by: ORTHOPAEDIC SURGERY

## 2021-09-27 PROCEDURE — 99999 PR PBB SHADOW E&M-EST. PATIENT-LVL III: ICD-10-PCS | Mod: PBBFAC,,, | Performed by: ORTHOPAEDIC SURGERY

## 2021-09-27 PROCEDURE — 3044F PR MOST RECENT HEMOGLOBIN A1C LEVEL <7.0%: ICD-10-PCS | Mod: CPTII,S$GLB,, | Performed by: ORTHOPAEDIC SURGERY

## 2021-09-27 PROCEDURE — 20610 LARGE JOINT ASPIRATION/INJECTION: BILATERAL KNEE: ICD-10-PCS | Mod: 50,S$GLB,, | Performed by: ORTHOPAEDIC SURGERY

## 2021-09-27 PROCEDURE — 3008F BODY MASS INDEX DOCD: CPT | Mod: CPTII,S$GLB,, | Performed by: ORTHOPAEDIC SURGERY

## 2021-09-27 PROCEDURE — 1159F MED LIST DOCD IN RCRD: CPT | Mod: CPTII,S$GLB,, | Performed by: ORTHOPAEDIC SURGERY

## 2021-09-27 PROCEDURE — 1159F PR MEDICATION LIST DOCUMENTED IN MEDICAL RECORD: ICD-10-PCS | Mod: CPTII,S$GLB,, | Performed by: ORTHOPAEDIC SURGERY

## 2021-09-27 PROCEDURE — 3044F HG A1C LEVEL LT 7.0%: CPT | Mod: CPTII,S$GLB,, | Performed by: ORTHOPAEDIC SURGERY

## 2021-09-27 PROCEDURE — 20610 DRAIN/INJ JOINT/BURSA W/O US: CPT | Mod: 50,S$GLB,, | Performed by: ORTHOPAEDIC SURGERY

## 2021-09-27 PROCEDURE — 1160F PR REVIEW ALL MEDS BY PRESCRIBER/CLIN PHARMACIST DOCUMENTED: ICD-10-PCS | Mod: CPTII,S$GLB,, | Performed by: ORTHOPAEDIC SURGERY

## 2021-09-27 RX ORDER — TRIAMCINOLONE ACETONIDE 40 MG/ML
40 INJECTION, SUSPENSION INTRA-ARTICULAR; INTRAMUSCULAR
Status: DISCONTINUED | OUTPATIENT
Start: 2021-09-27 | End: 2021-09-27 | Stop reason: HOSPADM

## 2021-09-27 RX ADMIN — TRIAMCINOLONE ACETONIDE 40 MG: 40 INJECTION, SUSPENSION INTRA-ARTICULAR; INTRAMUSCULAR at 03:09

## 2021-10-01 ENCOUNTER — OFFICE VISIT (OUTPATIENT)
Dept: DERMATOLOGY | Facility: CLINIC | Age: 61
End: 2021-10-01
Payer: COMMERCIAL

## 2021-10-01 DIAGNOSIS — L81.4 SOLAR LENTIGO: ICD-10-CM

## 2021-10-01 DIAGNOSIS — L57.0 ACTINIC KERATOSES: ICD-10-CM

## 2021-10-01 DIAGNOSIS — D22.9 MULTIPLE BENIGN NEVI: ICD-10-CM

## 2021-10-01 DIAGNOSIS — L73.8 SEBACEOUS HYPERPLASIA OF FACE: ICD-10-CM

## 2021-10-01 DIAGNOSIS — L82.1 SEBORRHEIC KERATOSES: ICD-10-CM

## 2021-10-01 DIAGNOSIS — B35.6 TINEA CRURIS: Primary | ICD-10-CM

## 2021-10-01 PROCEDURE — 99214 PR OFFICE/OUTPT VISIT, EST, LEVL IV, 30-39 MIN: ICD-10-PCS | Mod: 25,S$GLB,, | Performed by: DERMATOLOGY

## 2021-10-01 PROCEDURE — 99999 PR PBB SHADOW E&M-EST. PATIENT-LVL III: ICD-10-PCS | Mod: PBBFAC,,, | Performed by: DERMATOLOGY

## 2021-10-01 PROCEDURE — 99999 PR PBB SHADOW E&M-EST. PATIENT-LVL III: CPT | Mod: PBBFAC,,, | Performed by: DERMATOLOGY

## 2021-10-01 PROCEDURE — 17000 DESTRUCT PREMALG LESION: CPT | Mod: S$GLB,,, | Performed by: DERMATOLOGY

## 2021-10-01 PROCEDURE — 3044F PR MOST RECENT HEMOGLOBIN A1C LEVEL <7.0%: ICD-10-PCS | Mod: CPTII,S$GLB,, | Performed by: DERMATOLOGY

## 2021-10-01 PROCEDURE — 1159F PR MEDICATION LIST DOCUMENTED IN MEDICAL RECORD: ICD-10-PCS | Mod: CPTII,S$GLB,, | Performed by: DERMATOLOGY

## 2021-10-01 PROCEDURE — 1160F RVW MEDS BY RX/DR IN RCRD: CPT | Mod: CPTII,S$GLB,, | Performed by: DERMATOLOGY

## 2021-10-01 PROCEDURE — 1160F PR REVIEW ALL MEDS BY PRESCRIBER/CLIN PHARMACIST DOCUMENTED: ICD-10-PCS | Mod: CPTII,S$GLB,, | Performed by: DERMATOLOGY

## 2021-10-01 PROCEDURE — 1159F MED LIST DOCD IN RCRD: CPT | Mod: CPTII,S$GLB,, | Performed by: DERMATOLOGY

## 2021-10-01 PROCEDURE — 3044F HG A1C LEVEL LT 7.0%: CPT | Mod: CPTII,S$GLB,, | Performed by: DERMATOLOGY

## 2021-10-01 PROCEDURE — 99214 OFFICE O/P EST MOD 30 MIN: CPT | Mod: 25,S$GLB,, | Performed by: DERMATOLOGY

## 2021-10-01 PROCEDURE — 17000 PR DESTRUCTION(LASER SURGERY,CRYOSURGERY,CHEMOSURGERY),PREMALIGNANT LESIONS,FIRST LESION: ICD-10-PCS | Mod: S$GLB,,, | Performed by: DERMATOLOGY

## 2021-10-01 RX ORDER — ECONAZOLE NITRATE 10 MG/G
CREAM TOPICAL
Qty: 85 G | Refills: 2 | Status: SHIPPED | OUTPATIENT
Start: 2021-10-01

## 2021-10-01 RX ORDER — TERBINAFINE HYDROCHLORIDE 250 MG/1
250 TABLET ORAL DAILY
Qty: 14 TABLET | Refills: 0 | Status: SHIPPED | OUTPATIENT
Start: 2021-10-01 | End: 2021-10-15

## 2021-10-02 ENCOUNTER — LAB VISIT (OUTPATIENT)
Dept: LAB | Facility: HOSPITAL | Age: 61
End: 2021-10-02
Attending: FAMILY MEDICINE
Payer: COMMERCIAL

## 2021-10-02 DIAGNOSIS — R73.9 HYPERGLYCEMIA: ICD-10-CM

## 2021-10-02 DIAGNOSIS — E55.9 VITAMIN D DEFICIENCY: ICD-10-CM

## 2021-10-02 DIAGNOSIS — E78.5 HYPERLIPIDEMIA, UNSPECIFIED HYPERLIPIDEMIA TYPE: ICD-10-CM

## 2021-10-02 LAB
25(OH)D3+25(OH)D2 SERPL-MCNC: 59 NG/ML (ref 30–96)
ALBUMIN SERPL BCP-MCNC: 3.8 G/DL (ref 3.5–5.2)
ALP SERPL-CCNC: 49 U/L (ref 55–135)
ALT SERPL W/O P-5'-P-CCNC: 24 U/L (ref 10–44)
ANION GAP SERPL CALC-SCNC: 12 MMOL/L (ref 8–16)
AST SERPL-CCNC: 15 U/L (ref 10–40)
BASOPHILS # BLD AUTO: 0.03 K/UL (ref 0–0.2)
BASOPHILS NFR BLD: 0.4 % (ref 0–1.9)
BILIRUB SERPL-MCNC: 0.6 MG/DL (ref 0.1–1)
BUN SERPL-MCNC: 20 MG/DL (ref 6–20)
CALCIUM SERPL-MCNC: 9.6 MG/DL (ref 8.7–10.5)
CHLORIDE SERPL-SCNC: 105 MMOL/L (ref 95–110)
CHOLEST SERPL-MCNC: 189 MG/DL (ref 120–199)
CHOLEST/HDLC SERPL: 3.5 {RATIO} (ref 2–5)
CO2 SERPL-SCNC: 23 MMOL/L (ref 23–29)
CREAT SERPL-MCNC: 0.8 MG/DL (ref 0.5–1.4)
DIFFERENTIAL METHOD: NORMAL
EOSINOPHIL # BLD AUTO: 0.2 K/UL (ref 0–0.5)
EOSINOPHIL NFR BLD: 2.4 % (ref 0–8)
ERYTHROCYTE [DISTWIDTH] IN BLOOD BY AUTOMATED COUNT: 13.4 % (ref 11.5–14.5)
EST. GFR  (AFRICAN AMERICAN): >60 ML/MIN/1.73 M^2
EST. GFR  (NON AFRICAN AMERICAN): >60 ML/MIN/1.73 M^2
ESTIMATED AVG GLUCOSE: 120 MG/DL (ref 68–131)
GLUCOSE SERPL-MCNC: 97 MG/DL (ref 70–110)
HBA1C MFR BLD: 5.8 % (ref 4–5.6)
HCT VFR BLD AUTO: 42.1 % (ref 37–48.5)
HDLC SERPL-MCNC: 54 MG/DL (ref 40–75)
HDLC SERPL: 28.6 % (ref 20–50)
HGB BLD-MCNC: 13.6 G/DL (ref 12–16)
IMM GRANULOCYTES # BLD AUTO: 0.02 K/UL (ref 0–0.04)
IMM GRANULOCYTES NFR BLD AUTO: 0.2 % (ref 0–0.5)
LDLC SERPL CALC-MCNC: 119.2 MG/DL (ref 63–159)
LYMPHOCYTES # BLD AUTO: 2.7 K/UL (ref 1–4.8)
LYMPHOCYTES NFR BLD: 33.2 % (ref 18–48)
MCH RBC QN AUTO: 28.8 PG (ref 27–31)
MCHC RBC AUTO-ENTMCNC: 32.3 G/DL (ref 32–36)
MCV RBC AUTO: 89 FL (ref 82–98)
MONOCYTES # BLD AUTO: 0.5 K/UL (ref 0.3–1)
MONOCYTES NFR BLD: 6.2 % (ref 4–15)
NEUTROPHILS # BLD AUTO: 4.6 K/UL (ref 1.8–7.7)
NEUTROPHILS NFR BLD: 57.6 % (ref 38–73)
NONHDLC SERPL-MCNC: 135 MG/DL
NRBC BLD-RTO: 0 /100 WBC
PLATELET # BLD AUTO: 301 K/UL (ref 150–450)
PMV BLD AUTO: 9.7 FL (ref 9.2–12.9)
POTASSIUM SERPL-SCNC: 4.3 MMOL/L (ref 3.5–5.1)
PROT SERPL-MCNC: 7.2 G/DL (ref 6–8.4)
RBC # BLD AUTO: 4.72 M/UL (ref 4–5.4)
SODIUM SERPL-SCNC: 140 MMOL/L (ref 136–145)
TRIGL SERPL-MCNC: 79 MG/DL (ref 30–150)
WBC # BLD AUTO: 8.01 K/UL (ref 3.9–12.7)

## 2021-10-02 PROCEDURE — 82306 VITAMIN D 25 HYDROXY: CPT | Performed by: FAMILY MEDICINE

## 2021-10-02 PROCEDURE — 80061 LIPID PANEL: CPT | Performed by: FAMILY MEDICINE

## 2021-10-02 PROCEDURE — 85025 COMPLETE CBC W/AUTO DIFF WBC: CPT | Performed by: FAMILY MEDICINE

## 2021-10-02 PROCEDURE — 80053 COMPREHEN METABOLIC PANEL: CPT | Performed by: FAMILY MEDICINE

## 2021-10-02 PROCEDURE — 83036 HEMOGLOBIN GLYCOSYLATED A1C: CPT | Performed by: FAMILY MEDICINE

## 2021-10-02 PROCEDURE — 36415 COLL VENOUS BLD VENIPUNCTURE: CPT | Mod: PO | Performed by: FAMILY MEDICINE

## 2021-10-03 RX ORDER — MOMETASONE FUROATE 50 UG/1
2 SPRAY, METERED NASAL DAILY PRN
Qty: 17 G | Status: SHIPPED | OUTPATIENT
Start: 2021-10-03 | End: 2022-10-04 | Stop reason: SDUPTHER

## 2021-10-05 ENCOUNTER — OFFICE VISIT (OUTPATIENT)
Dept: FAMILY MEDICINE | Facility: CLINIC | Age: 61
End: 2021-10-05
Payer: COMMERCIAL

## 2021-10-05 VITALS
HEART RATE: 62 BPM | SYSTOLIC BLOOD PRESSURE: 130 MMHG | DIASTOLIC BLOOD PRESSURE: 78 MMHG | OXYGEN SATURATION: 98 % | HEIGHT: 66 IN | WEIGHT: 239.44 LBS | TEMPERATURE: 98 F | BODY MASS INDEX: 38.48 KG/M2

## 2021-10-05 DIAGNOSIS — E78.5 HYPERLIPIDEMIA, UNSPECIFIED HYPERLIPIDEMIA TYPE: ICD-10-CM

## 2021-10-05 DIAGNOSIS — K21.9 GASTROESOPHAGEAL REFLUX DISEASE, UNSPECIFIED WHETHER ESOPHAGITIS PRESENT: ICD-10-CM

## 2021-10-05 DIAGNOSIS — R73.9 HYPERGLYCEMIA: ICD-10-CM

## 2021-10-05 DIAGNOSIS — E55.9 VITAMIN D DEFICIENCY: ICD-10-CM

## 2021-10-05 DIAGNOSIS — I10 GOOD HYPERTENSION CONTROL: Primary | ICD-10-CM

## 2021-10-05 DIAGNOSIS — E66.01 SEVERE OBESITY (BMI 35.0-39.9) WITH COMORBIDITY: ICD-10-CM

## 2021-10-05 PROCEDURE — 1159F MED LIST DOCD IN RCRD: CPT | Mod: CPTII,S$GLB,, | Performed by: FAMILY MEDICINE

## 2021-10-05 PROCEDURE — 3078F DIAST BP <80 MM HG: CPT | Mod: CPTII,S$GLB,, | Performed by: FAMILY MEDICINE

## 2021-10-05 PROCEDURE — 99214 OFFICE O/P EST MOD 30 MIN: CPT | Mod: S$GLB,,, | Performed by: FAMILY MEDICINE

## 2021-10-05 PROCEDURE — 99999 PR PBB SHADOW E&M-EST. PATIENT-LVL III: ICD-10-PCS | Mod: PBBFAC,,, | Performed by: FAMILY MEDICINE

## 2021-10-05 PROCEDURE — 3008F PR BODY MASS INDEX (BMI) DOCUMENTED: ICD-10-PCS | Mod: CPTII,S$GLB,, | Performed by: FAMILY MEDICINE

## 2021-10-05 PROCEDURE — 3075F PR MOST RECENT SYSTOLIC BLOOD PRESS GE 130-139MM HG: ICD-10-PCS | Mod: CPTII,S$GLB,, | Performed by: FAMILY MEDICINE

## 2021-10-05 PROCEDURE — 3044F HG A1C LEVEL LT 7.0%: CPT | Mod: CPTII,S$GLB,, | Performed by: FAMILY MEDICINE

## 2021-10-05 PROCEDURE — 3008F BODY MASS INDEX DOCD: CPT | Mod: CPTII,S$GLB,, | Performed by: FAMILY MEDICINE

## 2021-10-05 PROCEDURE — 3075F SYST BP GE 130 - 139MM HG: CPT | Mod: CPTII,S$GLB,, | Performed by: FAMILY MEDICINE

## 2021-10-05 PROCEDURE — 99999 PR PBB SHADOW E&M-EST. PATIENT-LVL III: CPT | Mod: PBBFAC,,, | Performed by: FAMILY MEDICINE

## 2021-10-05 PROCEDURE — 1160F RVW MEDS BY RX/DR IN RCRD: CPT | Mod: CPTII,S$GLB,, | Performed by: FAMILY MEDICINE

## 2021-10-05 PROCEDURE — 3044F PR MOST RECENT HEMOGLOBIN A1C LEVEL <7.0%: ICD-10-PCS | Mod: CPTII,S$GLB,, | Performed by: FAMILY MEDICINE

## 2021-10-05 PROCEDURE — 1160F PR REVIEW ALL MEDS BY PRESCRIBER/CLIN PHARMACIST DOCUMENTED: ICD-10-PCS | Mod: CPTII,S$GLB,, | Performed by: FAMILY MEDICINE

## 2021-10-05 PROCEDURE — 3078F PR MOST RECENT DIASTOLIC BLOOD PRESSURE < 80 MM HG: ICD-10-PCS | Mod: CPTII,S$GLB,, | Performed by: FAMILY MEDICINE

## 2021-10-05 PROCEDURE — 1159F PR MEDICATION LIST DOCUMENTED IN MEDICAL RECORD: ICD-10-PCS | Mod: CPTII,S$GLB,, | Performed by: FAMILY MEDICINE

## 2021-10-05 PROCEDURE — 99214 PR OFFICE/OUTPT VISIT, EST, LEVL IV, 30-39 MIN: ICD-10-PCS | Mod: S$GLB,,, | Performed by: FAMILY MEDICINE

## 2021-10-28 ENCOUNTER — IMMUNIZATION (OUTPATIENT)
Dept: PRIMARY CARE CLINIC | Facility: CLINIC | Age: 61
End: 2021-10-28
Payer: COMMERCIAL

## 2021-10-28 DIAGNOSIS — Z23 NEED FOR VACCINATION: Primary | ICD-10-CM

## 2021-10-28 PROCEDURE — 91300 COVID-19, MRNA, LNP-S, PF, 30 MCG/0.3 ML DOSE VACCINE: ICD-10-PCS | Mod: S$GLB,,, | Performed by: FAMILY MEDICINE

## 2021-10-28 PROCEDURE — 0003A COVID-19, MRNA, LNP-S, PF, 30 MCG/0.3 ML DOSE VACCINE: CPT | Mod: S$GLB,,, | Performed by: FAMILY MEDICINE

## 2021-10-28 PROCEDURE — 0003A COVID-19, MRNA, LNP-S, PF, 30 MCG/0.3 ML DOSE VACCINE: ICD-10-PCS | Mod: S$GLB,,, | Performed by: FAMILY MEDICINE

## 2021-10-28 PROCEDURE — 91300 COVID-19, MRNA, LNP-S, PF, 30 MCG/0.3 ML DOSE VACCINE: CPT | Mod: S$GLB,,, | Performed by: FAMILY MEDICINE

## 2021-11-22 ENCOUNTER — OFFICE VISIT (OUTPATIENT)
Dept: DERMATOLOGY | Facility: CLINIC | Age: 61
End: 2021-11-22
Payer: COMMERCIAL

## 2021-11-22 DIAGNOSIS — L91.8 INFLAMED SKIN TAG: Primary | ICD-10-CM

## 2021-11-22 PROCEDURE — 99499 NO LOS: ICD-10-PCS | Mod: S$GLB,,, | Performed by: DERMATOLOGY

## 2021-11-22 PROCEDURE — 99999 PR PBB SHADOW E&M-EST. PATIENT-LVL III: CPT | Mod: PBBFAC,,, | Performed by: DERMATOLOGY

## 2021-11-22 PROCEDURE — 17110 DESTRUCTION B9 LES UP TO 14: CPT | Mod: S$GLB,,, | Performed by: DERMATOLOGY

## 2021-11-22 PROCEDURE — 17110 PR DESTRUCTION BENIGN LESIONS UP TO 14: ICD-10-PCS | Mod: S$GLB,,, | Performed by: DERMATOLOGY

## 2021-11-22 PROCEDURE — 99999 PR PBB SHADOW E&M-EST. PATIENT-LVL III: ICD-10-PCS | Mod: PBBFAC,,, | Performed by: DERMATOLOGY

## 2021-11-22 PROCEDURE — 99499 UNLISTED E&M SERVICE: CPT | Mod: S$GLB,,, | Performed by: DERMATOLOGY

## 2022-03-30 ENCOUNTER — PATIENT MESSAGE (OUTPATIENT)
Dept: FAMILY MEDICINE | Facility: CLINIC | Age: 62
End: 2022-03-30
Payer: COMMERCIAL

## 2022-03-30 NOTE — TELEPHONE ENCOUNTER
No new care gaps identified.  Powered by [employer]. Reference number: [id number].   3/30/2022 3:47:10 PM CDT   Detail Level: Detailed Wound Evaluated By: Los Martinez MD and Alicia [facility] Additional Comments: Advised patient that he could do filler, laser or do another graft. he understands his options and would like to think about it.

## 2022-04-04 RX ORDER — RABEPRAZOLE SODIUM 20 MG/1
20 TABLET, DELAYED RELEASE ORAL DAILY
Qty: 90 TABLET | Refills: 3 | Status: SHIPPED | OUTPATIENT
Start: 2022-04-04 | End: 2023-08-02

## 2022-04-11 ENCOUNTER — OFFICE VISIT (OUTPATIENT)
Dept: DERMATOLOGY | Facility: CLINIC | Age: 62
End: 2022-04-11
Payer: COMMERCIAL

## 2022-04-11 VITALS — HEIGHT: 66 IN | BODY MASS INDEX: 38.41 KG/M2 | WEIGHT: 239 LBS

## 2022-04-11 DIAGNOSIS — L57.0 ACTINIC KERATOSES: Primary | ICD-10-CM

## 2022-04-11 DIAGNOSIS — L82.1 SEBORRHEIC KERATOSES: ICD-10-CM

## 2022-04-11 PROCEDURE — 1160F RVW MEDS BY RX/DR IN RCRD: CPT | Mod: CPTII,S$GLB,, | Performed by: DERMATOLOGY

## 2022-04-11 PROCEDURE — 1159F PR MEDICATION LIST DOCUMENTED IN MEDICAL RECORD: ICD-10-PCS | Mod: CPTII,S$GLB,, | Performed by: DERMATOLOGY

## 2022-04-11 PROCEDURE — 99999 PR PBB SHADOW E&M-EST. PATIENT-LVL IV: CPT | Mod: PBBFAC,,, | Performed by: DERMATOLOGY

## 2022-04-11 PROCEDURE — 1159F MED LIST DOCD IN RCRD: CPT | Mod: CPTII,S$GLB,, | Performed by: DERMATOLOGY

## 2022-04-11 PROCEDURE — 99212 PR OFFICE/OUTPT VISIT, EST, LEVL II, 10-19 MIN: ICD-10-PCS | Mod: S$GLB,,, | Performed by: DERMATOLOGY

## 2022-04-11 PROCEDURE — 1160F PR REVIEW ALL MEDS BY PRESCRIBER/CLIN PHARMACIST DOCUMENTED: ICD-10-PCS | Mod: CPTII,S$GLB,, | Performed by: DERMATOLOGY

## 2022-04-11 PROCEDURE — 99212 OFFICE O/P EST SF 10 MIN: CPT | Mod: S$GLB,,, | Performed by: DERMATOLOGY

## 2022-04-11 PROCEDURE — 3008F PR BODY MASS INDEX (BMI) DOCUMENTED: ICD-10-PCS | Mod: CPTII,S$GLB,, | Performed by: DERMATOLOGY

## 2022-04-11 PROCEDURE — 99999 PR PBB SHADOW E&M-EST. PATIENT-LVL IV: ICD-10-PCS | Mod: PBBFAC,,, | Performed by: DERMATOLOGY

## 2022-04-11 PROCEDURE — 3008F BODY MASS INDEX DOCD: CPT | Mod: CPTII,S$GLB,, | Performed by: DERMATOLOGY

## 2022-04-11 NOTE — PROGRESS NOTES
Subjective:       Patient ID:  Mercedes Laura is a 62 y.o. female who presents for   Chief Complaint   Patient presents with    Spot     Recheck spot on nose     LOV-11/22/21- inflammed skin tag, tinea cruris  Treated with topical and PO antifungals, cleared    Here today with c/o spot on L inner thigh    C/o spot on nose   Frozen in October ([place])   Would like re checked     Has no hx of NMSC  Has fx of MM      Current Outpatient Medications:     acyclovir 5% (ZOVIRAX) 5 % Crea, Apply topically 5 (five) times daily., Disp: 5 g, Rfl: prn    b complex vitamins capsule, Take 1 capsule by mouth once daily., Disp: , Rfl:     biotin 10 mg Tab, Take 100 mg by mouth once daily. , Disp: , Rfl:     calcium carbonate (TUMS) 200 mg calcium (500 mg) chewable tablet, Take 1 tablet by mouth daily as needed for Heartburn., Disp: , Rfl:     cetirizine (ZYRTEC) 10 MG tablet, Take 10 mg by mouth daily as needed for Allergies or Rhinitis. , Disp: , Rfl:     econazole nitrate 1 % cream, Apply to inner thighs and buttocks BID PRN flare, Disp: 85 g, Rfl: 2    enzymes,digestive (DIGESTIVE ENZYMES ORAL), Take by mouth., Disp: , Rfl:     estradioL (ESTRACE) 0.01 % (0.1 mg/gram) vaginal cream, Place 1 g vaginally every Monday and Thursday., Disp: 42.5 g, Rfl: 1    lactobacillus combination no.4 (PROBIOTIC) 3 billion cell Cap, Take 1 application by mouth 2 (two) times a day., Disp: , Rfl:     Lactobacillus rhamnosus GG (CULTURELLE) 10 billion cell capsule, Take 1 capsule by mouth once daily., Disp: , Rfl:     loratadine (CLARITIN) 10 mg tablet, Take 10 mg by mouth daily as needed for Allergies., Disp: , Rfl:     mometasone (NASONEX) 50 mcg/actuation nasal spray, 2 sprays by Nasal route daily as needed (rhinitis)., Disp: 17 g, Rfl: prn    multivitamin (THERAGRAN) per tablet, Take 1 tablet by mouth once daily., Disp: , Rfl:     olmesartan-hydrochlorothiazide (BENICAR HCT) 40-12.5 mg Tab, TAKE 1 TABLET DAILY, Disp: 90 tablet, Rfl:  3    potassium chloride (MICRO-K) 10 MEQ CpSR, TAKE 1 CAPSULE DAILY, Disp: 90 capsule, Rfl: 3    RABEprazole (ACIPHEX) 20 mg tablet, TAKE ONE TABLET BY MOUTH ONCE DAILY, Disp: 90 tablet, Rfl: 1    RABEprazole (ACIPHEX) 20 mg tablet, Take 1 tablet (20 mg total) by mouth once daily., Disp: 90 tablet, Rfl: 3    tumeric-ging-olive-oreg-capryl 100 mg-150 mg- 50 mg-150 mg Cap, Take by mouth., Disp: , Rfl:     vitamin D (VITAMIN D3) 1000 units Tab, Take 2 Units by mouth once daily. , Disp: , Rfl:         Review of Systems   Constitutional: Negative for fever, chills and fatigue.   Respiratory: Negative for cough and shortness of breath.    Skin: Positive for dry skin, daily sunscreen use and activity-related sunscreen use. Negative for itching and rash.        Objective:    Physical Exam   Constitutional: She appears well-developed and well-nourished. No distress.   Neurological: She is alert and oriented to person, place, and time. She is not disoriented.   Psychiatric: She has a normal mood and affect.   Skin:   Areas Examined (abnormalities noted in diagram):   Head / Face Inspection Performed                           Diagram Legend     Erythematous scaling macule/papule c/w actinic keratosis       Vascular papule c/w angioma      Pigmented verrucoid papule/plaque c/w seborrheic keratosis      Yellow umbilicated papule c/w sebaceous hyperplasia      Irregularly shaped tan macule c/w lentigo     1-2 mm smooth white papules consistent with Milia      Movable subcutaneous cyst with punctum c/w epidermal inclusion cyst      Subcutaneous movable cyst c/w pilar cyst      Firm pink to brown papule c/w dermatofibroma      Pedunculated fleshy papule(s) c/w skin tag(s)      Evenly pigmented macule c/w junctional nevus     Mildly variegated pigmented, slightly irregular-bordered macule c/w mildly atypical nevus      Flesh colored to evenly pigmented papule c/w intradermal nevus       Pink pearly papule/plaque c/w basal cell  carcinoma      Erythematous hyperkeratotic cursted plaque c/w SCC      Surgical scar with no sign of skin cancer recurrence      Open and closed comedones      Inflammatory papules and pustules      Verrucoid papule consistent consistent with wart     Erythematous eczematous patches and plaques     Dystrophic onycholytic nail with subungual debris c/w onychomycosis     Umbilicated papule    Erythematous-base heme-crusted tan verrucoid plaque consistent with inflamed seborrheic keratosis     Erythematous Silvery Scaling Plaque c/w Psoriasis     See annotation      Assessment / Plan:        Actinic keratoses  Nasal tip  Resolved post cryo    Seborrheic keratoses  These are benign inherited growths without a malignant potential. Reassurance given to patient. No treatment is necessary.     Patient instructed in importance in daily broad spectrum sun protection of at least spf 30. Mineral sunscreen ingredients preferred (Zinc +/- Titanium) and can be found OTC.   Recommend Elta MD for daily use on face and neck.  Patient encouraged to wear hat for all outdoor exposure.   Also discussed sun avoidance and use of protective clothing.               No follow-ups on file.

## 2022-06-24 DIAGNOSIS — I10 GOOD HYPERTENSION CONTROL: ICD-10-CM

## 2022-06-24 DIAGNOSIS — I10 ESSENTIAL HYPERTENSION: ICD-10-CM

## 2022-06-24 RX ORDER — OLMESARTAN MEDOXOMIL AND HYDROCHLOROTHIAZIDE 40/12.5 40; 12.5 MG/1; MG/1
TABLET ORAL
Qty: 90 TABLET | Refills: 1 | Status: SHIPPED | OUTPATIENT
Start: 2022-06-24 | End: 2022-10-04 | Stop reason: SDUPTHER

## 2022-06-24 RX ORDER — POTASSIUM CHLORIDE 750 MG/1
CAPSULE, EXTENDED RELEASE ORAL
Qty: 90 CAPSULE | Refills: 1 | Status: SHIPPED | OUTPATIENT
Start: 2022-06-24 | End: 2022-10-04 | Stop reason: SDUPTHER

## 2022-06-24 NOTE — TELEPHONE ENCOUNTER
Refill Decision Note   Mercedes Laura  is requesting a refill authorization.  Brief Assessment and Rationale for Refill:  Approve     Medication Therapy Plan:       Medication Reconciliation Completed: No   Comments:     No Care Gaps recommended.     Note composed:4:52 PM 06/24/2022

## 2022-06-24 NOTE — TELEPHONE ENCOUNTER
No new care gaps identified.  [facility] Embedded Care Gaps. Reference number: [id number]. 6/24/2022   2:35:04 PM CDT

## 2022-07-10 ENCOUNTER — PATIENT MESSAGE (OUTPATIENT)
Dept: FAMILY MEDICINE | Facility: CLINIC | Age: 62
End: 2022-07-10
Payer: COMMERCIAL

## 2022-07-10 DIAGNOSIS — E55.9 VITAMIN D DEFICIENCY: ICD-10-CM

## 2022-07-10 DIAGNOSIS — R73.9 HYPERGLYCEMIA: ICD-10-CM

## 2022-07-10 DIAGNOSIS — E78.5 HYPERLIPIDEMIA, UNSPECIFIED HYPERLIPIDEMIA TYPE: Primary | ICD-10-CM

## 2022-07-13 DIAGNOSIS — Z12.31 OTHER SCREENING MAMMOGRAM: ICD-10-CM

## 2022-07-14 ENCOUNTER — PATIENT MESSAGE (OUTPATIENT)
Dept: FAMILY MEDICINE | Facility: CLINIC | Age: 62
End: 2022-07-14
Payer: COMMERCIAL

## 2022-07-18 ENCOUNTER — PATIENT MESSAGE (OUTPATIENT)
Dept: ADMINISTRATIVE | Facility: HOSPITAL | Age: 62
End: 2022-07-18
Payer: COMMERCIAL

## 2022-07-28 ENCOUNTER — HOSPITAL ENCOUNTER (OUTPATIENT)
Dept: RADIOLOGY | Facility: HOSPITAL | Age: 62
Discharge: HOME | End: 2022-07-28
Attending: FAMILY MEDICINE
Payer: COMMERCIAL

## 2022-07-28 DIAGNOSIS — Z12.31 OTHER SCREENING MAMMOGRAM: ICD-10-CM

## 2022-07-28 PROCEDURE — 77063 MAMMO DIGITAL SCREENING BILAT WITH TOMO: ICD-10-PCS | Mod: 26,,, | Performed by: RADIOLOGY

## 2022-07-28 PROCEDURE — 77067 SCR MAMMO BI INCL CAD: CPT | Mod: TC

## 2022-07-28 PROCEDURE — 77063 BREAST TOMOSYNTHESIS BI: CPT | Mod: 26,,, | Performed by: RADIOLOGY

## 2022-07-28 PROCEDURE — 77067 SCR MAMMO BI INCL CAD: CPT | Mod: 26,,, | Performed by: RADIOLOGY

## 2022-07-28 PROCEDURE — 77067 MAMMO DIGITAL SCREENING BILAT WITH TOMO: ICD-10-PCS | Mod: 26,,, | Performed by: RADIOLOGY

## 2022-07-28 PROCEDURE — 77063 BREAST TOMOSYNTHESIS BI: CPT | Mod: TC

## 2022-08-03 ENCOUNTER — OFFICE VISIT (OUTPATIENT)
Dept: ORTHOPEDICS | Facility: CLINIC | Age: 62
End: 2022-08-03
Payer: COMMERCIAL

## 2022-08-03 DIAGNOSIS — M17.10 ARTHRITIS OF KNEE: Primary | ICD-10-CM

## 2022-08-03 DIAGNOSIS — M25.562 PAIN IN BOTH KNEES, UNSPECIFIED CHRONICITY: ICD-10-CM

## 2022-08-03 DIAGNOSIS — M25.561 PAIN IN BOTH KNEES, UNSPECIFIED CHRONICITY: ICD-10-CM

## 2022-08-03 PROCEDURE — 99999 PR PBB SHADOW E&M-EST. PATIENT-LVL II: ICD-10-PCS | Mod: PBBFAC,,, | Performed by: ORTHOPAEDIC SURGERY

## 2022-08-03 PROCEDURE — 1160F RVW MEDS BY RX/DR IN RCRD: CPT | Mod: CPTII,S$GLB,, | Performed by: ORTHOPAEDIC SURGERY

## 2022-08-03 PROCEDURE — 99213 OFFICE O/P EST LOW 20 MIN: CPT | Mod: 25,S$GLB,, | Performed by: ORTHOPAEDIC SURGERY

## 2022-08-03 PROCEDURE — 99999 PR PBB SHADOW E&M-EST. PATIENT-LVL II: CPT | Mod: PBBFAC,,, | Performed by: ORTHOPAEDIC SURGERY

## 2022-08-03 PROCEDURE — 1159F PR MEDICATION LIST DOCUMENTED IN MEDICAL RECORD: ICD-10-PCS | Mod: CPTII,S$GLB,, | Performed by: ORTHOPAEDIC SURGERY

## 2022-08-03 PROCEDURE — 20610 DRAIN/INJ JOINT/BURSA W/O US: CPT | Mod: 50,S$GLB,, | Performed by: ORTHOPAEDIC SURGERY

## 2022-08-03 PROCEDURE — 99213 PR OFFICE/OUTPT VISIT, EST, LEVL III, 20-29 MIN: ICD-10-PCS | Mod: 25,S$GLB,, | Performed by: ORTHOPAEDIC SURGERY

## 2022-08-03 PROCEDURE — 1159F MED LIST DOCD IN RCRD: CPT | Mod: CPTII,S$GLB,, | Performed by: ORTHOPAEDIC SURGERY

## 2022-08-03 PROCEDURE — 1160F PR REVIEW ALL MEDS BY PRESCRIBER/CLIN PHARMACIST DOCUMENTED: ICD-10-PCS | Mod: CPTII,S$GLB,, | Performed by: ORTHOPAEDIC SURGERY

## 2022-08-03 PROCEDURE — 20610 LARGE JOINT ASPIRATION/INJECTION: BILATERAL KNEE: ICD-10-PCS | Mod: 50,S$GLB,, | Performed by: ORTHOPAEDIC SURGERY

## 2022-08-03 RX ORDER — TRIAMCINOLONE ACETONIDE 40 MG/ML
40 INJECTION, SUSPENSION INTRA-ARTICULAR; INTRAMUSCULAR
Status: DISCONTINUED | OUTPATIENT
Start: 2022-08-03 | End: 2022-08-03 | Stop reason: HOSPADM

## 2022-08-03 RX ADMIN — TRIAMCINOLONE ACETONIDE 40 MG: 40 INJECTION, SUSPENSION INTRA-ARTICULAR; INTRAMUSCULAR at 03:08

## 2022-08-03 NOTE — PROGRESS NOTES
Past Medical History:   Diagnosis Date    Anxiety     this came on since lap kadeem sgy    Arthritis     Colon polyp     Deviated septum     GERD (gastroesophageal reflux disease)     Hypertension     PAC (premature atrial contraction)     Plantar fasciitis     PMB (postmenopausal bleeding) 05/2018    Seasonal allergies     Sinus disorder     Uterine leiomyoma 05/2018    Varicose veins of both lower extremities        Past Surgical History:   Procedure Laterality Date    BREAST BIOPSY Left 2015    benign    CHOLECYSTECTOMY  6-1-2015    Dr Ashraf    COLONOSCOPY N/A 11/23/2015    Procedure: COLONOSCOPY;  Surgeon: Theo Stinson MD;  Location: [facility] ENDO;  Service: Endoscopy;  Laterality: N/A; repeat in 5 years for surveillance    COLONOSCOPY N/A 7/14/2021    Procedure: COLONOSCOPY;  Surgeon: Theo Stinson MD;  Location: [facility] ENDO;  Service: Endoscopy;  Laterality: N/A;    CYSTOSCOPY N/A 6/7/2018    Procedure: CYSTOSCOPY;  Surgeon: Anaya Caldera MD;  Location: [facility] OR;  Service: OB/GYN;  Laterality: N/A;    ESOPHAGOGASTRODUODENOSCOPY N/A 3/4/2019    Procedure: EGD (ESOPHAGOGASTRODUODENOSCOPY);  Surgeon: Theo Stinson MD;  Location: [facility];  Service: Endoscopy;  Laterality: N/A;    HYSTERECTOMY      KNEE ARTHROSCOPY W/ MENISCECTOMY  11/15/2000    right     LAPAROSCOPIC SALPINGO-OOPHORECTOMY Bilateral 6/7/2018    Procedure: [id number];  Surgeon: Anaya Caldera MD;  Location: [facility] OR;  Service: OB/GYN;  Laterality: Bilateral;    LAPAROSCOPIC TOTAL HYSTERECTOMY N/A 6/7/2018    Procedure: HYSTERECTOMY-TOTAL LAPAROSCOPIC (TLH);  Surgeon: Anaya Caldera MD;  Location: [facility] OR;  Service: OB/GYN;  Laterality: N/A;    OOPHORECTOMY      UPPER GASTROINTESTINAL ENDOSCOPY  06/22/2015    Dr. Stinson       Current Outpatient Medications   Medication Sig    acyclovir 5% (ZOVIRAX) 5 % Crea Apply topically 5 (five) times daily.    b complex vitamins  capsule Take 1 capsule by mouth once daily.    biotin 10 mg Tab Take 100 mg by mouth once daily.     calcium carbonate (TUMS) 200 mg calcium (500 mg) chewable tablet Take 1 tablet by mouth daily as needed for Heartburn.    cetirizine (ZYRTEC) 10 MG tablet Take 10 mg by mouth daily as needed for Allergies or Rhinitis.     econazole nitrate 1 % cream Apply to inner thighs and buttocks BID PRN flare    enzymes,digestive (DIGESTIVE ENZYMES ORAL) Take by mouth.    estradioL (ESTRACE) 0.01 % (0.1 mg/gram) vaginal cream Place 1 g vaginally every Monday and Thursday.    lactobacillus combination no.4 (PROBIOTIC) 3 billion cell Cap Take 1 application by mouth 2 (two) times a day.    Lactobacillus rhamnosus GG (CULTURELLE) 10 billion cell capsule Take 1 capsule by mouth once daily.    loratadine (CLARITIN) 10 mg tablet Take 10 mg by mouth daily as needed for Allergies.    mometasone (NASONEX) 50 mcg/actuation nasal spray 2 sprays by Nasal route daily as needed (rhinitis).    multivitamin (THERAGRAN) per tablet Take 1 tablet by mouth once daily.    olmesartan-hydrochlorothiazide (BENICAR HCT) 40-12.5 mg Tab TAKE 1 TABLET DAILY    potassium chloride (MICRO-K) 10 MEQ CpSR TAKE 1 CAPSULE DAILY    RABEprazole (ACIPHEX) 20 mg tablet TAKE ONE TABLET BY MOUTH ONCE DAILY    RABEprazole (ACIPHEX) 20 mg tablet Take 1 tablet (20 mg total) by mouth once daily.    tumeric-ging-olive-oreg-capryl 100 mg-150 mg- 50 mg-150 mg Cap Take by mouth.    vitamin D (VITAMIN D3) 1000 units Tab Take 2 Units by mouth once daily.      No current facility-administered medications for this visit.       Review of patient's allergies indicates:   Allergen Reactions    Augmentin [amoxicillin-pot clavulanate] Nausea And Vomiting     Gi upset    Bentyl [dicyclomine] Other (See Comments)     Worsening anxiety    Naprosyn [naproxen]     Omeprazole      Other reaction(s): ABDOMINAL PAIN    Adhesive Rash    Lexapro [escitalopram oxalate]  Anxiety     Bad dreams, feeling of doom, sweats    Sulfa (sulfonamide antibiotics) Rash     Other reaction(s): measle like rash       Family History   Problem Relation Age of Onset    Hypertension Mother     Arthritis Mother     Hypertension Father     Cancer Father         melanoma    Melanoma Father     COPD Father     Heart disease Father         chf    Diverticulitis Maternal Grandmother     Arthritis Maternal Aunt     Diabetes Maternal Aunt     Hypertension Maternal Aunt     Arthritis Maternal Uncle     Breast cancer Neg Hx     Ovarian cancer Neg Hx     Psoriasis Neg Hx     Lupus Neg Hx     Eczema Neg Hx     Colon cancer Neg Hx     Colon polyps Neg Hx     Crohn's disease Neg Hx     Esophageal cancer Neg Hx     Stomach cancer Neg Hx        Social History     Socioeconomic History    Marital status:    Tobacco Use    Smoking status: Never Smoker    Smokeless tobacco: Never Used   Substance and Sexual Activity    Alcohol use: Yes     Comment: rarely / few times per yr    Drug use: No    Sexual activity: Yes     Partners: Male     Birth control/protection: None, Post-menopausal       Chief Complaint:   No chief complaint on file.      History of present illness: Is a 62-year-old female seen in consultation for Dr. Llamas.  Patient's had knee pain for several years now.  Symptoms are stable and mild to moderate.  Pain with running and squatting.  Patient had prior right meniscus surgery several years ago.   Pain is laterally located.  She has been in bio skin brace is previously.  Right knee is worse than left.  Her last injection was back in 2021.  Worse over the last few months.      Answers for HPI/ROS submitted by the patient on 4/22/2018   Leg pain  unexpected weight change: No  appetite change : No  sleep disturbance: No  IMMUNOCOMPROMISED: No  nervous/ anxious: No  dysphoric mood: No  rash: No  visual disturbance: No  eye redness: No  eye pain: No  ear pain: No  tinnitus:  No  hearing loss: No  sinus pressure : No  nosebleeds: No  enviro allergies: No  food allergies: No  cough: No  shortness of breath: No  sweating: No  dysuria: No  frequency: No  difficulty urinating: No  hematuria: No  painful intercourse: No  chest pain: No  palpitations: Yes  nausea: No  vomiting: No  diarrhea: No  blood in stool: No  constipation: No  headaches: No  numbness: No  seizures: No  joint swelling: No  myalgia: Yes  weakness: No  back pain: No  Pain Chronicity: recurrent  History of trauma: Yes  Onset: more than 1 year ago  Frequency: intermittently  Progression since onset: unchanged  injury location: at home  pain- numeric: 1/10  pain location: left knee, right knee  pain quality: aching, dull  Radiating Pain: No  Aggravating factors: activity, bending, cold, exercise  fever: No  inability to bear weight: No  itching: No  joint locking: No  limited range of motion: No  stiffness: No  tingling: No  Treatments tried: cold, heat, exercise, movement, OTC pain meds, rest  physical therapy: not tried  Improvement on treatment: moderate        Physical Examination:    Vital Signs:    There were no vitals filed for this visit.    There is no height or weight on file to calculate BMI.    This a well-developed, well nourished patient in no acute distress.  They are alert and oriented and cooperative to examination.  Pt. walks without an antalgic gait.      Examination of bilateral knees shows no rashes or erythema. There are no masses ecchymosis or effusion. Patient has full range of motion from 0-130°. Patient is moderately tender to palpation over lateral joint line and nontender to palpation over the medial joint line. Patient has a - Lachman exam, - anterior drawer exam, and - posterior drawer exam. - Lata's exam. Knee is stable to varus and valgus stress. 5 out of 5 motor strength. Palpable distal pulses. Intact light touch sensation. Negative Patellofemoral crepitus      X-rays: X-rays of the right  knee is reviewed which show severe valgus narrowing of both knees with the right knee slightly worse.       Assessment:: Bilateral valgus knee arthritis    Plan:  I reviewed the findings with her today.  We talked about knee replacements.  We talked in detail about possibly doing her left knee this summer.  We talked about possibly doing Iovera preoperatively as well as possible nerve ablation on that foot as well around the time of surgery.  Patient elected to just try cortisone injections today.  Follow-up in March to start surgical scheduling          This note was created using  voice recognition software that occasionally misinterpreted phrases or words.    Consult note is delivered via Epic messaging service.

## 2022-08-03 NOTE — PROCEDURES
Large Joint Aspiration/Injection: bilateral knee    Date/Time: 8/3/2022 3:45 PM  Performed by: Chu Whitman MD  Authorized by: Chu Whitman MD     Consent Done?:  Yes (Verbal)  Indications:  Pain  Site marked: the procedure site was marked    Timeout: prior to procedure the correct patient, procedure, and site was verified    Prep: patient was prepped and draped in usual sterile fashion      Local anesthesia used?: Yes    Anesthesia:  Local infiltration  Local anesthetic:  Bupivacaine 0.25% without epinephrine and lidocaine 2% without epinephrine  Anesthetic total (ml):  6      Details:  Needle Size:  22 G  Ultrasonic Guidance for needle placement?: No    Approach:  Anterolateral  Location:  Knee  Laterality:  Bilateral  Site:  Bilateral knee  Medications (Right):  40 mg triamcinolone acetonide 40 mg/mL  Medications (Left):  40 mg triamcinolone acetonide 40 mg/mL  Patient tolerance:  Patient tolerated the procedure well with no immediate complications

## 2022-10-01 ENCOUNTER — LAB VISIT (OUTPATIENT)
Dept: LAB | Facility: HOSPITAL | Age: 62
End: 2022-10-01
Attending: FAMILY MEDICINE
Payer: COMMERCIAL

## 2022-10-01 DIAGNOSIS — E55.9 VITAMIN D DEFICIENCY: ICD-10-CM

## 2022-10-01 DIAGNOSIS — E78.5 HYPERLIPIDEMIA, UNSPECIFIED HYPERLIPIDEMIA TYPE: ICD-10-CM

## 2022-10-01 DIAGNOSIS — R73.9 HYPERGLYCEMIA: ICD-10-CM

## 2022-10-01 LAB
25(OH)D3+25(OH)D2 SERPL-MCNC: 53 NG/ML (ref 30–96)
ALBUMIN SERPL BCP-MCNC: 4.1 G/DL (ref 3.5–5.2)
ALP SERPL-CCNC: 58 U/L (ref 55–135)
ALT SERPL W/O P-5'-P-CCNC: 27 U/L (ref 10–44)
ANION GAP SERPL CALC-SCNC: 13 MMOL/L (ref 8–16)
AST SERPL-CCNC: 18 U/L (ref 10–40)
BASOPHILS # BLD AUTO: 0.02 K/UL (ref 0–0.2)
BASOPHILS NFR BLD: 0.4 % (ref 0–1.9)
BILIRUB SERPL-MCNC: 0.5 MG/DL (ref 0.1–1)
BUN SERPL-MCNC: 15 MG/DL (ref 6–20)
CALCIUM SERPL-MCNC: 9.7 MG/DL (ref 8.7–10.5)
CHLORIDE SERPL-SCNC: 107 MMOL/L (ref 95–110)
CHOLEST SERPL-MCNC: 199 MG/DL (ref 120–199)
CHOLEST/HDLC SERPL: 4.1 {RATIO} (ref 2–5)
CO2 SERPL-SCNC: 21 MMOL/L (ref 23–29)
CREAT SERPL-MCNC: 0.8 MG/DL (ref 0.5–1.4)
DIFFERENTIAL METHOD: NORMAL
EOSINOPHIL # BLD AUTO: 0.1 K/UL (ref 0–0.5)
EOSINOPHIL NFR BLD: 1.3 % (ref 0–8)
ERYTHROCYTE [DISTWIDTH] IN BLOOD BY AUTOMATED COUNT: 13.4 % (ref 11.5–14.5)
EST. GFR  (NO RACE VARIABLE): >60 ML/MIN/1.73 M^2
ESTIMATED AVG GLUCOSE: 120 MG/DL (ref 68–131)
GLUCOSE SERPL-MCNC: 113 MG/DL (ref 70–110)
HBA1C MFR BLD: 5.8 % (ref 4–5.6)
HCT VFR BLD AUTO: 40.8 % (ref 37–48.5)
HDLC SERPL-MCNC: 49 MG/DL (ref 40–75)
HDLC SERPL: 24.6 % (ref 20–50)
HGB BLD-MCNC: 13.2 G/DL (ref 12–16)
IMM GRANULOCYTES # BLD AUTO: 0.01 K/UL (ref 0–0.04)
IMM GRANULOCYTES NFR BLD AUTO: 0.2 % (ref 0–0.5)
LDLC SERPL CALC-MCNC: 133.8 MG/DL (ref 63–159)
LYMPHOCYTES # BLD AUTO: 1.8 K/UL (ref 1–4.8)
LYMPHOCYTES NFR BLD: 34.2 % (ref 18–48)
MCH RBC QN AUTO: 28.9 PG (ref 27–31)
MCHC RBC AUTO-ENTMCNC: 32.4 G/DL (ref 32–36)
MCV RBC AUTO: 89 FL (ref 82–98)
MONOCYTES # BLD AUTO: 0.4 K/UL (ref 0.3–1)
MONOCYTES NFR BLD: 8.1 % (ref 4–15)
NEUTROPHILS # BLD AUTO: 3 K/UL (ref 1.8–7.7)
NEUTROPHILS NFR BLD: 55.8 % (ref 38–73)
NONHDLC SERPL-MCNC: 150 MG/DL
NRBC BLD-RTO: 0 /100 WBC
PLATELET # BLD AUTO: 270 K/UL (ref 150–450)
PMV BLD AUTO: 9.3 FL (ref 9.2–12.9)
POTASSIUM SERPL-SCNC: 4 MMOL/L (ref 3.5–5.1)
PROT SERPL-MCNC: 6.9 G/DL (ref 6–8.4)
RBC # BLD AUTO: 4.57 M/UL (ref 4–5.4)
SODIUM SERPL-SCNC: 141 MMOL/L (ref 136–145)
TRIGL SERPL-MCNC: 81 MG/DL (ref 30–150)
WBC # BLD AUTO: 5.32 K/UL (ref 3.9–12.7)

## 2022-10-01 PROCEDURE — 80061 LIPID PANEL: CPT | Performed by: FAMILY MEDICINE

## 2022-10-01 PROCEDURE — 83036 HEMOGLOBIN GLYCOSYLATED A1C: CPT | Performed by: FAMILY MEDICINE

## 2022-10-01 PROCEDURE — 80053 COMPREHEN METABOLIC PANEL: CPT | Performed by: FAMILY MEDICINE

## 2022-10-01 PROCEDURE — 82306 VITAMIN D 25 HYDROXY: CPT | Performed by: FAMILY MEDICINE

## 2022-10-01 PROCEDURE — 85025 COMPLETE CBC W/AUTO DIFF WBC: CPT | Performed by: FAMILY MEDICINE

## 2022-10-01 PROCEDURE — 36415 COLL VENOUS BLD VENIPUNCTURE: CPT | Mod: PO | Performed by: FAMILY MEDICINE

## 2022-10-04 ENCOUNTER — OFFICE VISIT (OUTPATIENT)
Dept: FAMILY MEDICINE | Facility: CLINIC | Age: 62
End: 2022-10-04
Payer: COMMERCIAL

## 2022-10-04 VITALS
DIASTOLIC BLOOD PRESSURE: 70 MMHG | BODY MASS INDEX: 38.05 KG/M2 | HEIGHT: 66 IN | OXYGEN SATURATION: 94 % | WEIGHT: 236.75 LBS | SYSTOLIC BLOOD PRESSURE: 126 MMHG | HEART RATE: 70 BPM | TEMPERATURE: 99 F

## 2022-10-04 DIAGNOSIS — Z00.00 ANNUAL PHYSICAL EXAM: Primary | ICD-10-CM

## 2022-10-04 DIAGNOSIS — R73.9 HYPERGLYCEMIA: ICD-10-CM

## 2022-10-04 DIAGNOSIS — E78.5 HYPERLIPIDEMIA, UNSPECIFIED HYPERLIPIDEMIA TYPE: ICD-10-CM

## 2022-10-04 DIAGNOSIS — I10 GOOD HYPERTENSION CONTROL: ICD-10-CM

## 2022-10-04 DIAGNOSIS — N89.8 VAGINAL DRYNESS: ICD-10-CM

## 2022-10-04 DIAGNOSIS — I10 ESSENTIAL HYPERTENSION: ICD-10-CM

## 2022-10-04 DIAGNOSIS — K21.9 GASTROESOPHAGEAL REFLUX DISEASE, UNSPECIFIED WHETHER ESOPHAGITIS PRESENT: ICD-10-CM

## 2022-10-04 DIAGNOSIS — E55.9 VITAMIN D DEFICIENCY: ICD-10-CM

## 2022-10-04 DIAGNOSIS — L65.0 TELOGEN EFFLUVIUM: ICD-10-CM

## 2022-10-04 DIAGNOSIS — E66.01 SEVERE OBESITY (BMI 35.0-39.9) WITH COMORBIDITY: ICD-10-CM

## 2022-10-04 PROCEDURE — 3008F BODY MASS INDEX DOCD: CPT | Mod: CPTII,S$GLB,, | Performed by: FAMILY MEDICINE

## 2022-10-04 PROCEDURE — 1160F RVW MEDS BY RX/DR IN RCRD: CPT | Mod: CPTII,S$GLB,, | Performed by: FAMILY MEDICINE

## 2022-10-04 PROCEDURE — 99396 PREV VISIT EST AGE 40-64: CPT | Mod: S$GLB,,, | Performed by: FAMILY MEDICINE

## 2022-10-04 PROCEDURE — 99999 PR PBB SHADOW E&M-EST. PATIENT-LVL V: ICD-10-PCS | Mod: PBBFAC,,, | Performed by: FAMILY MEDICINE

## 2022-10-04 PROCEDURE — 1159F PR MEDICATION LIST DOCUMENTED IN MEDICAL RECORD: ICD-10-PCS | Mod: CPTII,S$GLB,, | Performed by: FAMILY MEDICINE

## 2022-10-04 PROCEDURE — 1160F PR REVIEW ALL MEDS BY PRESCRIBER/CLIN PHARMACIST DOCUMENTED: ICD-10-PCS | Mod: CPTII,S$GLB,, | Performed by: FAMILY MEDICINE

## 2022-10-04 PROCEDURE — 3008F PR BODY MASS INDEX (BMI) DOCUMENTED: ICD-10-PCS | Mod: CPTII,S$GLB,, | Performed by: FAMILY MEDICINE

## 2022-10-04 PROCEDURE — 99999 PR PBB SHADOW E&M-EST. PATIENT-LVL V: CPT | Mod: PBBFAC,,, | Performed by: FAMILY MEDICINE

## 2022-10-04 PROCEDURE — 3078F DIAST BP <80 MM HG: CPT | Mod: CPTII,S$GLB,, | Performed by: FAMILY MEDICINE

## 2022-10-04 PROCEDURE — 3078F PR MOST RECENT DIASTOLIC BLOOD PRESSURE < 80 MM HG: ICD-10-PCS | Mod: CPTII,S$GLB,, | Performed by: FAMILY MEDICINE

## 2022-10-04 PROCEDURE — 3074F SYST BP LT 130 MM HG: CPT | Mod: CPTII,S$GLB,, | Performed by: FAMILY MEDICINE

## 2022-10-04 PROCEDURE — 1159F MED LIST DOCD IN RCRD: CPT | Mod: CPTII,S$GLB,, | Performed by: FAMILY MEDICINE

## 2022-10-04 PROCEDURE — 99396 PR PREVENTIVE VISIT,EST,40-64: ICD-10-PCS | Mod: S$GLB,,, | Performed by: FAMILY MEDICINE

## 2022-10-04 PROCEDURE — 3044F PR MOST RECENT HEMOGLOBIN A1C LEVEL <7.0%: ICD-10-PCS | Mod: CPTII,S$GLB,, | Performed by: FAMILY MEDICINE

## 2022-10-04 PROCEDURE — 3074F PR MOST RECENT SYSTOLIC BLOOD PRESSURE < 130 MM HG: ICD-10-PCS | Mod: CPTII,S$GLB,, | Performed by: FAMILY MEDICINE

## 2022-10-04 PROCEDURE — 3044F HG A1C LEVEL LT 7.0%: CPT | Mod: CPTII,S$GLB,, | Performed by: FAMILY MEDICINE

## 2022-10-04 RX ORDER — RABEPRAZOLE SODIUM 20 MG/1
20 TABLET, DELAYED RELEASE ORAL DAILY
Qty: 90 TABLET | Refills: 3 | Status: SHIPPED | OUTPATIENT
Start: 2022-10-04 | End: 2023-08-02

## 2022-10-04 RX ORDER — POTASSIUM CHLORIDE 750 MG/1
10 CAPSULE, EXTENDED RELEASE ORAL DAILY
Qty: 90 CAPSULE | Refills: 3 | Status: SHIPPED | OUTPATIENT
Start: 2022-10-04 | End: 2023-10-05 | Stop reason: SDUPTHER

## 2022-10-04 RX ORDER — MOMETASONE FUROATE 50 UG/1
2 SPRAY, METERED NASAL DAILY PRN
Qty: 17 G | Status: SHIPPED | OUTPATIENT
Start: 2022-10-04 | End: 2023-11-24 | Stop reason: SDUPTHER

## 2022-10-04 RX ORDER — OLMESARTAN MEDOXOMIL AND HYDROCHLOROTHIAZIDE 40/12.5 40; 12.5 MG/1; MG/1
1 TABLET ORAL DAILY
Qty: 90 TABLET | Refills: 3 | Status: SHIPPED | OUTPATIENT
Start: 2022-10-04 | End: 2023-10-05 | Stop reason: SDUPTHER

## 2022-10-04 RX ORDER — VIT C/E/ZN/COPPR/LUTEIN/ZEAXAN 250MG-90MG
CAPSULE ORAL
COMMUNITY

## 2022-10-04 RX ORDER — ESTRADIOL 0.1 MG/G
1 CREAM VAGINAL
Qty: 42.5 G | Refills: 1 | Status: SHIPPED | OUTPATIENT
Start: 2022-10-06 | End: 2022-12-06 | Stop reason: SDUPTHER

## 2022-10-04 NOTE — PROGRESS NOTES
Subjective:       Patient ID: Mercedes Laura is a 62 y.o. female.    Chief Complaint: Annual Exam    HPI  Review of Systems   Constitutional:  Positive for fatigue. Negative for unexpected weight change.   Respiratory:  Negative for chest tightness and shortness of breath.    Cardiovascular:  Negative for chest pain, palpitations and leg swelling.   Gastrointestinal:  Negative for abdominal pain.   Musculoskeletal:  Negative for arthralgias.   Neurological:  Negative for dizziness, syncope, light-headedness and headaches.     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    Had COVID end of JUly. Lots of fatigue.  Hair falling out like it did 2 years ago.  Taking care of mother sick and hospitalized at same time for cardiac issues    Reviewed labs 10/2022  mammo 7/2021 neg     Prediabetes A1c 5.8 -diet controlled     GI Dr. Stinson h/o colon polyps -colonoscopy 2021 on 5 year surveillance  Objective:      Physical Exam  Vitals and nursing note reviewed.   Constitutional:       Appearance: She is well-developed.   Cardiovascular:      Rate and Rhythm: Normal rate and regular rhythm.      Heart sounds: Normal heart sounds.   Pulmonary:      Effort: Pulmonary effort is normal.      Breath sounds: Normal breath sounds.   Skin:     General: Skin is warm and dry.   Neurological:      Mental Status: She is alert and oriented to person, place, and time.       Assessment:       1. Annual physical exam    2. Good hypertension control    3.  "Hyperlipidemia, unspecified hyperlipidemia type    4. Vitamin D deficiency    5. Severe obesity (BMI 35.0-39.9) with comorbidity    6. Hyperglycemia    7. Gastroesophageal reflux disease, unspecified whether esophagitis present    8. Telogen effluvium    9. Essential hypertension    10. Vaginal dryness        Plan:         1. Good hypertension control  Controlled on current medications.  Continue current medications.    - olmesartan-hydrochlorothiazide (BENICAR HCT) 40-12.5 mg Tab; Take 1 tablet by mouth once daily.  Dispense: 90 tablet; Refill: 3    2. Hyperlipidemia, unspecified hyperlipidemia type  Controlled on current medications.  Continue current medications.    - CBC Auto Differential; Future  - Comprehensive Metabolic Panel; Future  - Lipid Panel; Future    3. Vitamin D deficiency  Controlled on current medications.  Continue current medications.    - Vitamin D; Future    4. Severe obesity (BMI 35.0-39.9) with comorbidity  Counseled patient on his ideal body weight, health consequences of being obese and current recommendations including weekly exercise and a heart healthy diet.  Current BMI is:Estimated body mass index is 38.22 kg/m² as calculated from the following:    Height as of this encounter: 5' 6" (1.676 m).    Weight as of this encounter: 107.4 kg (236 lb 12.4 oz)..  Patient is aware that ideal BMI < 25 or Weight in (lb) to have BMI = 25: 154.6.      5. Hyperglycemia   Your blood sugar is borderline high.  This means you are at risk for developing type 2 diabetes mellitus.  To lessen your risk you should exercise regularly, avoid excess carbohydrates and work toward a body mass index of less than 25.      - Hemoglobin A1C; Future    6. Gastroesophageal reflux disease, unspecified whether esophagitis present  Counseled patient on prevention of reflux with changes in diet and behavior.  I recommended avoidance of greasy and spicy foods, caffeine and eating within 3 hours of bedtime.  I counseled the " patient to avoid eating large meals and instead eating more frequent small meals.  I also recommended weight loss and elevation of the head of the bed by 6 inches.  If symptoms persist after these changes medication may be needed to control GERD.      7. Telogen effluvium  Due to covid. Reassurance.     8. Essential hypertension  Controlled on current medications.  Continue current medications.    - potassium chloride (MICRO-K) 10 MEQ CpSR; Take 1 capsule (10 mEq total) by mouth once daily.  Dispense: 90 capsule; Refill: 3    9. Vaginal dryness  cont  - estradioL (ESTRACE) 0.01 % (0.1 mg/gram) vaginal cream; Place 1 g vaginally every Monday and Thursday.  Dispense: 42.5 g; Refill: 1      10. Annual physical exam  Discussed health maintenance guidelines appropriate for age.            Time spent with patient: 20 minutes    Patient with be reevaluated in 1 year or sooner prn    Greater than 50% of this visit was spent counseling as described in above documentation:Yes

## 2022-10-07 ENCOUNTER — OFFICE VISIT (OUTPATIENT)
Dept: DERMATOLOGY | Facility: CLINIC | Age: 62
End: 2022-10-07
Payer: COMMERCIAL

## 2022-10-07 VITALS — WEIGHT: 236 LBS | BODY MASS INDEX: 37.93 KG/M2 | HEIGHT: 66 IN

## 2022-10-07 DIAGNOSIS — D22.9 MULTIPLE BENIGN NEVI: ICD-10-CM

## 2022-10-07 DIAGNOSIS — L57.0 ACTINIC KERATOSES: Primary | ICD-10-CM

## 2022-10-07 DIAGNOSIS — L73.8 SEBACEOUS HYPERPLASIA OF FACE: ICD-10-CM

## 2022-10-07 DIAGNOSIS — L82.1 SEBORRHEIC KERATOSES: ICD-10-CM

## 2022-10-07 DIAGNOSIS — L81.4 SOLAR LENTIGO: ICD-10-CM

## 2022-10-07 PROCEDURE — 3008F PR BODY MASS INDEX (BMI) DOCUMENTED: ICD-10-PCS | Mod: CPTII,S$GLB,, | Performed by: DERMATOLOGY

## 2022-10-07 PROCEDURE — 3044F PR MOST RECENT HEMOGLOBIN A1C LEVEL <7.0%: ICD-10-PCS | Mod: CPTII,S$GLB,, | Performed by: DERMATOLOGY

## 2022-10-07 PROCEDURE — 99999 PR PBB SHADOW E&M-EST. PATIENT-LVL IV: ICD-10-PCS | Mod: PBBFAC,,, | Performed by: DERMATOLOGY

## 2022-10-07 PROCEDURE — 1160F PR REVIEW ALL MEDS BY PRESCRIBER/CLIN PHARMACIST DOCUMENTED: ICD-10-PCS | Mod: CPTII,S$GLB,, | Performed by: DERMATOLOGY

## 2022-10-07 PROCEDURE — 3044F HG A1C LEVEL LT 7.0%: CPT | Mod: CPTII,S$GLB,, | Performed by: DERMATOLOGY

## 2022-10-07 PROCEDURE — 17000 DESTRUCT PREMALG LESION: CPT | Mod: S$GLB,,, | Performed by: DERMATOLOGY

## 2022-10-07 PROCEDURE — 3008F BODY MASS INDEX DOCD: CPT | Mod: CPTII,S$GLB,, | Performed by: DERMATOLOGY

## 2022-10-07 PROCEDURE — 1159F PR MEDICATION LIST DOCUMENTED IN MEDICAL RECORD: ICD-10-PCS | Mod: CPTII,S$GLB,, | Performed by: DERMATOLOGY

## 2022-10-07 PROCEDURE — 1160F RVW MEDS BY RX/DR IN RCRD: CPT | Mod: CPTII,S$GLB,, | Performed by: DERMATOLOGY

## 2022-10-07 PROCEDURE — 99999 PR PBB SHADOW E&M-EST. PATIENT-LVL IV: CPT | Mod: PBBFAC,,, | Performed by: DERMATOLOGY

## 2022-10-07 PROCEDURE — 1159F MED LIST DOCD IN RCRD: CPT | Mod: CPTII,S$GLB,, | Performed by: DERMATOLOGY

## 2022-10-07 PROCEDURE — 99213 OFFICE O/P EST LOW 20 MIN: CPT | Mod: 25,S$GLB,, | Performed by: DERMATOLOGY

## 2022-10-07 PROCEDURE — 17000 PR DESTRUCTION(LASER SURGERY,CRYOSURGERY,CHEMOSURGERY),PREMALIGNANT LESIONS,FIRST LESION: ICD-10-PCS | Mod: S$GLB,,, | Performed by: DERMATOLOGY

## 2022-10-07 PROCEDURE — 99213 PR OFFICE/OUTPT VISIT, EST, LEVL III, 20-29 MIN: ICD-10-PCS | Mod: 25,S$GLB,, | Performed by: DERMATOLOGY

## 2022-10-07 NOTE — PATIENT INSTRUCTIONS

## 2022-10-07 NOTE — PROGRESS NOTES
Subjective:       Patient ID:  Mercedes Laura is a 62 y.o. female who presents for   Chief Complaint   Patient presents with    Skin Check     TBSC     LOV 4/11/22- AK,SK    Patient here today for TBSC  C/O spot to nasal tip, frozen on last visit.   Spot to right thumb. Hard, callus like, curious as to reason, Not bothersome  Spot to top of right hand    Derm HX:  Denies Phx of NMSC  Fhx of MM    Current Outpatient Medications:   ·  acyclovir 5% (ZOVIRAX) 5 % Crea, Apply topically 5 (five) times daily., Disp: 5 g, Rfl: prn  ·  b complex vitamins capsule, Take 1 capsule by mouth once daily., Disp: , Rfl:   ·  biotin 10 mg Tab, Take 100 mg by mouth once daily. , Disp: , Rfl:   ·  calcium carbonate (TUMS) 200 mg calcium (500 mg) chewable tablet, Take 1 tablet by mouth daily as needed for Heartburn., Disp: , Rfl:   ·  cetirizine (ZYRTEC) 10 MG tablet, Take 10 mg by mouth daily as needed for Allergies or Rhinitis. , Disp: , Rfl:   ·  econazole nitrate 1 % cream, Apply to inner thighs and buttocks BID PRN flare, Disp: 85 g, Rfl: 2  ·  enzymes,digestive (DIGESTIVE ENZYMES ORAL), Take by mouth., Disp: , Rfl:   ·  estradioL (ESTRACE) 0.01 % (0.1 mg/gram) vaginal cream, Place 1 g vaginally every Monday and Thursday., Disp: 42.5 g, Rfl: 1  ·  lactobacillus combination no.4 (PROBIOTIC) 3 billion cell Cap, Take 1 application by mouth 2 (two) times a day., Disp: , Rfl:   ·  Lactobacillus rhamnosus GG (CULTURELLE) 10 billion cell capsule, Take 1 capsule by mouth once daily., Disp: , Rfl:   ·  loratadine (CLARITIN) 10 mg tablet, Take 10 mg by mouth daily as needed for Allergies., Disp: , Rfl:   ·  mometasone (NASONEX) 50 mcg/actuation nasal spray, 2 sprays by Nasal route daily as needed (rhinitis)., Disp: 17 g, Rfl: prn  ·  multivitamin (THERAGRAN) per tablet, Take 1 tablet by mouth once daily., Disp: , Rfl:   ·  olmesartan-hydrochlorothiazide (BENICAR HCT) 40-12.5 mg Tab, Take 1 tablet by mouth once daily., Disp: 90 tablet, Rfl:  3  ·  potassium chloride (MICRO-K) 10 MEQ CpSR, Take 1 capsule (10 mEq total) by mouth once daily., Disp: 90 capsule, Rfl: 3  ·  RABEprazole (ACIPHEX) 20 mg tablet, Take 1 tablet (20 mg total) by mouth once daily., Disp: 90 tablet, Rfl: 3  ·  RABEprazole (ACIPHEX) 20 mg tablet, Take 1 tablet (20 mg total) by mouth once daily., Disp: 90 tablet, Rfl: 3  ·  tumeric-ging-olive-oreg-capryl 100 mg-150 mg- 50 mg-150 mg Cap, Take by mouth., Disp: , Rfl:   ·  vit C,Q-Gm-rflmp-lutein-zeaxan (PRESERVISION AREDS-2) 250-90-40-1 mg Cap, Take by mouth., Disp: , Rfl:   ·  vitamin D (VITAMIN D3) 1000 units Tab, Take 2 Units by mouth once daily. , Disp: , Rfl:       Review of Systems   Constitutional:  Negative for fever, chills and fatigue.   Respiratory:  Negative for cough and shortness of breath.    Skin:  Positive for dry skin, daily sunscreen use and activity-related sunscreen use. Negative for itching and rash.      Objective:    Physical Exam   Constitutional: She appears well-developed and well-nourished. No distress.   Neurological: She is alert and oriented to person, place, and time. She is not disoriented.   Psychiatric: She has a normal mood and affect.   Skin:   Areas Examined (abnormalities noted in diagram):   Scalp / Hair Palpated and Inspected  Head / Face Inspection Performed  Neck Inspection Performed  Chest / Axilla Inspection Performed  Abdomen Inspection Performed  Genitals / Buttocks / Groin Inspection Performed  Back Inspection Performed  RUE Inspected  LUE Inspection Performed  RLE Inspected  LLE Inspection Performed  Nails and Digits Inspection Performed                         Diagram Legend     Erythematous scaling macule/papule c/w actinic keratosis       Vascular papule c/w angioma      Pigmented verrucoid papule/plaque c/w seborrheic keratosis      Yellow umbilicated papule c/w sebaceous hyperplasia      Irregularly shaped tan macule c/w lentigo     1-2 mm smooth white papules consistent with Milia       Movable subcutaneous cyst with punctum c/w epidermal inclusion cyst      Subcutaneous movable cyst c/w pilar cyst      Firm pink to brown papule c/w dermatofibroma      Pedunculated fleshy papule(s) c/w skin tag(s)      Evenly pigmented macule c/w junctional nevus     Mildly variegated pigmented, slightly irregular-bordered macule c/w mildly atypical nevus      Flesh colored to evenly pigmented papule c/w intradermal nevus       Pink pearly papule/plaque c/w basal cell carcinoma      Erythematous hyperkeratotic cursted plaque c/w SCC      Surgical scar with no sign of skin cancer recurrence      Open and closed comedones      Inflammatory papules and pustules      Verrucoid papule consistent consistent with wart     Erythematous eczematous patches and plaques     Dystrophic onycholytic nail with subungual debris c/w onychomycosis     Umbilicated papule    Erythematous-base heme-crusted tan verrucoid plaque consistent with inflamed seborrheic keratosis     Erythematous Silvery Scaling Plaque c/w Psoriasis     See annotation      Assessment / Plan:        Actinic keratoses, nose, tip, refreeze, tiny lesion  Cryosurgery Procedure Note    Verbal consent from the patient is obtained and the patient is aware of the precancerous quality and need for treatment of these lesions. Liquid nitrogen cryosurgery is applied to the 1 actinic keratoses, as detailed in the physical exam, to produce a freeze injury. The patient is aware that blisters may form and is instructed on wound care with gentle cleansing and use of vaseline ointment to keep moist until healed. The patient is supplied a handout on cryosurgery and is instructed to call if lesions do not completely resolve.    Patient instructed in importance in daily broad spectrum sun protection of at least spf 30. Mineral sunscreen ingredients preferred (Zinc +/- Titanium) and can be found OTC.   Recommend Elta MD for daily use on face and neck.  Patient encouraged to wear  hat for all outdoor exposure.   Also discussed sun avoidance and use of protective clothing.    Multiple benign nevi  Careful dermoscopy evaluation of nevi performed with none identified as needing biopsy today  Monitor for new mole or moles that are becoming bigger, darker, irritated, or developing irregular borders.     Solar lentigo  This is a benign hyperpigmented sun induced lesion. Daily sun protection will reduce the number of new lesions. Treatment of these benign lesions are considered cosmetic.    Seborrheic keratoses  These are benign inherited growths without a malignant potential. Reassurance given to patient. No treatment is necessary.     Sebaceous hyperplasia of face  This is a common condition representing benign enlargement of the sebaceous lobule. It typically occurs in adulthood. Reassurance given to patient.              Follow up in about 6 months (around 4/7/2023).

## 2022-10-19 ENCOUNTER — PATIENT MESSAGE (OUTPATIENT)
Dept: ORTHOPEDICS | Facility: CLINIC | Age: 62
End: 2022-10-19
Payer: COMMERCIAL

## 2022-12-06 DIAGNOSIS — N89.8 VAGINAL DRYNESS: ICD-10-CM

## 2022-12-06 RX ORDER — ESTRADIOL 0.1 MG/G
1 CREAM VAGINAL
Qty: 42.5 G | Refills: 1 | Status: SHIPPED | OUTPATIENT
Start: 2022-12-08 | End: 2024-04-02 | Stop reason: SDUPTHER

## 2023-01-30 ENCOUNTER — TELEPHONE (OUTPATIENT)
Dept: FAMILY MEDICINE | Facility: CLINIC | Age: 63
End: 2023-01-30
Payer: COMMERCIAL

## 2023-01-30 NOTE — TELEPHONE ENCOUNTER
----- Message from Fidelia Escobedo sent at 1/30/2023 10:37 AM CST -----  Contact: pt  Type: Needs Medical Advice  Who Called:  pt   Symptoms (please be specific):  positive covid   How long has patient had these symptoms:  01/22  Pharmacy name and phone #:        Additional Information: pt states she tested positive for covid and wanted to let you guys know. Please advise.

## 2023-01-30 NOTE — TELEPHONE ENCOUNTER
Pt did at home Covid test with positive result states she is showing s/o improvement and does not require intervention at this time.  Call was actually in reference to her mother.

## 2023-03-21 DIAGNOSIS — M17.10 ARTHRITIS OF KNEE: Primary | ICD-10-CM

## 2023-03-27 ENCOUNTER — HOSPITAL ENCOUNTER (OUTPATIENT)
Dept: RADIOLOGY | Facility: HOSPITAL | Age: 63
Discharge: HOME | End: 2023-03-27
Attending: ORTHOPAEDIC SURGERY
Payer: COMMERCIAL

## 2023-03-27 ENCOUNTER — OFFICE VISIT (OUTPATIENT)
Dept: ORTHOPEDICS | Facility: CLINIC | Age: 63
End: 2023-03-27
Payer: COMMERCIAL

## 2023-03-27 DIAGNOSIS — M25.562 PAIN IN BOTH KNEES, UNSPECIFIED CHRONICITY: ICD-10-CM

## 2023-03-27 DIAGNOSIS — M17.10 ARTHRITIS OF KNEE: ICD-10-CM

## 2023-03-27 DIAGNOSIS — M17.10 ARTHRITIS OF KNEE: Primary | ICD-10-CM

## 2023-03-27 DIAGNOSIS — M25.562 BILATERAL CHRONIC KNEE PAIN: ICD-10-CM

## 2023-03-27 DIAGNOSIS — M25.561 BILATERAL CHRONIC KNEE PAIN: ICD-10-CM

## 2023-03-27 DIAGNOSIS — M25.561 PAIN IN BOTH KNEES, UNSPECIFIED CHRONICITY: ICD-10-CM

## 2023-03-27 DIAGNOSIS — G89.29 BILATERAL CHRONIC KNEE PAIN: ICD-10-CM

## 2023-03-27 PROCEDURE — 73564 X-RAY EXAM KNEE 4 OR MORE: CPT | Mod: 26,,, | Performed by: RADIOLOGY

## 2023-03-27 PROCEDURE — 20610 DRAIN/INJ JOINT/BURSA W/O US: CPT | Mod: 50,S$GLB,, | Performed by: ORTHOPAEDIC SURGERY

## 2023-03-27 PROCEDURE — 99999 PR PBB SHADOW E&M-EST. PATIENT-LVL II: ICD-10-PCS | Mod: PBBFAC,,, | Performed by: ORTHOPAEDIC SURGERY

## 2023-03-27 PROCEDURE — 73564 XR KNEE ORTHO BILAT WITH FLEXION: ICD-10-PCS | Mod: 26,,, | Performed by: RADIOLOGY

## 2023-03-27 PROCEDURE — 73564 X-RAY EXAM KNEE 4 OR MORE: CPT | Mod: TC,50,PN

## 2023-03-27 PROCEDURE — 99214 OFFICE O/P EST MOD 30 MIN: CPT | Mod: 25,S$GLB,, | Performed by: ORTHOPAEDIC SURGERY

## 2023-03-27 PROCEDURE — 1159F MED LIST DOCD IN RCRD: CPT | Mod: CPTII,S$GLB,, | Performed by: ORTHOPAEDIC SURGERY

## 2023-03-27 PROCEDURE — 1159F PR MEDICATION LIST DOCUMENTED IN MEDICAL RECORD: ICD-10-PCS | Mod: CPTII,S$GLB,, | Performed by: ORTHOPAEDIC SURGERY

## 2023-03-27 PROCEDURE — 1160F RVW MEDS BY RX/DR IN RCRD: CPT | Mod: CPTII,S$GLB,, | Performed by: ORTHOPAEDIC SURGERY

## 2023-03-27 PROCEDURE — 99214 PR OFFICE/OUTPT VISIT, EST, LEVL IV, 30-39 MIN: ICD-10-PCS | Mod: 25,S$GLB,, | Performed by: ORTHOPAEDIC SURGERY

## 2023-03-27 PROCEDURE — 20610 LARGE JOINT ASPIRATION/INJECTION: BILATERAL KNEE: ICD-10-PCS | Mod: 50,S$GLB,, | Performed by: ORTHOPAEDIC SURGERY

## 2023-03-27 PROCEDURE — 99999 PR PBB SHADOW E&M-EST. PATIENT-LVL II: CPT | Mod: PBBFAC,,, | Performed by: ORTHOPAEDIC SURGERY

## 2023-03-27 PROCEDURE — 1160F PR REVIEW ALL MEDS BY PRESCRIBER/CLIN PHARMACIST DOCUMENTED: ICD-10-PCS | Mod: CPTII,S$GLB,, | Performed by: ORTHOPAEDIC SURGERY

## 2023-03-27 RX ORDER — TRIAMCINOLONE ACETONIDE 40 MG/ML
40 INJECTION, SUSPENSION INTRA-ARTICULAR; INTRAMUSCULAR
Status: DISCONTINUED | OUTPATIENT
Start: 2023-03-27 | End: 2023-03-27 | Stop reason: HOSPADM

## 2023-03-27 RX ADMIN — TRIAMCINOLONE ACETONIDE 40 MG: 40 INJECTION, SUSPENSION INTRA-ARTICULAR; INTRAMUSCULAR at 01:03

## 2023-03-27 NOTE — PROGRESS NOTES
Past Medical History:   Diagnosis Date    Anxiety     this came on since lap kadeem sgy    Arthritis     Colon polyp     Deviated septum     GERD (gastroesophageal reflux disease)     Hypertension     PAC (premature atrial contraction)     Plantar fasciitis     PMB (postmenopausal bleeding) 05/2018    Seasonal allergies     Sinus disorder     Uterine leiomyoma 05/2018    Varicose veins of both lower extremities        Past Surgical History:   Procedure Laterality Date    BREAST BIOPSY Left 2015    benign    CHOLECYSTECTOMY  6-1-2015    Dr Ashraf    COLONOSCOPY N/A 11/23/2015    Procedure: COLONOSCOPY;  Surgeon: Theo Stinson MD;  Location: [facility] ENDO;  Service: Endoscopy;  Laterality: N/A; repeat in 5 years for surveillance    COLONOSCOPY N/A 7/14/2021    Procedure: COLONOSCOPY;  Surgeon: Theo Stinson MD;  Location: [facility] ENDO;  Service: Endoscopy;  Laterality: N/A;    CYSTOSCOPY N/A 6/7/2018    Procedure: CYSTOSCOPY;  Surgeon: Anaya Caldera MD;  Location: [facility] OR;  Service: OB/GYN;  Laterality: N/A;    ESOPHAGOGASTRODUODENOSCOPY N/A 3/4/2019    Procedure: EGD (ESOPHAGOGASTRODUODENOSCOPY);  Surgeon: Theo Stinson MD;  Location: [facility];  Service: Endoscopy;  Laterality: N/A;    HYSTERECTOMY      KNEE ARTHROSCOPY W/ MENISCECTOMY  11/15/2000    right     LAPAROSCOPIC SALPINGO-OOPHORECTOMY Bilateral 6/7/2018    Procedure: [id number];  Surgeon: Anaya Caldera MD;  Location: [facility] OR;  Service: OB/GYN;  Laterality: Bilateral;    LAPAROSCOPIC TOTAL HYSTERECTOMY N/A 6/7/2018    Procedure: HYSTERECTOMY-TOTAL LAPAROSCOPIC (TLH);  Surgeon: Anaya Caldera MD;  Location: [facility] OR;  Service: OB/GYN;  Laterality: N/A;    OOPHORECTOMY      UPPER GASTROINTESTINAL ENDOSCOPY  06/22/2015    Dr. Stinson       Current Outpatient Medications   Medication Sig    acyclovir 5% (ZOVIRAX) 5 % Crea Apply topically 5 (five) times daily.    b complex vitamins capsule Take 1 capsule by mouth  once daily.    biotin 10 mg Tab Take 100 mg by mouth once daily.     calcium carbonate (TUMS) 200 mg calcium (500 mg) chewable tablet Take 1 tablet by mouth daily as needed for Heartburn.    cetirizine (ZYRTEC) 10 MG tablet Take 10 mg by mouth daily as needed for Allergies or Rhinitis.     econazole nitrate 1 % cream Apply to inner thighs and buttocks BID PRN flare    enzymes,digestive (DIGESTIVE ENZYMES ORAL) Take by mouth.    estradioL (ESTRACE) 0.01 % (0.1 mg/gram) vaginal cream Place 1 g vaginally every Monday and Thursday.    lactobacillus combination no.4 (PROBIOTIC) 3 billion cell Cap Take 1 application by mouth 2 (two) times a day.    Lactobacillus rhamnosus GG (CULTURELLE) 10 billion cell capsule Take 1 capsule by mouth once daily.    loratadine (CLARITIN) 10 mg tablet Take 10 mg by mouth daily as needed for Allergies.    mometasone (NASONEX) 50 mcg/actuation nasal spray 2 sprays by Nasal route daily as needed (rhinitis).    multivitamin (THERAGRAN) per tablet Take 1 tablet by mouth once daily.    olmesartan-hydrochlorothiazide (BENICAR HCT) 40-12.5 mg Tab Take 1 tablet by mouth once daily.    potassium chloride (MICRO-K) 10 MEQ CpSR Take 1 capsule (10 mEq total) by mouth once daily.    RABEprazole (ACIPHEX) 20 mg tablet Take 1 tablet (20 mg total) by mouth once daily.    RABEprazole (ACIPHEX) 20 mg tablet Take 1 tablet (20 mg total) by mouth once daily.    tumeric-ging-olive-oreg-capryl 100 mg-150 mg- 50 mg-150 mg Cap Take by mouth.    vit C,Q-Me-ihowf-lutein-zeaxan (PRESERVISION AREDS-2) 250-90-40-1 mg Cap Take by mouth.    vitamin D (VITAMIN D3) 1000 units Tab Take 2 Units by mouth once daily.      No current facility-administered medications for this visit.       Review of patient's allergies indicates:   Allergen Reactions    Augmentin [amoxicillin-pot clavulanate] Nausea And Vomiting     Gi upset    Bentyl [dicyclomine] Other (See Comments)     Worsening anxiety    Naprosyn [naproxen]     Omeprazole       Other reaction(s): ABDOMINAL PAIN    Adhesive Rash    Lexapro [escitalopram oxalate] Anxiety     Bad dreams, feeling of doom, sweats    Sulfa (sulfonamide antibiotics) Rash     Other reaction(s): measle like rash       Family History   Problem Relation Age of Onset    Hypertension Mother     Arthritis Mother     Hypertension Father     Cancer Father         melanoma    Melanoma Father     COPD Father     Heart disease Father         chf    Diverticulitis Maternal Grandmother     Arthritis Maternal Aunt     Diabetes Maternal Aunt     Hypertension Maternal Aunt     Arthritis Maternal Uncle     Breast cancer Neg Hx     Ovarian cancer Neg Hx     Psoriasis Neg Hx     Lupus Neg Hx     Eczema Neg Hx     Colon cancer Neg Hx     Colon polyps Neg Hx     Crohn's disease Neg Hx     Esophageal cancer Neg Hx     Stomach cancer Neg Hx        Social History     Socioeconomic History    Marital status:    Tobacco Use    Smoking status: Never    Smokeless tobacco: Never   Substance and Sexual Activity    Alcohol use: Yes     Comment: rarely / few times per yr    Drug use: No    Sexual activity: Yes     Partners: Male     Birth control/protection: None, Post-menopausal       Chief Complaint:   No chief complaint on file.      History of present illness: Is a 63-year-old female seen in consultation for Dr. Llamas.  Patient's had knee pain for several years now.  Symptoms are stable and mild to moderate.  Pain with running and squatting.  Patient had prior right meniscus surgery several years ago.   Pain is laterally located.  She has been in bio skin brace is previously.  Right knee is worse than left.   Worse over the last few months.      Answers for HPI/ROS submitted by the patient on 4/22/2018   Leg pain  unexpected weight change: No  appetite change : No  sleep disturbance: No  IMMUNOCOMPROMISED: No  nervous/ anxious: No  dysphoric mood: No  rash: No  visual disturbance: No  eye redness: No  eye pain: No  ear pain:  No  tinnitus: No  hearing loss: No  sinus pressure : No  nosebleeds: No  enviro allergies: No  food allergies: No  cough: No  shortness of breath: No  sweating: No  dysuria: No  frequency: No  difficulty urinating: No  hematuria: No  painful intercourse: No  chest pain: No  palpitations: Yes  nausea: No  vomiting: No  diarrhea: No  blood in stool: No  constipation: No  headaches: No  numbness: No  seizures: No  joint swelling: No  myalgia: Yes  weakness: No  back pain: No  Pain Chronicity: recurrent  History of trauma: Yes  Onset: more than 1 year ago  Frequency: intermittently  Progression since onset: unchanged  injury location: at home  pain- numeric: 1/10  pain location: left knee, right knee  pain quality: aching, dull  Radiating Pain: No  Aggravating factors: activity, bending, cold, exercise  fever: No  inability to bear weight: No  itching: No  joint locking: No  limited range of motion: No  stiffness: No  tingling: No  Treatments tried: cold, heat, exercise, movement, OTC pain meds, rest  physical therapy: not tried  Improvement on treatment: moderate        Physical Examination:    Vital Signs:    There were no vitals filed for this visit.    There is no height or weight on file to calculate BMI.    This a well-developed, well nourished patient in no acute distress.  They are alert and oriented and cooperative to examination.  Pt. walks without an antalgic gait.      Examination of bilateral knees shows no rashes or erythema. There are no masses ecchymosis or effusion. Patient has full range of motion from 0-130°. Patient is moderately tender to palpation over lateral joint line and nontender to palpation over the medial joint line.Knee is stable to varus and valgus stress. 5 out of 5 motor strength. Palpable distal pulses. Intact light touch sensation. Negative Patellofemoral crepitus      X-rays: X-rays of the right knee is ordered and reviewed which show severe valgus narrowing of both knees       Assessment:: Bilateral valgus knee arthritis    Plan:  I reviewed the findings with her today.  We talked about knee replacements.  We talked in detail about possibly doing her left knee.  We will likely do this pretty soon.  I agreed to inject both knees today.  Patient is also concerned about metal allergies.  We will run a metal allergy test on her as well.          This note was created using  voice recognition software that occasionally misinterpreted phrases or words.    Consult note is delivered via Epic messaging service.

## 2023-03-27 NOTE — PROCEDURES
Large Joint Aspiration/Injection: bilateral knee    Date/Time: 3/27/2023 1:00 PM  Performed by: Chu Whitman MD  Authorized by: Chu Whitman MD     Consent Done?:  Yes (Verbal)  Indications:  Pain  Site marked: the procedure site was marked    Timeout: prior to procedure the correct patient, procedure, and site was verified    Prep: patient was prepped and draped in usual sterile fashion      Local anesthesia used?: Yes    Anesthesia:  Local infiltration  Local anesthetic:  Bupivacaine 0.25% without epinephrine and lidocaine 2% without epinephrine  Anesthetic total (ml):  6      Details:  Needle Size:  22 G  Ultrasonic Guidance for needle placement?: No    Approach:  Anterolateral  Location:  Knee  Laterality:  Bilateral  Site:  Bilateral knee  Medications (Right):  40 mg triamcinolone acetonide 40 mg/mL  Medications (Left):  40 mg triamcinolone acetonide 40 mg/mL  Patient tolerance:  Patient tolerated the procedure well with no immediate complications

## 2023-03-28 ENCOUNTER — PATIENT MESSAGE (OUTPATIENT)
Dept: ORTHOPEDICS | Facility: CLINIC | Age: 63
End: 2023-03-28
Payer: COMMERCIAL

## 2023-04-10 ENCOUNTER — OFFICE VISIT (OUTPATIENT)
Dept: DERMATOLOGY | Facility: CLINIC | Age: 63
End: 2023-04-10
Payer: COMMERCIAL

## 2023-04-10 VITALS — WEIGHT: 219 LBS | BODY MASS INDEX: 35.2 KG/M2 | HEIGHT: 66 IN

## 2023-04-10 DIAGNOSIS — L73.8 SEBACEOUS HYPERPLASIA OF FACE: ICD-10-CM

## 2023-04-10 DIAGNOSIS — L57.8 OTHER SKIN CHANGES DUE TO CHRONIC EXPOSURE TO NONIONIZING RADIATION: ICD-10-CM

## 2023-04-10 DIAGNOSIS — L70.0 ACNE VULGARIS: Primary | ICD-10-CM

## 2023-04-10 DIAGNOSIS — L82.1 SEBORRHEIC KERATOSES: ICD-10-CM

## 2023-04-10 DIAGNOSIS — D22.9 MULTIPLE BENIGN NEVI: ICD-10-CM

## 2023-04-10 DIAGNOSIS — L81.4 SOLAR LENTIGO: ICD-10-CM

## 2023-04-10 PROCEDURE — 3008F BODY MASS INDEX DOCD: CPT | Mod: CPTII,S$GLB,, | Performed by: DERMATOLOGY

## 2023-04-10 PROCEDURE — 3008F PR BODY MASS INDEX (BMI) DOCUMENTED: ICD-10-PCS | Mod: CPTII,S$GLB,, | Performed by: DERMATOLOGY

## 2023-04-10 PROCEDURE — 99999 PR PBB SHADOW E&M-EST. PATIENT-LVL IV: ICD-10-PCS | Mod: PBBFAC,,, | Performed by: DERMATOLOGY

## 2023-04-10 PROCEDURE — 99214 PR OFFICE/OUTPT VISIT, EST, LEVL IV, 30-39 MIN: ICD-10-PCS | Mod: S$GLB,,, | Performed by: DERMATOLOGY

## 2023-04-10 PROCEDURE — 99999 PR PBB SHADOW E&M-EST. PATIENT-LVL IV: CPT | Mod: PBBFAC,,, | Performed by: DERMATOLOGY

## 2023-04-10 PROCEDURE — 1160F PR REVIEW ALL MEDS BY PRESCRIBER/CLIN PHARMACIST DOCUMENTED: ICD-10-PCS | Mod: CPTII,S$GLB,, | Performed by: DERMATOLOGY

## 2023-04-10 PROCEDURE — 99214 OFFICE O/P EST MOD 30 MIN: CPT | Mod: S$GLB,,, | Performed by: DERMATOLOGY

## 2023-04-10 PROCEDURE — 1159F PR MEDICATION LIST DOCUMENTED IN MEDICAL RECORD: ICD-10-PCS | Mod: CPTII,S$GLB,, | Performed by: DERMATOLOGY

## 2023-04-10 PROCEDURE — 1159F MED LIST DOCD IN RCRD: CPT | Mod: CPTII,S$GLB,, | Performed by: DERMATOLOGY

## 2023-04-10 PROCEDURE — 1160F RVW MEDS BY RX/DR IN RCRD: CPT | Mod: CPTII,S$GLB,, | Performed by: DERMATOLOGY

## 2023-04-10 RX ORDER — TRETINOIN 0.5 MG/G
CREAM TOPICAL
Qty: 45 G | Refills: 5 | Status: SHIPPED | OUTPATIENT
Start: 2023-04-10

## 2023-04-10 RX ORDER — TRETINOIN 0.5 MG/G
CREAM TOPICAL
Qty: 45 G | Refills: 5 | Status: SHIPPED | OUTPATIENT
Start: 2023-04-10 | End: 2023-04-10 | Stop reason: SDUPTHER

## 2023-04-10 NOTE — PROGRESS NOTES
Subjective:      Patient ID:  Mercedes Laura is a 63 y.o. female who presents for   Chief Complaint   Patient presents with    Skin Check     UBSC     LOV 10/7/22- AK, Benign Nevi, Lentigo    Patient here today for UBSC  C/O nasal tip, frozen at last visit, resolved    Derm Hx:  Denies Phx of NMSC  Fhx of MM    Current Outpatient Medications:   ·  acyclovir 5% (ZOVIRAX) 5 % Crea, Apply topically 5 (five) times daily., Disp: 5 g, Rfl: prn  ·  b complex vitamins capsule, Take 1 capsule by mouth once daily., Disp: , Rfl:   ·  biotin 10 mg Tab, Take 100 mg by mouth once daily. , Disp: , Rfl:   ·  calcium carbonate (TUMS) 200 mg calcium (500 mg) chewable tablet, Take 1 tablet by mouth daily as needed for Heartburn., Disp: , Rfl:   ·  cetirizine (ZYRTEC) 10 MG tablet, Take 10 mg by mouth daily as needed for Allergies or Rhinitis. , Disp: , Rfl:   ·  econazole nitrate 1 % cream, Apply to inner thighs and buttocks BID PRN flare, Disp: 85 g, Rfl: 2  ·  enzymes,digestive (DIGESTIVE ENZYMES ORAL), Take by mouth., Disp: , Rfl:   ·  estradioL (ESTRACE) 0.01 % (0.1 mg/gram) vaginal cream, Place 1 g vaginally every Monday and Thursday., Disp: 42.5 g, Rfl: 1  ·  lactobacillus combination no.4 (PROBIOTIC) 3 billion cell Cap, Take 1 application by mouth 2 (two) times a day., Disp: , Rfl:   ·  Lactobacillus rhamnosus GG (CULTURELLE) 10 billion cell capsule, Take 1 capsule by mouth once daily., Disp: , Rfl:   ·  loratadine (CLARITIN) 10 mg tablet, Take 10 mg by mouth daily as needed for Allergies., Disp: , Rfl:   ·  mometasone (NASONEX) 50 mcg/actuation nasal spray, 2 sprays by Nasal route daily as needed (rhinitis)., Disp: 17 g, Rfl: prn  ·  multivitamin (THERAGRAN) per tablet, Take 1 tablet by mouth once daily., Disp: , Rfl:   ·  olmesartan-hydrochlorothiazide (BENICAR HCT) 40-12.5 mg Tab, Take 1 tablet by mouth once daily., Disp: 90 tablet, Rfl: 3  ·  potassium chloride (MICRO-K) 10 MEQ CpSR, Take 1 capsule (10 mEq total) by mouth  once daily., Disp: 90 capsule, Rfl: 3  ·  RABEprazole (ACIPHEX) 20 mg tablet, Take 1 tablet (20 mg total) by mouth once daily., Disp: 90 tablet, Rfl: 3  ·  RABEprazole (ACIPHEX) 20 mg tablet, Take 1 tablet (20 mg total) by mouth once daily., Disp: 90 tablet, Rfl: 3  ·  tumeric-ging-olive-oreg-capryl 100 mg-150 mg- 50 mg-150 mg Cap, Take by mouth., Disp: , Rfl:   ·  vit C,D-Jv-rfpez-lutein-zeaxan (PRESERVISION AREDS-2) 250-90-40-1 mg Cap, Take by mouth., Disp: , Rfl:   ·  vitamin D (VITAMIN D3) 1000 units Tab, Take 2 Units by mouth once daily. , Disp: , Rfl:        Review of Systems   Constitutional:  Negative for fever, chills and fatigue.   Respiratory:  Negative for cough and shortness of breath.    Skin:  Positive for dry skin, daily sunscreen use and activity-related sunscreen use. Negative for itching and rash.     Objective:   Physical Exam   Constitutional: She appears well-developed and well-nourished. No distress.   Neurological: She is alert and oriented to person, place, and time. She is not disoriented.   Psychiatric: She has a normal mood and affect.   Skin:   Areas Examined (abnormalities noted in diagram):   Scalp / Hair Palpated and Inspected  Head / Face Inspection Performed  Neck Inspection Performed  Chest / Axilla Inspection Performed  Abdomen Inspection Performed  Back Inspection Performed  RUE Inspected  LUE Inspection Performed  Nails and Digits Inspection Performed                       Diagram Legend     Erythematous scaling macule/papule c/w actinic keratosis       Vascular papule c/w angioma      Pigmented verrucoid papule/plaque c/w seborrheic keratosis      Yellow umbilicated papule c/w sebaceous hyperplasia      Irregularly shaped tan macule c/w lentigo     1-2 mm smooth white papules consistent with Milia      Movable subcutaneous cyst with punctum c/w epidermal inclusion cyst      Subcutaneous movable cyst c/w pilar cyst      Firm pink to brown papule c/w dermatofibroma       Pedunculated fleshy papule(s) c/w skin tag(s)      Evenly pigmented macule c/w junctional nevus     Mildly variegated pigmented, slightly irregular-bordered macule c/w mildly atypical nevus      Flesh colored to evenly pigmented papule c/w intradermal nevus       Pink pearly papule/plaque c/w basal cell carcinoma      Erythematous hyperkeratotic cursted plaque c/w SCC      Surgical scar with no sign of skin cancer recurrence      Open and closed comedones      Inflammatory papules and pustules      Verrucoid papule consistent consistent with wart     Erythematous eczematous patches and plaques     Dystrophic onycholytic nail with subungual debris c/w onychomycosis     Umbilicated papule    Erythematous-base heme-crusted tan verrucoid plaque consistent with inflamed seborrheic keratosis     Erythematous Silvery Scaling Plaque c/w Psoriasis     See annotation      Assessment / Plan:        Acne vulgaris  -     tretinoin (RETIN-A) 0.05 % cream; Thin film to entire face at bedtime  Dispense: 45 g; Refill: 5  Comedonal with mild inflammatory component  Handout in AVS    Solar lentigo  This is a benign hyperpigmented sun induced lesion. Daily sun protection will reduce the number of new lesions. Treatment of these benign lesions are considered cosmetic.    Multiple benign nevi  Careful dermoscopy evaluation of nevi performed with none identified as needing biopsy today  Monitor for new mole or moles that are becoming bigger, darker, irritated, or developing irregular borders.     Seborrheic keratoses  These are benign inherited growths without a malignant potential. Reassurance given to patient. No treatment is necessary.     Sebaceous hyperplasia of face  This is a common condition representing benign enlargement of the sebaceous lobule. It typically occurs in adulthood. Reassurance given to patient.     Actinic skin damage  Patient instructed in importance in daily broad spectrum sun protection of at least spf 30.  Mineral sunscreen ingredients preferred (Zinc +/- Titanium) and can be found OTC.   Recommend Elta MD for daily use on face and neck.  Patient encouraged to wear hat for all outdoor exposure.   Also discussed sun avoidance and use of protective clothing.             Follow up if symptoms worsen or fail to improve.

## 2023-04-10 NOTE — PATIENT INSTRUCTIONS
RETINOIDS     Your doctor has prescribed a topical retinoid for your skin.  A retinoid is a derivative of Vitamin A used to treat a variety of skin conditions including acne, keratoses, uneven pigmentation from sun damage, fine lines and wrinkles, enlarged oil glands, and enlarged pores.      HOW DO THEY WORK?   Retinoids increase skin cell turnover from the normal 30 days to 5-6 days, thereby minimizing clogged pores, the initiating factor in acne. Retinoids can also promote repair of DNA in cells damaged by the gale, even out skin pigmentation and prevent development of pre-cancers. They can shrink oil glands and minimize appearance of pores.  These effects cannot be appreciated unless the medication is used consistently!    HOW DO I USE A RETINOID?   Wash your face with a mild cleanser (purpose, vanicream facial cleanser, cetaphil gentle cleanser), then towel dry gently. Apply a thin film to the entire face (a green-pea size amount should suffice) at night. A gentle non medicated moisturizer (cerave pm) may be applied before the retinoid to improve tolerability.    WHAT IF MY SKIN APPEARS RED, DRY, AND PEELING?   Retinoids cause the top layer of your skin to shed, giving an appearance of dry skin. In fact, new healthy skin cells are replacing older damaged cells on the surface. This usually occurs in the first few weeks as the skin is adjusting to the new medication. It is reasonable to use the medication every other night or even every 2 nights until your skin adjusts.   You can apply a moisturizer throughout the day as needed. Retinoids come in a variety of strength and vehicles and your doctor can find one best for you. IF you cannot tolerate prescription strength retinoids, over the counter products with retinol may be beneficial (Olay proX, MARYURI deep wrinkle cream, Green cream)    WILL MY SKIN BE MORE SENSITIVE TO THE SUN?   You will need to use a sunscreen with SPF 30+ daily. As retinoids thin the outermost  dead layer of the skin, they make the skin more sensitive to UV rays. However, remember that over time, retinoids actually make the skin thicker by enhancing collagen deposition, which protects the skin from sun damage.    WHEN WILL I SEE RESULTS?   If using for acne, you should see improvement in 6-8 weeks; acne may appear to flare in the initial weeks of treatment: don't be alarmed and KEEP USING THE MEDICATION. This is normal and will lead to improved skin if you stick with it. Likewise, DO NOT STOP using once your acne improves; this treatment will PREVENT to appearance of new acne lesions.   If using for anti-aging and dyspigmentation, you may begin to see results in 3 months, but most effects are visible after 6 months of CONSISTENT USE.    Remember that retinoids should not be used if you are pregnant.  Discontinue use 1 week prior to waxing, as skin is more likely to tear.

## 2023-04-11 ENCOUNTER — LAB VISIT (OUTPATIENT)
Dept: LAB | Facility: HOSPITAL | Age: 63
End: 2023-04-11
Attending: ORTHOPAEDIC SURGERY
Payer: COMMERCIAL

## 2023-04-11 DIAGNOSIS — M17.10 ARTHRITIS OF KNEE: ICD-10-CM

## 2023-04-11 PROCEDURE — 36415 COLL VENOUS BLD VENIPUNCTURE: CPT | Performed by: ORTHOPAEDIC SURGERY

## 2023-04-11 PROCEDURE — 86353 LYMPHOCYTE TRANSFORMATION: CPT | Mod: 91 | Performed by: ORTHOPAEDIC SURGERY

## 2023-05-01 LAB — METAL SENSITIVITY TESTING, METALS ONLY: NORMAL

## 2023-05-01 NOTE — PROGRESS NOTES
Results noted. Pt was not highly reactive to any of the metals tested.  Could proceed with standard knee replacement.

## 2023-05-04 ENCOUNTER — TELEPHONE (OUTPATIENT)
Dept: ORTHOPEDICS | Facility: CLINIC | Age: 63
End: 2023-05-04
Payer: COMMERCIAL

## 2023-05-04 NOTE — TELEPHONE ENCOUNTER
Called and spoke to patient.  Informed her that metal sensitivity results were reviewed by Dr. Whitman and that we can proceed with our normal TKA implant.  She understood and will call us back to schedule a follow up to discuss in more detail when she is ready to pursue having her TKA.       Asa

## 2023-08-02 ENCOUNTER — OFFICE VISIT (OUTPATIENT)
Dept: FAMILY MEDICINE | Facility: CLINIC | Age: 63
End: 2023-08-02
Payer: COMMERCIAL

## 2023-08-02 DIAGNOSIS — I10 GOOD HYPERTENSION CONTROL: ICD-10-CM

## 2023-08-02 DIAGNOSIS — E78.5 HYPERLIPIDEMIA, UNSPECIFIED HYPERLIPIDEMIA TYPE: ICD-10-CM

## 2023-08-02 DIAGNOSIS — K29.70 GASTRITIS, PRESENCE OF BLEEDING UNSPECIFIED, UNSPECIFIED CHRONICITY, UNSPECIFIED GASTRITIS TYPE: Primary | ICD-10-CM

## 2023-08-02 DIAGNOSIS — Z12.31 OTHER SCREENING MAMMOGRAM: ICD-10-CM

## 2023-08-02 DIAGNOSIS — K21.9 GASTROESOPHAGEAL REFLUX DISEASE, UNSPECIFIED WHETHER ESOPHAGITIS PRESENT: ICD-10-CM

## 2023-08-02 DIAGNOSIS — J30.1 SEASONAL ALLERGIC RHINITIS DUE TO POLLEN: ICD-10-CM

## 2023-08-02 PROCEDURE — 99999 PR PBB SHADOW E&M-EST. PATIENT-LVL II: ICD-10-PCS | Mod: PBBFAC,,, | Performed by: FAMILY MEDICINE

## 2023-08-02 PROCEDURE — 1159F PR MEDICATION LIST DOCUMENTED IN MEDICAL RECORD: ICD-10-PCS | Mod: CPTII,S$GLB,, | Performed by: FAMILY MEDICINE

## 2023-08-02 PROCEDURE — 1159F MED LIST DOCD IN RCRD: CPT | Mod: CPTII,S$GLB,, | Performed by: FAMILY MEDICINE

## 2023-08-02 PROCEDURE — 99214 OFFICE O/P EST MOD 30 MIN: CPT | Mod: S$GLB,,, | Performed by: FAMILY MEDICINE

## 2023-08-02 PROCEDURE — 1160F RVW MEDS BY RX/DR IN RCRD: CPT | Mod: CPTII,S$GLB,, | Performed by: FAMILY MEDICINE

## 2023-08-02 PROCEDURE — 1160F PR REVIEW ALL MEDS BY PRESCRIBER/CLIN PHARMACIST DOCUMENTED: ICD-10-PCS | Mod: CPTII,S$GLB,, | Performed by: FAMILY MEDICINE

## 2023-08-02 PROCEDURE — 99214 PR OFFICE/OUTPT VISIT, EST, LEVL IV, 30-39 MIN: ICD-10-PCS | Mod: S$GLB,,, | Performed by: FAMILY MEDICINE

## 2023-08-02 PROCEDURE — 99999 PR PBB SHADOW E&M-EST. PATIENT-LVL II: CPT | Mod: PBBFAC,,, | Performed by: FAMILY MEDICINE

## 2023-08-02 RX ORDER — DOXYCYCLINE HYCLATE 100 MG
100 TABLET ORAL 2 TIMES DAILY
COMMUNITY

## 2023-08-02 RX ORDER — RABEPRAZOLE SODIUM 20 MG/1
20 TABLET, DELAYED RELEASE ORAL DAILY
Qty: 90 TABLET | Refills: 3 | COMMUNITY
Start: 2023-08-02 | End: 2023-11-24 | Stop reason: SDUPTHER

## 2023-08-02 RX ORDER — SUCRALFATE 1 G/10ML
1 SUSPENSION ORAL
Qty: 414 ML | Refills: 0 | Status: SHIPPED | OUTPATIENT
Start: 2023-08-02

## 2023-08-02 NOTE — PROGRESS NOTES
Subjective:       Patient ID: Mercedes Laura is a 63 y.o. female.    Chief Complaint: Gastroesophageal Reflux    HPI  Review of Systems   Constitutional:  Positive for fatigue. Negative for chills and fever.   HENT:  Positive for congestion, postnasal drip, rhinorrhea, sneezing and sore throat. Negative for ear discharge, ear pain and sinus pressure.    Eyes:  Positive for redness and itching.   Respiratory:  Positive for cough. Negative for shortness of breath and wheezing.    Gastrointestinal:  Positive for diarrhea and nausea.       Patient Active Problem List   Diagnosis    Arthritis    Pedal edema    Hyperglycemia    Vitamin D deficiency    Hiatal hernia with gastroesophageal reflux    Panic attacks    Good hypertension control    Anxiety    GERD (gastroesophageal reflux disease)    Cardiovascular risk factor, ASCVD 10-year risk 1.9%, 2015    Venous reflux    Raygoza's neuroma of third interspace of left foot    Hx of colonic polyps    Severe obesity (BMI 35.0-39.9) with comorbidity    Hyperlipidemia     Patient is here for a problem visit    C/o uri sx. Allergy flare x 2 weeks.  Heartburn and dyspepsia.  Zantac and carafate helped in past. Has mild nausea. Seen in UC.    Doxycycline, rocephin and cortisone shot.  Now has mild diarrhea. Feels ear stuffiness. Using pataday for allergy eye.  Mometasone NS and claritin for allergies. Ent dr. Schultz.  Lots of stress at home. 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         Reviewed labs 10/2022  mammo 7/2021 neg     Prediabetes A1c 5.8 -diet controlled     GI Dr. Stinson h/o colon polyps -colonoscopy 2021 on 5 year surveillance  Objective:      Physical Exam  Vitals and nursing note reviewed.   Constitutional:       Appearance: She is well-developed.   HENT:      Right Ear: Tympanic membrane and ear canal normal.      Left Ear: Tympanic membrane and ear canal normal.      Nose: Mucosal edema and  rhinorrhea present.      Right Sinus: No maxillary sinus tenderness or frontal sinus tenderness.      Left Sinus: No maxillary sinus tenderness or frontal sinus tenderness.      Mouth/Throat:      Pharynx: Posterior oropharyngeal erythema present. No oropharyngeal exudate.      Tonsils: No tonsillar abscesses.   Cardiovascular:      Rate and Rhythm: Normal rate and regular rhythm.      Heart sounds: Normal heart sounds.   Pulmonary:      Effort: Pulmonary effort is normal.      Breath sounds: Normal breath sounds.   Skin:     General: Skin is warm and dry.         Assessment:       1. Gastritis, presence of bleeding unspecified, unspecified chronicity, unspecified gastritis type    2. Good hypertension control    3. Hyperlipidemia, unspecified hyperlipidemia type    4. Gastroesophageal reflux disease, unspecified whether esophagitis present    5. Seasonal allergic rhinitis due to pollen        Plan:       1. Gastritis, presence of bleeding unspecified, unspecified chronicity, unspecified gastritis type  add  - sucralfate (CARAFATE) 100 mg/mL suspension; Take 10 mLs (1 g total) by mouth 4 (four) times daily before meals and nightly.  Dispense: 414 mL; Refill: 0    2. Good hypertension control  Controlled on current medications.  Continue current medications.      3. Hyperlipidemia, unspecified hyperlipidemia type  Stable condition.  Continue current medications.  Will adjust based on lab findings or if condition changes.      4. Gastroesophageal reflux disease, unspecified whether esophagitis present  Cont aciphex bid until sx improve then resume daily    5. Seasonal allergic rhinitis due to pollen  Recommend otc non-sedating antihistamine such as Loratadine and/or steroid nasal spray such as Flonase as directed and as needed.  Please return to clinic if symptoms persist after these interventions.  Cont current regimen        Reeval as scheduled

## 2023-09-09 ENCOUNTER — LAB VISIT (OUTPATIENT)
Dept: LAB | Facility: HOSPITAL | Age: 63
End: 2023-09-09
Attending: FAMILY MEDICINE
Payer: COMMERCIAL

## 2023-09-09 DIAGNOSIS — R73.9 HYPERGLYCEMIA: ICD-10-CM

## 2023-09-09 DIAGNOSIS — E78.5 HYPERLIPIDEMIA, UNSPECIFIED HYPERLIPIDEMIA TYPE: ICD-10-CM

## 2023-09-09 DIAGNOSIS — E55.9 VITAMIN D DEFICIENCY: ICD-10-CM

## 2023-09-09 LAB
25(OH)D3+25(OH)D2 SERPL-MCNC: 61 NG/ML (ref 30–96)
ALBUMIN SERPL BCP-MCNC: 4 G/DL (ref 3.5–5.2)
ALP SERPL-CCNC: 56 U/L (ref 55–135)
ALT SERPL W/O P-5'-P-CCNC: 15 U/L (ref 10–44)
ANION GAP SERPL CALC-SCNC: 9 MMOL/L (ref 8–16)
AST SERPL-CCNC: 17 U/L (ref 10–40)
BASOPHILS # BLD AUTO: 0.02 K/UL (ref 0–0.2)
BASOPHILS NFR BLD: 0.4 % (ref 0–1.9)
BILIRUB SERPL-MCNC: 0.6 MG/DL (ref 0.1–1)
BUN SERPL-MCNC: 18 MG/DL (ref 8–23)
CALCIUM SERPL-MCNC: 9.7 MG/DL (ref 8.7–10.5)
CHLORIDE SERPL-SCNC: 107 MMOL/L (ref 95–110)
CHOLEST SERPL-MCNC: 203 MG/DL (ref 120–199)
CHOLEST/HDLC SERPL: 4.1 {RATIO} (ref 2–5)
CO2 SERPL-SCNC: 26 MMOL/L (ref 23–29)
CREAT SERPL-MCNC: 0.7 MG/DL (ref 0.5–1.4)
DIFFERENTIAL METHOD: NORMAL
EOSINOPHIL # BLD AUTO: 0.1 K/UL (ref 0–0.5)
EOSINOPHIL NFR BLD: 1.9 % (ref 0–8)
ERYTHROCYTE [DISTWIDTH] IN BLOOD BY AUTOMATED COUNT: 13.3 % (ref 11.5–14.5)
EST. GFR  (NO RACE VARIABLE): >60 ML/MIN/1.73 M^2
ESTIMATED AVG GLUCOSE: 117 MG/DL (ref 68–131)
GLUCOSE SERPL-MCNC: 90 MG/DL (ref 70–110)
HBA1C MFR BLD: 5.7 % (ref 4–5.6)
HCT VFR BLD AUTO: 40.3 % (ref 37–48.5)
HDLC SERPL-MCNC: 50 MG/DL (ref 40–75)
HDLC SERPL: 24.6 % (ref 20–50)
HGB BLD-MCNC: 12.9 G/DL (ref 12–16)
IMM GRANULOCYTES # BLD AUTO: 0.01 K/UL (ref 0–0.04)
IMM GRANULOCYTES NFR BLD AUTO: 0.2 % (ref 0–0.5)
LDLC SERPL CALC-MCNC: 135 MG/DL (ref 63–159)
LYMPHOCYTES # BLD AUTO: 2 K/UL (ref 1–4.8)
LYMPHOCYTES NFR BLD: 38.8 % (ref 18–48)
MCH RBC QN AUTO: 28.7 PG (ref 27–31)
MCHC RBC AUTO-ENTMCNC: 32 G/DL (ref 32–36)
MCV RBC AUTO: 90 FL (ref 82–98)
MONOCYTES # BLD AUTO: 0.4 K/UL (ref 0.3–1)
MONOCYTES NFR BLD: 7.4 % (ref 4–15)
NEUTROPHILS # BLD AUTO: 2.6 K/UL (ref 1.8–7.7)
NEUTROPHILS NFR BLD: 51.3 % (ref 38–73)
NONHDLC SERPL-MCNC: 153 MG/DL
NRBC BLD-RTO: 0 /100 WBC
PLATELET # BLD AUTO: 284 K/UL (ref 150–450)
PMV BLD AUTO: 9.7 FL (ref 9.2–12.9)
POTASSIUM SERPL-SCNC: 4.3 MMOL/L (ref 3.5–5.1)
PROT SERPL-MCNC: 7.2 G/DL (ref 6–8.4)
RBC # BLD AUTO: 4.49 M/UL (ref 4–5.4)
SODIUM SERPL-SCNC: 142 MMOL/L (ref 136–145)
TRIGL SERPL-MCNC: 90 MG/DL (ref 30–150)
WBC # BLD AUTO: 5.15 K/UL (ref 3.9–12.7)

## 2023-09-09 PROCEDURE — 80061 LIPID PANEL: CPT | Performed by: FAMILY MEDICINE

## 2023-09-09 PROCEDURE — 83036 HEMOGLOBIN GLYCOSYLATED A1C: CPT | Performed by: FAMILY MEDICINE

## 2023-09-09 PROCEDURE — 82306 VITAMIN D 25 HYDROXY: CPT | Performed by: FAMILY MEDICINE

## 2023-09-09 PROCEDURE — 85025 COMPLETE CBC W/AUTO DIFF WBC: CPT | Performed by: FAMILY MEDICINE

## 2023-09-09 PROCEDURE — 36415 COLL VENOUS BLD VENIPUNCTURE: CPT | Mod: PO | Performed by: FAMILY MEDICINE

## 2023-09-09 PROCEDURE — 80053 COMPREHEN METABOLIC PANEL: CPT | Performed by: FAMILY MEDICINE

## 2023-09-18 ENCOUNTER — OFFICE VISIT (OUTPATIENT)
Dept: ORTHOPEDICS | Facility: CLINIC | Age: 63
End: 2023-09-18
Payer: COMMERCIAL

## 2023-09-18 VITALS — HEIGHT: 66 IN | WEIGHT: 219 LBS | BODY MASS INDEX: 35.2 KG/M2 | RESPIRATION RATE: 18 BRPM

## 2023-09-18 DIAGNOSIS — M17.10 ARTHRITIS OF KNEE: Primary | ICD-10-CM

## 2023-09-18 PROCEDURE — 3008F PR BODY MASS INDEX (BMI) DOCUMENTED: ICD-10-PCS | Mod: CPTII,S$GLB,, | Performed by: ORTHOPAEDIC SURGERY

## 2023-09-18 PROCEDURE — 3044F HG A1C LEVEL LT 7.0%: CPT | Mod: CPTII,S$GLB,, | Performed by: ORTHOPAEDIC SURGERY

## 2023-09-18 PROCEDURE — 99213 PR OFFICE/OUTPT VISIT, EST, LEVL III, 20-29 MIN: ICD-10-PCS | Mod: 25,S$GLB,, | Performed by: ORTHOPAEDIC SURGERY

## 2023-09-18 PROCEDURE — 1159F PR MEDICATION LIST DOCUMENTED IN MEDICAL RECORD: ICD-10-PCS | Mod: CPTII,S$GLB,, | Performed by: ORTHOPAEDIC SURGERY

## 2023-09-18 PROCEDURE — 20610 LARGE JOINT ASPIRATION/INJECTION: BILATERAL KNEE: ICD-10-PCS | Mod: 50,S$GLB,, | Performed by: ORTHOPAEDIC SURGERY

## 2023-09-18 PROCEDURE — 3044F PR MOST RECENT HEMOGLOBIN A1C LEVEL <7.0%: ICD-10-PCS | Mod: CPTII,S$GLB,, | Performed by: ORTHOPAEDIC SURGERY

## 2023-09-18 PROCEDURE — 99999 PR PBB SHADOW E&M-EST. PATIENT-LVL IV: CPT | Mod: PBBFAC,,, | Performed by: ORTHOPAEDIC SURGERY

## 2023-09-18 PROCEDURE — 3008F BODY MASS INDEX DOCD: CPT | Mod: CPTII,S$GLB,, | Performed by: ORTHOPAEDIC SURGERY

## 2023-09-18 PROCEDURE — 1159F MED LIST DOCD IN RCRD: CPT | Mod: CPTII,S$GLB,, | Performed by: ORTHOPAEDIC SURGERY

## 2023-09-18 PROCEDURE — 99999 PR PBB SHADOW E&M-EST. PATIENT-LVL IV: ICD-10-PCS | Mod: PBBFAC,,, | Performed by: ORTHOPAEDIC SURGERY

## 2023-09-18 PROCEDURE — 20610 DRAIN/INJ JOINT/BURSA W/O US: CPT | Mod: 50,S$GLB,, | Performed by: ORTHOPAEDIC SURGERY

## 2023-09-18 PROCEDURE — 99213 OFFICE O/P EST LOW 20 MIN: CPT | Mod: 25,S$GLB,, | Performed by: ORTHOPAEDIC SURGERY

## 2023-09-18 RX ORDER — TRIAMCINOLONE ACETONIDE 40 MG/ML
40 INJECTION, SUSPENSION INTRA-ARTICULAR; INTRAMUSCULAR
Status: DISCONTINUED | OUTPATIENT
Start: 2023-09-18 | End: 2023-09-18 | Stop reason: HOSPADM

## 2023-09-18 RX ORDER — MELOXICAM 7.5 MG/1
7.5 TABLET ORAL DAILY
Qty: 30 TABLET | Refills: 0 | Status: SHIPPED | OUTPATIENT
Start: 2023-09-18

## 2023-09-18 RX ADMIN — TRIAMCINOLONE ACETONIDE 40 MG: 40 INJECTION, SUSPENSION INTRA-ARTICULAR; INTRAMUSCULAR at 04:09

## 2023-09-18 NOTE — PROGRESS NOTES
Past Medical History:   Diagnosis Date    Anxiety     this came on since lap kadeem sgy    Arthritis     Colon polyp     Deviated septum     GERD (gastroesophageal reflux disease)     Hypertension     PAC (premature atrial contraction)     Plantar fasciitis     PMB (postmenopausal bleeding) 05/2018    Seasonal allergies     Sinus disorder     Uterine leiomyoma 05/2018    Varicose veins of both lower extremities        Past Surgical History:   Procedure Laterality Date    BREAST BIOPSY Left 2015    benign    CHOLECYSTECTOMY  6-1-2015    Dr Ashraf    COLONOSCOPY N/A 11/23/2015    Procedure: COLONOSCOPY;  Surgeon: Theo Stinson MD;  Location: [facility] ENDO;  Service: Endoscopy;  Laterality: N/A; repeat in 5 years for surveillance    COLONOSCOPY N/A 7/14/2021    Procedure: COLONOSCOPY;  Surgeon: Theo Stinson MD;  Location: [facility] ENDO;  Service: Endoscopy;  Laterality: N/A;    CYSTOSCOPY N/A 6/7/2018    Procedure: CYSTOSCOPY;  Surgeon: Anaya Caldera MD;  Location: [facility] OR;  Service: OB/GYN;  Laterality: N/A;    ESOPHAGOGASTRODUODENOSCOPY N/A 3/4/2019    Procedure: EGD (ESOPHAGOGASTRODUODENOSCOPY);  Surgeon: Theo Stinson MD;  Location: [facility];  Service: Endoscopy;  Laterality: N/A;    HYSTERECTOMY      KNEE ARTHROSCOPY W/ MENISCECTOMY  11/15/2000    right     LAPAROSCOPIC SALPINGO-OOPHORECTOMY Bilateral 6/7/2018    Procedure: CAMGREAS-[id number]-[id number];  Surgeon: Anaya Caldera MD;  Location: [facility] OR;  Service: OB/GYN;  Laterality: Bilateral;    LAPAROSCOPIC TOTAL HYSTERECTOMY N/A 6/7/2018    Procedure: HYSTERECTOMY-TOTAL LAPAROSCOPIC (TLH);  Surgeon: Anaya Caldera MD;  Location: [facility] OR;  Service: OB/GYN;  Laterality: N/A;    OOPHORECTOMY      UPPER GASTROINTESTINAL ENDOSCOPY  06/22/2015    Dr. Stinson       Current Outpatient Medications   Medication Sig    acyclovir 5% (ZOVIRAX) 5 % Crea Apply topically 5 (five) times daily.    b complex vitamins capsule Take 1 capsule by mouth  once daily.    biotin 10 mg Tab Take 100 mg by mouth once daily.     calcium carbonate (TUMS) 200 mg calcium (500 mg) chewable tablet Take 1 tablet by mouth daily as needed for Heartburn.    cetirizine (ZYRTEC) 10 MG tablet Take 10 mg by mouth daily as needed for Allergies or Rhinitis.     doxycycline (VIBRA-TABS) 100 MG tablet Take 100 mg by mouth 2 (two) times daily.    econazole nitrate 1 % cream Apply to inner thighs and buttocks BID PRN flare    enzymes,digestive (DIGESTIVE ENZYMES ORAL) Take by mouth.    estradioL (ESTRACE) 0.01 % (0.1 mg/gram) vaginal cream Place 1 g vaginally every Monday and Thursday.    lactobacillus combination no.4 (PROBIOTIC) 3 billion cell Cap Take 1 application by mouth 2 (two) times a day.    Lactobacillus rhamnosus GG (CULTURELLE) 10 billion cell capsule Take 1 capsule by mouth once daily.    loratadine (CLARITIN) 10 mg tablet Take 10 mg by mouth daily as needed for Allergies.    mometasone (NASONEX) 50 mcg/actuation nasal spray 2 sprays by Nasal route daily as needed (rhinitis).    multivitamin (THERAGRAN) per tablet Take 1 tablet by mouth once daily.    olmesartan-hydrochlorothiazide (BENICAR HCT) 40-12.5 mg Tab Take 1 tablet by mouth once daily.    potassium chloride (MICRO-K) 10 MEQ CpSR Take 1 capsule (10 mEq total) by mouth once daily.    RABEprazole (ACIPHEX) 20 mg tablet Take 1 tablet (20 mg total) by mouth once daily.    sucralfate (CARAFATE) 100 mg/mL suspension Take 10 mLs (1 g total) by mouth 4 (four) times daily before meals and nightly.    tretinoin (RETIN-A) 0.05 % cream Thin film to entire face at bedtime    tumeric-ging-olive-oreg-capryl 100 mg-150 mg- 50 mg-150 mg Cap Take by mouth.    vit C,Q-Vl-nexkk-lutein-zeaxan (PRESERVISION AREDS-2) 250-90-40-1 mg Cap Take by mouth.    vitamin D (VITAMIN D3) 1000 units Tab Take 2 Units by mouth once daily.      No current facility-administered medications for this visit.       Review of patient's allergies indicates:    Allergen Reactions    Augmentin [amoxicillin-pot clavulanate] Nausea And Vomiting     Gi upset    Bentyl [dicyclomine] Other (See Comments)     Worsening anxiety    Naprosyn [naproxen]     Omeprazole      Other reaction(s): ABDOMINAL PAIN    Adhesive Rash    Lexapro [escitalopram oxalate] Anxiety     Bad dreams, feeling of doom, sweats    Sulfa (sulfonamide antibiotics) Rash     Other reaction(s): measle like rash       Family History   Problem Relation Age of Onset    Hypertension Mother     Arthritis Mother     Hypertension Father     Cancer Father         melanoma    Melanoma Father     COPD Father     Heart disease Father         chf    Diverticulitis Maternal Grandmother     Arthritis Maternal Aunt     Diabetes Maternal Aunt     Hypertension Maternal Aunt     Arthritis Maternal Uncle     Breast cancer Neg Hx     Ovarian cancer Neg Hx     Psoriasis Neg Hx     Lupus Neg Hx     Eczema Neg Hx     Colon cancer Neg Hx     Colon polyps Neg Hx     Crohn's disease Neg Hx     Esophageal cancer Neg Hx     Stomach cancer Neg Hx        Social History     Socioeconomic History    Marital status:    Tobacco Use    Smoking status: Never    Smokeless tobacco: Never   Substance and Sexual Activity    Alcohol use: Yes     Comment: rarely / few times per yr    Drug use: No    Sexual activity: Yes     Partners: Male     Birth control/protection: None, Post-menopausal     Social Determinants of Health     Physical Activity: Insufficiently Active (9/1/2020)    Exercise Vital Sign     Days of Exercise per Week: 7 days     Minutes of Exercise per Session: 10 min   Stress: No Stress Concern Present (9/1/2020)    [employer] [place] [place] [employer] - Occupational Stress Questionnaire     Feeling of Stress : Not at all   Social Connections: Unknown (9/1/2020)    Social Connection and Isolation Panel [NHANES]     Frequency of Communication with Friends and Family: Patient refused     Frequency of Social Gatherings with  Friends and Family: Patient refused     Active Member of Clubs or Organizations: Patient refused     Attends Club or Organization Meetings: Patient refused     Marital Status:        Chief Complaint:   No chief complaint on file.      History of present illness: Is a 63-year-old female seen in consultation for Dr. Llamas.  Patient's had knee pain for several years now.  Symptoms are stable and mild to moderate.  Pain with running and squatting.  Patient had prior right meniscus surgery several years ago.   Pain is laterally located.  She has been in bio skin brace is previously.  Right knee is worse than left.   Worse over the last few months.  Last treatment was back in March.      Answers for HPI/ROS submitted by the patient on 4/22/2018   Leg pain  unexpected weight change: No  appetite change : No  sleep disturbance: No  IMMUNOCOMPROMISED: No  nervous/ anxious: No  dysphoric mood: No  rash: No  visual disturbance: No  eye redness: No  eye pain: No  ear pain: No  tinnitus: No  hearing loss: No  sinus pressure : No  nosebleeds: No  enviro allergies: No  food allergies: No  cough: No  shortness of breath: No  sweating: No  dysuria: No  frequency: No  difficulty urinating: No  hematuria: No  painful intercourse: No  chest pain: No  palpitations: Yes  nausea: No  vomiting: No  diarrhea: No  blood in stool: No  constipation: No  headaches: No  numbness: No  seizures: No  joint swelling: No  myalgia: Yes  weakness: No  back pain: No  Pain Chronicity: recurrent  History of trauma: Yes  Onset: more than 1 year ago  Frequency: intermittently  Progression since onset: unchanged  injury location: at home  pain- numeric: 1/10  pain location: left knee, right knee  pain quality: aching, dull  Radiating Pain: No  Aggravating factors: activity, bending, cold, exercise  fever: No  inability to bear weight: No  itching: No  joint locking: No  limited range of motion: No  stiffness: No  tingling: No  Treatments tried:  cold, heat, exercise, movement, OTC pain meds, rest  physical therapy: not tried  Improvement on treatment: moderate        Physical Examination:    Vital Signs:    There were no vitals filed for this visit.    There is no height or weight on file to calculate BMI.    This a well-developed, well nourished patient in no acute distress.  They are alert and oriented and cooperative to examination.  Pt. walks without an antalgic gait.      Examination of bilateral knees shows no rashes or erythema. There are no masses ecchymosis or effusion. Patient has full range of motion from 0-130°. Patient is moderately tender to palpation over lateral joint line and nontender to palpation over the medial joint line.Knee is stable to varus and valgus stress. 5 out of 5 motor strength. Palpable distal pulses. Intact light touch sensation. Negative Patellofemoral crepitus      X-rays: X-rays of the right knee is  reviewed which show severe valgus narrowing of both knees      Assessment:: Bilateral valgus knee arthritis    Plan:  I reviewed the findings with her today.  We talked about knee replacements.  We talked in detail about possibly doing her left knee.  We will likely do this pretty soon.  I agreed to inject both knees today.  Patient is also concerned about metal allergies.  We will also give her Mobic 7.5 milligrams.  She has a history of gastritis so will take it sparingly with a PPI as well.          This note was created using  voice recognition software that occasionally misinterpreted phrases or words.    Consult note is delivered via Epic messaging service.

## 2023-09-18 NOTE — PROCEDURES
Large Joint Aspiration/Injection: bilateral knee    Date/Time: 9/18/2023 4:00 PM    Performed by: Chu Whitman MD  Authorized by: Chu Whitman MD    Consent Done?:  Yes (Verbal)  Indications:  Pain  Site marked: the procedure site was marked    Timeout: prior to procedure the correct patient, procedure, and site was verified    Prep: patient was prepped and draped in usual sterile fashion      Local anesthesia used?: Yes    Anesthesia:  Local infiltration  Local anesthetic: Ropivicaine.  Anesthetic total (ml):  3      Details:  Needle Size:  22 G  Ultrasonic Guidance for needle placement?: No    Approach:  Anterolateral  Location:  Knee  Laterality:  Bilateral  Site:  Bilateral knee  Medications (Right):  40 mg triamcinolone acetonide 40 mg/mL  Medications (Left):  40 mg triamcinolone acetonide 40 mg/mL  Patient tolerance:  Patient tolerated the procedure well with no immediate complications

## 2023-09-21 NOTE — MR AVS SNAPSHOT
[facility]  [address]  [place] [address]  Phone: [phone]  Fax: [phone]                  Mercedes Laura   3/28/2017 4:00 PM   Office Visit    Description:  Female : 1960   Provider:  Tory Llamas MD   Department:  [place] [facility]           Reason for Visit     Establish Care     Cough     Nasal Congestion           Diagnoses this Visit        Comments    Good hypertension control    -  Primary     Vitamin D deficiency         Acute non-recurrent maxillary sinusitis         Uncontrolled hypertension                To Do List           Future Appointments        Provider Department Dept Phone    2017 10:00 AM [facility] [phone]      Goals (5 Years of Data)     None      Follow-Up and Disposition     Return in about 6 months (around 2017).       These Medications        Disp Refills Start End    azithromycin (Z-BECKY) 250 MG tablet 6 tablet 0 3/28/2017 2017    Take 2 tablets by mouth on day 1; Take 1 tablet by mouth on days 2-5    [facility]: [id number] [place] - [address] Ph #: [phone]       potassium chloride (MICRO-K) 10 MEQ CpSR 90 capsule 4 3/28/2017     Take 1 capsule (10 mEq total) by mouth once daily. - Oral    Pharmacy: [facility] - [address] Ph #: [phone]       olmesartan-hydrochlorothiazide (BENICAR HCT) 40-12.5 mg Tab 90 tablet 4 3/28/2017     Take 1 tablet by mouth once daily. - Oral    Pharmacy: [facility] - [address] Ph #: [phone]         [facility] On Call     [facility] On Call Nurse Care Line -  Assistance  Registered nurses in the [facility] On Call Center provide clinical advisement, health education, appointment booking, and other advisory services.  Call for this free service at [phone].             Medications           Message regarding Medications     Verify the  changes and/or additions to your medication regime listed below are the same as discussed with your clinician today.  If any of these changes or additions are incorrect, please notify your healthcare provider.        START taking these NEW medications        Refills    azithromycin (Z-BECKY) 250 MG tablet 0    Sig: Take 2 tablets by mouth on day 1; Take 1 tablet by mouth on days 2-5    Class: Normal      These medications were administered today        Dose Freq    methylPREDNISolone acetate injection 60 mg 60 mg Clinic/HOD 1 time    Sig: Inject 1.5 mLs (60 mg total) into the muscle one time.    Class: Normal    Route: Intramuscular      STOP taking these medications     FLUARIX QUAD 6559-8733, PF, 60 mcg (15 mcg x 4)/0.5 mL Syrg     famotidine (PEPCID) 40 MG tablet Take 40 mg by mouth 2 (two) times daily.    medroxyPROGESTERone (PROVERA) 10 MG tablet Take 1 tablet (10 mg total) by mouth once daily.           Verify that the below list of medications is an accurate representation of the medications you are currently taking.  If none reported, the list may be blank. If incorrect, please contact your healthcare provider. Carry this list with you in case of emergency.           Current Medications     cetirizine (ZYRTEC) 10 MG tablet Take 10 mg by mouth once daily.    mometasone (NASONEX) 50 mcg/actuation nasal spray 2 sprays by Nasal route once daily.    multivitamin (THERAGRAN) per tablet Take 1 tablet by mouth once daily.    nystatin-triamcinolone (MYCOLOG II) cream     olmesartan-hydrochlorothiazide (BENICAR HCT) 40-12.5 mg Tab Take 1 tablet by mouth once daily.    potassium chloride (MICRO-K) 10 MEQ CpSR Take 1 capsule (10 mEq total) by mouth once daily.    azithromycin (Z-BECKY) 250 MG tablet Take 2 tablets by mouth on day 1; Take 1 tablet by mouth on days 2-5           Clinical Reference Information           Your Vitals Were     BP Pulse Temp Height Weight BMI    126/80 (BP Location: Right arm, Patient Position:  "Sitting, BP Method: Automatic) 62 98.2 °F (36.8 °C) (Oral) 5' 6" (1.676 m) 104.8 kg (231 lb 0.7 oz) 37.29 kg/m2      Blood Pressure          Most Recent Value    BP  126/80      Allergies as of 3/28/2017     Augmentin [Amoxicillin-pot Clavulanate]    Bentyl [Dicyclomine]    Naprosyn [Naproxen]    Omeprazole    Adhesive    Lexapro [Escitalopram Oxalate]    Sulfa (Sulfonamide Antibiotics)      Immunizations Administered on Date of Encounter - 3/28/2017     None      Orders Placed During Today's Visit     Future Labs/Procedures Expected by Expires    Comprehensive metabolic panel  3/28/2017 5/27/2018    Lipid panel  3/28/2017 5/27/2018    Magnesium  3/28/2017 5/27/2018    Vitamin D  3/28/2017 5/27/2018      Instructions      Walking for Fitness  Fitness walking has something for everyone, even people who are already fit. Walking is one of the safest ways to condition your body aerobically. It can boost energy, help you lose weight, and reduce stress.    Physical benefits  · Walking strengthens your heart and lungs, and tones your muscles.  · When walking, your feet land with less impact than in other sports. This reduces chances of muscle, bone, and joint injury.  · Regular walking improves your cholesterol levels and lowers your risk of heart disease. And it helps you control your blood sugar if you have diabetes.  · Walking is a weight-bearing activity, which helps maintain bone density. This can help prevent osteoporosis.  Personal rewards  · Taking walks can help you relax and manage stress. And fitness walking may make you feel better about yourself.  · Walking can help you sleep better at night and make you less likely to be depressed.  · Regular walking may help maintain your memory as you get older.  · Walking is a great way to spend extra time with friends and family members. Be sure to invite your dog along!  Q&A about fitness walking  Q: Will walking keep me fit?  A: Yes. Regular walking at the right pace " gives you all the benefits of other aerobic activities, such as jogging and swimming.  Q: Will walking help me lose weight and keep it off?  A: Yes. Per mile, walking can burn as many calories as jogging. Your health care provider can help work walking into your weight-loss plan.  Q: Is walking safe for my health?  A: Yes. Walking is safe if you have high blood pressure, diabetes, heart disease, or other conditions. Talk to your health care provider before you start.  Date Last Reviewed: 5/9/2015 © 2000-2016 [employer]. [address] [place] [id number]. All rights reserved. This information is not intended as a substitute for professional medical care. Always follow your healthcare professional's instructions.        Weight Management: Getting Started  Healthy bodies come in all shapes and sizes. Not all bodies are made to be thin. For some people, a healthy weight is higher than the average weight listed on weight charts. Your healthcare provider can help you decide on a healthy weight for you.    Reasons to lose weight  Losing weight can help with some health problems, such as high blood pressure, heart disease, diabetes, sleep apnea, and arthritis. You may also feel more energy.  Set your long-term goal  Your goal doesn't even have to be a specific weight. You may decide on a fitness goal (such as being able to walk 10 miles a week), or a health goal (such as lowering your blood pressure). Choose a goal that is measurable and reasonable, so you know when you've reached it. A goal of reaching a BMI of less than 25 is not always reasonable (or possible).   Make an action plan  Habits dont change overnight. Setting your goals too high can leave you feeling discouraged if you cant reach them. Be realistic. Choose one or two small changes you can make now. Set an action plan for how you are going to make these changes. When you can stick to this plan, keep making a few more small changes.  Taking small steps will help you stay on the path to success.  Track your progress  Write down your goals. Then, keep a daily record of your progress. Write down what you eat and how active you are. This record lets you look back on how much youve done. It may also help when youre feeling frustrated. Reward yourself for success. Even if you dont reach every goal, give yourself credit for what you do get done.  Get support  Encouragement from others can help make losing weight easier. Ask your family members and friends for support. They may even want to join you. Also look to your healthcare provider, registered dietitian, and  for help. Your local hospital can give you more information about nutrition, exercise, and weight loss.  Date Last Reviewed: 1/31/2016  © 3421-1184 [employer]. [address] [place] [address]. All rights reserved. This information is not intended as a substitute for professional medical care. Always follow your healthcare professional's instructions.             Language Assistance Services     ATTENTION: Language assistance services are available, free of charge. Please call [phone].      ATENCIÓN: Si habla español, tiene a gale disposición servicios gratuitos de asistencia lingüística. Llame al [phone].     NELSON Ý: N?u b?n nói Ti?ng Vi?t, có các d?ch v? h? tr? ngôn ng? mi?n phí dành cho b?n. G?i s? [phone].         [facility] complies with applicable Federal civil rights laws and does not discriminate on the basis of race, color, national origin, age, disability, or sex.         No

## 2023-10-05 ENCOUNTER — OFFICE VISIT (OUTPATIENT)
Dept: FAMILY MEDICINE | Facility: CLINIC | Age: 63
End: 2023-10-05
Payer: COMMERCIAL

## 2023-10-05 VITALS
WEIGHT: 212.5 LBS | SYSTOLIC BLOOD PRESSURE: 128 MMHG | RESPIRATION RATE: 16 BRPM | HEIGHT: 66 IN | HEART RATE: 67 BPM | BODY MASS INDEX: 34.15 KG/M2 | TEMPERATURE: 98 F | DIASTOLIC BLOOD PRESSURE: 70 MMHG | OXYGEN SATURATION: 96 %

## 2023-10-05 DIAGNOSIS — I10 ESSENTIAL HYPERTENSION: ICD-10-CM

## 2023-10-05 DIAGNOSIS — E55.9 VITAMIN D DEFICIENCY: ICD-10-CM

## 2023-10-05 DIAGNOSIS — R73.9 HYPERGLYCEMIA: ICD-10-CM

## 2023-10-05 DIAGNOSIS — J30.1 SEASONAL ALLERGIC RHINITIS DUE TO POLLEN: ICD-10-CM

## 2023-10-05 DIAGNOSIS — Z00.00 ANNUAL PHYSICAL EXAM: Primary | ICD-10-CM

## 2023-10-05 DIAGNOSIS — K21.9 GASTROESOPHAGEAL REFLUX DISEASE, UNSPECIFIED WHETHER ESOPHAGITIS PRESENT: ICD-10-CM

## 2023-10-05 DIAGNOSIS — Z12.31 ENCOUNTER FOR SCREENING MAMMOGRAM FOR MALIGNANT NEOPLASM OF BREAST: ICD-10-CM

## 2023-10-05 DIAGNOSIS — I10 GOOD HYPERTENSION CONTROL: ICD-10-CM

## 2023-10-05 DIAGNOSIS — E78.5 HYPERLIPIDEMIA, UNSPECIFIED HYPERLIPIDEMIA TYPE: ICD-10-CM

## 2023-10-05 DIAGNOSIS — E66.01 SEVERE OBESITY (BMI 35.0-39.9) WITH COMORBIDITY: ICD-10-CM

## 2023-10-05 PROCEDURE — 1160F RVW MEDS BY RX/DR IN RCRD: CPT | Mod: CPTII,S$GLB,, | Performed by: FAMILY MEDICINE

## 2023-10-05 PROCEDURE — 99396 PR PREVENTIVE VISIT,EST,40-64: ICD-10-PCS | Mod: S$GLB,,, | Performed by: FAMILY MEDICINE

## 2023-10-05 PROCEDURE — 3044F PR MOST RECENT HEMOGLOBIN A1C LEVEL <7.0%: ICD-10-PCS | Mod: CPTII,S$GLB,, | Performed by: FAMILY MEDICINE

## 2023-10-05 PROCEDURE — 3078F DIAST BP <80 MM HG: CPT | Mod: CPTII,S$GLB,, | Performed by: FAMILY MEDICINE

## 2023-10-05 PROCEDURE — 3078F PR MOST RECENT DIASTOLIC BLOOD PRESSURE < 80 MM HG: ICD-10-PCS | Mod: CPTII,S$GLB,, | Performed by: FAMILY MEDICINE

## 2023-10-05 PROCEDURE — 3074F PR MOST RECENT SYSTOLIC BLOOD PRESSURE < 130 MM HG: ICD-10-PCS | Mod: CPTII,S$GLB,, | Performed by: FAMILY MEDICINE

## 2023-10-05 PROCEDURE — 1159F PR MEDICATION LIST DOCUMENTED IN MEDICAL RECORD: ICD-10-PCS | Mod: CPTII,S$GLB,, | Performed by: FAMILY MEDICINE

## 2023-10-05 PROCEDURE — 3044F HG A1C LEVEL LT 7.0%: CPT | Mod: CPTII,S$GLB,, | Performed by: FAMILY MEDICINE

## 2023-10-05 PROCEDURE — 99999 PR PBB SHADOW E&M-EST. PATIENT-LVL III: ICD-10-PCS | Mod: PBBFAC,,, | Performed by: FAMILY MEDICINE

## 2023-10-05 PROCEDURE — 99999 PR PBB SHADOW E&M-EST. PATIENT-LVL III: CPT | Mod: PBBFAC,,, | Performed by: FAMILY MEDICINE

## 2023-10-05 PROCEDURE — 1160F PR REVIEW ALL MEDS BY PRESCRIBER/CLIN PHARMACIST DOCUMENTED: ICD-10-PCS | Mod: CPTII,S$GLB,, | Performed by: FAMILY MEDICINE

## 2023-10-05 PROCEDURE — 3008F BODY MASS INDEX DOCD: CPT | Mod: CPTII,S$GLB,, | Performed by: FAMILY MEDICINE

## 2023-10-05 PROCEDURE — 1159F MED LIST DOCD IN RCRD: CPT | Mod: CPTII,S$GLB,, | Performed by: FAMILY MEDICINE

## 2023-10-05 PROCEDURE — 99396 PREV VISIT EST AGE 40-64: CPT | Mod: S$GLB,,, | Performed by: FAMILY MEDICINE

## 2023-10-05 PROCEDURE — 3074F SYST BP LT 130 MM HG: CPT | Mod: CPTII,S$GLB,, | Performed by: FAMILY MEDICINE

## 2023-10-05 PROCEDURE — 3008F PR BODY MASS INDEX (BMI) DOCUMENTED: ICD-10-PCS | Mod: CPTII,S$GLB,, | Performed by: FAMILY MEDICINE

## 2023-10-05 RX ORDER — OLMESARTAN MEDOXOMIL AND HYDROCHLOROTHIAZIDE 40/12.5 40; 12.5 MG/1; MG/1
1 TABLET ORAL DAILY
Qty: 90 TABLET | Refills: 3 | Status: SHIPPED | OUTPATIENT
Start: 2023-10-05

## 2023-10-05 RX ORDER — POTASSIUM CHLORIDE 750 MG/1
10 CAPSULE, EXTENDED RELEASE ORAL DAILY
Qty: 90 CAPSULE | Refills: 3 | Status: SHIPPED | OUTPATIENT
Start: 2023-10-05

## 2023-10-05 NOTE — PROGRESS NOTES
Subjective:       Patient ID: Mercedes Laura is a 63 y.o. female.    Chief Complaint: Annual Exam    HPI  Review of Systems   Constitutional:  Negative for fatigue and unexpected weight change.   Respiratory:  Negative for chest tightness and shortness of breath.    Cardiovascular:  Negative for chest pain, palpitations and leg swelling.   Gastrointestinal:  Negative for abdominal pain.   Musculoskeletal:  Negative for arthralgias.   Neurological:  Negative for dizziness, syncope, light-headedness and headaches.       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    Reviewed labs 9/23    Ortho Dr. Whitman knee arthritis    Derm Dr. Crane acne vulgaris      Heartburn and dyspepsia.  Zantac and carafate helped in past. Has mild nausea.     Allergic rhinitis -Feels ear stuffiness. Using pataday for allergy eye.  Mometasone NS and claritin for allergies. Ent dr. Schultz.      Lots of stress at home. 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           Prediabetes A1c 5.7 -diet controlled     GI Dr. Stinson h/o colon polyps -colonoscopy 2021 on 5 year surveillance  Objective:      Physical Exam  Vitals and nursing note reviewed.   Constitutional:       Appearance: She is well-developed.   Cardiovascular:      Rate and Rhythm: Normal rate and regular rhythm.      Heart sounds: Normal heart sounds.   Pulmonary:      Effort: Pulmonary effort is normal.      Breath sounds:  Normal breath sounds.   Skin:     General: Skin is warm and dry.   Neurological:      Mental Status: She is alert and oriented to person, place, and time.         Assessment:       1. Annual physical exam    2. Good hypertension control    3. Hyperlipidemia, unspecified hyperlipidemia type    4. Seasonal allergic rhinitis due to pollen    5. Vitamin D deficiency    6. Hyperglycemia    7. Severe obesity (BMI 35.0-39.9) with comorbidity    8. Gastroesophageal reflux disease, unspecified whether esophagitis present    9. Encounter for screening mammogram for malignant neoplasm of breast    10. Essential hypertension        Plan:         1. Annual physical exam  Discussed health maintenance guidelines appropriate for age.      - CBC Auto Differential; Future  - Comprehensive Metabolic Panel; Future  - Hemoglobin A1C; Future  - Lipid Panel; Future    2. Good hypertension control  Controlled on current medications.  Continue current medications.    - olmesartan-hydrochlorothiazide (BENICAR HCT) 40-12.5 mg Tab; Take 1 tablet by mouth once daily.  Dispense: 90 tablet; Refill: 3    3. Hyperlipidemia, unspecified hyperlipidemia type  I recommend a heart healthy diet rich in fiber, fresh vegetables and fruit and low in saturated fats (fried foods, red meat, etc.).  I also recommend regular exercise including a minimum of 150 minutes of cardio exercise per week and 2-30 minute workouts of strength training like light weights, yoga, pilates, etc.  You should work toward a body mass index of < 25.      - Lipid Panel; Future    4. Seasonal allergic rhinitis due to pollen  Controlled on current medications.  Continue current medications.      5. Vitamin D deficiency  Normal. Continue 7578-7734 units otc d3 a day    6. Hyperglycemia   Your blood sugar is borderline high.  This means you are at risk for developing type 2 diabetes mellitus.  To lessen your risk you should exercise regularly, avoid excess carbohydrates and work toward  "a body mass index of less than 25.        7. Severe obesity (BMI 35.0-39.9) with comorbidity  Counseled patient on his ideal body weight, health consequences of being obese and current recommendations including weekly exercise and a heart healthy diet.  Current BMI is:Estimated body mass index is 34.3 kg/m² as calculated from the following:    Height as of this encounter: 5' 6" (1.676 m).    Weight as of this encounter: 96.4 kg (212 lb 8.4 oz)..  Patient is aware that ideal BMI < 25 or Weight in (lb) to have BMI = 25: 154.6.      8. Gastroesophageal reflux disease, unspecified whether esophagitis present  Counseled patient on prevention of reflux with changes in diet and behavior.  I recommended avoidance of greasy and spicy foods, caffeine and eating within 3 hours of bedtime.  I counseled the patient to avoid eating large meals and instead eating more frequent small meals.  I also recommended weight loss and elevation of the head of the bed by 6 inches.  If symptoms persist after these changes medication may be needed to control GERD.      9. Encounter for screening mammogram for malignant neoplasm of breast  Screen and treat as indicated:    - Mammo Digital Screening Bilat w/ Jacob; Future    10. Essential hypertension  Controlled on current medications.  Continue current medications.    - potassium chloride (MICRO-K) 10 MEQ CpSR; Take 1 capsule (10 mEq total) by mouth once daily.  Dispense: 90 capsule; Refill: 3        Time spent with patient: 20 minutes    Patient with be reevaluated in 1 year or sooner prn    Greater than 50% of this visit was spent counseling as described in above documentation:Yes  "

## 2023-11-24 ENCOUNTER — PATIENT MESSAGE (OUTPATIENT)
Dept: FAMILY MEDICINE | Facility: CLINIC | Age: 63
End: 2023-11-24
Payer: COMMERCIAL

## 2023-11-24 NOTE — TELEPHONE ENCOUNTER
No care due was identified.  [facility] Embedded Care Due Messages. Reference number: [id number].   11/24/2023 3:09:22 PM CST

## 2023-11-26 RX ORDER — MOMETASONE FUROATE 50 UG/1
2 SPRAY, METERED NASAL DAILY PRN
Qty: 17 G | Status: SHIPPED | OUTPATIENT
Start: 2023-11-26

## 2023-11-26 RX ORDER — RABEPRAZOLE SODIUM 20 MG/1
20 TABLET, DELAYED RELEASE ORAL DAILY
Qty: 90 TABLET | Refills: 3 | Status: SHIPPED | OUTPATIENT
Start: 2023-11-26

## 2023-12-18 DIAGNOSIS — R93.89 ABNORMAL FINDINGS ON DIAGNOSTIC IMAGING OF OTHER SPECIFIED BODY STRUCTURES: ICD-10-CM

## 2023-12-18 DIAGNOSIS — R05.1 ACUTE COUGH: Primary | ICD-10-CM

## 2023-12-19 ENCOUNTER — HOSPITAL ENCOUNTER (OUTPATIENT)
Dept: RADIOLOGY | Facility: HOSPITAL | Age: 63
Discharge: HOME | End: 2023-12-19
Attending: EMERGENCY MEDICINE
Payer: COMMERCIAL

## 2023-12-19 DIAGNOSIS — R05.1 ACUTE COUGH: ICD-10-CM

## 2023-12-19 DIAGNOSIS — R93.89 ABNORMAL FINDINGS ON DIAGNOSTIC IMAGING OF OTHER SPECIFIED BODY STRUCTURES: ICD-10-CM

## 2023-12-19 PROCEDURE — 25500020 PHARM REV CODE 255

## 2023-12-19 PROCEDURE — 70491 CT SOFT TISSUE NECK W/DYE: CPT | Mod: TC

## 2023-12-19 RX ADMIN — IOHEXOL 100 ML: 350 INJECTION, SOLUTION INTRAVENOUS at 06:12

## 2024-02-29 ENCOUNTER — TELEPHONE (OUTPATIENT)
Dept: FAMILY MEDICINE | Facility: CLINIC | Age: 64
End: 2024-02-29
Payer: COMMERCIAL

## 2024-02-29 ENCOUNTER — OFFICE VISIT (OUTPATIENT)
Dept: ORTHOPEDICS | Facility: CLINIC | Age: 64
End: 2024-02-29
Payer: COMMERCIAL

## 2024-02-29 VITALS — WEIGHT: 212 LBS | HEIGHT: 66 IN | BODY MASS INDEX: 34.07 KG/M2

## 2024-02-29 DIAGNOSIS — M25.561 PAIN IN BOTH KNEES, UNSPECIFIED CHRONICITY: ICD-10-CM

## 2024-02-29 DIAGNOSIS — M17.10 ARTHRITIS OF KNEE: Primary | ICD-10-CM

## 2024-02-29 DIAGNOSIS — M25.562 PAIN IN BOTH KNEES, UNSPECIFIED CHRONICITY: ICD-10-CM

## 2024-02-29 PROCEDURE — 99214 OFFICE O/P EST MOD 30 MIN: CPT | Mod: 25,S$GLB,, | Performed by: ORTHOPAEDIC SURGERY

## 2024-02-29 PROCEDURE — 1160F RVW MEDS BY RX/DR IN RCRD: CPT | Mod: CPTII,S$GLB,, | Performed by: ORTHOPAEDIC SURGERY

## 2024-02-29 PROCEDURE — 99999 PR PBB SHADOW E&M-EST. PATIENT-LVL III: CPT | Mod: PBBFAC,,, | Performed by: ORTHOPAEDIC SURGERY

## 2024-02-29 PROCEDURE — 1159F MED LIST DOCD IN RCRD: CPT | Mod: CPTII,S$GLB,, | Performed by: ORTHOPAEDIC SURGERY

## 2024-02-29 PROCEDURE — 3008F BODY MASS INDEX DOCD: CPT | Mod: CPTII,S$GLB,, | Performed by: ORTHOPAEDIC SURGERY

## 2024-02-29 PROCEDURE — 20610 DRAIN/INJ JOINT/BURSA W/O US: CPT | Mod: 50,S$GLB,, | Performed by: ORTHOPAEDIC SURGERY

## 2024-02-29 RX ORDER — TRIAMCINOLONE ACETONIDE 40 MG/ML
40 INJECTION, SUSPENSION INTRA-ARTICULAR; INTRAMUSCULAR
Status: DISCONTINUED | OUTPATIENT
Start: 2024-02-29 | End: 2024-02-29 | Stop reason: HOSPADM

## 2024-02-29 RX ADMIN — TRIAMCINOLONE ACETONIDE 40 MG: 40 INJECTION, SUSPENSION INTRA-ARTICULAR; INTRAMUSCULAR at 03:02

## 2024-02-29 NOTE — TELEPHONE ENCOUNTER
----- Message from Joni Malik sent at 2/29/2024  4:36 PM CST -----  Regarding: pre op clearance  Patient was seen today by Dr. Whitman and consented for right total knee arthroplasty.  Please advise on pre op medical clearance for this patient.  Once clearance has been given we will contact the patient to schedule surgery.      Thank you,     Asa

## 2024-02-29 NOTE — TELEPHONE ENCOUNTER
Called patient to schedule surgery pre-op appointment in regards to a surgery clearance. No answer, left voicemail to return call.

## 2024-02-29 NOTE — PROCEDURES
Large Joint Aspiration/Injection: bilateral knee    Date/Time: 2/29/2024 3:45 PM    Performed by: Chu Whitman MD  Authorized by: Chu Whitman MD    Consent Done?:  Yes (Verbal)  Indications:  Pain  Site marked: the procedure site was marked    Timeout: prior to procedure the correct patient, procedure, and site was verified    Prep: patient was prepped and draped in usual sterile fashion      Local anesthesia used?: Yes    Anesthesia:  Local infiltration  Local anesthetic: Ropivicaine.  Anesthetic total (ml):  3      Details:  Needle Size:  22 G  Ultrasonic Guidance for needle placement?: No    Approach:  Anterolateral  Location:  Knee  Laterality:  Bilateral  Site:  Bilateral knee  Medications (Right):  40 mg triamcinolone acetonide 40 mg/mL  Medications (Left):  40 mg triamcinolone acetonide 40 mg/mL  Patient tolerance:  Patient tolerated the procedure well with no immediate complications

## 2024-04-01 ENCOUNTER — TELEPHONE (OUTPATIENT)
Dept: ORTHOPEDICS | Facility: CLINIC | Age: 64
End: 2024-04-01
Payer: COMMERCIAL

## 2024-04-01 DIAGNOSIS — N89.8 VAGINAL DRYNESS: ICD-10-CM

## 2024-04-01 NOTE — TELEPHONE ENCOUNTER
Called and left VM with patient to discuss.  Needs clearance from PCP.  Message in chart where they tried to reach out to schedule pre op appointment.  Asked patient to follow up with PCP for clearance then we can schedule surgery.     Asa

## 2024-04-01 NOTE — TELEPHONE ENCOUNTER
----- Message from Raven Gleason sent at 4/1/2024  9:42 AM CDT -----  Regarding: Pt Advice  Needs Medical Advice      Who Called:  Pt         Would the patient rather a call back or a response via [facility]? Call back    Best Call Back Number:  [phone]      Additional Information: [facility] is trying to schedule a surgery for this summer and wanting to know an updated statues on that. Would like a call from the office.   Please Advise - Thank you

## 2024-04-01 NOTE — TELEPHONE ENCOUNTER
No care due was identified.  [facility] Embedded Care Due Messages. Reference number: [id number].   4/01/2024 9:50:17 AM CDT

## 2024-04-02 ENCOUNTER — OFFICE VISIT (OUTPATIENT)
Dept: FAMILY MEDICINE | Facility: CLINIC | Age: 64
End: 2024-04-02
Payer: COMMERCIAL

## 2024-04-02 ENCOUNTER — HOSPITAL ENCOUNTER (OUTPATIENT)
Dept: RADIOLOGY | Facility: HOSPITAL | Age: 64
Discharge: HOME | End: 2024-04-02
Attending: NURSE PRACTITIONER
Payer: COMMERCIAL

## 2024-04-02 ENCOUNTER — LAB VISIT (OUTPATIENT)
Dept: LAB | Facility: HOSPITAL | Age: 64
End: 2024-04-02
Attending: NURSE PRACTITIONER
Payer: COMMERCIAL

## 2024-04-02 VITALS
OXYGEN SATURATION: 98 % | HEIGHT: 66 IN | WEIGHT: 224.19 LBS | DIASTOLIC BLOOD PRESSURE: 82 MMHG | HEART RATE: 54 BPM | TEMPERATURE: 98 F | BODY MASS INDEX: 36.03 KG/M2 | SYSTOLIC BLOOD PRESSURE: 140 MMHG

## 2024-04-02 DIAGNOSIS — Z76.0 MEDICATION REFILL: ICD-10-CM

## 2024-04-02 DIAGNOSIS — Z01.818 PRE-OP EVALUATION: ICD-10-CM

## 2024-04-02 DIAGNOSIS — Z01.818 PRE-OP EVALUATION: Primary | ICD-10-CM

## 2024-04-02 DIAGNOSIS — M17.10 ARTHRITIS OF KNEE: ICD-10-CM

## 2024-04-02 LAB
ALBUMIN SERPL BCP-MCNC: 4 G/DL (ref 3.5–5.2)
ALP SERPL-CCNC: 60 U/L (ref 55–135)
ALT SERPL W/O P-5'-P-CCNC: 20 U/L (ref 10–44)
ANION GAP SERPL CALC-SCNC: 10 MMOL/L (ref 8–16)
AST SERPL-CCNC: 17 U/L (ref 10–40)
BASOPHILS # BLD AUTO: 0.04 K/UL (ref 0–0.2)
BASOPHILS NFR BLD: 0.5 % (ref 0–1.9)
BILIRUB SERPL-MCNC: 0.3 MG/DL (ref 0.1–1)
BUN SERPL-MCNC: 16 MG/DL (ref 8–23)
CALCIUM SERPL-MCNC: 10.2 MG/DL (ref 8.7–10.5)
CHLORIDE SERPL-SCNC: 104 MMOL/L (ref 95–110)
CO2 SERPL-SCNC: 25 MMOL/L (ref 23–29)
CREAT SERPL-MCNC: 0.8 MG/DL (ref 0.5–1.4)
DIFFERENTIAL METHOD BLD: ABNORMAL
EOSINOPHIL # BLD AUTO: 0.2 K/UL (ref 0–0.5)
EOSINOPHIL NFR BLD: 2.2 % (ref 0–8)
ERYTHROCYTE [DISTWIDTH] IN BLOOD BY AUTOMATED COUNT: 13.1 % (ref 11.5–14.5)
EST. GFR  (NO RACE VARIABLE): >60 ML/MIN/1.73 M^2
GLUCOSE SERPL-MCNC: 84 MG/DL (ref 70–110)
HCT VFR BLD AUTO: 42.1 % (ref 37–48.5)
HGB BLD-MCNC: 13.4 G/DL (ref 12–16)
IMM GRANULOCYTES # BLD AUTO: 0.01 K/UL (ref 0–0.04)
IMM GRANULOCYTES NFR BLD AUTO: 0.1 % (ref 0–0.5)
LYMPHOCYTES # BLD AUTO: 3.5 K/UL (ref 1–4.8)
LYMPHOCYTES NFR BLD: 40.6 % (ref 18–48)
MCH RBC QN AUTO: 29.1 PG (ref 27–31)
MCHC RBC AUTO-ENTMCNC: 31.8 G/DL (ref 32–36)
MCV RBC AUTO: 91 FL (ref 82–98)
MONOCYTES # BLD AUTO: 0.6 K/UL (ref 0.3–1)
MONOCYTES NFR BLD: 6.8 % (ref 4–15)
NEUTROPHILS # BLD AUTO: 4.3 K/UL (ref 1.8–7.7)
NEUTROPHILS NFR BLD: 49.8 % (ref 38–73)
NRBC BLD-RTO: 0 /100 WBC
PLATELET # BLD AUTO: 300 K/UL (ref 150–450)
PMV BLD AUTO: 9.5 FL (ref 9.2–12.9)
POTASSIUM SERPL-SCNC: 3.6 MMOL/L (ref 3.5–5.1)
PROT SERPL-MCNC: 7.5 G/DL (ref 6–8.4)
RBC # BLD AUTO: 4.61 M/UL (ref 4–5.4)
SODIUM SERPL-SCNC: 139 MMOL/L (ref 136–145)
WBC # BLD AUTO: 8.63 K/UL (ref 3.9–12.7)

## 2024-04-02 PROCEDURE — 1159F MED LIST DOCD IN RCRD: CPT | Mod: CPTII,S$GLB,, | Performed by: NURSE PRACTITIONER

## 2024-04-02 PROCEDURE — 80053 COMPREHEN METABOLIC PANEL: CPT | Performed by: NURSE PRACTITIONER

## 2024-04-02 PROCEDURE — 3008F BODY MASS INDEX DOCD: CPT | Mod: CPTII,S$GLB,, | Performed by: NURSE PRACTITIONER

## 2024-04-02 PROCEDURE — 93010 ELECTROCARDIOGRAM REPORT: CPT | Mod: S$GLB,,, | Performed by: INTERNAL MEDICINE

## 2024-04-02 PROCEDURE — 71046 X-RAY EXAM CHEST 2 VIEWS: CPT | Mod: TC

## 2024-04-02 PROCEDURE — 1160F RVW MEDS BY RX/DR IN RCRD: CPT | Mod: CPTII,S$GLB,, | Performed by: NURSE PRACTITIONER

## 2024-04-02 PROCEDURE — 99999 PR PBB SHADOW E&M-EST. PATIENT-LVL V: CPT | Mod: PBBFAC,,, | Performed by: NURSE PRACTITIONER

## 2024-04-02 PROCEDURE — 71046 X-RAY EXAM CHEST 2 VIEWS: CPT | Mod: 26,,, | Performed by: RADIOLOGY

## 2024-04-02 PROCEDURE — 36415 COLL VENOUS BLD VENIPUNCTURE: CPT | Mod: PO | Performed by: NURSE PRACTITIONER

## 2024-04-02 PROCEDURE — 85025 COMPLETE CBC W/AUTO DIFF WBC: CPT | Performed by: NURSE PRACTITIONER

## 2024-04-02 PROCEDURE — 93005 ELECTROCARDIOGRAM TRACING: CPT | Mod: S$GLB,,, | Performed by: NURSE PRACTITIONER

## 2024-04-02 PROCEDURE — 3077F SYST BP >= 140 MM HG: CPT | Mod: CPTII,S$GLB,, | Performed by: NURSE PRACTITIONER

## 2024-04-02 PROCEDURE — 99213 OFFICE O/P EST LOW 20 MIN: CPT | Mod: S$GLB,,, | Performed by: NURSE PRACTITIONER

## 2024-04-02 PROCEDURE — 3079F DIAST BP 80-89 MM HG: CPT | Mod: CPTII,S$GLB,, | Performed by: NURSE PRACTITIONER

## 2024-04-02 RX ORDER — ESTRADIOL 0.1 MG/G
1 CREAM VAGINAL
Qty: 42.5 G | Refills: 1 | Status: SHIPPED | OUTPATIENT
Start: 2024-04-04 | End: 2025-04-04

## 2024-04-02 NOTE — PROGRESS NOTES
Patient ID: Mercedes Laura is a 64 y.o. female.    Chief Complaint: Pre-op Exam    Mercedes Laura is in the office     HPI  65 y/o female patient with medical problems listed below presents for pre op of right total knee arthroplasty which is not scheduled yet.   She requests refill of estradiol cream.   Denies smoking.   Denies hx of anesthetic complications.     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 , Disp: , Rfl:     calcium carbonate (TUMS) 200 mg calcium (500 mg) chewable tablet, Take 1 tablet by mouth daily as needed for Heartburn., Disp: , Rfl:     cetirizine (ZYRTEC) 10 MG tablet, Take 10 mg by mouth daily as needed for Allergies or Rhinitis. , Disp: , Rfl:     doxycycline (VIBRA-TABS) 100 MG tablet, Take 100 mg by mouth 2 (two) times daily., Disp: , Rfl:     econazole nitrate 1 % cream, Apply to inner thighs and buttocks BID PRN flare, Disp: 85 g, Rfl: 2    enzymes,digestive (DIGESTIVE ENZYMES ORAL), Take by mouth., Disp: , Rfl:     lactobacillus combination no.4 (PROBIOTIC) 3 billion cell Cap, Take 1 application by mouth 2 (two) times a day., Disp: , Rfl:     Lactobacillus rhamnosus GG (CULTURELLE) 10 billion cell capsule, Take 1 capsule by mouth once daily., Disp: , Rfl:     loratadine (CLARITIN) 10 mg  tablet, Take 10 mg by mouth daily as needed for Allergies., Disp: , Rfl:     meloxicam (MOBIC) 7.5 MG tablet, Take 1 tablet (7.5 mg total) by mouth once daily., Disp: 30 tablet, Rfl: 0    mometasone (NASONEX) 50 mcg/actuation nasal spray, 2 sprays by Nasal route daily as needed (rhinitis)., Disp: 17 g, Rfl: prn    multivitamin (THERAGRAN) per tablet, Take 1 tablet by mouth once daily., Disp: , Rfl:     olmesartan-hydrochlorothiazide (BENICAR HCT) 40-12.5 mg Tab, Take 1 tablet by mouth once daily., Disp: 90 tablet, Rfl: 3    potassium chloride (MICRO-K) 10 MEQ CpSR, Take 1 capsule (10 mEq total) by mouth once daily., Disp: 90 capsule, Rfl: 3    RABEprazole (ACIPHEX) 20 mg tablet, Take 1 tablet (20 mg total) by mouth once daily., Disp: 90 tablet, Rfl: 3    sucralfate (CARAFATE) 100 mg/mL suspension, Take 10 mLs (1 g total) by mouth 4 (four) times daily before meals and nightly., Disp: 414 mL, Rfl: 0    tretinoin (RETIN-A) 0.05 % cream, Thin film to entire face at bedtime, Disp: 45 g, Rfl: 5    tumeric-ging-olive-oreg-capryl 100 mg-150 mg- 50 mg-150 mg Cap, Take by mouth., Disp: , Rfl:     vit C,S-Ju-fgxvx-lutein-zeaxan (PRESERVISION AREDS-2) 250-90-40-1 mg Cap, Take by mouth., Disp: , Rfl:     vitamin D (VITAMIN D3) 1000 units Tab, Take 2 Units by mouth once daily. , Disp: , Rfl:     [START ON 4/4/2024] estradioL (ESTRACE) 0.01 % (0.1 mg/gram) vaginal cream, Place 1 g vaginally every Monday and Thursday., Disp: 42.5 g, Rfl: 1    The 10-year ASCVD risk score (Debby STERLING, et al., 2019) is: 6.9%    Values used to calculate the score:      Age: 64 years      Sex: Female      Is Non- : No      Diabetic: No      Tobacco smoker: No      Systolic Blood Pressure: 140 mmHg      Is BP treated: Yes      HDL Cholesterol: 50 mg/dL      Total Cholesterol: 203 mg/dL     Wt Readings from Last 3 Encounters:   04/02/24 101.7 kg (224 lb 3.3 oz)   02/29/24 96.2 kg (212 lb)   10/05/23 96.4 kg (212 lb 8.4 oz)     Temp  Readings from Last 3 Encounters:   04/02/24 97.9 °F (36.6 °C) (Temporal)   10/05/23 98.2 °F (36.8 °C) (Oral)   10/04/22 98.6 °F (37 °C) (Oral)     BP Readings from Last 3 Encounters:   04/02/24 (!) 140/82   10/05/23 128/70   10/04/22 126/70     Pulse Readings from Last 3 Encounters:   04/02/24 (!) 54   10/05/23 67   10/04/22 70     Resp Readings from Last 3 Encounters:   10/05/23 16   09/18/23 18   09/27/21 17     PF Readings from Last 3 Encounters:   No data found for PF     SpO2 Readings from Last 3 Encounters:   04/02/24 98%   10/05/23 96%   10/04/22 (!) 94%        Lab Results   Component Value Date    HGBA1C 5.7 (H) 09/09/2023    HGBA1C 5.8 (H) 10/01/2022    HGBA1C 5.8 (H) 10/02/2021     Lab Results   Component Value Date    LDLCALC 135.0 09/09/2023    CREATININE 0.7 12/19/2023     Review of Systems   Constitutional:  Negative for chills and fever.   Respiratory:  Negative for cough and shortness of breath.    Cardiovascular:  Negative for chest pain and palpitations.   Gastrointestinal:  Negative for abdominal pain.   Neurological:  Negative for dizziness and headaches.         Objective:      Physical Exam  Constitutional:       General: She is not in acute distress.     Appearance: Normal appearance.   HENT:      Head: Atraumatic.   Cardiovascular:      Rate and Rhythm: Normal rate and regular rhythm.      Pulses: Normal pulses.      Heart sounds: Normal heart sounds.   Pulmonary:      Effort: Pulmonary effort is normal.      Breath sounds: Normal breath sounds.   Abdominal:      General: Abdomen is flat. Bowel sounds are normal.      Palpations: Abdomen is soft.   Neurological:      Mental Status: She is oriented to person, place, and time.           Screening recommendations appropriate to age and health status were reviewed.    Pre-op evaluation  -     CBC Auto Differential; Future; Expected date: 04/02/2024  -     Comprehensive Metabolic Panel; Future; Expected date: 04/02/2024  -     EKG 12-lead  -      X-Ray Chest PA And Lateral; Future; Expected date: 04/02/2024    Medication refill  -     estradioL (ESTRACE) 0.01 % (0.1 mg/gram) vaginal cream; Place 1 g vaginally every Monday and Thursday.  Dispense: 42.5 g; Refill: 1    Arthritis of knee      RCRI risk factors include: no known RCRI risk factors. As such, per RCRI the risk of cardiac death, nonfatal myocardial infarction, or nonfatal cardiac arrest is 0.4% and the risk of myocardial infarction, pulmonary edema, ventricular fibrillation, primary cardiac arrest, or complete heart block is 0.5%.  Overall this patient can be considered intermediate risk for this intermediate risk procedure. No further cardiac testing is recommended at this time.     Patient denies any symptoms (as per HPI) concerning for undiagnosed lung disease including JAZIEL. Would not recommend obtaining sleep study, or PFTs at this time. Patient is a non-smoker. We discussed the benefits of early mobilization and deep breathing after surgery.      Screened patient for alcohol misuse, use of illicit drugs, and personal or family history of anesthetic complications or bleeding diathesis and no substantial concerns were identified.     All current medications were reviewed and at this time no changes to medications are recommended prior to surgery. Advised to hold aspirin for 7 days prior to the surgery.    I recommend use of standard pre-op and post-op precautions for this patient. In my opinion, she is medically optimized for this procedure, and can proceed without further evaluation.

## 2024-04-03 LAB
OHS QRS DURATION: 90 MS
OHS QTC CALCULATION: 410 MS

## 2024-04-03 RX ORDER — ESTRADIOL 0.1 MG/G
1 CREAM VAGINAL
Qty: 42.5 G | Refills: 1 | Status: SHIPPED | OUTPATIENT
Start: 2024-04-04 | End: 2025-04-04

## 2024-04-04 ENCOUNTER — TELEPHONE (OUTPATIENT)
Dept: FAMILY MEDICINE | Facility: CLINIC | Age: 64
End: 2024-04-04
Payer: COMMERCIAL

## 2024-04-04 ENCOUNTER — PATIENT MESSAGE (OUTPATIENT)
Dept: ORTHOPEDICS | Facility: CLINIC | Age: 64
End: 2024-04-04
Payer: COMMERCIAL

## 2024-04-04 ENCOUNTER — OFFICE VISIT (OUTPATIENT)
Dept: DERMATOLOGY | Facility: CLINIC | Age: 64
End: 2024-04-04
Payer: COMMERCIAL

## 2024-04-04 VITALS — HEIGHT: 66 IN | BODY MASS INDEX: 36.03 KG/M2 | WEIGHT: 224.19 LBS

## 2024-04-04 DIAGNOSIS — M17.11 PRIMARY OSTEOARTHRITIS OF RIGHT KNEE: Primary | ICD-10-CM

## 2024-04-04 DIAGNOSIS — L81.4 SOLAR LENTIGO: ICD-10-CM

## 2024-04-04 DIAGNOSIS — B07.8 COMMON WART: Primary | ICD-10-CM

## 2024-04-04 DIAGNOSIS — L73.8 SEBACEOUS HYPERPLASIA OF FACE: ICD-10-CM

## 2024-04-04 DIAGNOSIS — L82.1 SEBORRHEIC KERATOSES: ICD-10-CM

## 2024-04-04 DIAGNOSIS — L57.8 OTHER SKIN CHANGES DUE TO CHRONIC EXPOSURE TO NONIONIZING RADIATION: ICD-10-CM

## 2024-04-04 DIAGNOSIS — D22.9 MULTIPLE BENIGN NEVI: ICD-10-CM

## 2024-04-04 DIAGNOSIS — L70.0 ACNE VULGARIS: ICD-10-CM

## 2024-04-04 PROCEDURE — 1159F MED LIST DOCD IN RCRD: CPT | Mod: CPTII,S$GLB,, | Performed by: DERMATOLOGY

## 2024-04-04 PROCEDURE — 17110 DESTRUCTION B9 LES UP TO 14: CPT | Mod: S$GLB,,, | Performed by: DERMATOLOGY

## 2024-04-04 PROCEDURE — 99213 OFFICE O/P EST LOW 20 MIN: CPT | Mod: 25,S$GLB,, | Performed by: DERMATOLOGY

## 2024-04-04 PROCEDURE — 1160F RVW MEDS BY RX/DR IN RCRD: CPT | Mod: CPTII,S$GLB,, | Performed by: DERMATOLOGY

## 2024-04-04 PROCEDURE — 3008F BODY MASS INDEX DOCD: CPT | Mod: CPTII,S$GLB,, | Performed by: DERMATOLOGY

## 2024-04-04 RX ORDER — TRETINOIN 0.5 MG/G
CREAM TOPICAL
Qty: 45 G | Refills: 5 | Status: SHIPPED | OUTPATIENT
Start: 2024-04-04

## 2024-04-04 NOTE — PATIENT INSTRUCTIONS

## 2024-04-04 NOTE — PROGRESS NOTES
Subjective:      Patient ID:  Mercedes Laura is a 64 y.o. female who presents for   Chief Complaint   Patient presents with    Skin Check     TBSE     LOV 4/10/ 23 - Acne vulgaris, lentigo, nevi, SK, seb hyper     Patient here today for TBSC  No further concerns currently    Derm Hx:  Denies Phx of NMSC  Fhx of MM - Father    Current Outpatient Medications:   ·  acyclovir 5% (ZOVIRAX) 5 % Crea, Apply topically 5 (five) times daily., Disp: 5 g, Rfl: prn  ·  b complex vitamins capsule, Take 1 capsule by mouth once daily., Disp: , Rfl:   ·  biotin 10 mg Tab, Take 100 mg by mouth once daily. , Disp: , Rfl:   ·  calcium carbonate (TUMS) 200 mg calcium (500 mg) chewable tablet, Take 1 tablet by mouth daily as needed for Heartburn., Disp: , Rfl:   ·  cetirizine (ZYRTEC) 10 MG tablet, Take 10 mg by mouth daily as needed for Allergies or Rhinitis. , Disp: , Rfl:   ·  doxycycline (VIBRA-TABS) 100 MG tablet, Take 100 mg by mouth 2 (two) times daily., Disp: , Rfl:   ·  econazole nitrate 1 % cream, Apply to inner thighs and buttocks BID PRN flare, Disp: 85 g, Rfl: 2  ·  enzymes,digestive (DIGESTIVE ENZYMES ORAL), Take by mouth., Disp: , Rfl:   ·  estradioL (ESTRACE) 0.01 % (0.1 mg/gram) vaginal cream, Place 1 g vaginally every Monday and Thursday., Disp: 42.5 g, Rfl: 1  ·  estradioL (ESTRACE) 0.01 % (0.1 mg/gram) vaginal cream, Place 1 g vaginally every Monday and Thursday., Disp: 42.5 g, Rfl: 1  ·  lactobacillus combination no.4 (PROBIOTIC) 3 billion cell Cap, Take 1 application by mouth 2 (two) times a day., Disp: , Rfl:   ·  Lactobacillus rhamnosus GG (CULTURELLE) 10 billion cell capsule, Take 1 capsule by mouth once daily., Disp: , Rfl:   ·  loratadine (CLARITIN) 10 mg tablet, Take 10 mg by mouth daily as needed for Allergies., Disp: , Rfl:   ·  meloxicam (MOBIC) 7.5 MG tablet, Take 1 tablet (7.5 mg total) by mouth once daily., Disp: 30 tablet, Rfl: 0  ·  mometasone (NASONEX) 50 mcg/actuation nasal spray, 2 sprays by Nasal  route daily as needed (rhinitis)., Disp: 17 g, Rfl: prn  ·  multivitamin (THERAGRAN) per tablet, Take 1 tablet by mouth once daily., Disp: , Rfl:   ·  olmesartan-hydrochlorothiazide (BENICAR HCT) 40-12.5 mg Tab, Take 1 tablet by mouth once daily., Disp: 90 tablet, Rfl: 3  ·  potassium chloride (MICRO-K) 10 MEQ CpSR, Take 1 capsule (10 mEq total) by mouth once daily., Disp: 90 capsule, Rfl: 3  ·  RABEprazole (ACIPHEX) 20 mg tablet, Take 1 tablet (20 mg total) by mouth once daily., Disp: 90 tablet, Rfl: 3  ·  sucralfate (CARAFATE) 100 mg/mL suspension, Take 10 mLs (1 g total) by mouth 4 (four) times daily before meals and nightly., Disp: 414 mL, Rfl: 0  ·  tretinoin (RETIN-A) 0.05 % cream, Thin film to entire face at bedtime, Disp: 45 g, Rfl: 5  ·  tumeric-ging-olive-oreg-capryl 100 mg-150 mg- 50 mg-150 mg Cap, Take by mouth., Disp: , Rfl:   ·  vit C,R-Rz-xlfcz-lutein-zeaxan (PRESERVISION AREDS-2) 250-90-40-1 mg Cap, Take by mouth., Disp: , Rfl:   ·  vitamin D (VITAMIN D3) 1000 units Tab, Take 2 Units by mouth once daily. , Disp: , Rfl:         Review of Systems   Constitutional:  Negative for fever, chills and fatigue.   Respiratory:  Negative for cough and shortness of breath.    Skin:  Positive for dry skin, daily sunscreen use and activity-related sunscreen use. Negative for itching and rash.       Objective:   Physical Exam   Constitutional: She appears well-developed and well-nourished. No distress.   Neurological: She is alert and oriented to person, place, and time. She is not disoriented.   Psychiatric: She has a normal mood and affect.   Skin:   Areas Examined (abnormalities noted in diagram):   Scalp / Hair Palpated and Inspected  Head / Face Inspection Performed  Neck Inspection Performed  Chest / Axilla Inspection Performed  Abdomen Inspection Performed  Genitals / Buttocks / Groin Inspection Performed  Back Inspection Performed  RUE Inspected  LUE Inspection Performed  RLE Inspected  LLE Inspection  Performed  Nails and Digits Inspection Performed                         Diagram Legend     Erythematous scaling macule/papule c/w actinic keratosis       Vascular papule c/w angioma      Pigmented verrucoid papule/plaque c/w seborrheic keratosis      Yellow umbilicated papule c/w sebaceous hyperplasia      Irregularly shaped tan macule c/w lentigo     1-2 mm smooth white papules consistent with Milia      Movable subcutaneous cyst with punctum c/w epidermal inclusion cyst      Subcutaneous movable cyst c/w pilar cyst      Firm pink to brown papule c/w dermatofibroma      Pedunculated fleshy papule(s) c/w skin tag(s)      Evenly pigmented macule c/w junctional nevus     Mildly variegated pigmented, slightly irregular-bordered macule c/w mildly atypical nevus      Flesh colored to evenly pigmented papule c/w intradermal nevus       Pink pearly papule/plaque c/w basal cell carcinoma      Erythematous hyperkeratotic cursted plaque c/w SCC      Surgical scar with no sign of skin cancer recurrence      Open and closed comedones      Inflammatory papules and pustules      Verrucoid papule consistent consistent with wart     Erythematous eczematous patches and plaques     Dystrophic onycholytic nail with subungual debris c/w onychomycosis     Umbilicated papule    Erythematous-base heme-crusted tan verrucoid plaque consistent with inflamed seborrheic keratosis     Erythematous Silvery Scaling Plaque c/w Psoriasis     See annotation      Assessment / Plan:        Common wart  Cryosurgery procedure note:    Verbal consent from the patient is obtained. Liquid nitrogen cryosurgery is applied to 1 verruca with prior paring, as detailed in the physical exam, to produce a freeze injury. 3 consecutive freeze thaw cycles are applied to each verruca. The patient is aware that blisters (possibly blood blisters) may form.    Multiple benign nevi  Careful dermoscopy evaluation of nevi performed with none identified as needing biopsy  today  Monitor for new mole or moles that are becoming bigger, darker, irritated, or developing irregular borders.     Seborrheic keratoses  These are benign inherited growths without a malignant potential. Reassurance given to patient. No treatment is necessary.     Solar lentigo  This is a benign hyperpigmented sun induced lesion. Daily sun protection will reduce the number of new lesions. Treatment of these benign lesions are considered cosmetic.    Sebaceous hyperplasia of face  This is a common condition representing benign enlargement of the sebaceous lobule. It typically occurs in adulthood. Reassurance given to patient.     Other skin changes due to chronic exposure to nonionizing radiation  Patient instructed in importance in daily broad spectrum sun protection of at least spf 30. Mineral sunscreen ingredients preferred (Zinc +/- Titanium) and can be found OTC.   Recommend Elta MD for daily use on face and neck.  Patient encouraged to wear hat for all outdoor exposure.   Also discussed sun avoidance and use of protective clothing.             Follow up in about 4 weeks (around 5/2/2024), or if symptoms worsen or fail to improve.

## 2024-04-04 NOTE — TELEPHONE ENCOUNTER
----- Message from Ame Beltran sent at 4/4/2024  8:53 AM CDT -----  Contact: self  Type:  Patient Returning Call    Who Called:self  Who Left Message for Patient:Tiffany   Does the patient know what this is regarding?:yes, results  Would the patient rather a call back or a response via [facility]? call  Best Call Back Number:[phone] (home)     Additional Information: please advise and thank you.

## 2024-04-08 DIAGNOSIS — Z01.818 PRE-OP TESTING: ICD-10-CM

## 2024-04-08 DIAGNOSIS — M17.11 PRIMARY OSTEOARTHRITIS OF RIGHT KNEE: Primary | ICD-10-CM

## 2024-04-08 RX ORDER — SODIUM CHLORIDE 9 MG/ML
INJECTION, SOLUTION INTRAVENOUS CONTINUOUS
Status: CANCELLED | OUTPATIENT
Start: 2024-04-08

## 2024-04-08 RX ORDER — CLINDAMYCIN PHOSPHATE 900 MG/50ML
900 INJECTION, SOLUTION INTRAVENOUS
Status: CANCELLED | OUTPATIENT
Start: 2024-04-08

## 2024-04-08 RX ORDER — MUPIROCIN 20 MG/G
OINTMENT TOPICAL
Status: CANCELLED | OUTPATIENT
Start: 2024-04-08

## 2024-04-12 ENCOUNTER — PATIENT MESSAGE (OUTPATIENT)
Dept: ADMINISTRATIVE | Facility: HOSPITAL | Age: 64
End: 2024-04-12
Payer: COMMERCIAL

## 2024-05-08 ENCOUNTER — PATIENT MESSAGE (OUTPATIENT)
Dept: ORTHOPEDICS | Facility: CLINIC | Age: 64
End: 2024-05-08
Payer: COMMERCIAL

## 2024-05-16 ENCOUNTER — HOSPITAL ENCOUNTER (OUTPATIENT)
Dept: PREADMISSION TESTING | Facility: HOSPITAL | Age: 64
Discharge: HOME | End: 2024-05-16
Attending: ORTHOPAEDIC SURGERY
Payer: COMMERCIAL

## 2024-05-16 ENCOUNTER — HOSPITAL ENCOUNTER (OUTPATIENT)
Dept: RADIOLOGY | Facility: HOSPITAL | Age: 64
Discharge: HOME | End: 2024-05-16
Attending: ORTHOPAEDIC SURGERY
Payer: COMMERCIAL

## 2024-05-16 DIAGNOSIS — M17.11 PRIMARY OSTEOARTHRITIS OF RIGHT KNEE: ICD-10-CM

## 2024-05-16 DIAGNOSIS — M17.11 PRIMARY OSTEOARTHRITIS OF RIGHT KNEE: Primary | ICD-10-CM

## 2024-05-16 DIAGNOSIS — Z01.818 PRE-OP TESTING: ICD-10-CM

## 2024-05-16 DIAGNOSIS — Z01.818 PREOP TESTING: Primary | ICD-10-CM

## 2024-05-16 LAB — MRSA SCREEN BY PCR: NEGATIVE

## 2024-05-16 PROCEDURE — 73700 CT LOWER EXTREMITY W/O DYE: CPT | Mod: TC,RT

## 2024-05-16 PROCEDURE — 87641 MR-STAPH DNA AMP PROBE: CPT | Performed by: ORTHOPAEDIC SURGERY

## 2024-05-16 PROCEDURE — 73700 CT LOWER EXTREMITY W/O DYE: CPT | Mod: 26,RT,, | Performed by: RADIOLOGY

## 2024-05-16 RX ORDER — OLOPATADINE HYDROCHLORIDE 1 MG/ML
1 SOLUTION/ DROPS OPHTHALMIC
COMMUNITY

## 2024-05-16 RX ORDER — DEXTROMETHORPHAN HYDROBROMIDE, GUAIFENESIN 5; 100 MG/5ML; MG/5ML
650 LIQUID ORAL
COMMUNITY

## 2024-05-16 RX ORDER — ASPIRIN 325 MG/325MG
CAPSULE ORAL
COMMUNITY

## 2024-05-16 NOTE — DISCHARGE INSTRUCTIONS
To confirm, Your doctor has instructed you that surgery is scheduled for: 5/28/24 with Dr. Whitman    Please [facility] to [facility], Registration the morning of surgery. You must check-in and receive a wristband before going to your procedure.  [address] [place] [address] [place] [address]    Pre-Op will call the afternoon prior to surgery between 1:00 and 6:00 PM with the final arrival time.  Phone number: [phone]    PLEASE NOTE:  The surgery schedule has many variables which may affect the time of your surgery case.  Family members should be available if your surgery time changes.  Plan to be here the day of your procedure between 4-6 hours.    MEDICATIONS:  TAKE ONLY THESE MEDICATIONS WITH A SMALL SIP OF WATER THE MORNING OF YOUR PROCEDURE:  SEE LIST      DO NOT TAKE THESE MEDICATIONS 5-7 DAYS PRIOR to your procedure or per your surgeon's request:   ASPIRIN, ALEVE, ADVIL, IBUPROFEN, FISH OIL VITAMIN E, HERBALS  (May take Tylenol)    ONLY if you are prescribed any types of blood thinners such as:  Aspirin, Coumadin, Plavix, Pradaxa, Xarelto, Aggrenox, Effient, Eliquis, Savasya, Brilinta, or any other, ask your surgeon whether you should stop taking them and how long before surgery you should stop.  You may also need to verify with the prescribing physician if it is ok to stop your medication.      INSTRUCTIONS IMPORTANT!!  Do not eat or drink anything between midnight and the time of your procedure- this includes gum, mints, and candy.  Do not smoke or drink alcoholic beverages 24 hours prior to your procedure.  Shower the night before AND the morning of your procedure with a Chlorhexidine wash such as Hibiclens or Dial antibacterial soap from the neck down.  Do not get it on your face or in your eyes.  You may use your own shampoo and face wash. This helps your skin to be as bacteria free as possible.    If you wear contact lenses, dentures, hearing aids or glasses, bring a container to put them  in during surgery and give to a family member for safe keeping.  Please leave all jewelry, piercing's and valuables at home. You must remove your false eyelashes prior to surgery.    DO NOT remove hair from the surgery site.  Do not shave the incision site unless you are given specific instructions to do so.    ONLY if you have been diagnosed with sleep apnea please bring your C-PAP machine.  ONLY if you wear home oxygen please bring your portable oxygen tank the day of your procedure.  ONLY if you have a history of OPEN HEART SURGERY you will need a clearance from your Cardiologist per Anesthesia.      ONLY for patients requiring bowel prep, written instructions will be given by your doctor's office.  ONLY if you have a neuro stimulator, please bring the controller with you the morning of surgery  ONLY if a type and screen test is needed before surgery, please return:  If your doctor has scheduled you for an overnight stay, bring a small overnight bag with any personal items you need.  Make arrangements in advance for transportation home by a responsible adult. You can not go home in an uber or a cab per hospital policy.  It is not safe to drive a vehicle during the 24 hours after anesthesia.          All  facilities and properties are tobacco free.  Smoking is NOT allowed.   If you have any questions about these instructions, call Pre-Op Admit  Nursing at [phone] or the Pre-Op Day Surgery Unit at [phone].

## 2024-05-16 NOTE — PRE ADMISSION SCREENING
Patient Name: Mercedes Laura  YOB: 1960   MRN: [id number]     [facility]   Basic Mobility Inpatient Short Form 6 Clicks         How much difficulty does the patient currently have  Unable  A Lot  A Little  None      1. Turning over in bed (including adjusting bedclothes, sheets and blankets)?     1 []    2 []    3 []    4 [x]        2. Sitting down on and standing up from a chair with arms (e.g., wheelchair, bedside commode, etc.)     1 []  2 []  3 []     4 [x]      3. Moving from lying on back to sitting on the side of the bed?     1 []  2 []  3 []    4 [x]    How much help from another person does the patient currently need  Total  A Lot  A Little  None      4. Moving to and from a bed to a chair (including a wheelchair)?    1 []  2 []  3 []    4 [x]      5. Need to walk in hospital room?    1 []  2 []  3 []    4 [x]      6. Climbing 3-5 steps with a railing?    1 []  2 []  3 []    4 [x]       Raw Score:      24            CMS 0-100% Score:     0       %   Standardized Score:   61.14            CMS Modifier:       [facility]AC[facility]   Basic Mobility Inpatient Short Form 6 Clicks Score Conversion Table*         *Use this form to convert -PAC Basic Mobility Inpatient Raw Scores.   [facility] Inpatient Basic Mobility Short Form Scoring Example   1. Add the number values associated with the response to each item. For example, items totals yield a Raw Score of 21.   2. Match the raw score to the t-Scale scores (t-Scale score = 50.25, SE = 4.69).   3. Find the associated CMS % (CMS % = 28.97%).   4. Locate the correct CMS Functional Modifier Code, or G Code (G code = CJ)     NOTE: Each -PAC Short Form has a separate conversion table. Make sure that you use the correct conversion table.       Instruction Manual - page 45 contains conversion table

## 2024-05-16 NOTE — PRE ADMISSION SCREENING
"               [facility] Risk Assessment Scale    Patient Name: Mercedes Laura  YOB: 1960  MRN: [id number]            RIsk Factor Measure Recommendation Patient Data Scale/Score   BMI >40 Reconsider surgery, weight loss   Estimated body mass index is 36.19 kg/m² as calculated from the following:    Height as of 4/4/24: 5' 6" (1.676 m).    Weight as of 4/4/24: 101.7 kg (224 lb 3.3 oz).   [] 0 = 1 - 24.9  [] 1 = 25-29.9  [] 2 = 30-34.9  [x] 3 = 35-39.9  [] 4 = 40-44.9  [] 5 = 45-99.9   Hemoglobin AIC (if applicable) >9 Delay surgery until DM under control  Refer for:  Nutrition Therapy  Exercise   Medication    Lab Results   Component Value Date    HGBA1C 5.7 (H) 09/09/2023       Lab Results   Component Value Date    GLU 84 04/02/2024      [] 0 = 4.0-5.6  [x] 1 = 5.7-6.4  [] 2 = 6.5-6.9  [] 3 = 7.0-7.9  [] 4 = 8.0-8.9  [] 5 = 9.0-12   Hemoglobin (Anemia) <9 Delay surgery   Correct anemia Lab Results   Component Value Date    HGB 13.4 04/02/2024    [] 20 - <9.0                    Albumin <3 Delay surgery &Workup Lab Results   Component Value Date    ALBUMIN 4.0 04/02/2024    [] 20 - <3.0   Smoking Cessation >4 Weeks Delay Surgery  Refer to OP Cessation Class    Never Smoker [] 20 - current smoker                                _____ PPD                    Hx of MI, PE, Arrhythmia, CVA, DVT <30 Days Delay Surgery    N/A [] 20      Infection Variable Delay surgery and re-evaluate   N/A [] 20 - recent/current infection     Depression (PHQ) >10 out of 27 Delay Surgery and re-evaluate  Medication  Counseling              [x] 0     []1     []2     []3      []4      [] 5                    (1-4)      (5-9)  (10-14)  (15-19)   (20-27)     Memory Impairment & Memory loss (Mini-Cog Screening Tool) Advanced dementia and/or Parkinson's Reconsider surgery     [x] 0     []1     []2     []3     []4     [] 5     Physical Conditioning (Modified AM-PAC Per Physical Therapy at [facility]) Unable to ambulate on day of surgery Delay " surgery and re-evaluate  Pre-Rehabilitation   (PT evaluation)       [x]  0   []4       []8     []12        []16     []20       (<20%)   (<40%)   (<60%)   (<80% )    (>80%)     Home Environment/Caregiver support  (Per /Navigator Interview)    Availability of basic services and/or approprate assistance during post-operative period Delay surgery and re-evaluate  Safe home environment  Health   1 week post-surgery  Transportation  availability  Ability to obtain DME/Medications post-op    [x] 0     []1     []2     []3     []4     [] 5  [x] 0     []1     []2     []3     []4     [] 5  [x] 0     []1     []2     []3     []4     [] 5  [x] 0     []1     []2     []3     []4     [] 5         MD Contact: Dr. Whitman Comments:  Total Score:  4

## 2024-05-16 NOTE — PRE ADMISSION SCREENING
JOINT CAMP ASSESSMENT    Name Mercedes Laura   MRN [id number]    Age/Sex 64 y.o. female    Surgeon Dr. Chu Whitman   Joint Camp Date 5/16/2024   Surgery Date 5/28/2024   Procedure Right Knee Arthroplasty   Insurance Payor: [facility] / Plan: BCBS OF [facility] EPO / Product Type: Commercial /    Care Team Patient Care Team:  Tory Llamas MD as PCP - General (Family Medicine)  MERI Schlutz MD as Consulting Physician (Otolaryngology)  Anaya Caldera MD as Obstetrician (Obstetrics)  Carolin Grady LPN (Inactive) as Care Coordinator    Pharmacy   [id number] - [place], [place] - [address]  [address]  [place] [address]  Phone: [phone] Fax: [phone]    [facility] - [place] [address]  Phone: [phone] Fax: [phone]    [facility] - [place], [place] - [address] [place] [address]  Phone: [phone] Fax: [phone]     AM-PAC Score   24   Risk Assessment Score 4     Past Medical History:   Diagnosis Date    Anxiety     this came on since lap kadeem sgy    Arthritis     Colon polyp     Deviated septum     GERD (gastroesophageal reflux disease)     Hypertension     Leg cramps     PAC (premature atrial contraction)     Plantar fasciitis     PMB (postmenopausal bleeding) 05/2018    Seasonal allergies     Sinus disorder     Uterine leiomyoma 05/2018    Varicose veins of both lower extremities        Past Surgical History:   Procedure Laterality Date    BREAST BIOPSY Left 2015    benign    CHOLECYSTECTOMY  6-1-2015    Dr Ashraf    COLONOSCOPY N/A 11/23/2015    Procedure: COLONOSCOPY;  Surgeon: Theo Stinson MD;  Location: [facility];  Service: Endoscopy;  Laterality: N/A; repeat in 5 years for surveillance    COLONOSCOPY N/A 7/14/2021    Procedure: COLONOSCOPY;  Surgeon: Theo Stinson MD;  Location: [facility];   Service: Endoscopy;  Laterality: N/A;    CYSTOSCOPY N/A 6/7/2018    Procedure: CYSTOSCOPY;  Surgeon: Anaya Caldera MD;  Location: [facility] OR;  Service: OB/GYN;  Laterality: N/A;    ESOPHAGOGASTRODUODENOSCOPY N/A 3/4/2019    Procedure: EGD (ESOPHAGOGASTRODUODENOSCOPY);  Surgeon: Theo Stinson MD;  [facility];  Service: Endoscopy;  Laterality: N/A;    HYSTERECTOMY      KNEE ARTHROSCOPY W/ MENISCECTOMY  11/15/2000    right     LAPAROSCOPIC SALPINGO-OOPHORECTOMY Bilateral 6/7/2018    Procedure: [id number];  Surgeon: Anaya Caldera MD;  Location: [facility] OR;  Service: OB/GYN;  Laterality: Bilateral;    LAPAROSCOPIC TOTAL HYSTERECTOMY N/A 6/7/2018    Procedure: HYSTERECTOMY-TOTAL LAPAROSCOPIC (TLH);  Surgeon: Anaya Caldera MD;  Location: [facility] OR;  Service: OB/GYN;  Laterality: N/A;    OOPHORECTOMY      UPPER GASTROINTESTINAL ENDOSCOPY  06/22/2015    Dr. Stinson         Home Enviroment     Living Arrangement: Lives with spouse  Home Environment: 2-story house, number of outside stairs: 15 with a railing, number of inside stairs: 14 with a railing, can live on one level, bedroom on 2nd floor, bathroom on 2nd floor, tub-shower  Home Safety Concerns: unremarkable    DISCHARGE CAREGIVER/SUPPORT SYSTEM     Identified post-op caregiver: Patient has spouse / significant other.  Patient's caregiver(s) will be able to provide physical assistance. Patient will have someone to assist overnight.      Caregiver present at pre-op interview:  No      PRE-OPERATIVE FUNCTIONAL STATUS     Employment: Employed full time    Pre-op Functional Status: Patient is independent with mobility/ambulation, transfers, ADL's, IADL's.    Use of assistive device for ambulation: none  ADL: self care  ADL Limitations: difficulty with walking  Medical Restrictions: Unstable ambulation and Decreased range of motions in extremities    POTENTIAL BARRIERS TO DISCHARGE/POTENTIAL POST-OP COMPLICATIONS      Patient with hx of HTN. POSSIBLE SAME DAY DISCHARGE.    DISCHARGE PLAN     Expected LOS of 1 days or less for joint replacement discussed with patient. We also discussed a discharge path of HH for approximately two weeks with a transition to outpatient PT on the third week given no post-op complications.      Patient in agreement with discharge plan: Yes    Discharge to: Discharge home with home health (PT/OT) x2 weeks with transition to outpatient PT     HH:  [facility] ([place]). Patient disclosure form completed and sent to case management for upload to the medical record.      OP PT: [facility] (T[facility] Petersen)     Home DME: none    Needed DME at D/C: rolling walker, bedside commode, and tub transfer bench. Patient to purchase BSC & Tub Transfer Bench from retail prior to surgery.     Rx: Per Dr. Whitman at discharge     Meds to Beds: Yes  Patient expected to discharge on Aspirin 81mg by mouth twice daily for DVT prophylaxis.

## 2024-05-22 DIAGNOSIS — M17.11 PRIMARY OSTEOARTHRITIS OF RIGHT KNEE: Primary | ICD-10-CM

## 2024-05-22 DIAGNOSIS — Z96.651 S/P TOTAL KNEE REPLACEMENT, RIGHT: ICD-10-CM

## 2024-05-23 ENCOUNTER — TELEPHONE (OUTPATIENT)
Dept: ORTHOPEDICS | Facility: CLINIC | Age: 64
End: 2024-05-23
Payer: COMMERCIAL

## 2024-05-23 NOTE — TELEPHONE ENCOUNTER
----- Message from Timi Ferguson MA sent at 5/23/2024  9:08 AM CDT -----  Contact: patient  Patient cannot get in touch with DME about delivery of her walker.  Call back number is [phone]

## 2024-05-27 ENCOUNTER — ANESTHESIA EVENT (OUTPATIENT)
Dept: SURGERY | Facility: HOSPITAL | Age: 64
End: 2024-05-27
Payer: COMMERCIAL

## 2024-05-27 RX ORDER — ONDANSETRON 4 MG/1
4 TABLET, FILM COATED ORAL EVERY 6 HOURS PRN
Qty: 30 TABLET | Refills: 0 | Status: SHIPPED | OUTPATIENT
Start: 2024-05-27

## 2024-05-27 RX ORDER — CYCLOBENZAPRINE HCL 10 MG
10 TABLET ORAL 3 TIMES DAILY PRN
Qty: 30 TABLET | Refills: 0 | Status: SHIPPED | OUTPATIENT
Start: 2024-05-27 | End: 2024-06-07

## 2024-05-27 RX ORDER — OXYCODONE AND ACETAMINOPHEN 5; 325 MG/1; MG/1
1 TABLET ORAL EVERY 6 HOURS PRN
Qty: 28 TABLET | Refills: 0 | Status: SHIPPED | OUTPATIENT
Start: 2024-05-27 | End: 2024-05-29

## 2024-05-27 RX ORDER — ACETAMINOPHEN 500 MG
1000 TABLET ORAL EVERY 8 HOURS PRN
Qty: 30 TABLET | Refills: 0 | Status: SHIPPED | OUTPATIENT
Start: 2024-05-27

## 2024-05-27 RX ORDER — SUCRALFATE 1 G/10ML
1 SUSPENSION ORAL 4 TIMES DAILY
Qty: 414 ML | Refills: 0 | Status: SHIPPED | OUTPATIENT
Start: 2024-05-27

## 2024-05-27 RX ORDER — IBUPROFEN 600 MG/1
600 TABLET ORAL 3 TIMES DAILY PRN
Qty: 30 TABLET | Refills: 0 | Status: SHIPPED | OUTPATIENT
Start: 2024-05-27

## 2024-05-27 RX ORDER — ASPIRIN 81 MG/1
81 TABLET ORAL 2 TIMES DAILY
Qty: 56 TABLET | Refills: 0 | Status: SHIPPED | OUTPATIENT
Start: 2024-05-27 | End: 2025-05-27

## 2024-05-27 NOTE — TELEPHONE ENCOUNTER
Meds to Beds     Spoke with patient about the naproxen allergy, says she can take long as she has Carafate to coat her stomach.

## 2024-05-28 ENCOUNTER — HOSPITAL ENCOUNTER (OUTPATIENT)
Facility: HOSPITAL | Age: 64
Discharge: HOME | End: 2024-05-28
Attending: ORTHOPAEDIC SURGERY | Admitting: ORTHOPAEDIC SURGERY
Payer: COMMERCIAL

## 2024-05-28 ENCOUNTER — ANESTHESIA (OUTPATIENT)
Dept: SURGERY | Facility: HOSPITAL | Age: 64
End: 2024-05-28
Payer: COMMERCIAL

## 2024-05-28 DIAGNOSIS — Z01.818 PRE-OP TESTING: ICD-10-CM

## 2024-05-28 DIAGNOSIS — M17.11 PRIMARY OSTEOARTHRITIS OF RIGHT KNEE: ICD-10-CM

## 2024-05-28 PROCEDURE — C9290 INJ, BUPIVACAINE LIPOSOME: HCPCS | Performed by: ANESTHESIOLOGY

## 2024-05-28 PROCEDURE — 0055T BONE SRGRY CMPTR CT/MRI IMAG: CPT | Mod: ,,, | Performed by: ORTHOPAEDIC SURGERY

## 2024-05-28 PROCEDURE — 63600175 PHARM REV CODE 636 W HCPCS: Performed by: ORTHOPAEDIC SURGERY

## 2024-05-28 PROCEDURE — 97530 THERAPEUTIC ACTIVITIES: CPT

## 2024-05-28 PROCEDURE — 27200750 HC INSULATED NEEDLE/ STIMUPLEX: Performed by: ANESTHESIOLOGY

## 2024-05-28 PROCEDURE — 36000713 HC OR TIME LEV V EA ADD 15 MIN: Performed by: ORTHOPAEDIC SURGERY

## 2024-05-28 PROCEDURE — 37000009 HC ANESTHESIA EA ADD 15 MINS: Performed by: ORTHOPAEDIC SURGERY

## 2024-05-28 PROCEDURE — 63600175 PHARM REV CODE 636 W HCPCS: Mod: JZ,JG | Performed by: ANESTHESIOLOGY

## 2024-05-28 PROCEDURE — 25000003 PHARM REV CODE 250: Performed by: ANESTHESIOLOGY

## 2024-05-28 PROCEDURE — C1713 ANCHOR/SCREW BN/BN,TIS/BN: HCPCS | Performed by: ORTHOPAEDIC SURGERY

## 2024-05-28 PROCEDURE — 64448 NJX AA&/STRD FEM NRV NFS IMG: CPT | Mod: 59 | Performed by: ANESTHESIOLOGY

## 2024-05-28 PROCEDURE — 25000003 PHARM REV CODE 250: Performed by: NURSE ANESTHETIST, CERTIFIED REGISTERED

## 2024-05-28 PROCEDURE — 63600175 PHARM REV CODE 636 W HCPCS: Mod: JZ,JG | Performed by: ORTHOPAEDIC SURGERY

## 2024-05-28 PROCEDURE — 37000008 HC ANESTHESIA 1ST 15 MINUTES: Performed by: ORTHOPAEDIC SURGERY

## 2024-05-28 PROCEDURE — 97116 GAIT TRAINING THERAPY: CPT

## 2024-05-28 PROCEDURE — 25000003 PHARM REV CODE 250: Performed by: ORTHOPAEDIC SURGERY

## 2024-05-28 PROCEDURE — 71000016 HC POSTOP RECOV ADDL HR: Performed by: ORTHOPAEDIC SURGERY

## 2024-05-28 PROCEDURE — D9220A PRA ANESTHESIA: Mod: ANES,,, | Performed by: ANESTHESIOLOGY

## 2024-05-28 PROCEDURE — 27201423 OPTIME MED/SURG SUP & DEVICES STERILE SUPPLY: Performed by: ORTHOPAEDIC SURGERY

## 2024-05-28 PROCEDURE — C1769 GUIDE WIRE: HCPCS | Performed by: ORTHOPAEDIC SURGERY

## 2024-05-28 PROCEDURE — 94799 UNLISTED PULMONARY SVC/PX: CPT

## 2024-05-28 PROCEDURE — 36000712 HC OR TIME LEV V 1ST 15 MIN: Performed by: ORTHOPAEDIC SURGERY

## 2024-05-28 PROCEDURE — 71000033 HC RECOVERY, INTIAL HOUR: Performed by: ORTHOPAEDIC SURGERY

## 2024-05-28 PROCEDURE — 27447 TOTAL KNEE ARTHROPLASTY: CPT | Mod: RT,,, | Performed by: ORTHOPAEDIC SURGERY

## 2024-05-28 PROCEDURE — 97161 PT EVAL LOW COMPLEX 20 MIN: CPT

## 2024-05-28 PROCEDURE — C1776 JOINT DEVICE (IMPLANTABLE): HCPCS | Performed by: ORTHOPAEDIC SURGERY

## 2024-05-28 PROCEDURE — 99900031 HC PATIENT EDUCATION (STAT)

## 2024-05-28 PROCEDURE — 27200688 HC TRAY, SPINAL-HYPER/ ISOBARIC: Performed by: ANESTHESIOLOGY

## 2024-05-28 PROCEDURE — 71000015 HC POSTOP RECOV 1ST HR: Performed by: ORTHOPAEDIC SURGERY

## 2024-05-28 PROCEDURE — 63600175 PHARM REV CODE 636 W HCPCS

## 2024-05-28 PROCEDURE — D9220A PRA ANESTHESIA: Mod: CRNA,,, | Performed by: NURSE ANESTHETIST, CERTIFIED REGISTERED

## 2024-05-28 PROCEDURE — 63600175 PHARM REV CODE 636 W HCPCS: Performed by: NURSE ANESTHETIST, CERTIFIED REGISTERED

## 2024-05-28 PROCEDURE — 97110 THERAPEUTIC EXERCISES: CPT

## 2024-05-28 PROCEDURE — 71000039 HC RECOVERY, EACH ADD'L HOUR: Performed by: ORTHOPAEDIC SURGERY

## 2024-05-28 DEVICE — CRUCIATE RETAINING FEMORAL
Type: IMPLANTABLE DEVICE | Site: KNEE | Status: FUNCTIONAL
Brand: TRIATHLON

## 2024-05-28 DEVICE — TOBRA FULL DOSE ANTIBIOTIC BONE CEMENT, 10 PACK CATALOG NUMBER IS 6197-9-010
Type: IMPLANTABLE DEVICE | Site: KNEE | Status: FUNCTIONAL
Brand: SIMPLEX

## 2024-05-28 DEVICE — TIBIAL BEARING INSERT
Type: IMPLANTABLE DEVICE | Site: KNEE | Status: FUNCTIONAL
Brand: TRIATHLON

## 2024-05-28 DEVICE — PRIMARY TIBIAL BASEPLATE
Type: IMPLANTABLE DEVICE | Site: KNEE | Status: FUNCTIONAL
Brand: TRIATHLON

## 2024-05-28 DEVICE — PATELLA
Type: IMPLANTABLE DEVICE | Site: KNEE | Status: FUNCTIONAL
Brand: TRIATHLON

## 2024-05-28 RX ORDER — FENTANYL CITRATE 50 UG/ML
INJECTION, SOLUTION INTRAMUSCULAR; INTRAVENOUS
Status: DISCONTINUED | OUTPATIENT
Start: 2024-05-28 | End: 2024-05-28

## 2024-05-28 RX ORDER — IBUPROFEN 600 MG/1
600 TABLET ORAL ONCE AS NEEDED
Status: COMPLETED | OUTPATIENT
Start: 2024-05-28 | End: 2024-05-28

## 2024-05-28 RX ORDER — LIDOCAINE HYDROCHLORIDE 20 MG/ML
INJECTION INTRAVENOUS
Status: DISCONTINUED | OUTPATIENT
Start: 2024-05-28 | End: 2024-05-28

## 2024-05-28 RX ORDER — ONDANSETRON HYDROCHLORIDE 2 MG/ML
4 INJECTION, SOLUTION INTRAVENOUS ONCE AS NEEDED
Status: DISCONTINUED | OUTPATIENT
Start: 2024-05-28 | End: 2024-05-28 | Stop reason: HOSPADM

## 2024-05-28 RX ORDER — PROPOFOL 10 MG/ML
VIAL (ML) INTRAVENOUS CONTINUOUS PRN
Status: DISCONTINUED | OUTPATIENT
Start: 2024-05-28 | End: 2024-05-28

## 2024-05-28 RX ORDER — ONDANSETRON HYDROCHLORIDE 2 MG/ML
INJECTION, SOLUTION INTRAVENOUS
Status: DISCONTINUED | OUTPATIENT
Start: 2024-05-28 | End: 2024-05-28

## 2024-05-28 RX ORDER — MUPIROCIN 20 MG/G
OINTMENT TOPICAL
Status: DISCONTINUED | OUTPATIENT
Start: 2024-05-28 | End: 2024-05-28 | Stop reason: HOSPADM

## 2024-05-28 RX ORDER — OXYCODONE HYDROCHLORIDE 5 MG/1
5 TABLET ORAL ONCE AS NEEDED
Status: COMPLETED | OUTPATIENT
Start: 2024-05-28 | End: 2024-05-28

## 2024-05-28 RX ORDER — OXYCODONE HCL 10 MG/1
10 TABLET, FILM COATED, EXTENDED RELEASE ORAL ONCE
Status: COMPLETED | OUTPATIENT
Start: 2024-05-28 | End: 2024-05-28

## 2024-05-28 RX ORDER — MIDAZOLAM HYDROCHLORIDE 1 MG/ML
2 INJECTION, SOLUTION INTRAMUSCULAR; INTRAVENOUS
Status: DISCONTINUED | OUTPATIENT
Start: 2024-05-28 | End: 2024-05-28 | Stop reason: HOSPADM

## 2024-05-28 RX ORDER — BUPIVACAINE HYDROCHLORIDE 7.5 MG/ML
INJECTION, SOLUTION EPIDURAL; RETROBULBAR
Status: DISCONTINUED | OUTPATIENT
Start: 2024-05-28 | End: 2024-05-28

## 2024-05-28 RX ORDER — BUPIVACAINE HYDROCHLORIDE 5 MG/ML
INJECTION, SOLUTION EPIDURAL; INTRACAUDAL
Status: COMPLETED | OUTPATIENT
Start: 2024-05-28 | End: 2024-05-28

## 2024-05-28 RX ORDER — CLINDAMYCIN PHOSPHATE 900 MG/50ML
900 INJECTION, SOLUTION INTRAVENOUS
Status: COMPLETED | OUTPATIENT
Start: 2024-05-28 | End: 2024-05-28

## 2024-05-28 RX ORDER — FENTANYL CITRATE 50 UG/ML
25 INJECTION, SOLUTION INTRAMUSCULAR; INTRAVENOUS EVERY 5 MIN PRN
Status: DISCONTINUED | OUTPATIENT
Start: 2024-05-28 | End: 2024-05-28 | Stop reason: HOSPADM

## 2024-05-28 RX ORDER — SODIUM CHLORIDE 9 MG/ML
INJECTION, SOLUTION INTRAVENOUS CONTINUOUS
Status: DISCONTINUED | OUTPATIENT
Start: 2024-05-28 | End: 2024-05-28 | Stop reason: HOSPADM

## 2024-05-28 RX ORDER — ACETAMINOPHEN 500 MG
1000 TABLET ORAL
Status: COMPLETED | OUTPATIENT
Start: 2024-05-28 | End: 2024-05-28

## 2024-05-28 RX ORDER — MIDAZOLAM HYDROCHLORIDE 1 MG/ML
INJECTION INTRAMUSCULAR; INTRAVENOUS
Status: DISCONTINUED | OUTPATIENT
Start: 2024-05-28 | End: 2024-05-28

## 2024-05-28 RX ORDER — SODIUM CHLORIDE, SODIUM LACTATE, POTASSIUM CHLORIDE, CALCIUM CHLORIDE 600; 310; 30; 20 MG/100ML; MG/100ML; MG/100ML; MG/100ML
INJECTION, SOLUTION INTRAVENOUS CONTINUOUS
Status: CANCELLED | OUTPATIENT
Start: 2024-05-28

## 2024-05-28 RX ORDER — CELECOXIB 100 MG/1
400 CAPSULE ORAL ONCE
Status: DISCONTINUED | OUTPATIENT
Start: 2024-05-28 | End: 2024-05-28 | Stop reason: HOSPADM

## 2024-05-28 RX ORDER — DEXAMETHASONE SODIUM PHOSPHATE 4 MG/ML
INJECTION, SOLUTION INTRA-ARTICULAR; INTRALESIONAL; INTRAMUSCULAR; INTRAVENOUS; SOFT TISSUE
Status: DISCONTINUED | OUTPATIENT
Start: 2024-05-28 | End: 2024-05-28

## 2024-05-28 RX ORDER — FENTANYL CITRATE 50 UG/ML
25-200 INJECTION, SOLUTION INTRAMUSCULAR; INTRAVENOUS
Status: DISCONTINUED | OUTPATIENT
Start: 2024-05-28 | End: 2024-05-28 | Stop reason: HOSPADM

## 2024-05-28 RX ORDER — PHENYLEPHRINE HYDROCHLORIDE 10 MG/ML
INJECTION INTRAVENOUS
Status: DISCONTINUED | OUTPATIENT
Start: 2024-05-28 | End: 2024-05-28

## 2024-05-28 RX ORDER — LIDOCAINE HYDROCHLORIDE 10 MG/ML
1 INJECTION, SOLUTION EPIDURAL; INFILTRATION; INTRACAUDAL; PERINEURAL ONCE
Status: DISCONTINUED | OUTPATIENT
Start: 2024-05-28 | End: 2024-05-28 | Stop reason: HOSPADM

## 2024-05-28 RX ADMIN — BUPIVACAINE 20 ML: 13.3 INJECTION, SUSPENSION, LIPOSOMAL INFILTRATION at 06:05

## 2024-05-28 RX ADMIN — GLYCOPYRROLATE 0.2 MG: 0.2 INJECTION, SOLUTION INTRAMUSCULAR; INTRAVITREAL at 08:05

## 2024-05-28 RX ADMIN — BUPIVACAINE HYDROCHLORIDE 10 ML: 5 INJECTION, SOLUTION EPIDURAL; INTRACAUDAL; PERINEURAL at 06:05

## 2024-05-28 RX ADMIN — MIDAZOLAM HYDROCHLORIDE 2 MG: 1 INJECTION INTRAMUSCULAR; INTRAVENOUS at 06:05

## 2024-05-28 RX ADMIN — OXYCODONE HYDROCHLORIDE 10 MG: 10 TABLET, FILM COATED, EXTENDED RELEASE ORAL at 06:05

## 2024-05-28 RX ADMIN — ACETAMINOPHEN 1000 MG: 500 TABLET ORAL at 06:05

## 2024-05-28 RX ADMIN — PHENYLEPHRINE HYDROCHLORIDE 200 MCG: 10 INJECTION INTRAVENOUS at 08:05

## 2024-05-28 RX ADMIN — PHENYLEPHRINE HYDROCHLORIDE 50 MCG: 10 INJECTION INTRAVENOUS at 08:05

## 2024-05-28 RX ADMIN — FENTANYL CITRATE 50 MCG: 50 INJECTION, SOLUTION INTRAMUSCULAR; INTRAVENOUS at 07:05

## 2024-05-28 RX ADMIN — MUPIROCIN 1 G: 20 OINTMENT TOPICAL at 06:05

## 2024-05-28 RX ADMIN — DEXAMETHASONE SODIUM PHOSPHATE 4 MG: 4 INJECTION, SOLUTION INTRA-ARTICULAR; INTRALESIONAL; INTRAMUSCULAR; INTRAVENOUS; SOFT TISSUE at 07:05

## 2024-05-28 RX ADMIN — PHENYLEPHRINE HYDROCHLORIDE 100 MCG: 10 INJECTION INTRAVENOUS at 08:05

## 2024-05-28 RX ADMIN — CLINDAMYCIN PHOSPHATE 900 MG: 18 INJECTION, SOLUTION INTRAVENOUS at 07:05

## 2024-05-28 RX ADMIN — ONDANSETRON 4 MG: 2 INJECTION INTRAMUSCULAR; INTRAVENOUS at 07:05

## 2024-05-28 RX ADMIN — OXYCODONE 5 MG: 5 TABLET ORAL at 09:05

## 2024-05-28 RX ADMIN — SODIUM CHLORIDE, SODIUM GLUCONATE, SODIUM ACETATE, POTASSIUM CHLORIDE AND MAGNESIUM CHLORIDE: 526; 502; 368; 37; 30 INJECTION, SOLUTION INTRAVENOUS at 06:05

## 2024-05-28 RX ADMIN — LIDOCAINE HYDROCHLORIDE 75 MG: 20 INJECTION, SOLUTION INTRAVENOUS at 07:05

## 2024-05-28 RX ADMIN — TRANEXAMIC ACID 1000 MG: 100 INJECTION, SOLUTION INTRAVENOUS at 07:05

## 2024-05-28 RX ADMIN — IBUPROFEN 600 MG: 600 TABLET ORAL at 01:05

## 2024-05-28 RX ADMIN — PROPOFOL 75 MCG/KG/MIN: 10 INJECTION, EMULSION INTRAVENOUS at 07:05

## 2024-05-28 RX ADMIN — FENTANYL CITRATE 50 MCG: 50 INJECTION, SOLUTION INTRAMUSCULAR; INTRAVENOUS at 06:05

## 2024-05-28 RX ADMIN — BUPIVACAINE HYDROCHLORIDE 1.4 ML: 7.5 INJECTION, SOLUTION EPIDURAL; RETROBULBAR at 07:05

## 2024-05-28 RX ADMIN — MIDAZOLAM HYDROCHLORIDE 2 MG: 1 INJECTION INTRAMUSCULAR; INTRAVENOUS at 07:05

## 2024-05-28 RX ADMIN — PHENYLEPHRINE HYDROCHLORIDE 150 MCG: 10 INJECTION INTRAVENOUS at 08:05

## 2024-05-28 RX ADMIN — ROPIVACAINE HYDROCHLORIDE: 5 INJECTION, SOLUTION EPIDURAL; INFILTRATION; PERINEURAL at 07:05

## 2024-05-28 NOTE — ANESTHESIA PROCEDURE NOTES
Peripheral Block    Patient location during procedure: pre-op   Block not for primary anesthetic.  Reason for block: at surgeon's request and post-op pain management   Post-op Pain Location: right knee   Start time: 5/28/2024 6:45 AM  Timeout: 5/28/2024 6:45 AM   End time: 5/28/2024 6:50 AM    Staffing  Authorizing Provider: Fritz Grimaldo MD  Performing Provider: Fritz Grimaldo MD    Staffing  Performed by: Fritz Grimaldo MD  Authorized by: Fritz Grimaldo MD    Preanesthetic Checklist  Completed: patient identified, IV checked, site marked, risks and benefits discussed, surgical consent, monitors and equipment checked, pre-op evaluation and timeout performed  Peripheral Block  Patient position: supine  Prep: ChloraPrep and site prepped and draped  Patient monitoring: heart rate, cardiac monitor, continuous pulse ox, continuous capnometry and frequent blood pressure checks  Block type: adductor canal  Laterality: right  Injection technique: continuous  Needle  Needle type: Tuohy   Needle gauge: 17 G  Needle length: 3.5 in  Needle localization: anatomical landmarks and ultrasound guidance  Catheter type: spring wound  Catheter size: 19 G  Test dose: lidocaine 1.5% with Epi 1-to-200,000 and negative   -ultrasound image captured on disc.  Assessment  Injection assessment: negative aspiration, negative parasthesia and local visualized surrounding nerve  Paresthesia pain: none  Heart rate change: no  Slow fractionated injection: yes  Pain Tolerance: no complaints and comfortable throughout block  Medications:    Medications: bupivacaine (pf) (MARCAINE) injection 0.5% - Perineural   10 mL - 5/28/2024 6:50:00 AM  BUPivacaine liposome (PF) 1.3 % (13.3 mg/mL) suspension - Injection   20 mL - 5/28/2024 6:50:00 AM    Additional Notes  VSS.  [facility] RN monitoring vitals throughout procedure.  Patient tolerated procedure well.

## 2024-05-28 NOTE — PLAN OF CARE
Patient cleared by PT to discharge to home. Patient and family voiced readiness to discharge.   Patient able to void with no problems noted.  Dressing to right knee remains clean dry and intact.  Medications delivered to bedside and reviewed with patient and family.  Discharge instructions given to pt and family/friend, verbalized understanding and questions answered. Handouts provided. Belongings given back to pt. IV removed- catheter intact. Discharge via wheelchair.

## 2024-05-28 NOTE — PT/OT/SLP EVAL
Physical Therapy Evaluation    Patient Name:  Mercedes Laura   MRN:  [id number]    Recommendations:     Discharge Recommendations: Low Intensity Therapy   Discharge Equipment Recommendations: none   Barriers to discharge: None    Assessment:     Mercedes Laura is a 64 y.o. female admitted with a medical diagnosis of <principal problem not specified>.  She presents with the following impairments/functional limitations: weakness, impaired sensation, impaired functional mobility, gait instability, impaired balance, decreased lower extremity function, decreased ROM, orthopedic precautions .    Pt seen at post 05, alert/motivated. Daughter and spouse at bedside. Pt c/o hypoesthesia, LE's weakness and numbness. Pt completed thera ex in supine AAROME.. pt unable to progress OOB/gait at this time and will require second visit.    Rehab Prognosis: Good; patient would benefit from acute skilled PT services to address these deficits and reach maximum level of function.    Recent Surgery: Procedure(s) (LRB):  ROBOTIC ARTHROPLASTY, KNEE, TOTAL (Right) Day of Surgery    Plan:     During this hospitalization, patient to be seen daily to address the identified rehab impairments via gait training, therapeutic activities, therapeutic exercises and progress toward the following goals:    Plan of Care Expires:       Subjective   Stated that they have 13 steps to enter home but has an elevator  Stated would like to train on stairs too while here  Chief Complaint: LE's heaviness/numbness/weakness  Patient/Family Comments/goals: get well  Pain/Comfort:  Pain Rating 1: 0/10    Patients cultural, spiritual, Jew conflicts given the current situation:      Living Environment:  Home with spouse and family  Prior to admission, patients level of function was indep.  Equipment used at home: none.  DME owned (not currently used): rolling walker.  Upon discharge, patient will have assistance from family.    Objective:     Communicated with nurse  Neyda prior to session.  Patient found HOB elevated with peripheral IV, pulse ox (continuous), blood pressure cuff  upon PT entry to room.    General Precautions: Standard, fall  Orthopedic Precautions:RLE weight bearing as tolerated   Braces:    Respiratory Status: Room air    Exams:  Postural Exam:  Patient presented with the following abnormalities:    -       BMI 36.15  RLE ROM: Deficits: RK flexion ~60*  RLE Strength: Deficits: 3-/5  LLE ROM: WFL  LLE Strength: Deficits: 3-/5    Functional Mobility:  Bed Mobility:     Scooting: moderate assistance      AM-PAC 6 CLICK MOBILITY  Total Score:8       Treatment & Education:  Patient was educated on the importance of OOB activity and functional mobility to negate negative effects of prolonged bed rest during hospitalization, safe transfers and ambulation, and D/C planning   Thera ex with AP,QS/GS,HS,leg raises  Pt encouraged fluid intake- had apple juice earlier  Encouraged LE's exercises  Pt will require second visit    Patient left HOB elevated with all lines intact, call button in reach, and nurse Neyad,daughter and spouse present.    GOALS:   Multidisciplinary Problems       Physical Therapy Goals          Problem: Physical Therapy    Goal Priority Disciplines Outcome Goal Variances Interventions   Physical Therapy Goal     PT, PT/OT Progressing     Description: Goals to be met by: 2024     Patient will increase functional independence with mobility by performin. Supine to sit with Contact Guard Assistance  2. Sit to stand transfer with Contact Guard Assistance  3. Bed to chair transfer with Contact Guard Assistance using Rolling Walker  4. Gait  x 250 feet with Contact Guard Assistance using Rolling Walker.   5. Ascend/descend 5 stair with right Handrails Minimal Assistance using No Assistive Device.   6. Lower extremity exercise program x20 reps                        History:     Past Medical History:   Diagnosis Date    Anxiety     this came  on since lap kadeem sgy    Arthritis     Colon polyp     Deviated septum     GERD (gastroesophageal reflux disease)     Hypertension     Leg cramps     PAC (premature atrial contraction)     Plantar fasciitis     PMB (postmenopausal bleeding) 05/2018    Seasonal allergies     Sinus disorder     Uterine leiomyoma 05/2018    Varicose veins of both lower extremities        Past Surgical History:   Procedure Laterality Date    BREAST BIOPSY Left 2015    benign    CHOLECYSTECTOMY  6-1-2015    Dr Ashraf    COLONOSCOPY N/A 11/23/2015    Procedure: COLONOSCOPY;  Surgeon: Theo Stinson MD;  Location: [facility] ENDO;  Service: Endoscopy;  Laterality: N/A; repeat in 5 years for surveillance    COLONOSCOPY N/A 7/14/2021    Procedure: COLONOSCOPY;  Surgeon: Theo Stinson MD;  Location: [facility] END[facility];  Service: Endoscopy;  Laterality: N/A;    CYSTOSCOPY N/A 6/7/2018    Procedure: CYSTOSCOPY;  Surgeon: Anaya Caldera MD;  Location: [facility] OR;  Service: OB/GYN;  Laterality: N/A;    ESOPHAGOGASTRODUODENOSCOPY N/A 3/4/2019    Procedure: EGD (ESOPHAGOGASTRODUODENOSCOPY);  Surgeon: Theo Stinson MD;  Location: [facility];  Service: Endoscopy;  Laterality: N/A;    HYSTERECTOMY      KNEE ARTHROSCOPY W/ MENISCECTOMY  11/15/2000    right     LAPAROSCOPIC SALPINGO-OOPHORECTOMY Bilateral 6/7/2018    Procedure: [id number];  Surgeon: Anaya Caldera MD;  Location: [facility] OR;  Service: OB/GYN;  Laterality: Bilateral;    LAPAROSCOPIC TOTAL HYSTERECTOMY N/A 6/7/2018    Procedure: HYSTERECTOMY-TOTAL LAPAROSCOPIC (TLH);  Surgeon: Anaya Caldera MD;  Location: [facility] OR;  Service: OB/GYN;  Laterality: N/A;    OOPHORECTOMY      UPPER GASTROINTESTINAL ENDOSCOPY  06/22/2015    Dr. Stinson       Time Tracking:     PT Received On: 05/28/24  PT Start Time: 1036     PT Stop Time: 1052  PT Total Time (min): 16 min     Billable Minutes: Evaluation 8 and Therapeutic Exercise 8      05/28/2024

## 2024-05-28 NOTE — PLAN OF CARE
Patient received from recovery at this time.  AAOX3.  NAD noted.  Dressing to right knee remains clean, dry and intact. Tolerating po intake well with no complaints of nausea/vomiting.  Patient able to move BLE with no problems noted.  Due to void. Rolling walker for home use brought to bedside by Spouse.

## 2024-05-28 NOTE — PT/OT/SLP PROGRESS
"Physical Therapy Treatment    Patient Name:  Mercedes Laura   MRN:  [id number]    Recommendations:     Discharge Recommendations: Low Intensity Therapy  Discharge Equipment Recommendations: none  Barriers to discharge: none    Assessment:     Mercedes Laura is a 64 y.o. female admitted with a medical diagnosis of <principal problem not specified>.  She presents with the following impairments/functional limitations: weakness, impaired sensation, impaired functional mobility, gait instability, impaired balance, decreased lower extremity function, decreased ROM, orthopedic precautions .    Pt seen supine in bed at second visit. Stated is moving legs more but has pain R thigh  . Pt requiring min assist to sit EOB and to stand with RW. Pt transferred to chair and taken to hallways and ambulated 120ft with RW min assist. Pt c/o fatigue and just "wanted to take a nap before the stair training". Pt requesting another visit.  Pt has ~16 steps to enter home and a narrow bathroom ~2 ft entry way and will need practice .  Pt to benefit from HHPT    Rehab Prognosis: Fair; patient would benefit from acute skilled PT services to address these deficits and reach maximum level of function.    Recent Surgery: Procedure(s) (LRB):  ROBOTIC ARTHROPLASTY, KNEE, TOTAL (Right) Day of Surgery    Plan:     During this hospitalization, patient to be seen daily to address the identified rehab impairments via gait training, therapeutic activities, therapeutic exercises and progress toward the following goals:    Plan of Care Expires:       Subjective   Spouse stated that the bathroom door is about 2 ft wide- has a bedside commode if unable to maneuver safely  Chief Complaint: weakness/pain R thigh, tired and wanting to take a nap  Patient/Family Comments/goals: get well  Pain/Comfort:  Pain Rating 1:  (not rated)  Location - Side 1: Right  Location 1: thigh  Pain Addressed 1: Pre-medicate for activity, Reposition, Cessation of Activity      Objective: "     Communicated with nurse aburto prior to session.  Patient found HOB elevated with peripheral IV upon PT entry to room.     General Precautions: Standard, fall  Orthopedic Precautions: RLE weight bearing as tolerated  Braces:    Respiratory Status: Room air     Functional Mobility:  Bed Mobility:     Scooting: minimum assistance  Supine to Sit: minimum assistance  Transfers:     Sit to Stand:  minimum assistance with rolling walker  Bed to Chair: minimum assistance with  rolling walker  using  Stand Pivot  Gait: 120ft with RW min assist and another person following with chair  c/o fatigue and being sleepy and requesting to be allowed to rest      AM-PAC 6 CLICK MOBILITY  Turning over in bed (including adjusting bedclothes, sheets and blankets)?: 3  Sitting down on and standing up from a chair with arms (e.g., wheelchair, bedside commode, etc.): 3  Moving from lying on back to sitting on the side of the bed?: 3  Moving to and from a bed to a chair (including a wheelchair)?: 3  Need to walk in hospital room?: 3  Climbing 3-5 steps with a railing?: 1  Basic Mobility Total Score: 16       Treatment & Education:  Patient was educated on the importance of OOB activity and functional mobility to negate negative effects of prolonged bed rest during hospitalization, safe transfers and ambulation, and D/C planning   RK flexion ~80*  Gait at hallways and OOB chair    Patient left up in chair with all lines intact, call button in reach, and family present..    GOALS:   Multidisciplinary Problems       Physical Therapy Goals          Problem: Physical Therapy    Goal Priority Disciplines Outcome Goal Variances Interventions   Physical Therapy Goal     PT, PT/OT Progressing     Description: Goals to be met by: 2024     Patient will increase functional independence with mobility by performin. Supine to sit with Contact Guard Assistance  2. Sit to stand transfer with Contact Guard Assistance  3. Bed to chair  transfer with Contact Guard Assistance using Rolling Walker  4. Gait  x 250 feet with Contact Guard Assistance using Rolling Walker.   5. Ascend/descend 5 stair with right Handrails Minimal Assistance using No Assistive Device.   6. Lower extremity exercise program x20 reps                        Time Tracking:     PT Received On: 05/28/24  PT Start Time: 1213     PT Stop Time: 1242  PT Total Time (min): 29 min     Billable Minutes: Gait Training 29    Treatment Type: Treatment  PT/PTA: PT     Number of PTA visits since last PT visit: 0     05/28/2024

## 2024-05-28 NOTE — DISCHARGE INSTRUCTIONS
"Discharge Instructions: After Your Surgery/Procedure  Youve just had surgery. During surgery you were given medicine called anesthesia to keep you relaxed and free of pain. After surgery you may have some pain or nausea. This is common. Here are some tips for feeling better and getting well after surgery.     Stay on schedule with your medication.   Going home  Your doctor or nurse will show you how to take care of yourself when you go home. He or she will also answer your questions. Have an adult family member or friend drive you home.      For your safety we recommend these precaution for the first 24 hours after your procedure:  Do not drive or use heavy equipment.  Do not make important decisions or sign legal papers.  Do not drink alcohol.  Have someone stay with you, if needed. He or she can watch for problems and help keep you safe.  Your concentration, balance, coordination, and judgement may be impaired for many hours after anesthesia.  Use caution when ambulating or standing up.     You may feel weak and "washed out" after anesthesia and surgery.      Subtle residual effects of general anesthesia or sedation with regional / local anesthesia can last more than 24 hours.  Rest for the remainder of the day or longer if your Doctor/Surgeon has advised you to do so.  Although you may feel normal within the first 24 hours, your reflexes and mental ability may be impaired without you realizing it.  You may feel dizzy, lightheaded or sleepy for 24 hours or longer.      Be sure to go to all follow-up visits with your doctor. And rest after your surgery for as long as your doctor tells you to.  Coping with pain  If you have pain after surgery, pain medicine will help you feel better. Take it as told, before pain becomes severe. Also, ask your doctor or pharmacist about other ways to control pain. This might be with heat, ice, or relaxation. And follow any other instructions your surgeon or nurse gives you.  Tips " for taking pain medicine  To get the best relief possible, remember these points:  Pain medicines can upset your stomach. Taking them with a little food may help.  Most pain relievers taken by mouth need at least 20 to 30 minutes to start to work.  Taking medicine on a schedule can help you remember to take it. Try to time your medicine so that you can take it before starting an activity. This might be before you get dressed, go for a walk, or sit down for dinner.  Constipation is a common side effect of pain medicines. Call your doctor before taking any medicines such as laxatives or stool softeners to help ease constipation. Also ask if you should skip any foods. Drinking lots of fluids and eating foods such as fruits and vegetables that are high in fiber can also help. Remember, do not take laxatives unless your surgeon has prescribed them.  Drinking alcohol and taking pain medicine can cause dizziness and slow your breathing. It can even be deadly. Do not drink alcohol while taking pain medicine.  Pain medicine can make you react more slowly to things. Do not drive or run machinery while taking pain medicine.  Your health care provider may tell you to take acetaminophen to help ease your pain. Ask him or her how much you are supposed to take each day. Acetaminophen or other pain relievers may interact with your prescription medicines or other over-the-counter (OTC) drugs. Some prescription medicines have acetaminophen and other ingredients. Using both prescription and OTC acetaminophen for pain can cause you to overdose. Read the labels on your OTC medicines with care. This will help you to clearly know the list of ingredients, how much to take, and any warnings. It may also help you not take too much acetaminophen. If you have questions or do not understand the information, ask your pharmacist or health care provider to explain it to you before you take the OTC medicine.  Managing nausea  Some people have an  upset stomach after surgery. This is often because of anesthesia, pain, or pain medicine, or the stress of surgery. These tips will help you handle nausea and eat healthy foods as you get better. If you were on a special food plan before surgery, ask your doctor if you should follow it while you get better. These tips may help:  Do not push yourself to eat. Your body will tell you when to eat and how much.  Start off with clear liquids and soup. They are easier to digest.  Next try semi-solid foods, such as mashed potatoes, applesauce, and gelatin, as you feel ready.  Slowly move to solid foods. Dont eat fatty, rich, or spicy foods at first.  Do not force yourself to have 3 large meals a day. Instead eat smaller amounts more often.  Take pain medicines with a small amount of solid food, such as crackers or toast, to avoid nausea.     Call your surgeon if  You still have pain an hour after taking medicine. The medicine may not be strong enough.  You feel too sleepy, dizzy, or groggy. The medicine may be too strong.  You have side effects like nausea, vomiting, or skin changes, such as rash, itching, or hives.       If you have obstructive sleep apnea  You were given anesthesia medicine during surgery to keep you comfortable and free of pain. After surgery, you may have more apnea spells because of this medicine and other medicines you were given. The spells may last longer than usual.   At home:  Keep using the continuous positive airway pressure (CPAP) device when you sleep. Unless your health care provider tells you not to, use it when you sleep, day or night. CPAP is a common device used to treat obstructive sleep apnea.  Talk with your provider before taking any pain medicine, muscle relaxants, or sedatives. Your provider will tell you about the possible dangers of taking these medicines.  © 3803-7865 [employer]. [address] [place] [address], [place] [address]. All rights reserved. This information is  not intended as a substitute for professional medical care. Always follow your healthcare professional's instructions.                        Using an Incentive Spirometer    An incentive spirometer is a device that helps you do deep breathing exercises. These exercises expand your lungs, aid in circulation, and help prevent pneumonia. Deep breathing exercises also help you breathe better and improve the function of your lungs by:  Keeping your lungs clear  Strengthening your breathing muscles  Helping prevent respiratory complications or problems  The incentive spirometer gives you a way to take an active part in recover. A nurse or therapist will teach you breathing exercises. To do these exercises, you will breathe in through your mouth and not your nose. The incentive spirometer only works correctly if you breathe in through your mouth.  Steps to clear lungs  Step 1. Exhale normally. Then, inhale normally.  Relax and breathe out.  Step 2. Place your lips tightly around the mouthpiece.  Make sure the device is upright and not tilted.  Step 3. Inhale as much air as you can through the mouthpiece (don't breath through your nose).  Inhale slowly and deeply.  Hold your breath long enough to keep the balls or disk raised for at least 3 to 5 seconds, or as instructed by your healthcare provider.  Some spirometers have an indicator to let you know that you are breathing in too fast. If the indicator goes off, breathe in more slowly.  Step 4. Repeat the exercise regularly.  Do this exercise every hour while you're awake, or as instructed by your healthcare provider.  If you were taught deep breathing and coughing exercises, do them regularly as instructed by your healthcare provider.                             Post op instructions for prevention of DVT  What is deep vein thrombosis?  Deep vein thrombosis (DVT) is the medical term for blood clots in the deep veins of the leg.  These blood clots can be dangerous.  A DVT  can block a blood vessel and keep blood from getting where it needs to go.  Another problem is that the clot can travel to other parts of the body such as the lungs.  A clot that travels to the lungs is called a pulmonary embolus (PE) and can cause serious problems with breathing which can lead to death.  Am I at risk for DVT/PE?  If you are not very active, you are at risk of DVT.  Anyone confined to bed, sitting for long periods of time, recovering from surgery, etc. increases the risk of DVT.  Other risk factors are cancer diagnosis, certain medications, estrogen replacement in any form,older age, obesity, pregnancy, smoking, history of clotting disorders, and dehydration.  How will I know if I have a DVT?  Swelling in the lower leg  Pain  Warmth, redness, hardness or bulging of the vein  If you have any of these symptoms, call your doctors office right away.  Some people will not have any symptoms until the clot moves to the lungs.  What are the symptoms of a PE?  Panting, shortness of breath, or trouble breathing  Sharp, knife-like chest pain when you breathe  Coughing or coughing up blood  Rapid heartbeat  If you have any of these symptoms or get worse quickly, call 911 for emergency treatment.  How can I prevent a DVT?  Avoid long periods of inactivity and dont cross your legs--get up and walk around every hour or so.  Stay active--walking after surgery is highly encouraged.  This means you should get out of the house and walk in the neighborhood.  Going up and down stairs will not impair healing (unless advised against such activity by your doctor).    Drink plenty of noncaffeinated, nonalcoholic fluids each day to prevent dehydration.  Wear special support stockings, if they have been advised by your doctor.  If you travel, stop at least once an hour and walk around.  Avoid smoking (assistance with stopping is available through your healthcare provider)  Always notify your doctor if you are not able to  follow the post operative instructions that are given to you at the time of discharge.  It may be necessary to prescribe one of the medications available to prevent DVT.We hope your stay was comfortable as you heal now, mend and rest.    For we have enjoyed taking care of you by giving your our best.    And as you get better, by regaining your health and strength;   We count it as a privilege to have served you and hope your time at [facility] was well spent.      Thank  You!!!                        Exparel(bupivacaine) has been injected to provide approximately 72 hours of reduced pain after your surgery.  Do not remove the bracelet for five days.  Report to your doctor as soon as possible if you experience any of the following:   Restlessness   Anxiety   Speech problems    Lightheadedness   Numbness and tingling of the mouth and lips   Seizures     Metallic taste   Blurred vision   Tremors    Twitching   Depression   Extreme drowsiness  Avoid additional use of local anesthetics (such as dental procedures) for five days (96 hours).

## 2024-05-28 NOTE — H&P
Past Medical History:   Diagnosis Date    Anxiety       this came on since lap kadeem sgy    Arthritis      Colon polyp      Deviated septum      GERD (gastroesophageal reflux disease)      Hypertension      PAC (premature atrial contraction)      Plantar fasciitis      PMB (postmenopausal bleeding) 05/2018    Seasonal allergies      Sinus disorder      Uterine leiomyoma 05/2018    Varicose veins of both lower extremities                 Past Surgical History:   Procedure Laterality Date    BREAST BIOPSY Left 2015     benign    CHOLECYSTECTOMY   6-1-2015     Dr Ashraf    COLONOSCOPY N/A 11/23/2015     Procedure: COLONOSCOPY;  Surgeon: Theo Stinson MD;  Location: [facility] ENDO;  Service: Endoscopy;  Laterality: N/A; repeat in 5 years for surveillance    COLONOSCOPY N/A 7/14/2021     Procedure: COLONOSCOPY;  Surgeon: Theo Stinson MD;  Location: [facility] ENDO;  Service: Endoscopy;  Laterality: N/A;    CYSTOSCOPY N/A 6/7/2018     Procedure: CYSTOSCOPY;  Surgeon: Anaya Caldera MD;  Location: [facility] OR;  Service: OB/GYN;  Laterality: N/A;    ESOPHAGOGASTRODUODENOSCOPY N/A 3/4/2019     Procedure: EGD (ESOPHAGOGASTRODUODENOSCOPY);  Surgeon: Theo Stinson MD;  Location: [facility];  Service: Endoscopy;  Laterality: N/A;    HYSTERECTOMY        KNEE ARTHROSCOPY W/ MENISCECTOMY   11/15/2000     right     LAPAROSCOPIC SALPINGO-OOPHORECTOMY Bilateral 6/7/2018     Procedure: [id number];  Surgeon: Anaya Caldera MD;  Location: [facility] OR;  Service: OB/GYN;  Laterality: Bilateral;    LAPAROSCOPIC TOTAL HYSTERECTOMY N/A 6/7/2018     Procedure: HYSTERECTOMY-TOTAL LAPAROSCOPIC (TLH);  Surgeon: Anaya Caldera MD;  Location: [facility] OR;  Service: OB/GYN;  Laterality: N/A;    OOPHORECTOMY        UPPER GASTROINTESTINAL ENDOSCOPY   06/22/2015     Dr. Stinson              Current Outpatient Medications   Medication Sig    acyclovir 5% (ZOVIRAX) 5 % Crea Apply topically 5 (five) times daily.    b  complex vitamins capsule Take 1 capsule by mouth once daily.    biotin 10 mg Tab Take 100 mg by mouth once daily.     calcium carbonate (TUMS) 200 mg calcium (500 mg) chewable tablet Take 1 tablet by mouth daily as needed for Heartburn.    cetirizine (ZYRTEC) 10 MG tablet Take 10 mg by mouth daily as needed for Allergies or Rhinitis.     doxycycline (VIBRA-TABS) 100 MG tablet Take 100 mg by mouth 2 (two) times daily.    econazole nitrate 1 % cream Apply to inner thighs and buttocks BID PRN flare    enzymes,digestive (DIGESTIVE ENZYMES ORAL) Take by mouth.    estradioL (ESTRACE) 0.01 % (0.1 mg/gram) vaginal cream Place 1 g vaginally every Monday and Thursday.    lactobacillus combination no.4 (PROBIOTIC) 3 billion cell Cap Take 1 application by mouth 2 (two) times a day.    Lactobacillus rhamnosus GG (CULTURELLE) 10 billion cell capsule Take 1 capsule by mouth once daily.    loratadine (CLARITIN) 10 mg tablet Take 10 mg by mouth daily as needed for Allergies.    meloxicam (MOBIC) 7.5 MG tablet Take 1 tablet (7.5 mg total) by mouth once daily.    mometasone (NASONEX) 50 mcg/actuation nasal spray 2 sprays by Nasal route daily as needed (rhinitis).    multivitamin (THERAGRAN) per tablet Take 1 tablet by mouth once daily.    olmesartan-hydrochlorothiazide (BENICAR HCT) 40-12.5 mg Tab Take 1 tablet by mouth once daily.    potassium chloride (MICRO-K) 10 MEQ CpSR Take 1 capsule (10 mEq total) by mouth once daily.    RABEprazole (ACIPHEX) 20 mg tablet Take 1 tablet (20 mg total) by mouth once daily.    sucralfate (CARAFATE) 100 mg/mL suspension Take 10 mLs (1 g total) by mouth 4 (four) times daily before meals and nightly.    tretinoin (RETIN-A) 0.05 % cream Thin film to entire face at bedtime    tumeric-ging-olive-oreg-capryl 100 mg-150 mg- 50 mg-150 mg Cap Take by mouth.    vit C,V-Ey-hygig-lutein-zeaxan (PRESERVISION AREDS-2) 250-90-40-1 mg Cap Take by mouth.    vitamin D (VITAMIN D3) 1000 units Tab Take 2 Units by mouth  once daily.       No current facility-administered medications for this visit.               Review of patient's allergies indicates:   Allergen Reactions    Augmentin [amoxicillin-pot clavulanate] Nausea And Vomiting       Gi upset    Bentyl [dicyclomine] Other (See Comments)       Worsening anxiety    Naprosyn [naproxen]      Omeprazole         Other reaction(s): ABDOMINAL PAIN    Adhesive Rash    Lexapro [escitalopram oxalate] Anxiety       Bad dreams, feeling of doom, sweats    Sulfa (sulfonamide antibiotics) Rash       Other reaction(s): measle like rash               Family History   Problem Relation Age of Onset    Hypertension Mother      Arthritis Mother      Hypertension Father      Cancer Father           melanoma    Melanoma Father      COPD Father      Heart disease Father           chf    Diverticulitis Maternal Grandmother      Arthritis Maternal Aunt      Diabetes Maternal Aunt      Hypertension Maternal Aunt      Arthritis Maternal Uncle      Breast cancer Neg Hx      Ovarian cancer Neg Hx      Psoriasis Neg Hx      Lupus Neg Hx      Eczema Neg Hx      Colon cancer Neg Hx      Colon polyps Neg Hx      Crohn's disease Neg Hx      Esophageal cancer Neg Hx      Stomach cancer Neg Hx           Social History            Socioeconomic History    Marital status:    Tobacco Use    Smoking status: Never    Smokeless tobacco: Never   Substance and Sexual Activity    Alcohol use: Yes       Comment: rarely / few times per yr    Drug use: No    Sexual activity: Yes       Partners: Male       Birth control/protection: None, Post-menopausal      Social Determinants of Health           Physical Activity: Insufficiently Active (9/1/2020)     Exercise Vital Sign      Days of Exercise per Week: 7 days      Minutes of Exercise per Session: 10 min   Stress: No Stress Concern Present (9/1/2020)     [employer] - Occupational Stress Questionnaire      Feeling of Stress : Not at all    Social Connections: Unknown (9/1/2020)     Social Connection and Isolation Panel [NHANES]      Frequency of Communication with Friends and Family: Patient declined      Frequency of Social Gatherings with Friends and Family: Patient declined      Active Member of Clubs or Organizations: Patient declined      Attends Club or Organization Meetings: Patient declined      Marital Status:          Chief Complaint:       Chief Complaint   Patient presents with    Right Knee - Pain    Left Knee - Pain         History of present illness: Is a 63-year-old female seen in consultation for Dr. Llamas.  Patient's had knee pain for several years now.  Symptoms are stable and mild to moderate.  Pain with running and squatting.  Patient had prior right meniscus surgery several years ago.   Pain is laterally located.  She has been in bio skin braces previously.  Right knee is worse than left.   Worse over the last few months.  We checked her for metal allergies and nothing was significant        Answers for HPI/ROS submitted by the patient on 4/22/2018   Leg pain  unexpected weight change: No  appetite change : No  sleep disturbance: No  IMMUNOCOMPROMISED: No  nervous/ anxious: No  dysphoric mood: No  rash: No  visual disturbance: No  eye redness: No  eye pain: No  ear pain: No  tinnitus: No  hearing loss: No  sinus pressure : No  nosebleeds: No  enviro allergies: No  food allergies: No  cough: No  shortness of breath: No  sweating: No  dysuria: No  frequency: No  difficulty urinating: No  hematuria: No  painful intercourse: No  chest pain: No  palpitations: Yes  nausea: No  vomiting: No  diarrhea: No  blood in stool: No  constipation: No  headaches: No  numbness: No  seizures: No  joint swelling: No  myalgia: Yes  weakness: No  back pain: No  Pain Chronicity: recurrent  History of trauma: Yes  Onset: more than 1 year ago  Frequency: intermittently  Progression since onset: unchanged  injury location: at home  pain-  numeric: 1/10  pain location: left knee, right knee  pain quality: aching, dull  Radiating Pain: No  Aggravating factors: activity, bending, cold, exercise  fever: No  inability to bear weight: No  itching: No  joint locking: No  limited range of motion: No  stiffness: No  tingling: No  Treatments tried: cold, heat, exercise, movement, OTC pain meds, rest  physical therapy: not tried  Improvement on treatment: moderate           Physical Examination:     Vital Signs:    There were no vitals filed for this visit.     Body mass index is 34.22 kg/m².     This a well-developed, well nourished patient in no acute distress.  They are alert and oriented and cooperative to examination.  Pt. walks without an antalgic gait.       Examination of bilateral knees shows no rashes or erythema. There are no masses ecchymosis or effusion. Patient has full range of motion from 0-130°. Patient is moderately tender to palpation over lateral joint line and nontender to palpation over the medial joint line.Knee is stable to varus and valgus stress. 5 out of 5 motor strength. Palpable distal pulses. Intact light touch sensation. Negative Patellofemoral crepitus     Heart is regular rate without obvious murmurs   Normal respiratory effort without audible wheezing  Abdomen is soft and nontender         X-rays: X-rays of the right knee is  reviewed which show severe valgus narrowing of both knees      Assessment:: Bilateral valgus knee arthritis     Plan:  I reviewed the findings with her today.  We talked about knee replacements.  We talked in detail about possibly doing her right knee.  I agreed to inject both knees today..  Also gave her a script for compression hose, knee sleeves, and printed her up a list of prehab exercises to work on.  Plan is for right robotic assisted total knee arthroplasty.  Risks, benefits, and alternatives to the procedure were explained to the patient including but not limited to damage to nerves, arteries,  blood vessels, bones, tendons, ligaments, stiffness, instability, infection, permanent limb dysfunction, DVT, PE, as well as general anesthetic complications including seizure, stroke, heart attack and even death. The patient understood these risks and wished to proceed and signed the informed consent.         The mobility limitation cannot be sufficiently resolved by the use of a cane. Patient's functional mobility deficit can be sufficiently resolved with the use of a (Rolling Walker or Walker). Patient's mobility limitation significantly impairs their ability to participate in one of more activities of daily living. The use of a (RW or Walker) will significantly improve the patient's ability to participate in MRADLS and the patient will use it on regular basis in the home.         This note was created using  voice recognition software that occasionally misinterpreted phrases or words.     Consult note is delivered via Epic messaging service.

## 2024-05-28 NOTE — ANESTHESIA POSTPROCEDURE EVALUATION
Anesthesia Post Evaluation    Patient: Mercedes Laura    Procedure(s) Performed: Procedure(s) (LRB):  ROBOTIC ARTHROPLASTY, KNEE, TOTAL (Right)    Final Anesthesia Type: spinal      Patient location during evaluation: PACU  Patient participation: Yes- Able to Participate  Level of consciousness: awake and alert  Post-procedure vital signs: reviewed and stable  Pain management: adequate  Airway patency: patent    PONV status at discharge: No PONV  Anesthetic complications: no      Cardiovascular status: hemodynamically stable  Respiratory status: unassisted and room air  Hydration status: euvolemic  Follow-up not needed.              Vitals Value Taken Time   /80 05/28/24 1050   Temp 36.7 °C (98 °F) 05/28/24 1010   Pulse 64 05/28/24 1050   Resp 16 05/28/24 1050   SpO2 96 % 05/28/24 1050         Event Time   Out of Recovery 10:18:00         Pain/Isabela Score: Pain Rating Prior to Med Admin: 3 (5/28/2024  1:56 PM)  Pain Rating Post Med Admin: 2 (5/28/2024 10:00 AM)  Isabela Score: 10 (5/28/2024 10:00 AM)  Modified Isabela Score: 19 (5/28/2024 10:50 AM)

## 2024-05-28 NOTE — DISCHARGE SUMMARY
[facility]  Discharge Note  Short Stay    Procedure(s) (LRB):  ROBOTIC ARTHROPLASTY, KNEE, TOTAL (Right)      OUTCOME: Patient tolerated treatment/procedure well without complication and is now ready for discharge.    DISPOSITION: Home or Self Care    FINAL DIAGNOSIS:  <principal problem not specified>    FOLLOWUP: In clinic    DISCHARGE INSTRUCTIONS:    Discharge Procedure Orders   Diet general     Leave dressing on - Keep it clean, dry, and intact until clinic visit     Change dressing (specify)   Order Comments: Dressing change: If dressing loses its seal, change daily.     Call MD for:  temperature >100.4     Call MD for:  persistent nausea and vomiting     Call MD for:  severe uncontrolled pain     Call MD for:  difficulty breathing, headache or visual disturbances     Call MD for:  redness, tenderness, or signs of infection (pain, swelling, redness, odor or green/yellow discharge around incision site)     Call MD for:  hives     Call MD for:  persistent dizziness or light-headedness     Call MD for:  extreme fatigue     Keep surgical extremity elevated     Weight bearing as tolerated     Activity as tolerated     Shower on day dressing removed (No bath)        TIME SPENT ON DISCHARGE: 5 minutes

## 2024-05-28 NOTE — PT/OT/SLP PROGRESS
Physical Therapy Treatment    Patient Name:  Mercedes Laura   MRN:  [id number]    Recommendations:     Discharge Recommendations: Low Intensity Therapy  Discharge Equipment Recommendations: none  Barriers to discharge: None    Assessment:     Mercedes Laura is a 64 y.o. female admitted with a medical diagnosis of <principal problem not specified>.  She presents with the following impairments/functional limitations: weakness, impaired sensation, impaired functional mobility, gait instability, impaired balance, decreased lower extremity function, decreased ROM, orthopedic precautions .    Third visit. Pt stated feeling better post napping and ready to try again. Pt requesting bathroom use and voided. Personal underwear and pad utilized post toileting. Min assist to stand with RW and ambulated 100ft CGA. Pt completed stair training ascending and descending 5 steps x 2 min assist. Pt and family provided with gait belt for discharge.  Pt educated on HEP that she could complete.    Rehab Prognosis: Good; patient would benefit from acute skilled PT services to address these deficits and reach maximum level of function.    Recent Surgery: Procedure(s) (LRB):  ROBOTIC ARTHROPLASTY, KNEE, TOTAL (Right) Day of Surgery    Plan:     During this hospitalization, patient to be seen daily to address the identified rehab impairments via gait training, therapeutic activities, therapeutic exercises and progress toward the following goals:    Plan of Care Expires:       Subjective   Stated that she had already practiced yesterday using bathroom with RW  Pt able to demonstrate to PT ability to take side and back steps with CGA  Spouse and daughter at bedside  Chief Complaint: pain R thigh and behind knee  Patient/Family Comments/goals: get well  Pain/Comfort:  Pain Rating 1:  (not rated)  Location - Side 1: Right  Location 1: thigh  Pain Addressed 1: Pre-medicate for activity, Reposition, Cessation of Activity      Objective:     Communicated  with nurse Faye prior to session.  Patient found up in chair with peripheral IV upon PT entry to room.     General Precautions: Standard, fall  Orthopedic Precautions: RLE weight bearing as tolerated  Braces:  none  Respiratory Status: Room air     Functional Mobility:  Transfers:     Sit to Stand:  minimum assistance with rolling walker  Toilet Transfer: minimum assistance with  rolling walker  using  Stand Pivot  Gait: 100ft with RW min/CGA with better gait and RW sequencing with a step through gait  Stair training min assist ascending and descending 5 steps utilizing bilateral hand rails x 2 trials      AM-PAC 6 CLICK MOBILITY  Turning over in bed (including adjusting bedclothes, sheets and blankets)?: 3  Sitting down on and standing up from a chair with arms (e.g., wheelchair, bedside commode, etc.): 3  Moving from lying on back to sitting on the side of the bed?: 3  Moving to and from a bed to a chair (including a wheelchair)?: 3  Need to walk in hospital room?: 3  Climbing 3-5 steps with a railing?: 1  Basic Mobility Total Score: 16       Treatment & Education:  Patient was educated on the importance of OOB activity and functional mobility to negate negative effects of prolonged bed rest during hospitalization, safe transfers and ambulation, and D/C planning   Gait at hallways  Stair training and toileting min/CGA    Patient left up in chair with all lines intact, call button in reach, nurse Neyda notified, and family present..    GOALS:   Multidisciplinary Problems       Physical Therapy Goals          Problem: Physical Therapy    Goal Priority Disciplines Outcome Goal Variances Interventions   Physical Therapy Goal     PT, PT/OT Progressing     Description: Goals to be met by: 2024     Patient will increase functional independence with mobility by performin. Supine to sit with Contact Guard Assistance  2. Sit to stand transfer with Contact Guard Assistance  3. Bed to chair transfer with  Contact Guard Assistance using Rolling Walker  4. Gait  x 250 feet with Contact Guard Assistance using Rolling Walker.   5. Ascend/descend 5 stair with right Handrails Minimal Assistance using No Assistive Device.   6. Lower extremity exercise program x20 reps                        Time Tracking:     PT Received On: 05/28/24  PT Start Time: 1324     PT Stop Time: 1353  PT Total Time (min): 29 min     Billable Minutes: Gait Training 19 and Therapeutic Activity 10    Treatment Type: Treatment  PT/PTA: PT     Number of PTA visits since last PT visit: 0     05/28/2024

## 2024-05-28 NOTE — TRANSFER OF CARE
Anesthesia Transfer of Care Note    Patient: Mercedes Laura    Procedure(s) Performed: Procedure(s) (LRB):  ROBOTIC ARTHROPLASTY, KNEE, TOTAL (Right)    Patient location: PACU    Anesthesia Type: spinal and MAC    Transport from OR: Transported from OR on 2-3 L/min O2 by NC with adequate spontaneous ventilation    Post pain: adequate analgesia    Post assessment: no apparent anesthetic complications    Post vital signs: stable    Level of consciousness: awake    Nausea/Vomiting: no nausea/vomiting    Complications: none    Transfer of care protocol was followed      Last vitals: Visit Vitals  BP (!) 140/70   Pulse 62   Temp 36.4 °C (97.6 °F) (Skin)   Resp 14   Wt 101.6 kg (224 lb)   LMP 06/07/2018   SpO2 98%   Breastfeeding No   BMI 36.15 kg/m²

## 2024-05-28 NOTE — ANESTHESIA PREPROCEDURE EVALUATION
05/28/2024  Mercedes Laura is a 64 y.o., female.      Pre-op Assessment    I have reviewed the Patient Summary Reports.     I have reviewed the Nursing Notes. I have reviewed the NPO Status.   I have reviewed the Medications.     Review of Systems  Anesthesia Hx:  No problems with previous Anesthesia                Social:  Non-Smoker       Cardiovascular:     Hypertension           hyperlipidemia                       Hypertension         Hepatic/GI:    Hiatal Hernia, GERD      Gerd    Hernia, Hiatal Hernia      Musculoskeletal:  Arthritis               Neurological:    Neuromuscular Disease,                                 Neuromuscular Disease   Psych:   anxiety                 Physical Exam  General: Well nourished, Cooperative, Oriented and Alert    Airway:  Mallampati: II   Mouth Opening: Normal  TM Distance: Normal  Tongue: Normal  Neck ROM: Normal ROM    Dental:  Intact    Chest/Lungs:  Normal Respiratory Rate    Heart:  Rate: Normal        Anesthesia Plan  Type of Anesthesia, risks & benefits discussed:    Anesthesia Type: Spinal  Intra-op Monitoring Plan: Standard ASA Monitors  Post Op Pain Control Plan: multimodal analgesia, IV/PO Opioids PRN and peripheral nerve block  Informed Consent: Informed consent signed with the Patient and all parties understand the risks and agree with anesthesia plan.  All questions answered.   ASA Score: 2    Ready For Surgery From Anesthesia Perspective.     .

## 2024-05-28 NOTE — PLAN OF CARE
Problem: Physical Therapy  Goal: Physical Therapy Goal  Description: Goals to be met by: 2024     Patient will increase functional independence with mobility by performin. Supine to sit with Contact Guard Assistance  2. Sit to stand transfer with Contact Guard Assistance  3. Bed to chair transfer with Contact Guard Assistance using Rolling Walker  4. Gait  x 250 feet with Contact Guard Assistance using Rolling Walker.   5. Ascend/descend 5 stair with right Handrails Minimal Assistance using No Assistive Device.   6. Lower extremity exercise program x20 reps   Outcome: Progressing   PT eval and treat at post op 05. Pt with LE's hypoesthesia/weakness. Pt completed thera ex in supine with LE's heaviness. Pt will require second visit

## 2024-05-28 NOTE — ANESTHESIA PROCEDURE NOTES
Spinal    Diagnosis: osteoarthritis  Patient location during procedure: OR  Start time: 5/28/2024 7:30 AM  Timeout: 5/28/2024 7:30 AM  End time: 5/28/2024 7:35 AM    Staffing  Authorizing Provider: Fritz Grimaldo MD  Performing Provider: Fritz Grimaldo MD    Staffing  Performed by: Fritz Grimaldo MD  Authorized by: Fritz Grimaldo MD    Preanesthetic Checklist  Completed: patient identified, IV checked, site marked, risks and benefits discussed, surgical consent, monitors and equipment checked, pre-op evaluation and timeout performed  Spinal Block  Patient position: sitting  Prep: ChloraPrep  Patient monitoring: heart rate, cardiac monitor, continuous pulse ox, continuous capnometry and frequent blood pressure checks  Approach: midline  Location: L4-5  Injection technique: single shot  CSF Fluid: clear free-flowing CSF  Needle  Needle type: pencil-tip   Needle gauge: 25 G  Needle length: 3.5 in  Additional Documentation: incremental injection, negative aspiration for heme and no paresthesia on injection  Needle localization: anatomical landmarks  Assessment  Sensory level: T10   Dermatomal levels determined by alcohol wipe  Ease of block: easy  Patient's tolerance of the procedure: comfortable throughout block and no complaints  Medications:    Medications: bupivacaine (pf) (MARCAINE) injection 0.75% - Intraspinal   1.4 mL - 5/28/2024 7:35:00 AM

## 2024-05-28 NOTE — OP NOTE
[facility]  Orthopedic Surgery  Operative Note    SUMMARY     Date of Procedure: 5/28/2024     Procedure: Procedure(s) (LRB):  ROBOTIC ARTHROPLASTY, KNEE, TOTAL (Right)       Surgeons and Role:     * Chu Whitman MD - Primary    Assistant: Joni SANCHEZ    Pre-Operative Diagnosis: Primary osteoarthritis of right knee [M17.11]  Pre-op testing [Z01.818]    Post-Operative Diagnosis: Post-Op Diagnosis Codes:     * Primary osteoarthritis of right knee [M17.11]     * Pre-op testing [Z01.818]    Anesthesia: Spinal    Complications: No    Estimated Blood Loss (EBL): 30ml           Implants:   Implant Name Type Inv. Item Serial No.  Lot No. LRB No. Used Action   PIN BONE [id number] - [id number]  PIN BONE [id number]  JOHN[employer] RUSTY. [id number] Right 1 Implanted and Explanted   PIN BONE 4 X 140MM STERILE - [id number]  PIN BONE 4 X 140MM STERILE  [facility] SURGICAL [id number] Right 1 Implanted and Explanted   CEMENT BONE ANTIBIO SIMPLEX P - [id number]  CEMENT BONE ANTIBIO SIMPLEX P  [employer]OHN[employer]USTY. [id number] Right 1 Implanted   FEMORAL CEMENTED #4 RIGHT - [id number]  FEMORAL CEMENTED #4 RIGHT  [employer]OHN[employer]USTY. [id number] Right 1 Implanted   PATELLA TRIATHLON 29X8 SYMTRC - [id number]  PATELLA TRIATHLON 29X8 SYMTRC  [employer]USTY. [id number] Right 1 Implanted   BASEPLATE TIB ZAIDA PRIM SZ 4 - [id number]  BASEPLATE TIB ZAIDA PRIM SZ 4  [employer]OHN[employer]USTY. [id number] Right 1 Implanted   INSERT TRIATHLON CS TIB 9MM 4 - [id number]  INSERT TRIATHLON CS TIB 9MM 4  JOHN[employer] RUSTY. [id number] Right 1 Implanted       Tourniquet time: 52min at 300mmHg    Specimens:   Specimen (24h ago, onward)      None                    Condition: Good    Disposition: PACU - hemodynamically stable.    Attestation: I was present and scrubbed for the entire procedure.    INDICATIONS FOR THE PROCEDURE: A 64F with a history of chronic   Right knee arthritis, had failed all conservative measures including cortisone    injections, PT, viscosupplementation and activity modification. After a long   discussion, the patient wished to proceed with the procedure above.     PROCEDURE IN DETAIL: Risks, benefits and alternatives of the procedure were   explained to the patient including, but not limited to damage to nerves,   arteries or blood vessels. Also explained risk of infection, stiffness, DVT, PE,   polyethylene wear as well as anesthetic complications including seizure, stroke,   heart attack and death, understood this and signed informed consent. The   patient's Right knee was marked prior to coming to the Operating Room. The patient was brought to the operating room, placed on the operating table in a supine position.A  formal timeout was done in which correct patient, procedure and op site were all   correctly identified and confirmed by the entire operating team.  3gm Ancef was given prior to surgical incision. Spinal anesthesia was induced. The patient'sRight  lower extremity was prepped and   draped in normal sterile fashion. TheRight  leg was exsanguinated with an   Esmarch. Tourniquet was inflated up 300 mmHg. Standard anterior approach to   the knee was made. Medial parapatellar arthrotomy was made, leaving about 1/8   inch of tissue for later repair. Proximal medial tibial release was performed,   making sure not to release any of the MCL. We then   everted the patella and reflexed the knee. Fat pad was excised as well as some   of the ACL. Soft tissue off the distal anterior femur was removed for   visualization, so as to help prevent notching.  We started off by placing our femoral pins.  Two pins were placed about 2 centimeters proximal and 1 centimeter anterior to the medial epicondyle.  We then retracted down to the level of the tibial tubercle.    Two pins were placed just short of bicortically in the tibia.  We then hooked up our arays to our pins.  We placed 2 checkpoints.  One in the tibia and 1 in the femur.   We then took the leg through range of motion.  We then began by marking off our bony points.  We did this 1st on the femur and then on the tibia.  After this we did ligament balancing of the knee 1st extension and then in flexion by using some spoons and an osteotome.  We then adjusted our bone cuts on the computer and once we liked our plan began by making our cuts.  The [employer] robotic assisted cutting arm was then brought in and then we made our femoral cuts and then turned our attention to the tibia.  Tibial cuts were made.  All of bone was removed as well as excess soft tissue.  Posterior osteophytes removed as well.  We then began to trial.  We placed a size 4 femur and a size 4 tibia with a size 9 polyethylene.  We were within 1 millimeter between our medial and lateral compartments in both flexion and extension.        We turned our attention to patella, caliper was used on the patella where it   measured 23. We set guide at 15 and made our 8-mm cut for polyethylene component, 29 was selected. Then drill holes were placed and the patellar button was placed.   This had good tracking. No need for a lateral release and with no hand tests,   it stayed centered the whole time. We then marked our tibial rotation. We then drilled our femoral lugs.  We then   removed everything except the size 4 tibial tray. We then checked our tibial tray   with a drop demario again and then marked our rotation and pinned it into place then   drilled, and then punched for our keel. Robot and pins were all removed. We then started preparing final   components on the back table. Anterior, medial and lateral structures were injected with our local   Cocktail. Bony surfaces   copiously irrigated with Pulsavac and then thoroughly dried. Once the cement   was the appropriate consistency, first the tibia and then the femur were   cemented into place, removing excess cement. A 9 trial was placed. The knee   was held in compression and then the  patella was placed. Cement was allowed to   cure. Once the cement was cured, we then removed excess cement. Again trialed   one final time, again seeing a 9. We then tapped into place the   final 9 meniscal bearing into place. After this was done, we then did our   diluted iodine soak for 3 minutes and then proceeded with closing.  Arthrotomy was closed using our StrataFix.   Subcutaneous tissue was closed using 2-0 Vicryl and skin was using a running 3-0   StrataFix and Dermabond.Instrument, sponge, and needle counts were correct prior to wound closure and at the conclusion of the case.  Sterile dressing was applied.   They were extubated, awakened and transferred from the Operating Room to the   Recovery Room in stable condition.

## 2024-05-29 ENCOUNTER — PATIENT MESSAGE (OUTPATIENT)
Dept: ORTHOPEDICS | Facility: CLINIC | Age: 64
End: 2024-05-29
Payer: COMMERCIAL

## 2024-05-29 ENCOUNTER — NURSE TRIAGE (OUTPATIENT)
Dept: ADMINISTRATIVE | Facility: CLINIC | Age: 64
End: 2024-05-29
Payer: COMMERCIAL

## 2024-05-29 ENCOUNTER — HOSPITAL ENCOUNTER (EMERGENCY)
Facility: HOSPITAL | Age: 64
Discharge: HOME | End: 2024-05-29
Attending: EMERGENCY MEDICINE
Payer: COMMERCIAL

## 2024-05-29 VITALS
DIASTOLIC BLOOD PRESSURE: 62 MMHG | SYSTOLIC BLOOD PRESSURE: 139 MMHG | HEIGHT: 66 IN | TEMPERATURE: 97 F | RESPIRATION RATE: 19 BRPM | BODY MASS INDEX: 36.15 KG/M2 | OXYGEN SATURATION: 97 % | HEART RATE: 65 BPM

## 2024-05-29 VITALS
DIASTOLIC BLOOD PRESSURE: 80 MMHG | WEIGHT: 224 LBS | SYSTOLIC BLOOD PRESSURE: 146 MMHG | RESPIRATION RATE: 16 BRPM | BODY MASS INDEX: 36.15 KG/M2 | TEMPERATURE: 98 F | OXYGEN SATURATION: 96 % | HEART RATE: 64 BPM

## 2024-05-29 DIAGNOSIS — I95.9 HYPOTENSION, UNSPECIFIED HYPOTENSION TYPE: ICD-10-CM

## 2024-05-29 DIAGNOSIS — R42 DIZZINESS: ICD-10-CM

## 2024-05-29 DIAGNOSIS — G89.18 POST-OPERATIVE PAIN: ICD-10-CM

## 2024-05-29 DIAGNOSIS — R55 VASOVAGAL NEAR SYNCOPE: Primary | ICD-10-CM

## 2024-05-29 PROCEDURE — 93005 ELECTROCARDIOGRAM TRACING: CPT

## 2024-05-29 PROCEDURE — 93010 ELECTROCARDIOGRAM REPORT: CPT | Mod: ,,, | Performed by: GENERAL PRACTICE

## 2024-05-29 PROCEDURE — 99283 EMERGENCY DEPT VISIT LOW MDM: CPT | Mod: 25

## 2024-05-29 RX ORDER — OXYCODONE AND ACETAMINOPHEN 7.5; 325 MG/1; MG/1
1 TABLET ORAL EVERY 4 HOURS PRN
Qty: 18 TABLET | Refills: 0 | Status: SHIPPED | OUTPATIENT
Start: 2024-05-29 | End: 2024-05-29

## 2024-05-29 RX ORDER — OXYCODONE AND ACETAMINOPHEN 7.5; 325 MG/1; MG/1
1 TABLET ORAL EVERY 4 HOURS PRN
Qty: 18 TABLET | Refills: 0 | Status: SHIPPED | OUTPATIENT
Start: 2024-05-29 | End: 2024-06-01

## 2024-05-29 RX ORDER — OXYCODONE AND ACETAMINOPHEN 7.5; 325 MG/1; MG/1
1 TABLET ORAL EVERY 6 HOURS PRN
Qty: 12 TABLET | Refills: 0 | Status: SHIPPED | OUTPATIENT
Start: 2024-05-29 | End: 2024-05-29

## 2024-05-29 NOTE — PROGRESS NOTES
5/29/24 Pt. States very pleased with care given.  All nurses and physical therapist were great.  States she has read and understands all discharge instructions. Was seen in ED last night for near syncope, panic attack with severe pain.  MD increased pain medication to Percocet 7.5/325 every 6 hours and continue with ibuprofen 600 mg alternating, tylenol and ASA as ordered.  Will use miralax starting today to prevent constipation. Ice and elevate knee 20 min. Every two ours.   PT and HH staring in home today. States is controlled now at 4/10.

## 2024-05-29 NOTE — TELEPHONE ENCOUNTER
Pt with complaints of knee pain, SOB, feeing weak, hot and dizzy.  States she did take a pain pill at 12 midnight.  Sats - 97%  HR 58, BP 87/44.    Care advice states to go to the ED now.  Patient and her  verbally understands, all questions answered, advised to call back for any worsening symptoms or further needs.   Pt states she is unable to stand at this time due to feeing dizzy and weak.  States they will call 911.      Reason for Disposition   Patient sounds very sick or weak to the triager    Additional Information   Negative: Sounds like a life-threatening emergency to the triager   Negative: [1] Widespread rash AND [2] bright red, sunburn-like   Negative: [1] SEVERE headache AND [2] after spinal (epidural) anesthesia   Negative: [1] Vomiting AND [2] persists > 4 hours   Negative: [1] Vomiting AND [2] abdomen looks much more swollen than usual   Negative: [1] Drinking very little AND [2] dehydration suspected (e.g., no urine > 12 hours, very dry mouth, very lightheaded)    Protocols used: Post-Op Symptoms and [id number]

## 2024-05-29 NOTE — TELEPHONE ENCOUNTER
Pt with pain of 4/10 currently.  Pt states feels better from earlier but still feels off.  This starts after movement or ambulation but does improve once sitting/resting.  Pt called in with the same complaint and went to the ED where she was discharged.  Pt states PT is coming out today and she also sent a secure chat to her provider with an update.  Care advice states to call provider within 24 hours.  Patient verbally understands, all questions answered, advised to call back for any worsening symptoms or further needs.     Reason for Disposition   [1] Caller has NON-URGENT question AND [2] triager unable to answer question    Additional Information   Negative: Sounds like a life-threatening emergency to the triager   Negative: [1] Widespread rash AND [2] bright red, sunburn-like   Negative: [1] SEVERE headache AND [2] after spinal (epidural) anesthesia   Negative: [1] Vomiting AND [2] persists > 4 hours   Negative: [1] Vomiting AND [2] abdomen looks much more swollen than usual   Negative: [1] Drinking very little AND [2] dehydration suspected (e.g., no urine > 12 hours, very dry mouth, very lightheaded)   Negative: Patient sounds very sick or weak to the triager   Negative: Sounds like a serious complication to the triager   Negative: Fever > 100.4 F (38.0 C)   Negative: [1] SEVERE post-op pain (e.g., excruciating, pain scale 8-10) AND [2] not controlled with pain medications   Negative: [1] Caller has URGENT question AND [2] triager unable to answer question   Negative: [1] Headache AND [2] after spinal (epidural) anesthesia AND [3] not severe   Negative: Fever present > 3 days (72 hours)   Negative: [1] MILD-MODERATE post-op pain (e.g., pain scale 1-7) AND [2] not controlled with pain medications    Protocols used: Post-Op Symptoms and [id number]

## 2024-05-29 NOTE — ED PROVIDER NOTES
Encounter Date: 5/29/2024       History     Chief Complaint   Patient presents with    Dizziness     Pt states she became dizziness and felt flushed after she took a dose of pain medications. Pt had knee surgery today. Pt states her blood pressure at home during this episode was 80/40. Pt states she feels better now, she just feels tired.      Emergent evaluation of a 64-year-old female who presents to the ER after she had a complete right knee replacement today with  she was discharged home to take Percocet 5/325 mg every 6 hours, Tylenol 1 g, ibuprofen 600 mg, and 81 mg of aspirin as a regiment control pain.  She reports she was not had good pain control despite taking these medications had last taken Percocet at 5:00 p.m. and then at midnight.  She was woke around 1 or 2:00 a.m. with severe right knee pain 10/10 she reports she became diaphoretic nauseous felt overheated and flushed felt that she was short of breath though she could take deep breaths she reports history of anxiety and felt she was having a panic attack she felt lightheaded like she was going to pass out and she woke her  her blood pressures reportedly 80/40 at home.  EMS was called she reports upon coming to the ER she was feeling better but she was just feeling tired.  Blood pressure was normal with the EMS.  She reports she was taken the Percocet 4 times today and did not have similar symptoms.      Review of patient's allergies indicates:   Allergen Reactions    Augmentin [amoxicillin-pot clavulanate] Nausea And Vomiting     Gi upset    Bentyl [dicyclomine] Other (See Comments)     Worsening anxiety    Naprosyn [naproxen] Other (See Comments)     Makes her tired and loopy    Omeprazole      Other reaction(s): ABDOMINAL PAIN    Adhesive Rash    Lexapro [escitalopram oxalate] Anxiety     Bad dreams, feeling of doom, sweats    Sulfa (sulfonamide antibiotics) Rash     Other reaction(s): measle like rash     Past Medical History:    Diagnosis Date    Anxiety     this came on since lap kadeem sgy    Arthritis     Colon polyp     Deviated septum     GERD (gastroesophageal reflux disease)     Hypertension     Leg cramps     PAC (premature atrial contraction)     Plantar fasciitis     PMB (postmenopausal bleeding) 05/2018    Seasonal allergies     Sinus disorder     Uterine leiomyoma 05/2018    Varicose veins of both lower extremities      Past Surgical History:   Procedure Laterality Date    BREAST BIOPSY Left 2015    benign    CHOLECYSTECTOMY  6-1-2015    Dr Ashraf    COLONOSCOPY N/A 11/23/2015    Procedure: COLONOSCOPY;  Surgeon: Theo Stinson MD;  Location: [facility] ENDO;  Service: Endoscopy;  Laterality: N/A; repeat in 5 years for surveillance    COLONOSCOPY N/A 7/14/2021    Procedure: COLONOSCOPY;  Surgeon: Theo Stinson MD;  Location: [facility] ENDO;  Service: Endoscopy;  Laterality: N/A;    CYSTOSCOPY N/A 6/7/2018    Procedure: CYSTOSCOPY;  Surgeon: Anaya Caldera MD;  Location: [facility] OR;  Service: OB/GYN;  Laterality: N/A;    ESOPHAGOGASTRODUODENOSCOPY N/A 3/4/2019    Procedure: EGD (ESOPHAGOGASTRODUODENOSCOPY);  Surgeon: Theo Stinson MD;  Location: [facility];  Service: Endoscopy;  Laterality: N/A;    HYSTERECTOMY      KNEE ARTHROSCOPY W/ MENISCECTOMY  11/15/2000    right     LAPAROSCOPIC SALPINGO-OOPHORECTOMY Bilateral 6/7/2018    Procedure: [id number];  Surgeon: Anaya Caldera MD;  Location: [facility] OR;  Service: OB/GYN;  Laterality: Bilateral;    LAPAROSCOPIC TOTAL HYSTERECTOMY N/A 6/7/2018    Procedure: HYSTERECTOMY-TOTAL LAPAROSCOPIC (TLH);  Surgeon: Anaya Caldera MD;  Location: [facility] OR;  Service: OB/GYN;  Laterality: N/A;    OOPHORECTOMY      UPPER GASTROINTESTINAL ENDOSCOPY  06/22/2015    Dr. Stinson     Family History   Problem Relation Name Age of Onset    Hypertension Mother      Arthritis Mother      Hypertension Father      Cancer Father          melanoma    Melanoma Father      COPD  Father      Heart disease Father          chf    Diverticulitis Maternal Grandmother      Arthritis Maternal Aunt      Diabetes Maternal Aunt      Hypertension Maternal Aunt      Arthritis Maternal Uncle      Breast cancer Neg Hx      Ovarian cancer Neg Hx      Psoriasis Neg Hx      Lupus Neg Hx      Eczema Neg Hx      Colon cancer Neg Hx      Colon polyps Neg Hx      Crohn's disease Neg Hx      Esophageal cancer Neg Hx      Stomach cancer Neg Hx       Social History     Tobacco Use    Smoking status: Never    Smokeless tobacco: Never   Substance Use Topics    Alcohol use: Yes     Comment: rarely / few times per yr    Drug use: No     Review of Systems   Constitutional:  Positive for diaphoresis. Negative for activity change, appetite change, chills, fatigue and fever.   HENT:  Negative for congestion, postnasal drip, rhinorrhea and sore throat.    Respiratory:  Positive for shortness of breath. Negative for cough, chest tightness, wheezing and stridor.    Cardiovascular:  Negative for chest pain and palpitations.   Gastrointestinal:  Positive for nausea. Negative for abdominal distention, abdominal pain, blood in stool, constipation, diarrhea and vomiting.   Endocrine: Positive for heat intolerance.   Genitourinary:  Negative for decreased urine volume and flank pain.   Musculoskeletal:  Negative for arthralgias, back pain, myalgias, neck pain and neck stiffness.   Skin:  Negative for rash.   Neurological:  Positive for dizziness, weakness and light-headedness. Negative for syncope and headaches.   Hematological:  Does not bruise/bleed easily.   Psychiatric/Behavioral:  Negative for confusion. The patient is nervous/anxious.    All other systems reviewed and are negative.      Physical Exam     Initial Vitals [05/29/24 0222]   BP Pulse Resp Temp SpO2   (!) 156/76 71 19 97.1 °F (36.2 °C) 100 %      MAP       --         Physical Exam    Nursing note and vitals reviewed.  Constitutional: She appears well-developed and  well-nourished. She is not diaphoretic. No distress.   HENT:   Head: Normocephalic and atraumatic.   Right Ear: External ear normal.   Left Ear: External ear normal.   Nose: Nose normal.   Mouth/Throat: Oropharynx is clear and moist.   Eyes: Conjunctivae and EOM are normal. Pupils are equal, round, and reactive to light.   Neck: Neck supple. No tracheal deviation present.   Normal range of motion.  Cardiovascular:  Normal rate, regular rhythm, normal heart sounds and intact distal pulses.     Exam reveals no gallop and no friction rub.       No murmur heard.  Blood pressure 156/76 pulse 71   Pulmonary/Chest: Breath sounds normal. No stridor. No respiratory distress. She has no wheezes. She has no rhonchi. She has no rales. She exhibits no tenderness.   Abdominal: Abdomen is soft. Bowel sounds are normal. She exhibits no distension and no mass. There is no abdominal tenderness. There is no rebound and no guarding.   Musculoskeletal:         General: Tenderness and edema present.      Cervical back: Normal range of motion and neck supple.      Comments: Right knee decreased range motion due to surgery today.  Distal right lower extremity with 2+ DP and posterior tibial pulse no erythema or warmth mild edema     Neurological: She is alert and oriented to person, place, and time. She has normal strength. No cranial nerve deficit or sensory deficit.   Skin: Skin is warm and dry. No rash noted. No erythema. No pallor.   Psychiatric: She has a normal mood and affect. Her behavior is normal. Judgment and thought content normal.         ED Course   Procedures  Labs Reviewed - No data to display  EKG Readings: (Independently Interpreted)   Normal sinus rhythm rate 67 no ischemic changes no ectopy or conduction delay       Imaging Results    None          Medications - No data to display  Medical Decision Making  Emergent evaluation of a 64-year-old female who presents to the ER after she had a complete right knee replacement  today with  she was discharged home to take Percocet 5/325 mg every 6 hours, Tylenol 1 g, ibuprofen 600 mg, and 81 mg of aspirin as a regiment control pain.  She reports she was not had good pain control despite taking these medications had last taken Percocet at 5:00 p.m. and then at midnight.  She was woke around 1 or 2:00 a.m. with severe right knee pain 10/10 she reports she became diaphoretic nauseous felt overheated and flushed felt that she was short of breath though she could take deep breaths she reports history of anxiety and felt she was having a panic attack she felt lightheaded like she was going to pass out and she woke her  her blood pressures reportedly 80/40 at home.  EMS was called she reports upon coming to the ER she was feeling better but she was just feeling tired.  Blood pressure was normal with the EMS.  She reports she was taken the Percocet 4 times today and did not have similar symptoms.  On physical exam vitals are normal blood pressure has remained normal here.  She was even able to get up to get to the restroom and back without hypotension.  She was no signs of decreased pulses redness or swelling to distal extremity concerning for compartment syndrome.  Her pain is now controlled and she was able to sleep.  MDM    Patient presents for emergent evaluation of acute near-syncope when patient woke up with severe pain around 1:00 a.m. that poses a threat to life and/or bodily function.   Differential diagnosis includes but was not limited to opiate overdose, opiate withdrawal, PE DVT cellulitis compartment syndrome anemia vasovagal episode, orthostasis  In the ED patient found to have acute vasovagal episode due to postop pain, near-syncope  I ordered EKG and personally reviewed it.  EKG significant for see above     Discharge MDM     Patient was managed in the ED with no medications given here discussed with the patient that I do not believe that it was an opiate overdose  I think it was more of a near-syncope from a vasovagal episode due to severe pain no he was talked about regimens to control pain will increase her Percocet to 7.5 mg she was no longer take the 5 mg Percocet she will take this 4 times a day she will continue taking a g of Tylenol but only do this twice a day in order to not be continuing more than 4 g of Tylenol in a day and will take ibuprofen 600 mg 3 times a day.  She will also continue her aspirin.  She will follow up with Dr. Whitman.   The response to treatment was good.    Patient was discharged in stable condition.  Detailed return precautions discussed.  Patient was told to follow up with primary care physician or specialist based on their diagnosis  Rosa Pierre MD      Risk  Prescription drug management.                                      Clinical Impression:  Final diagnoses:  [R42] Dizziness  [R55] Vasovagal near syncope (Primary)  [I95.9] Hypotension, unspecified hypotension type  [G89.18] Post-operative pain          ED Disposition Condition    Discharge Stable          ED Prescriptions       Medication Sig Dispense Start Date End Date Auth. Provider    oxyCODONE-acetaminophen (PERCOCET) 7.5-325 mg per tablet Take 1 tablet by mouth every 6 (six) hours as needed for Pain. 12 tablet 5/29/2024 6/1/2024 Rosa Pierre MD          Follow-up Information       Follow up With Specialties Details Why Contact Info Additional Information    Chu Whitman MD Sports Medicine, Orthopedic Surgery Schedule an appointment as soon as possible for a visit   [address] [place] [address] DR Iliana DURAN [id number]  [phone]       [facility] Emergency Medicine Go to  If symptoms worsen [address] [place] [address] [address] [place] [id number] 1st floor             Rosa Pierre MD  05/29/24 9374

## 2024-05-31 ENCOUNTER — PATIENT MESSAGE (OUTPATIENT)
Dept: ORTHOPEDICS | Facility: CLINIC | Age: 64
End: 2024-05-31
Payer: COMMERCIAL

## 2024-06-03 LAB
OHS QRS DURATION: 92 MS
OHS QTC CALCULATION: 420 MS

## 2024-06-04 ENCOUNTER — PATIENT MESSAGE (OUTPATIENT)
Dept: ORTHOPEDICS | Facility: CLINIC | Age: 64
End: 2024-06-04
Payer: COMMERCIAL

## 2024-06-04 DIAGNOSIS — Z96.651 S/P TOTAL KNEE REPLACEMENT, RIGHT: Primary | ICD-10-CM

## 2024-06-07 ENCOUNTER — HOSPITAL ENCOUNTER (OUTPATIENT)
Dept: RADIOLOGY | Facility: HOSPITAL | Age: 64
Discharge: HOME | End: 2024-06-07
Attending: ORTHOPAEDIC SURGERY
Payer: COMMERCIAL

## 2024-06-07 DIAGNOSIS — Z96.651 S/P TOTAL KNEE REPLACEMENT, RIGHT: Primary | ICD-10-CM

## 2024-06-07 DIAGNOSIS — M79.661 RIGHT CALF PAIN: ICD-10-CM

## 2024-06-07 DIAGNOSIS — Z96.651 S/P TOTAL KNEE REPLACEMENT, RIGHT: ICD-10-CM

## 2024-06-07 PROCEDURE — 93971 EXTREMITY STUDY: CPT | Mod: TC,RT

## 2024-06-07 PROCEDURE — 93971 EXTREMITY STUDY: CPT | Mod: 26,RT,, | Performed by: RADIOLOGY

## 2024-06-13 ENCOUNTER — OFFICE VISIT (OUTPATIENT)
Dept: ORTHOPEDICS | Facility: CLINIC | Age: 64
End: 2024-06-13
Payer: COMMERCIAL

## 2024-06-13 ENCOUNTER — HOSPITAL ENCOUNTER (OUTPATIENT)
Dept: RADIOLOGY | Facility: HOSPITAL | Age: 64
Discharge: HOME | End: 2024-06-13
Attending: ORTHOPAEDIC SURGERY
Payer: COMMERCIAL

## 2024-06-13 VITALS — BODY MASS INDEX: 36 KG/M2 | WEIGHT: 224 LBS | RESPIRATION RATE: 18 BRPM | HEIGHT: 66 IN

## 2024-06-13 DIAGNOSIS — Z96.651 S/P TOTAL KNEE REPLACEMENT, RIGHT: Primary | ICD-10-CM

## 2024-06-13 DIAGNOSIS — Z96.651 S/P TOTAL KNEE REPLACEMENT, RIGHT: ICD-10-CM

## 2024-06-13 PROCEDURE — 73560 X-RAY EXAM OF KNEE 1 OR 2: CPT | Mod: 26,RT,, | Performed by: RADIOLOGY

## 2024-06-13 PROCEDURE — 99024 POSTOP FOLLOW-UP VISIT: CPT | Mod: S$GLB,,, | Performed by: ORTHOPAEDIC SURGERY

## 2024-06-13 PROCEDURE — 99999 PR PBB SHADOW E&M-EST. PATIENT-LVL V: CPT | Mod: PBBFAC,,, | Performed by: ORTHOPAEDIC SURGERY

## 2024-06-13 PROCEDURE — 73560 X-RAY EXAM OF KNEE 1 OR 2: CPT | Mod: TC,PO,RT

## 2024-06-13 PROCEDURE — 1159F MED LIST DOCD IN RCRD: CPT | Mod: CPTII,S$GLB,, | Performed by: ORTHOPAEDIC SURGERY

## 2024-06-13 NOTE — PROGRESS NOTES
Past Medical History:   Diagnosis Date    Anxiety     this came on since lap kadeem sgy    Arthritis     Colon polyp     Deviated septum     GERD (gastroesophageal reflux disease)     Hypertension     Leg cramps     PAC (premature atrial contraction)     Plantar fasciitis     PMB (postmenopausal bleeding) 05/2018    Seasonal allergies     Sinus disorder     Uterine leiomyoma 05/2018    Varicose veins of both lower extremities        Past Surgical History:   Procedure Laterality Date    BREAST BIOPSY Left 2015    benign    CHOLECYSTECTOMY  6-1-2015    Dr Ashraf    COLONOSCOPY N/A 11/23/2015    Procedure: COLONOSCOPY;  Surgeon: Theo Stinson MD;  Location: [facility];  Service: Endoscopy;  Laterality: N/A; repeat in 5 years for surveillance    COLONOSCOPY N/A 7/14/2021    Procedure: COLONOSCOPY;  Surgeon: Theo Stinson MD;  Location: [facility];  Service: Endoscopy;  Laterality: N/A;    CYSTOSCOPY N/A 6/7/2018    Procedure: CYSTOSCOPY;  Surgeon: Anaya Caldera MD;  Location: [facility] OR;  Service: OB/GYN;  Laterality: N/A;    ESOPHAGOGASTRODUODENOSCOPY N/A 3/4/2019    Procedure: EGD (ESOPHAGOGASTRODUODENOSCOPY);  Surgeon: Theo Stinson MD;  Location: [facility];  Service: Endoscopy;  Laterality: N/A;    HYSTERECTOMY      KNEE ARTHROSCOPY W/ MENISCECTOMY  11/15/2000    right     LAPAROSCOPIC SALPINGO-OOPHORECTOMY Bilateral 6/7/2018    Procedure: [id number];  Surgeon: Anaya Caldera MD;  Location: [facility] OR;  Service: OB/GYN;  Laterality: Bilateral;    LAPAROSCOPIC TOTAL HYSTERECTOMY N/A 6/7/2018    Procedure: HYSTERECTOMY-TOTAL LAPAROSCOPIC (TLH);  Surgeon: Anaya Caldera MD;  Location: [facility] OR;  Service: OB/GYN;  Laterality: N/A;    OOPHORECTOMY      ROBOTIC ARTHROPLASTY, KNEE Right 5/28/2024    Procedure: ROBOTIC ARTHROPLASTY, KNEE, TOTAL;  Surgeon: Chu Whitman MD;  Location: [facility] OR;  Service: Orthopedics;  [facility]: [facility]  [facility]STRO[facility]AL [facility]OSCOPY  06/22/2015    Dr. Cabrera       Current Outpatient Medications   Medication Sig    acetaminophen (TYLENOL) 500 MG tablet Take 2 tablets (1,000 mg total) by mouth every 8 (eight) hours as needed for Pain.    acetaminophen (TYLENOL) 650 MG TbSR 650 mg.    acyclovir 5% (ZOVIRAX) 5 % Crea Apply topically 5 (five) times daily.    ascorbic acid/zinc (ZINC WITH VITAMIN C ORAL) Take by mouth.    aspirin (ECOTRIN) 81 MG EC tablet Take 1 tablet (81 mg total) by mouth 2 (two) times daily.    aspirin (VAZALORE) 325 mg Cap Take by mouth. PRN    b complex vitamins capsule Take 1 capsule by mouth once daily.    biotin 10 mg Tab Take 100 mg by mouth once daily.     calcium carbonate (TUMS) 200 mg calcium (500 mg) chewable tablet Take 1 tablet by mouth daily as needed for Heartburn.    econazole nitrate 1 % cream Apply to inner thighs and buttocks BID PRN flare    enzymes,digestive (DIGESTIVE ENZYMES ORAL) Take by mouth.    estradioL (ESTRACE) 0.01 % (0.1 mg/gram) vaginal cream Place 1 g vaginally every Monday and Thursday.    estradioL (ESTRACE) 0.01 % (0.1 mg/gram) vaginal cream Place 1 g vaginally every Monday and Thursday.    ibuprofen (ADVIL,MOTRIN) 600 MG tablet Take 1 tablet (600 mg total) by mouth 3 (three) times daily as needed for Pain.    lactobacillus combination no.4 (PROBIOTIC) 3 billion cell Cap Take 1 application by mouth 2 (two) times a day.    Lactobacillus rhamnosus GG (CULTURELLE) 10 billion cell capsule Take 1 capsule by mouth once daily.    loratadine (CLARITIN) 10 mg tablet Take 10 mg by mouth daily as needed for Allergies.    magnesium glycinate 100 mg Tab Take by mouth every evening.    meloxicam (MOBIC) 7.5 MG tablet Take 1 tablet (7.5 mg total) by mouth once daily.    mometasone (NASONEX) 50 mcg/actuation nasal spray 2 sprays by Nasal route daily as needed (rhinitis).    multivitamin (THERAGRAN) per tablet Take 1 tablet by mouth once daily.    NON FORMULARY MEDICATION THERA  TEARS EYE NUTRITION OMEGA 3    olmesartan-hydrochlorothiazide (BENICAR HCT) 40-12.5 mg Tab Take 1 tablet by mouth once daily.    olopatadine (PATANOL) 0.1 % ophthalmic solution 1 drop.    ondansetron (ZOFRAN) 4 MG tablet Take 1 tablet (4 mg total) by mouth every 6 (six) hours as needed for Nausea.    potassium chloride (MICRO-K) 10 MEQ CpSR Take 1 capsule (10 mEq total) by mouth once daily.    RABEprazole (ACIPHEX) 20 mg tablet Take 1 tablet (20 mg total) by mouth once daily.    sucralfate (CARAFATE) 100 mg/mL suspension Take 10 mLs (1 g total) by mouth 4 (four) times daily before meals and nightly.    sucralfate (CARAFATE) 100 mg/mL suspension Take 10 mLs (1 g total) by mouth 4 (four) times daily.    tretinoin (RETIN-A) 0.05 % cream Thin film to entire face at bedtime    valacyclovir HCl (VALTREX ORAL) Take by mouth.    vit C,K-Rt-mwkaa-lutein-zeaxan (PRESERVISION AREDS-2) 250-90-40-1 mg Cap Take by mouth.    vitamin D (VITAMIN D3) 1000 units Tab Take 2 Units by mouth once daily.      No current facility-administered medications for this visit.       Review of patient's allergies indicates:   Allergen Reactions    Augmentin [amoxicillin-pot clavulanate] Nausea And Vomiting     Gi upset    Bentyl [dicyclomine] Other (See Comments)     Worsening anxiety    Naprosyn [naproxen] Other (See Comments)     Makes her tired and loopy    Omeprazole      Other reaction(s): ABDOMINAL PAIN    Adhesive Rash    Lexapro [escitalopram oxalate] Anxiety     Bad dreams, feeling of doom, sweats    Sulfa (sulfonamide antibiotics) Rash     Other reaction(s): measle like rash       Family History   Problem Relation Name Age of Onset    Hypertension Mother      Arthritis Mother      Hypertension Father      Cancer Father          melanoma    Melanoma Father      COPD Father      Heart disease Father          chf    Diverticulitis Maternal Grandmother      Arthritis Maternal Aunt      Diabetes Maternal Aunt      Hypertension Maternal Aunt       Arthritis Maternal Uncle      Breast cancer Neg Hx      Ovarian cancer Neg Hx      Psoriasis Neg Hx      Lupus Neg Hx      Eczema Neg Hx      Colon cancer Neg Hx      Colon polyps Neg Hx      Crohn's disease Neg Hx      Esophageal cancer Neg Hx      Stomach cancer Neg Hx         Social History     Socioeconomic History    Marital status:    Tobacco Use    Smoking status: Never    Smokeless tobacco: Never   Substance and Sexual Activity    Alcohol use: Yes     Comment: rarely / few times per yr    Drug use: No    Sexual activity: Yes     Partners: Male     Birth control/protection: None, Post-menopausal     Social Determinants of Health     Physical Activity: Insufficiently Active (9/1/2020)    Exercise Vital Sign     Days of Exercise per Week: 7 days     Minutes of Exercise per Session: 10 min   Stress: No Stress Concern Present (9/1/2020)    [employer] - Occupational Stress Questionnaire     Feeling of Stress : Not at all       Chief Complaint: No chief complaint on file.      Date of surgery:  May 28, 2024 right total knee arthroplasty    History of present illness:  64-year-old female status post right robotic assisted total knee arthroplasty.  Patient went to the ER and an ultrasound was done on June 7th which ruled out DVT.  Okay.  Pain is a 2/10 today.  No wound issues.  Just has a lot of bruising and pain in her calf thigh and ankle.      Review of Systems:    Musculoskeletal:  See HPI        Physical Examination:    Vital Signs:  There were no vitals filed for this visit.    There is no height or weight on file to calculate BMI.    This a well-developed, well nourished patient in no acute distress.  They are alert and oriented and cooperative to examination.  Pt. walks without an antalgic gait.      Examination of the right knee shows well-healing surgical incision.  No erythema drainage.  Patient does have some resolving ecchymosis throughout her leg.  Range of motion  is about 5° to 90°.    X-rays:  Two views of the right knee are ordered and reviewed which show well-aligned right total knee arthroplasty without complication.  Some soft tissue swelling noted     Assessment::  Status post right total knee arthroplasty    Plan:  Reviewed the x-ray with her today.  Patient may get the incision wet.  We will transition outpatient physical therapy.  Encouraged her to use heat on the muscles and ecchymotic areas.  Follow up in a month.    This note was created using [employer] voice recognition software that occasionally misinterpreted phrases or words.

## 2024-06-19 ENCOUNTER — EXTERNAL HOME HEALTH (OUTPATIENT)
Dept: HOME HEALTH SERVICES | Facility: HOSPITAL | Age: 64
End: 2024-06-19
Payer: COMMERCIAL

## 2024-06-20 ENCOUNTER — PATIENT MESSAGE (OUTPATIENT)
Dept: ORTHOPEDICS | Facility: CLINIC | Age: 64
End: 2024-06-20
Payer: COMMERCIAL

## 2024-07-11 ENCOUNTER — OFFICE VISIT (OUTPATIENT)
Dept: ORTHOPEDICS | Facility: CLINIC | Age: 64
End: 2024-07-11
Payer: COMMERCIAL

## 2024-07-11 VITALS — HEIGHT: 66 IN | WEIGHT: 224 LBS | BODY MASS INDEX: 36 KG/M2

## 2024-07-11 DIAGNOSIS — Z96.651 S/P TOTAL KNEE REPLACEMENT, RIGHT: Primary | ICD-10-CM

## 2024-07-11 PROCEDURE — 99999 PR PBB SHADOW E&M-EST. PATIENT-LVL III: CPT | Mod: PBBFAC,,, | Performed by: ORTHOPAEDIC SURGERY

## 2024-07-11 NOTE — PROGRESS NOTES
Past Medical History:   Diagnosis Date    Anxiety     this came on since lap kadeem sgy    Arthritis     Colon polyp     Deviated septum     GERD (gastroesophageal reflux disease)     Hypertension     Leg cramps     PAC (premature atrial contraction)     Plantar fasciitis     PMB (postmenopausal bleeding) 05/2018    Seasonal allergies     Sinus disorder     Uterine leiomyoma 05/2018    Varicose veins of both lower extremities        Past Surgical History:   Procedure Laterality Date    BREAST BIOPSY Left 2015    benign    CHOLECYSTECTOMY  6-1-2015    Dr Ashraf    COLONOSCOPY N/A 11/23/2015    Procedure: COLONOSCOPY;  Surgeon: Theo Stinson MD;  Location: [facility];  Service: Endoscopy;  Laterality: N/A; repeat in 5 years for surveillance    COLONOSCOPY N/A 7/14/2021    Procedure: COLONOSCOPY;  Surgeon: Theo Stinson MD;  Location: [facility];  Service: Endoscopy;  Laterality: N/A;    CYSTOSCOPY N/A 6/7/2018    Procedure: CYSTOSCOPY;  Surgeon: Anaya Caldera MD;  Location: [facility] OR;  Service: OB/GYN;  Laterality: N/A;    ESOPHAGOGASTRODUODENOSCOPY N/A 3/4/2019    Procedure: EGD (ESOPHAGOGASTRODUODENOSCOPY);  Surgeon: Theo Stinson MD;  Location: [facility];  Service: Endoscopy;  Laterality: N/A;    HYSTERECTOMY      KNEE ARTHROSCOPY W/ MENISCECTOMY  11/15/2000    right     LAPAROSCOPIC SALPINGO-OOPHORECTOMY Bilateral 6/7/2018    Procedure: [id number];  Surgeon: Anaya Caldera MD;  Location: [facility] OR;  Service: OB/GYN;  Laterality: Bilateral;    LAPAROSCOPIC TOTAL HYSTERECTOMY N/A 6/7/2018    Procedure: HYSTERECTOMY-TOTAL LAPAROSCOPIC (TLH);  Surgeon: Anaya Caldera MD;  Location: [facility] OR;  Service: OB/GYN;  Laterality: N/A;    OOPHORECTOMY      ROBOTIC ARTHROPLASTY, KNEE Right 5/28/2024    Procedure: ROBOTIC ARTHROPLASTY, KNEE, TOTAL;  Surgeon: Chu Whitman MD;  Location: [facility] OR;  Service: Orthopedics;  [facility]: [facility]  GASTROINTESTINAL ENDOSCOPY  06/22/2015    Dr. Cabrera       Current Outpatient Medications   Medication Sig    acetaminophen (TYLENOL) 500 MG tablet Take 2 tablets (1,000 mg total) by mouth every 8 (eight) hours as needed for Pain.    acetaminophen (TYLENOL) 650 MG TbSR 650 mg.    acyclovir 5% (ZOVIRAX) 5 % Crea Apply topically 5 (five) times daily.    ascorbic acid/zinc (ZINC WITH VITAMIN C ORAL) Take by mouth.    aspirin (ECOTRIN) 81 MG EC tablet Take 1 tablet (81 mg total) by mouth 2 (two) times daily.    aspirin (VAZALORE) 325 mg Cap Take by mouth. PRN    b complex vitamins capsule Take 1 capsule by mouth once daily.    biotin 10 mg Tab Take 100 mg by mouth once daily.     calcium carbonate (TUMS) 200 mg calcium (500 mg) chewable tablet Take 1 tablet by mouth daily as needed for Heartburn.    econazole nitrate 1 % cream Apply to inner thighs and buttocks BID PRN flare    enzymes,digestive (DIGESTIVE ENZYMES ORAL) Take by mouth.    estradioL (ESTRACE) 0.01 % (0.1 mg/gram) vaginal cream Place 1 g vaginally every Monday and Thursday.    estradioL (ESTRACE) 0.01 % (0.1 mg/gram) vaginal cream Place 1 g vaginally every Monday and Thursday.    ibuprofen (ADVIL,MOTRIN) 600 MG tablet Take 1 tablet (600 mg total) by mouth 3 (three) times daily as needed for Pain.    lactobacillus combination no.4 (PROBIOTIC) 3 billion cell Cap Take 1 application by mouth 2 (two) times a day.    Lactobacillus rhamnosus GG (CULTURELLE) 10 billion cell capsule Take 1 capsule by mouth once daily.    loratadine (CLARITIN) 10 mg tablet Take 10 mg by mouth daily as needed for Allergies.    magnesium glycinate 100 mg Tab Take by mouth every evening.    meloxicam (MOBIC) 7.5 MG tablet Take 1 tablet (7.5 mg total) by mouth once daily.    mometasone (NASONEX) 50 mcg/actuation nasal spray 2 sprays by Nasal route daily as needed (rhinitis).    multivitamin (THERAGRAN) per tablet Take 1 tablet by mouth once daily.    NON FORMULARY MEDICATION THERA  TEARS EYE NUTRITION OMEGA 3    olmesartan-hydrochlorothiazide (BENICAR HCT) 40-12.5 mg Tab Take 1 tablet by mouth once daily.    olopatadine (PATANOL) 0.1 % ophthalmic solution 1 drop.    ondansetron (ZOFRAN) 4 MG tablet Take 1 tablet (4 mg total) by mouth every 6 (six) hours as needed for Nausea.    potassium chloride (MICRO-K) 10 MEQ CpSR Take 1 capsule (10 mEq total) by mouth once daily.    RABEprazole (ACIPHEX) 20 mg tablet Take 1 tablet (20 mg total) by mouth once daily.    sucralfate (CARAFATE) 100 mg/mL suspension Take 10 mLs (1 g total) by mouth 4 (four) times daily before meals and nightly.    sucralfate (CARAFATE) 100 mg/mL suspension Take 10 mLs (1 g total) by mouth 4 (four) times daily.    tretinoin (RETIN-A) 0.05 % cream Thin film to entire face at bedtime    valacyclovir HCl (VALTREX ORAL) Take by mouth.    vit C,O-Hb-yddgp-lutein-zeaxan (PRESERVISION AREDS-2) 250-90-40-1 mg Cap Take by mouth.    vitamin D (VITAMIN D3) 1000 units Tab Take 2 Units by mouth once daily.      No current facility-administered medications for this visit.       Review of patient's allergies indicates:   Allergen Reactions    Augmentin [amoxicillin-pot clavulanate] Nausea And Vomiting     Gi upset    Bentyl [dicyclomine] Other (See Comments)     Worsening anxiety    Naprosyn [naproxen] Other (See Comments)     Makes her tired and loopy    Omeprazole      Other reaction(s): ABDOMINAL PAIN    Adhesive Rash    Lexapro [escitalopram oxalate] Anxiety     Bad dreams, feeling of doom, sweats    Sulfa (sulfonamide antibiotics) Rash     Other reaction(s): measle like rash       Family History   Problem Relation Name Age of Onset    Hypertension Mother      Arthritis Mother      Hypertension Father      Cancer Father          melanoma    Melanoma Father      COPD Father      Heart disease Father          chf    Diverticulitis Maternal Grandmother      Arthritis Maternal Aunt      Diabetes Maternal Aunt      Hypertension Maternal Aunt       Arthritis Maternal Uncle      Breast cancer Neg Hx      Ovarian cancer Neg Hx      Psoriasis Neg Hx      Lupus Neg Hx      Eczema Neg Hx      Colon cancer Neg Hx      Colon polyps Neg Hx      Crohn's disease Neg Hx      Esophageal cancer Neg Hx      Stomach cancer Neg Hx         Social History     Socioeconomic History    Marital status:    Tobacco Use    Smoking status: Never    Smokeless tobacco: Never   Substance and Sexual Activity    Alcohol use: Yes     Comment: rarely / few times per yr    Drug use: No    Sexual activity: Yes     Partners: Male     Birth control/protection: None, Post-menopausal     Social Determinants of Health     Physical Activity: Insufficiently Active (9/1/2020)    Exercise Vital Sign     Days of Exercise per Week: 7 days     Minutes of Exercise per Session: 10 min   Stress: No Stress Concern Present (9/1/2020)    [employer] [place] [employer] - Occupational Stress Questionnaire     Feeling of Stress : Not at all       Chief Complaint: No chief complaint on file.      Date of surgery:  May 28, 2024 right total knee arthroplasty    History of present illness:  64-year-old female status post right robotic assisted total knee arthroplasty.  Pain is a 2/10 today.  No wound issues.        Review of Systems:    Musculoskeletal:  See HPI        Physical Examination:    Vital Signs:  There were no vitals filed for this visit.    There is no height or weight on file to calculate BMI.    This a well-developed, well nourished patient in no acute distress.  They are alert and oriented and cooperative to examination.  Pt. walks with a slow gait without a lot of antalgia or      Examination of the right knee shows well-healed surgical incision.  No erythema drainage.   Range of motion is about 5° to 120°.    X-rays:  Two views of the right knee are reviewed which show well-aligned right total knee arthroplasty without complication.  Some soft tissue swelling noted     Assessment::   Status post right total knee arthroplasty    Plan:  Continue with the outpatient physical therapy.  Follow up in 6 weeks with x-rays of both knees.    This note was created using M Mod[employer] voice recognition software that occasionally misinterpreted phrases or words.

## 2024-07-31 DIAGNOSIS — Z96.651 S/P TOTAL KNEE REPLACEMENT, RIGHT: Primary | ICD-10-CM

## 2024-08-18 ENCOUNTER — PATIENT MESSAGE (OUTPATIENT)
Dept: ORTHOPEDICS | Facility: CLINIC | Age: 64
End: 2024-08-18
Payer: COMMERCIAL

## 2024-08-18 DIAGNOSIS — R53.83 FATIGUE, UNSPECIFIED TYPE: ICD-10-CM

## 2024-08-18 DIAGNOSIS — M79.18 MYALGIA, MULTIPLE SITES: Primary | ICD-10-CM

## 2024-08-20 ENCOUNTER — LAB VISIT (OUTPATIENT)
Dept: LAB | Facility: HOSPITAL | Age: 64
End: 2024-08-20
Attending: ORTHOPAEDIC SURGERY
Payer: COMMERCIAL

## 2024-08-20 DIAGNOSIS — M79.18 MYALGIA, MULTIPLE SITES: ICD-10-CM

## 2024-08-20 DIAGNOSIS — R53.83 FATIGUE, UNSPECIFIED TYPE: ICD-10-CM

## 2024-08-20 LAB — ERYTHROCYTE [SEDIMENTATION RATE] IN BLOOD BY WESTERGREN METHOD: 18 MM/HR (ref 0–20)

## 2024-08-20 PROCEDURE — 36415 COLL VENOUS BLD VENIPUNCTURE: CPT | Mod: PO | Performed by: ORTHOPAEDIC SURGERY

## 2024-08-20 PROCEDURE — 85651 RBC SED RATE NONAUTOMATED: CPT | Mod: PO | Performed by: ORTHOPAEDIC SURGERY

## 2024-08-20 PROCEDURE — 80053 COMPREHEN METABOLIC PANEL: CPT | Performed by: ORTHOPAEDIC SURGERY

## 2024-08-20 PROCEDURE — 85025 COMPLETE CBC W/AUTO DIFF WBC: CPT | Performed by: ORTHOPAEDIC SURGERY

## 2024-08-20 PROCEDURE — 86140 C-REACTIVE PROTEIN: CPT | Performed by: ORTHOPAEDIC SURGERY

## 2024-08-21 LAB
ALBUMIN SERPL BCP-MCNC: 4.2 G/DL (ref 3.5–5.2)
ALP SERPL-CCNC: 60 U/L (ref 55–135)
ALT SERPL W/O P-5'-P-CCNC: 16 U/L (ref 10–44)
ANION GAP SERPL CALC-SCNC: 11 MMOL/L (ref 8–16)
AST SERPL-CCNC: 18 U/L (ref 10–40)
BASOPHILS # BLD AUTO: 0.04 K/UL (ref 0–0.2)
BASOPHILS NFR BLD: 0.6 % (ref 0–1.9)
BILIRUB SERPL-MCNC: 0.3 MG/DL (ref 0.1–1)
BUN SERPL-MCNC: 17 MG/DL (ref 8–23)
CALCIUM SERPL-MCNC: 9.9 MG/DL (ref 8.7–10.5)
CHLORIDE SERPL-SCNC: 105 MMOL/L (ref 95–110)
CO2 SERPL-SCNC: 23 MMOL/L (ref 23–29)
CREAT SERPL-MCNC: 0.9 MG/DL (ref 0.5–1.4)
CRP SERPL-MCNC: 3.8 MG/L (ref 0–8.2)
DIFFERENTIAL METHOD BLD: ABNORMAL
EOSINOPHIL # BLD AUTO: 0.1 K/UL (ref 0–0.5)
EOSINOPHIL NFR BLD: 2 % (ref 0–8)
ERYTHROCYTE [DISTWIDTH] IN BLOOD BY AUTOMATED COUNT: 13.6 % (ref 11.5–14.5)
EST. GFR  (NO RACE VARIABLE): >60 ML/MIN/1.73 M^2
GLUCOSE SERPL-MCNC: 95 MG/DL (ref 70–110)
HCT VFR BLD AUTO: 40.9 % (ref 37–48.5)
HGB BLD-MCNC: 12.6 G/DL (ref 12–16)
IMM GRANULOCYTES # BLD AUTO: 0.01 K/UL (ref 0–0.04)
IMM GRANULOCYTES NFR BLD AUTO: 0.2 % (ref 0–0.5)
LYMPHOCYTES # BLD AUTO: 2.8 K/UL (ref 1–4.8)
LYMPHOCYTES NFR BLD: 42.9 % (ref 18–48)
MCH RBC QN AUTO: 27.9 PG (ref 27–31)
MCHC RBC AUTO-ENTMCNC: 30.8 G/DL (ref 32–36)
MCV RBC AUTO: 91 FL (ref 82–98)
MONOCYTES # BLD AUTO: 0.5 K/UL (ref 0.3–1)
MONOCYTES NFR BLD: 7.7 % (ref 4–15)
NEUTROPHILS # BLD AUTO: 3 K/UL (ref 1.8–7.7)
NEUTROPHILS NFR BLD: 46.6 % (ref 38–73)
NRBC BLD-RTO: 0 /100 WBC
PLATELET # BLD AUTO: 303 K/UL (ref 150–450)
PMV BLD AUTO: 9.5 FL (ref 9.2–12.9)
POTASSIUM SERPL-SCNC: 4.1 MMOL/L (ref 3.5–5.1)
PROT SERPL-MCNC: 7.4 G/DL (ref 6–8.4)
RBC # BLD AUTO: 4.52 M/UL (ref 4–5.4)
SODIUM SERPL-SCNC: 139 MMOL/L (ref 136–145)
WBC # BLD AUTO: 6.48 K/UL (ref 3.9–12.7)

## 2024-08-22 ENCOUNTER — OFFICE VISIT (OUTPATIENT)
Dept: ORTHOPEDICS | Facility: CLINIC | Age: 64
End: 2024-08-22
Payer: COMMERCIAL

## 2024-08-22 ENCOUNTER — HOSPITAL ENCOUNTER (OUTPATIENT)
Dept: RADIOLOGY | Facility: HOSPITAL | Age: 64
Discharge: HOME | End: 2024-08-22
Attending: ORTHOPAEDIC SURGERY
Payer: COMMERCIAL

## 2024-08-22 VITALS — HEIGHT: 66 IN | RESPIRATION RATE: 16 BRPM | BODY MASS INDEX: 36 KG/M2 | WEIGHT: 224 LBS

## 2024-08-22 DIAGNOSIS — Z96.651 S/P TOTAL KNEE REPLACEMENT, RIGHT: ICD-10-CM

## 2024-08-22 DIAGNOSIS — M54.9 BACK PAIN, UNSPECIFIED BACK LOCATION, UNSPECIFIED BACK PAIN LATERALITY, UNSPECIFIED CHRONICITY: Primary | ICD-10-CM

## 2024-08-22 DIAGNOSIS — M79.18 MYALGIA, MULTIPLE SITES: Primary | ICD-10-CM

## 2024-08-22 DIAGNOSIS — M17.10 ARTHRITIS OF KNEE: ICD-10-CM

## 2024-08-22 PROCEDURE — 99999 PR PBB SHADOW E&M-EST. PATIENT-LVL IV: CPT | Mod: PBBFAC,,, | Performed by: ORTHOPAEDIC SURGERY

## 2024-08-22 PROCEDURE — 73564 X-RAY EXAM KNEE 4 OR MORE: CPT | Mod: TC,50,PO

## 2024-08-22 PROCEDURE — 73564 X-RAY EXAM KNEE 4 OR MORE: CPT | Mod: 26,,, | Performed by: RADIOLOGY

## 2024-08-22 RX ORDER — TRIAMCINOLONE ACETONIDE 40 MG/ML
40 INJECTION, SUSPENSION INTRA-ARTICULAR; INTRAMUSCULAR
Status: DISCONTINUED | OUTPATIENT
Start: 2024-08-22 | End: 2024-08-22 | Stop reason: HOSPADM

## 2024-08-22 RX ADMIN — TRIAMCINOLONE ACETONIDE 40 MG: 40 INJECTION, SUSPENSION INTRA-ARTICULAR; INTRAMUSCULAR at 03:08

## 2024-08-22 NOTE — PROGRESS NOTES
Past Medical History:   Diagnosis Date    Anxiety     this came on since lap kadeem sgy    Arthritis     Colon polyp     Deviated septum     GERD (gastroesophageal reflux disease)     Hypertension     Leg cramps     PAC (premature atrial contraction)     Plantar fasciitis     PMB (postmenopausal bleeding) 05/2018    Seasonal allergies     Sinus disorder     Uterine leiomyoma 05/2018    Varicose veins of both lower extremities        Past Surgical History:   Procedure Laterality Date    BREAST BIOPSY Left 2015    benign    CHOLECYSTECTOMY  6-1-2015    Dr Ashraf    COLONOSCOPY N/A 11/23/2015    Procedure: COLONOSCOPY;  Surgeon: Theo Stinson MD;  Location: [facility];  Service: Endoscopy;  Laterality: N/A; repeat in 5 years for surveillance    COLONOSCOPY N/A 7/14/2021    Procedure: COLONOSCOPY;  Surgeon: Theo Stinson MD;  Location: [facility];  Service: Endoscopy;  Laterality: N/A;    CYSTOSCOPY N/A 6/7/2018    Procedure: CYSTOSCOPY;  Surgeon: Anaya Caldera MD;  Location: [facility] OR;  Service: OB/GYN;  Laterality: N/A;    ESOPHAGOGASTRODUODENOSCOPY N/A 3/4/2019    Procedure: EGD (ESOPHAGOGASTRODUODENOSCOPY);  Surgeon: Theo Stinson MD;  Location: [facility];  Service: Endoscopy;  Laterality: N/A;    HYSTERECTOMY      KNEE ARTHROSCOPY W/ MENISCECTOMY  11/15/2000    right     LAPAROSCOPIC SALPINGO-OOPHORECTOMY Bilateral 6/7/2018    Procedure: [id number];  Surgeon: Anaya Caldera MD;  Location: [facility] OR;  Service: OB/GYN;  Laterality: Bilateral;    LAPAROSCOPIC TOTAL HYSTERECTOMY N/A 6/7/2018    Procedure: HYSTERECTOMY-TOTAL LAPAROSCOPIC (TLH);  Surgeon: Anaya Caldera MD;  Location: [facility] OR;  Service: OB/GYN;  Laterality: N/A;    OOPHORECTOMY      ROBOTIC ARTHROPLASTY, KNEE Right 5/28/2024    Procedure: ROBOTIC ARTHROPLASTY, KNEE, TOTAL;  Surgeon: Chu Whitman MD;  Location: [facility] OR;  Service: Orthopedics;  [facility]: [facility]  [facility]  GASTROINTESTINAL ENDOSCOPY  06/22/2015    Dr. Cabrera       Current Outpatient Medications   Medication Sig    acetaminophen (TYLENOL) 500 MG tablet Take 2 tablets (1,000 mg total) by mouth every 8 (eight) hours as needed for Pain.    acetaminophen (TYLENOL) 650 MG TbSR 650 mg.    acyclovir 5% (ZOVIRAX) 5 % Crea Apply topically 5 (five) times daily.    ascorbic acid/zinc (ZINC WITH VITAMIN C ORAL) Take by mouth.    aspirin (VAZALORE) 325 mg Cap Take by mouth. PRN    b complex vitamins capsule Take 1 capsule by mouth once daily.    biotin 10 mg Tab Take 100 mg by mouth once daily.     calcium carbonate (TUMS) 200 mg calcium (500 mg) chewable tablet Take 1 tablet by mouth daily as needed for Heartburn.    econazole nitrate 1 % cream Apply to inner thighs and buttocks BID PRN flare    enzymes,digestive (DIGESTIVE ENZYMES ORAL) Take by mouth.    estradioL (ESTRACE) 0.01 % (0.1 mg/gram) vaginal cream Place 1 g vaginally every Monday and Thursday.    estradioL (ESTRACE) 0.01 % (0.1 mg/gram) vaginal cream Place 1 g vaginally every Monday and Thursday.    ibuprofen (ADVIL,MOTRIN) 600 MG tablet Take 1 tablet (600 mg total) by mouth 3 (three) times daily as needed for Pain.    lactobacillus combination no.4 (PROBIOTIC) 3 billion cell Cap Take 1 application by mouth 2 (two) times a day.    Lactobacillus rhamnosus GG (CULTURELLE) 10 billion cell capsule Take 1 capsule by mouth once daily.    loratadine (CLARITIN) 10 mg tablet Take 10 mg by mouth daily as needed for Allergies.    magnesium glycinate 100 mg Tab Take by mouth every evening.    meloxicam (MOBIC) 7.5 MG tablet Take 1 tablet (7.5 mg total) by mouth once daily.    mometasone (NASONEX) 50 mcg/actuation nasal spray 2 sprays by Nasal route daily as needed (rhinitis).    multivitamin (THERAGRAN) per tablet Take 1 tablet by mouth once daily.    NON FORMULARY MEDICATION THERA TEARS EYE NUTRITION OMEGA 3    olmesartan-hydrochlorothiazide (BENICAR HCT) 40-12.5 mg Tab Take 1  tablet by mouth once daily.    olopatadine (PATANOL) 0.1 % ophthalmic solution 1 drop.    ondansetron (ZOFRAN) 4 MG tablet Take 1 tablet (4 mg total) by mouth every 6 (six) hours as needed for Nausea.    potassium chloride (MICRO-K) 10 MEQ CpSR Take 1 capsule (10 mEq total) by mouth once daily.    RABEprazole (ACIPHEX) 20 mg tablet Take 1 tablet (20 mg total) by mouth once daily.    sucralfate (CARAFATE) 100 mg/mL suspension Take 10 mLs (1 g total) by mouth 4 (four) times daily before meals and nightly.    sucralfate (CARAFATE) 100 mg/mL suspension Take 10 mLs (1 g total) by mouth 4 (four) times daily.    tretinoin (RETIN-A) 0.05 % cream Thin film to entire face at bedtime    valacyclovir HCl (VALTREX ORAL) Take by mouth.    vit C,Y-Cw-iieki-lutein-zeaxan (PRESERVISION AREDS-2) 250-90-40-1 mg Cap Take by mouth.    vitamin D (VITAMIN D3) 1000 units Tab Take 2 Units by mouth once daily.     aspirin (ECOTRIN) 81 MG EC tablet Take 1 tablet (81 mg total) by mouth 2 (two) times daily.     No current facility-administered medications for this visit.       Review of patient's allergies indicates:   Allergen Reactions    Augmentin [amoxicillin-pot clavulanate] Nausea And Vomiting     Gi upset    Bentyl [dicyclomine] Other (See Comments)     Worsening anxiety    Naprosyn [naproxen] Other (See Comments)     Makes her tired and loopy    Omeprazole      Other reaction(s): ABDOMINAL PAIN    Adhesive Rash    Lexapro [escitalopram oxalate] Anxiety     Bad dreams, feeling of doom, sweats    Sulfa (sulfonamide antibiotics) Rash     Other reaction(s): measle like rash       Family History   Problem Relation Name Age of Onset    Hypertension Mother      Arthritis Mother      Hypertension Father      Cancer Father          melanoma    Melanoma Father      COPD Father      Heart disease Father          chf    Diverticulitis Maternal Grandmother      Arthritis Maternal Aunt      Diabetes Maternal Aunt      Hypertension Maternal Aunt       Arthritis Maternal Uncle      Breast cancer Neg Hx      Ovarian cancer Neg Hx      Psoriasis Neg Hx      Lupus Neg Hx      Eczema Neg Hx      Colon cancer Neg Hx      Colon polyps Neg Hx      Crohn's disease Neg Hx      Esophageal cancer Neg Hx      Stomach cancer Neg Hx         Social History     Socioeconomic History    Marital status:    Tobacco Use    Smoking status: Never    Smokeless tobacco: Never   Substance and Sexual Activity    Alcohol use: Yes     Comment: rarely / few times per yr    Drug use: No    Sexual activity: Yes     Partners: Male     Birth control/protection: None, Post-menopausal     Social Determinants of Health     Physical Activity: Insufficiently Active (9/1/2020)    Exercise Vital Sign     Days of Exercise per Week: 7 days     Minutes of Exercise per Session: 10 min   Stress: No Stress Concern Present (9/1/2020)    [employer] [place] [employer] - Occupational Stress Questionnaire     Feeling of Stress : Not at all       Chief Complaint:   Chief Complaint   Patient presents with    Right Knee - Post-op Evaluation     Right TKA 5/28/2024       Date of surgery:  May 28, 2024 right total knee arthroplasty    History of present illness:  64-year-old female status post right robotic assisted total knee arthroplasty.  Pain is a 4/10 today.  Most of her pain is more proximal to the knee.  Having a lot of medial thigh pain.  Also having some lateral thigh pain and buttock pain.  Patient denies any pre-existing back or hip issues but this is bothering her more than the knee itself.  Left knee is hurting her a little more as well.      Review of Systems:    Musculoskeletal:  See HPI        Physical Examination:    Vital Signs:    Vitals:    08/22/24 1527   Resp: 16       Body mass index is 36.15 kg/m².    This a well-developed, well nourished patient in no acute distress.  They are alert and oriented and cooperative to examination.  Pt. walks with a slow gait without a lot of  antalgia or      Examination of the right knee shows well-healed surgical incision.  No erythema drainage.   Range of motion is about 5° to 120°.  Pain in the medial quad.    X-rays:  X-rays of both knees are ordered and reviewed which show well-aligned right right total knee arthroplasty without complication.  Some soft tissue swelling noted.  Severe lateral joint space narrowing noted of the left knee     Assessment::  Status post right total knee arthroplasty  Severe valgus left knee arthritis   Possible right lumbar spine or hip component    Plan:  Okay to take a holiday from formal physical therapy.  I agreed to inject her left knee today with cortisone.  Recommended getting her in with Dr. Looney or Lela to evaluate her hip and back.  Having a lot of possible neuritis.  Follow up in 6-8 weeks.    This note was created using [employer] voice recognition software that occasionally misinterpreted phrases or words.

## 2024-08-22 NOTE — PROCEDURES
Large Joint Aspiration/Injection: L knee    Date/Time: 8/22/2024 3:45 PM    Performed by: Chu Whitman MD  Authorized by: Chu Whitman MD    Consent Done?:  Yes (Verbal)  Indications:  Pain  Site marked: the procedure site was marked    Timeout: prior to procedure the correct patient, procedure, and site was verified    Local anesthetic:  Lidocaine 1% without epinephrine and bupivacaine 0.25% without epinephrine  Anesthetic total (ml):  6      Details:  Needle Size:  20 G  Approach:  Anterolateral  Location:  Knee  Site:  L knee  Medications:  40 mg triamcinolone acetonide 40 mg/mL  Patient tolerance:  Patient tolerated the procedure well with no immediate complications

## 2024-08-23 DIAGNOSIS — J30.1 SEASONAL ALLERGIC RHINITIS DUE TO POLLEN: Primary | ICD-10-CM

## 2024-08-23 RX ORDER — MOMETASONE FUROATE MONOHYDRATE 50 UG/1
2 SPRAY, METERED NASAL DAILY PRN
Qty: 51 G | Status: SHIPPED | OUTPATIENT
Start: 2024-08-23

## 2024-10-05 ENCOUNTER — LAB VISIT (OUTPATIENT)
Dept: LAB | Facility: HOSPITAL | Age: 64
End: 2024-10-05
Attending: FAMILY MEDICINE
Payer: COMMERCIAL

## 2024-10-05 DIAGNOSIS — E78.5 HYPERLIPIDEMIA, UNSPECIFIED HYPERLIPIDEMIA TYPE: ICD-10-CM

## 2024-10-05 DIAGNOSIS — Z00.00 ANNUAL PHYSICAL EXAM: ICD-10-CM

## 2024-10-05 LAB
ALBUMIN SERPL BCP-MCNC: 3.9 G/DL (ref 3.5–5.2)
ALP SERPL-CCNC: 57 U/L (ref 55–135)
ALT SERPL W/O P-5'-P-CCNC: 14 U/L (ref 10–44)
ANION GAP SERPL CALC-SCNC: 8 MMOL/L (ref 8–16)
AST SERPL-CCNC: 15 U/L (ref 10–40)
BASOPHILS # BLD AUTO: 0.03 K/UL (ref 0–0.2)
BASOPHILS NFR BLD: 0.6 % (ref 0–1.9)
BILIRUB SERPL-MCNC: 0.5 MG/DL (ref 0.1–1)
BUN SERPL-MCNC: 14 MG/DL (ref 8–23)
CALCIUM SERPL-MCNC: 9.7 MG/DL (ref 8.7–10.5)
CHLORIDE SERPL-SCNC: 110 MMOL/L (ref 95–110)
CHOLEST SERPL-MCNC: 206 MG/DL (ref 120–199)
CHOLEST/HDLC SERPL: 3.8 {RATIO} (ref 2–5)
CO2 SERPL-SCNC: 25 MMOL/L (ref 23–29)
CREAT SERPL-MCNC: 0.8 MG/DL (ref 0.5–1.4)
DIFFERENTIAL METHOD BLD: ABNORMAL
EOSINOPHIL # BLD AUTO: 0.1 K/UL (ref 0–0.5)
EOSINOPHIL NFR BLD: 2.3 % (ref 0–8)
ERYTHROCYTE [DISTWIDTH] IN BLOOD BY AUTOMATED COUNT: 13.8 % (ref 11.5–14.5)
EST. GFR  (NO RACE VARIABLE): >60 ML/MIN/1.73 M^2
ESTIMATED AVG GLUCOSE: 111 MG/DL (ref 68–131)
GLUCOSE SERPL-MCNC: 98 MG/DL (ref 70–110)
HBA1C MFR BLD: 5.5 % (ref 4–5.6)
HCT VFR BLD AUTO: 40.5 % (ref 37–48.5)
HDLC SERPL-MCNC: 54 MG/DL (ref 40–75)
HDLC SERPL: 26.2 % (ref 20–50)
HGB BLD-MCNC: 12.6 G/DL (ref 12–16)
IMM GRANULOCYTES # BLD AUTO: 0.01 K/UL (ref 0–0.04)
IMM GRANULOCYTES NFR BLD AUTO: 0.2 % (ref 0–0.5)
LDLC SERPL CALC-MCNC: 131 MG/DL (ref 63–159)
LYMPHOCYTES # BLD AUTO: 2.1 K/UL (ref 1–4.8)
LYMPHOCYTES NFR BLD: 44.2 % (ref 18–48)
MCH RBC QN AUTO: 27.7 PG (ref 27–31)
MCHC RBC AUTO-ENTMCNC: 31.1 G/DL (ref 32–36)
MCV RBC AUTO: 89 FL (ref 82–98)
MONOCYTES # BLD AUTO: 0.4 K/UL (ref 0.3–1)
MONOCYTES NFR BLD: 7.8 % (ref 4–15)
NEUTROPHILS # BLD AUTO: 2.1 K/UL (ref 1.8–7.7)
NEUTROPHILS NFR BLD: 44.9 % (ref 38–73)
NONHDLC SERPL-MCNC: 152 MG/DL
NRBC BLD-RTO: 0 /100 WBC
PLATELET # BLD AUTO: 238 K/UL (ref 150–450)
PMV BLD AUTO: 9.4 FL (ref 9.2–12.9)
POTASSIUM SERPL-SCNC: 4.3 MMOL/L (ref 3.5–5.1)
PROT SERPL-MCNC: 7 G/DL (ref 6–8.4)
RBC # BLD AUTO: 4.55 M/UL (ref 4–5.4)
SODIUM SERPL-SCNC: 143 MMOL/L (ref 136–145)
TRIGL SERPL-MCNC: 105 MG/DL (ref 30–150)
WBC # BLD AUTO: 4.75 K/UL (ref 3.9–12.7)

## 2024-10-05 PROCEDURE — 85025 COMPLETE CBC W/AUTO DIFF WBC: CPT | Performed by: FAMILY MEDICINE

## 2024-10-05 PROCEDURE — 80053 COMPREHEN METABOLIC PANEL: CPT | Performed by: FAMILY MEDICINE

## 2024-10-05 PROCEDURE — 36415 COLL VENOUS BLD VENIPUNCTURE: CPT | Mod: PO | Performed by: FAMILY MEDICINE

## 2024-10-05 PROCEDURE — 80061 LIPID PANEL: CPT | Performed by: FAMILY MEDICINE

## 2024-10-05 PROCEDURE — 83036 HEMOGLOBIN GLYCOSYLATED A1C: CPT | Performed by: FAMILY MEDICINE

## 2024-10-11 ENCOUNTER — LAB VISIT (OUTPATIENT)
Dept: LAB | Facility: HOSPITAL | Age: 64
End: 2024-10-11
Attending: FAMILY MEDICINE
Payer: COMMERCIAL

## 2024-10-11 ENCOUNTER — OFFICE VISIT (OUTPATIENT)
Dept: FAMILY MEDICINE | Facility: CLINIC | Age: 64
End: 2024-10-11
Payer: COMMERCIAL

## 2024-10-11 ENCOUNTER — HOSPITAL ENCOUNTER (OUTPATIENT)
Dept: RADIOLOGY | Facility: HOSPITAL | Age: 64
Discharge: HOME | End: 2024-10-11
Attending: FAMILY MEDICINE
Payer: COMMERCIAL

## 2024-10-11 VITALS
RESPIRATION RATE: 17 BRPM | WEIGHT: 232.81 LBS | HEART RATE: 69 BPM | DIASTOLIC BLOOD PRESSURE: 80 MMHG | HEIGHT: 66 IN | SYSTOLIC BLOOD PRESSURE: 130 MMHG | TEMPERATURE: 98 F | OXYGEN SATURATION: 97 % | BODY MASS INDEX: 37.41 KG/M2

## 2024-10-11 DIAGNOSIS — Z23 FLU VACCINE NEED: ICD-10-CM

## 2024-10-11 DIAGNOSIS — Z13.6 ENCOUNTER FOR SCREENING FOR CARDIOVASCULAR DISORDERS: ICD-10-CM

## 2024-10-11 DIAGNOSIS — I10 GOOD HYPERTENSION CONTROL: ICD-10-CM

## 2024-10-11 DIAGNOSIS — E78.5 HYPERLIPIDEMIA, UNSPECIFIED HYPERLIPIDEMIA TYPE: ICD-10-CM

## 2024-10-11 DIAGNOSIS — Z00.00 ANNUAL PHYSICAL EXAM: Primary | ICD-10-CM

## 2024-10-11 DIAGNOSIS — R73.03 PREDIABETES: ICD-10-CM

## 2024-10-11 DIAGNOSIS — R53.82 CHRONIC FATIGUE: ICD-10-CM

## 2024-10-11 PROCEDURE — 86663 EPSTEIN-BARR ANTIBODY: CPT | Performed by: FAMILY MEDICINE

## 2024-10-11 PROCEDURE — 3079F DIAST BP 80-89 MM HG: CPT | Mod: CPTII,S$GLB,, | Performed by: FAMILY MEDICINE

## 2024-10-11 PROCEDURE — 84443 ASSAY THYROID STIM HORMONE: CPT | Performed by: FAMILY MEDICINE

## 2024-10-11 PROCEDURE — 75571 CT HRT W/O DYE W/CA TEST: CPT | Mod: 26,,, | Performed by: RADIOLOGY

## 2024-10-11 PROCEDURE — 90656 IIV3 VACC NO PRSV 0.5 ML IM: CPT | Mod: S$GLB,,, | Performed by: FAMILY MEDICINE

## 2024-10-11 PROCEDURE — 36415 COLL VENOUS BLD VENIPUNCTURE: CPT | Mod: PO | Performed by: FAMILY MEDICINE

## 2024-10-11 PROCEDURE — 3044F HG A1C LEVEL LT 7.0%: CPT | Mod: CPTII,S$GLB,, | Performed by: FAMILY MEDICINE

## 2024-10-11 PROCEDURE — 3008F BODY MASS INDEX DOCD: CPT | Mod: CPTII,S$GLB,, | Performed by: FAMILY MEDICINE

## 2024-10-11 PROCEDURE — 3075F SYST BP GE 130 - 139MM HG: CPT | Mod: CPTII,S$GLB,, | Performed by: FAMILY MEDICINE

## 2024-10-11 PROCEDURE — 82607 VITAMIN B-12: CPT | Performed by: FAMILY MEDICINE

## 2024-10-11 PROCEDURE — 90471 IMMUNIZATION ADMIN: CPT | Mod: S$GLB,,, | Performed by: FAMILY MEDICINE

## 2024-10-11 PROCEDURE — 75571 CT HRT W/O DYE W/CA TEST: CPT | Mod: TC

## 2024-10-11 PROCEDURE — 1159F MED LIST DOCD IN RCRD: CPT | Mod: CPTII,S$GLB,, | Performed by: FAMILY MEDICINE

## 2024-10-11 PROCEDURE — 86665 EPSTEIN-BARR CAPSID VCA: CPT | Mod: 59 | Performed by: FAMILY MEDICINE

## 2024-10-11 PROCEDURE — 99214 OFFICE O/P EST MOD 30 MIN: CPT | Mod: 25,S$GLB,, | Performed by: FAMILY MEDICINE

## 2024-10-11 PROCEDURE — 99999 PR PBB SHADOW E&M-EST. PATIENT-LVL IV: CPT | Mod: PBBFAC,,, | Performed by: FAMILY MEDICINE

## 2024-10-11 PROCEDURE — 1160F RVW MEDS BY RX/DR IN RCRD: CPT | Mod: CPTII,S$GLB,, | Performed by: FAMILY MEDICINE

## 2024-10-11 NOTE — PROGRESS NOTES
Subjective:       Patient ID: Mercedes Laura is a 64 y.o. female.    Chief Complaint: Annual Exam    HPI  Review of Systems   Constitutional:  Positive for fatigue. Negative for unexpected weight change.   Respiratory:  Negative for chest tightness and shortness of breath.    Cardiovascular:  Negative for chest pain, palpitations and leg swelling.   Gastrointestinal:  Negative for abdominal pain.   Musculoskeletal:  Positive for arthralgias.   Neurological:  Negative for dizziness, syncope, light-headedness and headaches.       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    Teaches high school gifted english. C/o feeling exhausted all the time. Falls asleep early every day. No energy.  Concerned it may be reactivated EBV or fibromyalgia    Reviewed labs 10/24   Ortho Dr. Whitman right knee arthritis s/p TKR 5/28/24.  [facility] PT at [facility] 2024. Still having pain, swelling and increased heat.  Referred to pain mgmt.       Derm Dr. Crane acne vulgaris      GI Heartburn and dyspepsia.  Zantac and carafate helped in past. Has mild nausea. GI Dr. Stinson h/o colon polyps -colonoscopy 2021 on 5 year surveillance     ENT Allergic rhinitis -Feels ear stuffiness. Using pataday for allergy eye.  Mometasone NS and claritin for allergies. Ent dr. Schultz.       Psych Lots of stress at home. Caring for demented mother who is being placed in memory care this week.      Endocrine Prediabetes A1c 5.5 -diet controlled. HPL-Your cholesterol is high.  Your 10  year risk of having a heart attack or a stroke is:The 10-year ASCVD risk score (Debby STERLING, et al., 2019) is: 5.8%    Values used to calculate the score:      Age: 64 years      Sex: Female      Is Non- : No      Diabetic: No      Tobacco smoker: No      Systolic Blood Pressure: 130 mmHg      Is BP treated: Yes      HDL Cholesterol: 54 mg/dL      Total Cholesterol: 206 mg/dL   Not on statin         Objective:      Physical Exam  Vitals and nursing note reviewed.   Constitutional:       Appearance: She is well-developed.   Cardiovascular:      Rate and Rhythm: Normal rate and regular rhythm.      Heart sounds: Normal heart sounds.   Pulmonary:      Effort: Pulmonary effort is normal.      Breath sounds: Normal breath sounds.   Skin:     General: Skin is warm and dry.   Neurological:      Mental Status: She is alert and oriented to person, place, and time.         Assessment:       1. Annual physical exam    2. Encounter for screening for cardiovascular disorders    3. Hyperlipidemia, unspecified hyperlipidemia type    4. Good hypertension control    5. Prediabetes    6. Flu vaccine need    7. Chronic fatigue        Plan:         1. Annual physical exam (Primary)  Discussed health maintenance guidelines appropriate for age.        2. Encounter for screening for cardiovascular disorders  Screen and treat as indicated:    - CT Cardiac Scoring; Future    3. Hyperlipidemia, unspecified hyperlipidemia type  I recommend a heart healthy diet rich in fiber, fresh vegetables and fruit and low in saturated fats (fried foods, red meat, etc.).  I also recommend regular exercise including a minimum of 150 minutes of cardio exercise per week and 2-30 minute workouts of strength training like light weights, yoga, pilates, etc.  You should work toward a body mass index of < 25.      - Lipid Panel; Future    4. Good hypertension control  Controlled on current medications.  Continue current medications.      5.  Prediabetes   Your blood sugar is borderline high.  This means you are at risk for developing type 2 diabetes mellitus.  To lessen your risk you should exercise regularly, avoid excess carbohydrates and work toward a body mass index of less than 25.      - CBC Auto Differential; Future  - Comprehensive Metabolic Panel; Future  - Hemoglobin A1C; Future    6. Flu vaccine need  Immunize today.  Counseled patient on risks, benefits and side effects.  Patient elected to proceed with vaccination.    - (VFC) influenza (Flulaval, Fluzone, Fluarix) 45 mcg/0.5 mL IM vaccine (> or = 6 mo) 0.5 mL  - (VFC) influenza (Flulaval, Fluzone, Fluarix) 45 mcg/0.5 mL IM vaccine (> or = 6 mo) 0.5 mL    7. Chronic fatigue  Screen and treat as indicated:    - PAPA-BARR VIRUS ANTIBODY PANEL; Future  - TSH; Future  - Vitamin B12; Future  - Home Sleep Study; Future        Time spent with patient: 20 minutes    Patient with be reevaluated in 6 months or sooner prn    Greater than 50% of this visit was spent counseling as described in above documentation:Yes

## 2024-10-12 DIAGNOSIS — B27.00 POSITIVE TEST FOR EPSTEIN-BARR VIRUS (EBV): ICD-10-CM

## 2024-10-12 DIAGNOSIS — R53.82 CHRONIC FATIGUE: Primary | ICD-10-CM

## 2024-10-12 LAB
EBV EA IGG SER QL IA: POSITIVE
EBV VCA IGG SER QL IA: POSITIVE
EBV VCA IGM SER QL IA: NEGATIVE
EPSTEIN-BARR VIRUS IGG, EARLY ANTIGEN: POSITIVE
TSH SERPL DL<=0.005 MIU/L-ACNC: 2.13 UIU/ML (ref 0.4–4)
VIT B12 SERPL-MCNC: 524 PG/ML (ref 210–950)

## 2024-10-17 ENCOUNTER — OFFICE VISIT (OUTPATIENT)
Dept: ORTHOPEDICS | Facility: CLINIC | Age: 64
End: 2024-10-17
Payer: COMMERCIAL

## 2024-10-17 VITALS — WEIGHT: 232.81 LBS | HEIGHT: 66 IN | BODY MASS INDEX: 37.41 KG/M2

## 2024-10-17 DIAGNOSIS — Z96.651 S/P TOTAL KNEE REPLACEMENT, RIGHT: Primary | ICD-10-CM

## 2024-10-17 PROCEDURE — 99999 PR PBB SHADOW E&M-EST. PATIENT-LVL III: CPT | Mod: PBBFAC,,, | Performed by: ORTHOPAEDIC SURGERY

## 2024-10-17 PROCEDURE — 3044F HG A1C LEVEL LT 7.0%: CPT | Mod: CPTII,S$GLB,, | Performed by: ORTHOPAEDIC SURGERY

## 2024-10-17 PROCEDURE — 99214 OFFICE O/P EST MOD 30 MIN: CPT | Mod: S$GLB,,, | Performed by: ORTHOPAEDIC SURGERY

## 2024-10-17 PROCEDURE — 1160F RVW MEDS BY RX/DR IN RCRD: CPT | Mod: CPTII,S$GLB,, | Performed by: ORTHOPAEDIC SURGERY

## 2024-10-17 PROCEDURE — 3008F BODY MASS INDEX DOCD: CPT | Mod: CPTII,S$GLB,, | Performed by: ORTHOPAEDIC SURGERY

## 2024-10-17 PROCEDURE — 1159F MED LIST DOCD IN RCRD: CPT | Mod: CPTII,S$GLB,, | Performed by: ORTHOPAEDIC SURGERY

## 2024-10-17 NOTE — PROGRESS NOTES
Past Medical History:   Diagnosis Date    Anxiety     this came on since lap kadeem sgy    Arthritis     Colon polyp     Deviated septum     GERD (gastroesophageal reflux disease)     Hypertension     Leg cramps     PAC (premature atrial contraction)     Plantar fasciitis     PMB (postmenopausal bleeding) 05/2018    Seasonal allergies     Sinus disorder     Uterine leiomyoma 05/2018    Varicose veins of both lower extremities        Past Surgical History:   Procedure Laterality Date    BREAST BIOPSY Left 2015    benign    CHOLECYSTECTOMY  6-1-2015    Dr Ashraf    COLONOSCOPY N/A 11/23/2015    Procedure: COLONOSCOPY;  Surgeon: Theo Stinson MD;  Location: [facility];  Service: Endoscopy;  Laterality: N/A; repeat in 5 years for surveillance    COLONOSCOPY N/A 7/14/2021    Procedure: COLONOSCOPY;  Surgeon: Theo Stinson MD;  Location: [facility];  Service: Endoscopy;  Laterality: N/A;    CYSTOSCOPY N/A 6/7/2018    Procedure: CYSTOSCOPY;  Surgeon: Anaya Caldera MD;  Location: [facility] OR;  Service: OB/GYN;  Laterality: N/A;    ESOPHAGOGASTRODUODENOSCOPY N/A 3/4/2019    Procedure: EGD (ESOPHAGOGASTRODUODENOSCOPY);  Surgeon: Theo Stinson MD;  Location: [facility];  Service: Endoscopy;  Laterality: N/A;    HYSTERECTOMY      KNEE ARTHROSCOPY W/ MENISCECTOMY  11/15/2000    right     LAPAROSCOPIC SALPINGO-OOPHORECTOMY Bilateral 6/7/2018    Procedure: [id number];  Surgeon: Anaya Caldera MD;  Location: [facility] OR;  Service: OB/GYN;  Laterality: Bilateral;    LAPAROSCOPIC TOTAL HYSTERECTOMY N/A 6/7/2018    Procedure: HYSTERECTOMY-TOTAL LAPAROSCOPIC (TLH);  Surgeon: Anaya Caldera MD;  Location: [facility] OR;  Service: OB/GYN;  Laterality: N/A;    OOPHORECTOMY      ROBOTIC ARTHROPLASTY, KNEE Right 5/28/2024    Procedure: ROBOTIC ARTHROPLASTY, KNEE, TOTAL;  Surgeon: Chu Whitman MD;  [facility];  Service: Orthopedics;  [facility]: [facility]  GASTROINTESTINAL ENDOSCOPY  06/22/2015    Dr. Cabrera       Current Outpatient Medications   Medication Sig    acetaminophen (TYLENOL) 500 MG tablet Take 2 tablets (1,000 mg total) by mouth every 8 (eight) hours as needed for Pain.    acetaminophen (TYLENOL) 650 MG TbSR 650 mg.    acyclovir 5% (ZOVIRAX) 5 % Crea Apply topically 5 (five) times daily.    ascorbic acid/zinc (ZINC WITH VITAMIN C ORAL) Take by mouth.    aspirin (VAZALORE) 325 mg Cap Take by mouth. PRN    b complex vitamins capsule Take 1 capsule by mouth once daily.    biotin 10 mg Tab Take 100 mg by mouth once daily.     calcium carbonate (TUMS) 200 mg calcium (500 mg) chewable tablet Take 1 tablet by mouth daily as needed for Heartburn.    econazole nitrate 1 % cream Apply to inner thighs and buttocks BID PRN flare    enzymes,digestive (DIGESTIVE ENZYMES ORAL) Take by mouth.    estradioL (ESTRACE) 0.01 % (0.1 mg/gram) vaginal cream Place 1 g vaginally every Monday and Thursday.    ibuprofen (ADVIL,MOTRIN) 600 MG tablet Take 1 tablet (600 mg total) by mouth 3 (three) times daily as needed for Pain.    lactobacillus combination no.4 (PROBIOTIC) 3 billion cell Cap Take 1 application by mouth 2 (two) times a day.    loratadine (CLARITIN) 10 mg tablet Take 10 mg by mouth daily as needed for Allergies.    magnesium glycinate 100 mg Tab Take by mouth every evening.    meloxicam (MOBIC) 7.5 MG tablet Take 1 tablet (7.5 mg total) by mouth once daily.    mometasone (NASONEX) 50 mcg/actuation nasal spray 2 sprays by Nasal route daily as needed (rhinitis).    multivitamin (THERAGRAN) per tablet Take 1 tablet by mouth once daily.    NON FORMULARY MEDICATION THERA TEARS EYE NUTRITION OMEGA 3    olmesartan-hydrochlorothiazide (BENICAR HCT) 40-12.5 mg Tab Take 1 tablet by mouth once daily.    olopatadine (PATANOL) 0.1 % ophthalmic solution 1 drop.    ondansetron (ZOFRAN) 4 MG tablet Take 1 tablet (4 mg total) by mouth every 6 (six) hours as needed for Nausea.     potassium chloride (MICRO-K) 10 MEQ CpSR Take 1 capsule (10 mEq total) by mouth once daily.    RABEprazole (ACIPHEX) 20 mg tablet Take 1 tablet (20 mg total) by mouth once daily.    sucralfate (CARAFATE) 100 mg/mL suspension Take 10 mLs (1 g total) by mouth 4 (four) times daily before meals and nightly.    tretinoin (RETIN-A) 0.05 % cream Thin film to entire face at bedtime    valacyclovir HCl (VALTREX ORAL) Take by mouth.    vit C,D-Iw-gtxbr-lutein-zeaxan (PRESERVISION AREDS-2) 250-90-40-1 mg Cap Take by mouth.    vitamin D (VITAMIN D3) 1000 units Tab Take 2 Units by mouth once daily.      Current Facility-Administered Medications   Medication    (VFC) influenza (Flulaval, Fluzone, Fluarix) 45 mcg/0.5 mL IM vaccine (> or = 6 mo) 0.5 mL       Review of patient's allergies indicates:   Allergen Reactions    Augmentin [amoxicillin-pot clavulanate] Nausea And Vomiting     Gi upset    Bentyl [dicyclomine] Other (See Comments)     Worsening anxiety    Naprosyn [naproxen] Other (See Comments)     Makes her tired and loopy    Omeprazole      Other reaction(s): ABDOMINAL PAIN    Adhesive Rash    Lexapro [escitalopram oxalate] Anxiety     Bad dreams, feeling of doom, sweats    Sulfa (sulfonamide antibiotics) Rash     Other reaction(s): measle like rash       Family History   Problem Relation Name Age of Onset    Hypertension Mother      Arthritis Mother      Hypertension Father      Cancer Father          melanoma    Melanoma Father      COPD Father      Heart disease Father          chf    Diverticulitis Maternal Grandmother      Arthritis Maternal Aunt      Diabetes Maternal Aunt      Hypertension Maternal Aunt      Arthritis Maternal Uncle      Breast cancer Neg Hx      Ovarian cancer Neg Hx      Psoriasis Neg Hx      Lupus Neg Hx      Eczema Neg Hx      Colon cancer Neg Hx      Colon polyps Neg Hx      Crohn's disease Neg Hx      Esophageal cancer Neg Hx      Stomach cancer Neg Hx         Social History     Socioeconomic  History    Marital status:    Tobacco Use    Smoking status: Never    Smokeless tobacco: Never   Substance and Sexual Activity    Alcohol use: Yes     Comment: rarely / few times per yr    Drug use: No    Sexual activity: Yes     Partners: Male     Birth control/protection: None, Post-menopausal     Social Drivers of Health     Physical Activity: Insufficiently Active (9/1/2020)    Exercise Vital Sign     Days of Exercise per Week: 7 days     Minutes of Exercise per Session: 10 min   Stress: No Stress Concern Present (9/1/2020)    [employer] [place] [employer] - Occupational Stress Questionnaire     Feeling of Stress : Not at all       Chief Complaint:   Chief Complaint   Patient presents with    Follow-up     2 month F/U- s/p R TKA       Date of surgery:  May 28, 2024 right total knee arthroplasty    History of present illness:  64-year-old female status post right robotic assisted total knee arthroplasty.  Pain is a 2/10 today.   Having a lot of medial thigh pain.  Also having some lateral thigh/knee pain and buttock pain.  Patient denies any pre-existing back or hip issues but this is bothering her more than the knee itself.       Review of Systems:    Musculoskeletal:  See HPI        Physical Examination:    Vital Signs:    There were no vitals filed for this visit.      Body mass index is 37.58 kg/m².    This a well-developed, well nourished patient in no acute distress.  They are alert and oriented and cooperative to examination.  Pt. walks with a slow gait without a lot of antalgia or      Examination of the right knee shows well-healed surgical incision.  No erythema drainage.   Range of motion is about 0° to 125°.  Pain in the medial quad.  Pain over the IT band as well.    X-rays:  X-rays of both knees are reviewed which show well-aligned right right total knee arthroplasty without complication.  Some soft tissue swelling noted.  Severe lateral joint space narrowing noted of the left  knee     Assessment::  Status post right total knee arthroplasty  Possible right lumbar spine or hip component    Plan:  Continue with the home exercises.  Continue working on stretching.  Having a lot of tightness in the distal quad and IT band.  Encouraged her to do a massage stick and some aggressive quad stretching.  Patient sees pain management to evaluate her back and hip in a couple of weeks.  Overall she is improving and able to ride her bicycle.  Follow up in 3 months.  No x-rays needed.    This note was created using M Modal voice recognition software that occasionally misinterpreted phrases or words.

## 2024-11-05 ENCOUNTER — HOSPITAL ENCOUNTER (OUTPATIENT)
Dept: RADIOLOGY | Facility: HOSPITAL | Age: 64
Discharge: HOME | End: 2024-11-05
Attending: STUDENT IN AN ORGANIZED HEALTH CARE EDUCATION/TRAINING PROGRAM
Payer: COMMERCIAL

## 2024-11-05 ENCOUNTER — OFFICE VISIT (OUTPATIENT)
Dept: PAIN MEDICINE | Facility: CLINIC | Age: 64
End: 2024-11-05
Payer: COMMERCIAL

## 2024-11-05 VITALS — BODY MASS INDEX: 37.28 KG/M2 | WEIGHT: 232 LBS | HEIGHT: 66 IN

## 2024-11-05 DIAGNOSIS — M54.9 BACK PAIN, UNSPECIFIED BACK LOCATION, UNSPECIFIED BACK PAIN LATERALITY, UNSPECIFIED CHRONICITY: ICD-10-CM

## 2024-11-05 PROCEDURE — 72100 X-RAY EXAM L-S SPINE 2/3 VWS: CPT | Mod: 26,,, | Performed by: RADIOLOGY

## 2024-11-05 PROCEDURE — 99204 OFFICE O/P NEW MOD 45 MIN: CPT | Mod: S$GLB,,, | Performed by: STUDENT IN AN ORGANIZED HEALTH CARE EDUCATION/TRAINING PROGRAM

## 2024-11-05 PROCEDURE — 1159F MED LIST DOCD IN RCRD: CPT | Mod: CPTII,S$GLB,, | Performed by: STUDENT IN AN ORGANIZED HEALTH CARE EDUCATION/TRAINING PROGRAM

## 2024-11-05 PROCEDURE — 1160F RVW MEDS BY RX/DR IN RCRD: CPT | Mod: CPTII,S$GLB,, | Performed by: STUDENT IN AN ORGANIZED HEALTH CARE EDUCATION/TRAINING PROGRAM

## 2024-11-05 PROCEDURE — 72100 X-RAY EXAM L-S SPINE 2/3 VWS: CPT | Mod: TC

## 2024-11-05 PROCEDURE — 99999 PR PBB SHADOW E&M-EST. PATIENT-LVL V: CPT | Mod: PBBFAC,,, | Performed by: STUDENT IN AN ORGANIZED HEALTH CARE EDUCATION/TRAINING PROGRAM

## 2024-11-05 PROCEDURE — 3044F HG A1C LEVEL LT 7.0%: CPT | Mod: CPTII,S$GLB,, | Performed by: STUDENT IN AN ORGANIZED HEALTH CARE EDUCATION/TRAINING PROGRAM

## 2024-11-05 PROCEDURE — 3008F BODY MASS INDEX DOCD: CPT | Mod: CPTII,S$GLB,, | Performed by: STUDENT IN AN ORGANIZED HEALTH CARE EDUCATION/TRAINING PROGRAM

## 2024-11-05 NOTE — PROGRESS NOTES
[place] - Department    Tory Llamas MD      First Office Visit: 11/5/24  Today' Date: 11/5/2024  Last Office Visit: None    Chief complaint: R thigh pain     HPI: Pt is a 64 y.o., who presents for evaluation. Referred by Dr. Whitman. Pt complains of R thigh pain since she had her R TKA in May. Endorses pain that starts in the R groin and goes down the medial side of the leg to above the knee. Endorses R thigh muscle aching and numbness. Pt is wondering if possible tourniquet placement or nerve block could've affected the R leg pain. Denies having sharp, shooting pain going down the leg. Denies having any back pain. No BB changes. Has been doing PT for R knee and HEP.         Pain disability index score: 63  Pain score: 10    Relevant Imaging/ Testing:   US RLE 6/24    Procedures: None    Date of board of pharmacy review:11/5/2024  Date of opioid risk screening/ pain psych: None  Date of opioid agreement and consent: None  Date of urine drug screen: None  Date of random pill count: None     was reviewed today: reviewed, no concerns     Prescribed medications: None    See EHR for  PMH, PSH, FH, SH, Medications and Allergy    ROS:  Positive for pain  ROS     PE:  There were no vitals filed for this visit.  General: Pleasant, no distress  HEENT: NC/ AT. PERRLA  CV: Radial pulses intact  Pulm: No distress  Ext: No edema    Physical Exam     Neuromusculoskeletal:  Head: NC, AT. PERRLA  Neck: Intact range of motions  Shoulder: Intact range of motion  Lumbar: Intact range of motion. Bilat Facet loading. Min Tenderness. Neg SL. No pain with flexion. No pain with extension.   Hip: Intact range of motion. Min tenderness. No pain with internal and external rotation of R hip  SI: Level, Min tenderness  Knee: Intact range of motion  Reflexes: normal Knee  Strength: 5/5 globally   Sensory: Grossly intact   Skin: No bruising, erythema  Gait: Normal      Impression:  R groin pain  R thigh pain  H/o R TKA  Relevant  History  BMI 37.45  Anxiety  Panic attacks   Vit D deficiency     Assessment:  R/o lumbar radiculopathy     Plan:  Discussed options  Imaging/ relevant records viewed/ reviewed/ discussed  Imaging results viewed and reviewed (noted above)/ reviewed with patient   reviewed  Cont HEP  XR L-spine  EMG RLE - pt declines   Re-eval after  MR L-spine if pain persists  Cont care with Dr. Whitman       Prescribed medications:  1. None    The impression and plan were discussed and explained in detail. All the questions were answered. Education was provided accordingly.       Follow-up:  6 wks or sooner if needed    Jess Vogel MD

## 2024-11-10 DIAGNOSIS — I10 GOOD HYPERTENSION CONTROL: ICD-10-CM

## 2024-11-10 DIAGNOSIS — I10 ESSENTIAL HYPERTENSION: ICD-10-CM

## 2024-11-10 RX ORDER — POTASSIUM CHLORIDE 750 MG/1
10 CAPSULE, EXTENDED RELEASE ORAL
Qty: 90 CAPSULE | Refills: 3 | Status: SHIPPED | OUTPATIENT
Start: 2024-11-10

## 2024-11-10 RX ORDER — RABEPRAZOLE SODIUM 20 MG/1
20 TABLET, DELAYED RELEASE ORAL
Qty: 90 TABLET | Refills: 3 | Status: SHIPPED | OUTPATIENT
Start: 2024-11-10

## 2024-11-10 RX ORDER — OLMESARTAN MEDOXOMIL AND HYDROCHLOROTHIAZIDE 40/12.5 40; 12.5 MG/1; MG/1
1 TABLET ORAL
Qty: 90 TABLET | Refills: 3 | Status: SHIPPED | OUTPATIENT
Start: 2024-11-10

## 2024-11-10 NOTE — TELEPHONE ENCOUNTER
No care due was identified.  [facility] Embedded Care Due Messages. Reference number: [id number].   11/10/2024 12:45:02 PM CST

## 2024-11-10 NOTE — TELEPHONE ENCOUNTER
Refill Decision Note   Mercedes Laura  is requesting a refill authorization.  Brief Assessment and Rationale for Refill:  Approve     Medication Therapy Plan:         Comments:     Note composed:2:29 PM 11/10/2024

## 2024-11-25 ENCOUNTER — PROCEDURE VISIT (OUTPATIENT)
Dept: SLEEP MEDICINE | Facility: HOSPITAL | Age: 64
End: 2024-11-25
Payer: COMMERCIAL

## 2024-11-25 DIAGNOSIS — R53.82 CHRONIC FATIGUE: ICD-10-CM

## 2024-11-25 PROCEDURE — 95806 SLEEP STUDY UNATT&RESP EFFT: CPT

## 2024-12-11 DIAGNOSIS — G47.33 OSA ON CPAP: Primary | ICD-10-CM

## 2024-12-12 ENCOUNTER — PATIENT MESSAGE (OUTPATIENT)
Dept: FAMILY MEDICINE | Facility: CLINIC | Age: 64
End: 2024-12-12
Payer: COMMERCIAL

## 2024-12-13 ENCOUNTER — PATIENT MESSAGE (OUTPATIENT)
Dept: FAMILY MEDICINE | Facility: CLINIC | Age: 64
End: 2024-12-13
Payer: COMMERCIAL

## 2024-12-27 ENCOUNTER — OFFICE VISIT (OUTPATIENT)
Dept: PAIN MEDICINE | Facility: CLINIC | Age: 64
End: 2024-12-27
Payer: COMMERCIAL

## 2024-12-27 VITALS — BODY MASS INDEX: 37.28 KG/M2 | HEIGHT: 66 IN | WEIGHT: 232 LBS

## 2024-12-27 DIAGNOSIS — M54.9 BACK PAIN, UNSPECIFIED BACK LOCATION, UNSPECIFIED BACK PAIN LATERALITY, UNSPECIFIED CHRONICITY: Primary | ICD-10-CM

## 2024-12-27 PROCEDURE — 99999 PR PBB SHADOW E&M-EST. PATIENT-LVL IV: CPT | Mod: PBBFAC,,, | Performed by: STUDENT IN AN ORGANIZED HEALTH CARE EDUCATION/TRAINING PROGRAM

## 2024-12-27 NOTE — PROGRESS NOTES
[place] - Department    Tory Llamas MD      First Office Visit: 11/5/24  Today' Date: 12/27/2024  Last Office Visit: 11/5/24    Chief complaint: R thigh pain     HPI: Pt is a 64 y.o., who presents for evaluation. Referred by Dr. Whitman. Pt seen previously for R thigh pain since she had her R TKA in May. Previously endorsed pain that starts in the R groin and goes down the medial side of the leg to above the knee. Pt states her leg and knee are feeling much better and is continuing to get better. No BB changes. Is still doing PT and HEP.         Pain disability index score: 63  Pain score: 10    Relevant Imaging/ Testing:   XR L-spine 11/24  US RLE 6/24  XR B knee 7/24    Procedures: None    Date of board of pharmacy review:12/27/2024  Date of opioid risk screening/ pain psych: None  Date of opioid agreement and consent: None  Date of urine drug screen: None  Date of random pill count: None     was reviewed today: reviewed, no concerns     Prescribed medications: None    See EHR for  PMH, PSH, FH, SH, Medications and Allergy    ROS:  Positive for pain  ROS     PE:  There were no vitals filed for this visit.  General: Pleasant, no distress  HEENT: NC/ AT. PERRLA  CV: Radial pulses intact  Pulm: No distress  Ext: No edema    Physical Exam     Neuromusculoskeletal:  Head: NC, AT. PERRLA  Neck: Intact range of motions  Shoulder: Intact range of motion  Lumbar: Intact range of motion. Bilat Facet loading. Min Tenderness. Neg SL. No pain with flexion. No pain with extension.   Hip: Intact range of motion. Min tenderness. No pain with internal and external rotation of R hip  SI: Level, Min tenderness  Knee: Intact range of motion  Reflexes: normal Knee  Strength: 5/5 globally   Sensory: Grossly intact   Skin: No bruising, erythema  Gait: Normal      Impression:  R groin pain  R thigh pain  H/o R TKA  Relevant History  BMI 37.45  Anxiety  Panic attacks   Vit D deficiency     Assessment:  R/o lumbar radiculopathy      Plan:  Discussed options  Imaging/ relevant records viewed/ reviewed/ discussed  Imaging results viewed and reviewed (noted above)/ reviewed with patient   reviewed  Cont HEP  XR L-spine reviewed showing degenerative changes  EMG RLE - pt declines   Defer MR L-spine until pain worsens or returns   Cont care with Dr. Whitman       Prescribed medications:  1. None    The impression and plan were discussed and explained in detail. All the questions were answered. Education was provided accordingly.         Follow-up:  As needed    Jess Vogel MD

## 2025-01-02 ENCOUNTER — HOSPITAL ENCOUNTER (OUTPATIENT)
Dept: RADIOLOGY | Facility: CLINIC | Age: 65
Discharge: HOME | End: 2025-01-02
Attending: FAMILY MEDICINE
Payer: COMMERCIAL

## 2025-01-02 DIAGNOSIS — Z12.31 ENCOUNTER FOR SCREENING MAMMOGRAM FOR BREAST CANCER: ICD-10-CM

## 2025-01-02 PROCEDURE — 77063 BREAST TOMOSYNTHESIS BI: CPT | Mod: TC,PO

## 2025-01-02 PROCEDURE — 77067 SCR MAMMO BI INCL CAD: CPT | Mod: 26,,, | Performed by: RADIOLOGY

## 2025-01-02 PROCEDURE — 77063 BREAST TOMOSYNTHESIS BI: CPT | Mod: 26,,, | Performed by: RADIOLOGY

## 2025-01-03 ENCOUNTER — PATIENT MESSAGE (OUTPATIENT)
Dept: FAMILY MEDICINE | Facility: CLINIC | Age: 65
End: 2025-01-03
Payer: COMMERCIAL

## 2025-01-03 DIAGNOSIS — R92.8 ABNORMALITY OF RIGHT BREAST ON SCREENING MAMMOGRAM: Primary | ICD-10-CM

## 2025-01-03 DIAGNOSIS — R92.8 FOLLOW-UP EXAMINATION OF ABNORMAL MAMMOGRAM: Primary | ICD-10-CM

## 2025-01-05 DIAGNOSIS — R92.8 ABNORMAL MAMMOGRAM OF RIGHT BREAST: Primary | ICD-10-CM

## 2025-01-07 ENCOUNTER — HOSPITAL ENCOUNTER (OUTPATIENT)
Dept: RADIOLOGY | Facility: HOSPITAL | Age: 65
Discharge: HOME | End: 2025-01-07
Attending: PHYSICIAN ASSISTANT
Payer: COMMERCIAL

## 2025-01-07 DIAGNOSIS — R92.8 ABNORMALITY OF RIGHT BREAST ON SCREENING MAMMOGRAM: ICD-10-CM

## 2025-01-07 PROCEDURE — 77065 DX MAMMO INCL CAD UNI: CPT | Mod: 26,RT,, | Performed by: RADIOLOGY

## 2025-01-07 PROCEDURE — 77061 BREAST TOMOSYNTHESIS UNI: CPT | Mod: 26,RT,, | Performed by: RADIOLOGY

## 2025-01-07 PROCEDURE — 77061 BREAST TOMOSYNTHESIS UNI: CPT | Mod: TC,RT

## 2025-01-14 ENCOUNTER — PATIENT MESSAGE (OUTPATIENT)
Dept: ORTHOPEDICS | Facility: CLINIC | Age: 65
End: 2025-01-14
Payer: COMMERCIAL

## 2025-01-14 ENCOUNTER — HOSPITAL ENCOUNTER (OUTPATIENT)
Dept: RADIOLOGY | Facility: HOSPITAL | Age: 65
Discharge: HOME | End: 2025-01-14
Attending: PHYSICIAN ASSISTANT
Payer: COMMERCIAL

## 2025-01-14 DIAGNOSIS — R92.8 ABNORMAL MAMMOGRAM OF RIGHT BREAST: ICD-10-CM

## 2025-01-14 PROCEDURE — 76098 X-RAY EXAM SURGICAL SPECIMEN: CPT | Mod: 26,,, | Performed by: RADIOLOGY

## 2025-01-14 PROCEDURE — 19081 BX BREAST 1ST LESION STRTCTC: CPT | Mod: 59,RT,, | Performed by: RADIOLOGY

## 2025-01-14 PROCEDURE — 27202663 MAMMO BREAST STEREOTACTIC BREAST BIOPSY RIGHT

## 2025-01-14 PROCEDURE — 63600175 PHARM REV CODE 636 W HCPCS

## 2025-01-14 PROCEDURE — 25000003 PHARM REV CODE 250: Performed by: RADIOLOGY

## 2025-01-14 PROCEDURE — 76098 X-RAY EXAM SURGICAL SPECIMEN: CPT | Mod: TC

## 2025-01-14 RX ORDER — CLINDAMYCIN HYDROCHLORIDE 150 MG/1
900 CAPSULE ORAL
Status: DISCONTINUED | OUTPATIENT
Start: 2025-01-14 | End: 2025-01-15 | Stop reason: HOSPADM

## 2025-01-14 RX ADMIN — CLINDAMYCIN HYDROCHLORIDE 900 MG: 150 CAPSULE ORAL at 12:01

## 2025-01-14 RX ADMIN — LIDOCAINE HYDROCHLORIDE 30 ML: 10; .005 INJECTION, SOLUTION EPIDURAL; INFILTRATION; INTRACAUDAL; PERINEURAL at 12:01

## 2025-01-17 ENCOUNTER — TELEPHONE (OUTPATIENT)
Facility: CLINIC | Age: 65
End: 2025-01-17
Payer: COMMERCIAL

## 2025-01-17 NOTE — TELEPHONE ENCOUNTER
Spoke with pt in regards to breast biopsy returning benign and concordant with imaging findings. Six month follow-up right mammogram recommended. Pt verbalized understanding.

## 2025-01-23 ENCOUNTER — TELEPHONE (OUTPATIENT)
Dept: FAMILY MEDICINE | Facility: CLINIC | Age: 65
End: 2025-01-23
Payer: COMMERCIAL

## 2025-01-23 ENCOUNTER — OFFICE VISIT (OUTPATIENT)
Dept: ORTHOPEDICS | Facility: CLINIC | Age: 65
End: 2025-01-23
Payer: COMMERCIAL

## 2025-01-23 VITALS — WEIGHT: 231.94 LBS | HEIGHT: 66 IN | BODY MASS INDEX: 37.28 KG/M2

## 2025-01-23 DIAGNOSIS — Z96.651 S/P TOTAL KNEE REPLACEMENT, RIGHT: Primary | ICD-10-CM

## 2025-01-23 DIAGNOSIS — M17.10 ARTHRITIS OF KNEE: ICD-10-CM

## 2025-01-23 DIAGNOSIS — M17.12 PRIMARY OSTEOARTHRITIS OF LEFT KNEE: Primary | ICD-10-CM

## 2025-01-23 PROCEDURE — 1160F RVW MEDS BY RX/DR IN RCRD: CPT | Mod: CPTII,S$GLB,, | Performed by: ORTHOPAEDIC SURGERY

## 2025-01-23 PROCEDURE — 1159F MED LIST DOCD IN RCRD: CPT | Mod: CPTII,S$GLB,, | Performed by: ORTHOPAEDIC SURGERY

## 2025-01-23 PROCEDURE — 99214 OFFICE O/P EST MOD 30 MIN: CPT | Mod: 25,S$GLB,, | Performed by: ORTHOPAEDIC SURGERY

## 2025-01-23 PROCEDURE — 99999 PR PBB SHADOW E&M-EST. PATIENT-LVL III: CPT | Mod: PBBFAC,,, | Performed by: ORTHOPAEDIC SURGERY

## 2025-01-23 PROCEDURE — 20610 DRAIN/INJ JOINT/BURSA W/O US: CPT | Mod: LT,S$GLB,, | Performed by: ORTHOPAEDIC SURGERY

## 2025-01-23 PROCEDURE — 3008F BODY MASS INDEX DOCD: CPT | Mod: CPTII,S$GLB,, | Performed by: ORTHOPAEDIC SURGERY

## 2025-01-23 RX ORDER — TRIAMCINOLONE ACETONIDE 40 MG/ML
40 INJECTION, SUSPENSION INTRA-ARTICULAR; INTRAMUSCULAR
Status: DISCONTINUED | OUTPATIENT
Start: 2025-01-23 | End: 2025-01-23 | Stop reason: HOSPADM

## 2025-01-23 RX ADMIN — TRIAMCINOLONE ACETONIDE 40 MG: 40 INJECTION, SUSPENSION INTRA-ARTICULAR; INTRAMUSCULAR at 03:01

## 2025-01-23 NOTE — TELEPHONE ENCOUNTER
----- Message from Tech Joni sent at 1/23/2025  4:01 PM CST -----  Regarding: pre op clearance  Patient was seen today by Dr. Whitman and consented for left total knee arthroplasty.  Surgery has been scheduled for 5/27/25.  Please advise on pre op medical clearance for this patient.      Thank you,     Asa

## 2025-01-23 NOTE — PROGRESS NOTES
Past Medical History:   Diagnosis Date    Anxiety     this came on since lap kadeem sgy    Arthritis     Colon polyp     Deviated septum     GERD (gastroesophageal reflux disease)     Hypertension     Leg cramps     PAC (premature atrial contraction)     Plantar fasciitis     PMB (postmenopausal bleeding) 05/2018    Seasonal allergies     Sinus disorder     Uterine leiomyoma 05/2018    Varicose veins of both lower extremities        Past Surgical History:   Procedure Laterality Date    BREAST BIOPSY Left 2015    benign    CHOLECYSTECTOMY  6-1-2015    Dr Ashraf    COLONOSCOPY N/A 11/23/2015    Procedure: COLONOSCOPY;  Surgeon: Theo Stinson MD;  Location: [facility];  Service: Endoscopy;  Laterality: N/A; repeat in 5 years for surveillance    COLONOSCOPY N/A 7/14/2021    Procedure: COLONOSCOPY;  Surgeon: Theo Stinson MD;  Location: [facility];  Service: Endoscopy;  Laterality: N/A;    CYSTOSCOPY N/A 6/7/2018    Procedure: CYSTOSCOPY;  Surgeon: Anaya Caldera MD;  Location: [facility] OR;  Service: OB/GYN;  Laterality: N/A;    ESOPHAGOGASTRODUODENOSCOPY N/A 3/4/2019    Procedure: EGD (ESOPHAGOGASTRODUODENOSCOPY);  Surgeon: Theo Stinson MD;  Location: [facility];  Service: Endoscopy;  Laterality: N/A;    HYSTERECTOMY      KNEE ARTHROSCOPY W/ MENISCECTOMY  11/15/2000    right     LAPAROSCOPIC SALPINGO-OOPHORECTOMY Bilateral 6/7/2018    Procedure: [id number];  Surgeon: Anaya Caldera MD;  Location: [facility] OR;  Service: OB/GYN;  Laterality: Bilateral;    LAPAROSCOPIC TOTAL HYSTERECTOMY N/A 6/7/2018    Procedure: HYSTERECTOMY-TOTAL LAPAROSCOPIC (TLH);  Surgeon: Anaya Caldera MD;  Location: [facility] OR;  Service: OB/GYN;  Laterality: N/A;    OOPHORECTOMY      ROBOTIC ARTHROPLASTY, KNEE Right 5/28/2024    Procedure: ROBOTIC ARTHROPLASTY, KNEE, TOTAL;  Surgeon: Chu Whitamn MD;  Location: [facility] OR;  Service: Orthopedics;  [facility]: [facility]  GASTROINTESTINAL ENDOSCOPY  06/22/2015    Dr. Cabrera       Current Outpatient Medications   Medication Sig    acetaminophen (TYLENOL) 500 MG tablet Take 2 tablets (1,000 mg total) by mouth every 8 (eight) hours as needed for Pain.    acetaminophen (TYLENOL) 650 MG TbSR 650 mg.    acyclovir 5% (ZOVIRAX) 5 % Crea Apply topically 5 (five) times daily.    ascorbic acid/zinc (ZINC WITH VITAMIN C ORAL) Take by mouth.    aspirin (VAZALORE) 325 mg Cap Take by mouth. PRN    b complex vitamins capsule Take 1 capsule by mouth once daily.    biotin 10 mg Tab Take 100 mg by mouth once daily.     calcium carbonate (TUMS) 200 mg calcium (500 mg) chewable tablet Take 1 tablet by mouth daily as needed for Heartburn.    econazole nitrate 1 % cream Apply to inner thighs and buttocks BID PRN flare    enzymes,digestive (DIGESTIVE ENZYMES ORAL) Take by mouth.    estradioL (ESTRACE) 0.01 % (0.1 mg/gram) vaginal cream Place 1 g vaginally every Monday and Thursday.    ibuprofen (ADVIL,MOTRIN) 600 MG tablet Take 1 tablet (600 mg total) by mouth 3 (three) times daily as needed for Pain.    lactobacillus combination no.4 (PROBIOTIC) 3 billion cell Cap Take 1 application by mouth 2 (two) times a day.    loratadine (CLARITIN) 10 mg tablet Take 10 mg by mouth daily as needed for Allergies.    magnesium glycinate 100 mg Tab Take by mouth every evening.    meloxicam (MOBIC) 7.5 MG tablet Take 1 tablet (7.5 mg total) by mouth once daily.    mometasone (NASONEX) 50 mcg/actuation nasal spray 2 sprays by Nasal route daily as needed (rhinitis).    multivitamin (THERAGRAN) per tablet Take 1 tablet by mouth once daily.    NON FORMULARY MEDICATION THERA TEARS EYE NUTRITION OMEGA 3    olmesartan-hydrochlorothiazide (BENICAR HCT) 40-12.5 mg Tab TAKE 1 TABLET DAILY    olopatadine (PATANOL) 0.1 % ophthalmic solution 1 drop.    ondansetron (ZOFRAN) 4 MG tablet Take 1 tablet (4 mg total) by mouth every 6 (six) hours as needed for Nausea.    potassium chloride  (MICRO-K) 10 MEQ CpSR TAKE 1 CAPSULE DAILY    RABEprazole (ACIPHEX) 20 mg tablet TAKE 1 TABLET DAILY    sucralfate (CARAFATE) 100 mg/mL suspension Take 10 mLs (1 g total) by mouth 4 (four) times daily before meals and nightly.    tretinoin (RETIN-A) 0.05 % cream Thin film to entire face at bedtime    valacyclovir HCl (VALTREX ORAL) Take by mouth.    vit C,U-Za-kzpxj-lutein-zeaxan (PRESERVISION AREDS-2) 250-90-40-1 mg Cap Take by mouth.    vitamin D (VITAMIN D3) 1000 units Tab Take 2 Units by mouth once daily.      Current Facility-Administered Medications   Medication    (VFC) influenza (Flulaval, Fluzone, Fluarix) 45 mcg/0.5 mL IM vaccine (> or = 6 mo) 0.5 mL       Review of patient's allergies indicates:   Allergen Reactions    Augmentin [amoxicillin-pot clavulanate] Nausea And Vomiting     Gi upset    Bentyl [dicyclomine] Other (See Comments)     Worsening anxiety    Naprosyn [naproxen] Other (See Comments)     Makes her tired and loopy    Omeprazole      Other reaction(s): ABDOMINAL PAIN    Adhesive Rash    Lexapro [escitalopram oxalate] Anxiety     Bad dreams, feeling of doom, sweats    Sulfa (sulfonamide antibiotics) Rash     Other reaction(s): measle like rash       Family History   Problem Relation Name Age of Onset    Hypertension Mother      Arthritis Mother      Hypertension Father      Cancer Father          melanoma    Melanoma Father      COPD Father      Heart disease Father          chf    Diverticulitis Maternal Grandmother      Arthritis Maternal Aunt      Diabetes Maternal Aunt      Hypertension Maternal Aunt      Arthritis Maternal Uncle      Breast cancer Neg Hx      Ovarian cancer Neg Hx      Psoriasis Neg Hx      Lupus Neg Hx      Eczema Neg Hx      Colon cancer Neg Hx      Colon polyps Neg Hx      Crohn's disease Neg Hx      Esophageal cancer Neg Hx      Stomach cancer Neg Hx         Social History     Socioeconomic History    Marital status:    Tobacco Use    Smoking status: Never     Smokeless tobacco: Never   Substance and Sexual Activity    Alcohol use: Yes     Comment: rarely / few times per yr    Drug use: No    Sexual activity: Yes     Partners: Male     Birth control/protection: None, Post-menopausal     Social Drivers of Health     Physical Activity: Insufficiently Active (9/1/2020)    Exercise Vital Sign     Days of Exercise per Week: 7 days     Minutes of Exercise per Session: 10 min   Stress: No Stress Concern Present (9/1/2020)    [employer] [place] [employer] - Occupational Stress Questionnaire     Feeling of Stress : Not at all       Chief Complaint:   No chief complaint on file.      Date of surgery:  May 28, 2024 right total knee arthroplasty    History of present illness:  64-year-old female status post right robotic assisted total knee arthroplasty.  Pain is a 2/10 today.  Complaining more of left knee pain.  Pain is a 6/10.      Review of Systems:    Musculoskeletal:  See HPI        Physical Examination:    Vital Signs:    There were no vitals filed for this visit.      There is no height or weight on file to calculate BMI.    This a well-developed, well nourished patient in no acute distress.  They are alert and oriented and cooperative to examination.  Pt. walks with a slow gait without a lot of antalgia or      Examination of the right knee shows well-healed surgical incision.  No erythema drainage.   Range of motion is about 0° to 125°.  Pain in the medial quad.  Pain over the IT band as well.    Examination of the left knee shows no rashes or erythema. There are no masses ecchymosis or effusion. Patient has full range of motion from 0-130°. Patient is moderately tender to palpation over lateral joint line and nontender to palpation over the medial joint line. Patient has a - Lachman exam, - anterior drawer exam, and - posterior drawer exam. - Apley exam. Knee is stable to varus and valgus stress. 5 out of 5 motor strength. Palpable distal pulses. Intact light  touch sensation. Negative Patellofemoral crepitus.  Obvious valgus alignment    Heart is regular rate without obvious murmurs   Normal respiratory effort without audible wheezing  Abdomen is soft and nontender       X-rays:  X-rays of both knees are reviewed which show well-aligned right right total knee arthroplasty without complication.  Some soft tissue swelling noted.  Severe lateral joint space narrowing noted of the left knee     Assessment::  Status post right total knee arthroplasty  Severe left valgus knee arthritis    Plan:  Continue with the home exercises.  Continue working on stretching.  Agreed to inject her left knee today.  We will also get her set up for left robotic assisted total knee arthroplasty when school lets out in May.  Risks, benefits, and alternatives to the procedure were explained to the patient including but not limited to damage to nerves, arteries, blood vessels, bones, tendons, ligaments, stiffness, instability, infection, permanent limb dysfunction, DVT, PE, as well as general anesthetic complications including seizure, stroke, heart attack and even death. The patient understood these risks and wished to proceed and signed the informed consent.       This note was created using M Mod[employer] voice recognition software that occasionally misinterpreted phrases or words.

## 2025-01-23 NOTE — TELEPHONE ENCOUNTER
----- Message from Nohemi sent at 1/23/2025  4:44 PM CST -----  Type:  Patient Returning Call    Who Called:pt      Who Left Message for Patient:vivian      Does the patient know what this is regarding?:      Would the patient rather a call back or a response via [facility]? Call back       Best Call Back Number:[phone]       Additional Information:   Please call back to advise. Thank you.

## 2025-01-23 NOTE — PROCEDURES
Large Joint Aspiration/Injection: L knee    Date/Time: 1/23/2025 3:30 PM    Performed by: Chu Whitman MD  Authorized by: Chu Whitman MD    Consent Done?:  Yes (Verbal)  Indications:  Pain  Site marked: the procedure site was marked    Timeout: prior to procedure the correct patient, procedure, and site was verified    Local anesthetic:  Lidocaine 1% without epinephrine and bupivacaine 0.25% without epinephrine  Anesthetic total (ml):  6      Details:  Needle Size:  20 G  Approach:  Anterolateral  Location:  Knee  Site:  L knee  Medications:  40 mg triamcinolone acetonide 40 mg/mL  Patient tolerance:  Patient tolerated the procedure well with no immediate complications

## 2025-01-24 DIAGNOSIS — Z01.818 PRE-OP TESTING: ICD-10-CM

## 2025-01-24 DIAGNOSIS — M17.12 PRIMARY OSTEOARTHRITIS OF LEFT KNEE: Primary | ICD-10-CM

## 2025-01-24 RX ORDER — MUPIROCIN 20 MG/G
OINTMENT TOPICAL
OUTPATIENT
Start: 2025-01-24

## 2025-01-24 RX ORDER — SODIUM CHLORIDE 9 MG/ML
INJECTION, SOLUTION INTRAVENOUS CONTINUOUS
OUTPATIENT
Start: 2025-01-24

## 2025-01-29 ENCOUNTER — TELEPHONE (OUTPATIENT)
Dept: FAMILY MEDICINE | Facility: CLINIC | Age: 65
End: 2025-01-29
Payer: COMMERCIAL

## 2025-01-29 NOTE — TELEPHONE ENCOUNTER
---- Message from Jonas Quinones sent at 1/23/2025  4:01 PM CST -----  Regarding: pre op clearance  Patient was seen today by Dr. Whitman and consented for left total knee arthroplasty.  Surgery has been scheduled for 5/27/25.  Please advise on pre op medical clearance for this patient.       Thank you,      Asa      Spoke to pt advised surgical clearance appt needs to be within 30 days of scheduled procedure. Pt upset stating she has an appt on 4/16/25 already. I advised pt that was outside of the 30 day time frame and would be happy to assist with scheduling. Pt states she is needing to take off for this, has work and don't have time off. Pt requesting 5/14/25 since she is already scheduled for 2 other appt and states she will just have to take the whole day off. After scheduling pt states she can do a 4pm appt and was looking for a date she is available. Pt states she would call back to discuss after checking her calendar.

## 2025-01-29 NOTE — TELEPHONE ENCOUNTER
----- Message from Keisha sent at 1/29/2025  1:15 PM CST -----  Type:  Patient Returning Call    Who Called: PT    Who Left Message for Patient: Danae    Does the patient know what this is regarding?:pre op    Would the patient rather a call back or a response via [facility]? call    Best Call Back Number:[phone]       Additional Information: call after 3pm  Please call back to advise. Thank you!

## 2025-01-29 NOTE — TELEPHONE ENCOUNTER
----- Message from Lisset sent at 1/29/2025  3:34 PM CST -----  Contact: [phone]  Type:  Patient Returning Call    Who Called:  Mercedes    Who Left Message for Patient:  Danae    Does the patient know what this is regarding?:  scheduling pre-op    Would the patient rather a call back or a response via [facility]? Call back    Would the Best Call Back Number:  [phone]      Additional Information:

## 2025-02-20 DIAGNOSIS — N89.8 VAGINAL DRYNESS: ICD-10-CM

## 2025-02-24 RX ORDER — ESTRADIOL 0.1 MG/G
CREAM VAGINAL
Qty: 42.5 G | Refills: 1 | Status: SHIPPED | OUTPATIENT
Start: 2025-02-24

## 2025-03-06 ENCOUNTER — PROCEDURE VISIT (OUTPATIENT)
Dept: SLEEP MEDICINE | Facility: HOSPITAL | Age: 65
End: 2025-03-06
Attending: FAMILY MEDICINE
Payer: COMMERCIAL

## 2025-03-06 DIAGNOSIS — I10 GOOD HYPERTENSION CONTROL: ICD-10-CM

## 2025-03-06 DIAGNOSIS — G47.33 OSA ON CPAP: ICD-10-CM

## 2025-03-06 PROCEDURE — 95811 POLYSOM 6/>YRS CPAP 4/> PARM: CPT

## 2025-03-06 NOTE — TELEPHONE ENCOUNTER
----- Message from BLAZER [employer] FLIP [employer]OPS sent at 3/6/2025 12:04 PM CST -----  Type: Needs Medical AdviceWho Called:  Bg name for medication : 1. RABEprazole (ACIPHEX) 20 mg tablet                                             2.olmesartan-hydrochlorothiazide (BENICAR HCT) 40-12.5 mg TabBest Call Back Number: [phone] pharmacy name : [id number] - [place] - [address]  [address] [place] [address]Nxlka: [phone] Fax: [phone] Information: shirley needs a emergency RX for above medication because her normal RX is stuck at [facility]  since 2/27  and she needs it now , please call to further assist thank you .

## 2025-03-06 NOTE — TELEPHONE ENCOUNTER
No care due was identified.  [facility] Embedded Care Due Messages. Reference number: [id number].   3/06/2025 1:44:38 PM CST

## 2025-03-10 RX ORDER — RABEPRAZOLE SODIUM 20 MG/1
20 TABLET, DELAYED RELEASE ORAL DAILY
Qty: 15 TABLET | Refills: 0 | Status: SHIPPED | OUTPATIENT
Start: 2025-03-10

## 2025-03-10 RX ORDER — OLMESARTAN MEDOXOMIL AND HYDROCHLOROTHIAZIDE 40/12.5 40; 12.5 MG/1; MG/1
1 TABLET ORAL DAILY
Qty: 15 TABLET | Refills: 0 | Status: SHIPPED | OUTPATIENT
Start: 2025-03-10

## 2025-03-26 ENCOUNTER — PATIENT MESSAGE (OUTPATIENT)
Dept: DERMATOLOGY | Facility: CLINIC | Age: 65
End: 2025-03-26
Payer: COMMERCIAL

## 2025-04-05 ENCOUNTER — LAB VISIT (OUTPATIENT)
Dept: LAB | Facility: HOSPITAL | Age: 65
End: 2025-04-05
Attending: FAMILY MEDICINE
Payer: COMMERCIAL

## 2025-04-05 DIAGNOSIS — E78.5 HYPERLIPIDEMIA, UNSPECIFIED HYPERLIPIDEMIA TYPE: ICD-10-CM

## 2025-04-05 DIAGNOSIS — R73.03 PREDIABETES: ICD-10-CM

## 2025-04-05 LAB
ABSOLUTE EOSINOPHIL (OHS): 0.14 K/UL
ABSOLUTE MONOCYTE (OHS): 0.48 K/UL (ref 0.3–1)
ABSOLUTE NEUTROPHIL COUNT (OHS): 3.28 K/UL (ref 1.8–7.7)
ALBUMIN SERPL BCP-MCNC: 3.9 G/DL (ref 3.5–5.2)
ALP SERPL-CCNC: 59 UNIT/L (ref 40–150)
ALT SERPL W/O P-5'-P-CCNC: 15 UNIT/L (ref 10–44)
ANION GAP (OHS): 6 MMOL/L (ref 8–16)
AST SERPL-CCNC: 14 UNIT/L (ref 11–45)
BASOPHILS # BLD AUTO: 0.04 K/UL
BASOPHILS NFR BLD AUTO: 0.7 %
BILIRUB SERPL-MCNC: 0.5 MG/DL (ref 0.1–1)
BUN SERPL-MCNC: 18 MG/DL (ref 8–23)
CALCIUM SERPL-MCNC: 9.4 MG/DL (ref 8.7–10.5)
CHLORIDE SERPL-SCNC: 105 MMOL/L (ref 95–110)
CHOLEST SERPL-MCNC: 194 MG/DL (ref 120–199)
CHOLEST/HDLC SERPL: 4 {RATIO} (ref 2–5)
CO2 SERPL-SCNC: 25 MMOL/L (ref 23–29)
CREAT SERPL-MCNC: 0.7 MG/DL (ref 0.5–1.4)
EAG (OHS): 117 MG/DL (ref 68–131)
ERYTHROCYTE [DISTWIDTH] IN BLOOD BY AUTOMATED COUNT: 13.2 % (ref 11.5–14.5)
GFR SERPLBLD CREATININE-BSD FMLA CKD-EPI: >60 ML/MIN/1.73/M2
GLUCOSE SERPL-MCNC: 104 MG/DL (ref 70–110)
HBA1C MFR BLD: 5.7 % (ref 4–5.6)
HCT VFR BLD AUTO: 40.7 % (ref 37–48.5)
HDLC SERPL-MCNC: 49 MG/DL (ref 40–75)
HDLC SERPL: 25.3 % (ref 20–50)
HGB BLD-MCNC: 12.8 GM/DL (ref 12–16)
IMM GRANULOCYTES # BLD AUTO: 0.01 K/UL (ref 0–0.04)
IMM GRANULOCYTES NFR BLD AUTO: 0.2 % (ref 0–0.5)
LDLC SERPL CALC-MCNC: 123.4 MG/DL (ref 63–159)
LYMPHOCYTES # BLD AUTO: 2.17 K/UL (ref 1–4.8)
MCH RBC QN AUTO: 28.3 PG (ref 27–31)
MCHC RBC AUTO-ENTMCNC: 31.4 G/DL (ref 32–36)
MCV RBC AUTO: 90 FL (ref 82–98)
NONHDLC SERPL-MCNC: 145 MG/DL
NUCLEATED RBC (/100WBC) (OHS): 0 /100 WBC
PLATELET # BLD AUTO: 268 K/UL (ref 150–450)
PMV BLD AUTO: 9.8 FL (ref 9.2–12.9)
POTASSIUM SERPL-SCNC: 3.9 MMOL/L (ref 3.5–5.1)
PROT SERPL-MCNC: 7.2 GM/DL (ref 6–8.4)
RBC # BLD AUTO: 4.53 M/UL (ref 4–5.4)
RELATIVE EOSINOPHIL (OHS): 2.3 %
RELATIVE LYMPHOCYTE (OHS): 35.5 % (ref 18–48)
RELATIVE MONOCYTE (OHS): 7.8 % (ref 4–15)
RELATIVE NEUTROPHIL (OHS): 53.5 % (ref 38–73)
SODIUM SERPL-SCNC: 136 MMOL/L (ref 136–145)
TRIGL SERPL-MCNC: 108 MG/DL (ref 30–150)
WBC # BLD AUTO: 6.12 K/UL (ref 3.9–12.7)

## 2025-04-05 PROCEDURE — 83036 HEMOGLOBIN GLYCOSYLATED A1C: CPT

## 2025-04-05 PROCEDURE — 85025 COMPLETE CBC W/AUTO DIFF WBC: CPT

## 2025-04-05 PROCEDURE — 80061 LIPID PANEL: CPT

## 2025-04-05 PROCEDURE — 36415 COLL VENOUS BLD VENIPUNCTURE: CPT | Mod: PO

## 2025-04-05 PROCEDURE — 82040 ASSAY OF SERUM ALBUMIN: CPT

## 2025-04-06 ENCOUNTER — RESULTS FOLLOW-UP (OUTPATIENT)
Dept: FAMILY MEDICINE | Facility: CLINIC | Age: 65
End: 2025-04-06

## 2025-04-14 ENCOUNTER — PATIENT MESSAGE (OUTPATIENT)
Dept: FAMILY MEDICINE | Facility: CLINIC | Age: 65
End: 2025-04-14
Payer: COMMERCIAL

## 2025-04-14 DIAGNOSIS — G47.33 OSA ON CPAP: Primary | ICD-10-CM

## 2025-04-14 NOTE — TELEPHONE ENCOUNTER
Notify patient: Sleep study showed significant obstructive sleep apnea. In lab CPAP titration recommended starting cpap at home. Order placed. Please print and fax to [employer] [facility]

## 2025-04-16 ENCOUNTER — PATIENT MESSAGE (OUTPATIENT)
Dept: ORTHOPEDICS | Facility: CLINIC | Age: 65
End: 2025-04-16
Payer: COMMERCIAL

## 2025-04-16 ENCOUNTER — OFFICE VISIT (OUTPATIENT)
Dept: FAMILY MEDICINE | Facility: CLINIC | Age: 65
End: 2025-04-16
Payer: COMMERCIAL

## 2025-04-16 VITALS
SYSTOLIC BLOOD PRESSURE: 118 MMHG | DIASTOLIC BLOOD PRESSURE: 80 MMHG | BODY MASS INDEX: 37.28 KG/M2 | HEART RATE: 108 BPM | OXYGEN SATURATION: 96 % | HEIGHT: 66 IN | RESPIRATION RATE: 18 BRPM | WEIGHT: 231.94 LBS

## 2025-04-16 DIAGNOSIS — E78.5 HYPERLIPIDEMIA, UNSPECIFIED HYPERLIPIDEMIA TYPE: ICD-10-CM

## 2025-04-16 DIAGNOSIS — G47.33 OSA ON CPAP: ICD-10-CM

## 2025-04-16 DIAGNOSIS — R92.1 BREAST CALCIFICATION SEEN ON MAMMOGRAM: ICD-10-CM

## 2025-04-16 DIAGNOSIS — R73.03 PREDIABETES: ICD-10-CM

## 2025-04-16 DIAGNOSIS — I10 GOOD HYPERTENSION CONTROL: ICD-10-CM

## 2025-04-16 DIAGNOSIS — B27.90 CHRONIC EBV INFECTION: ICD-10-CM

## 2025-04-16 DIAGNOSIS — R53.82 CHRONIC FATIGUE: ICD-10-CM

## 2025-04-16 DIAGNOSIS — E66.01 SEVERE OBESITY (BMI 35.0-39.9) WITH COMORBIDITY: ICD-10-CM

## 2025-04-16 DIAGNOSIS — I10 ESSENTIAL HYPERTENSION: ICD-10-CM

## 2025-04-16 DIAGNOSIS — Z00.00 ANNUAL PHYSICAL EXAM: Primary | ICD-10-CM

## 2025-04-16 PROCEDURE — 99999 PR PBB SHADOW E&M-EST. PATIENT-LVL IV: CPT | Mod: PBBFAC,,, | Performed by: FAMILY MEDICINE

## 2025-04-16 RX ORDER — OLMESARTAN MEDOXOMIL AND HYDROCHLOROTHIAZIDE 40/12.5 40; 12.5 MG/1; MG/1
1 TABLET ORAL DAILY
Qty: 90 TABLET | Refills: 3 | Status: SHIPPED | OUTPATIENT
Start: 2025-04-16

## 2025-04-16 RX ORDER — RABEPRAZOLE SODIUM 20 MG/1
20 TABLET, DELAYED RELEASE ORAL DAILY
Qty: 90 TABLET | Refills: 3 | Status: SHIPPED | OUTPATIENT
Start: 2025-04-16

## 2025-04-16 NOTE — PROGRESS NOTES
Subjective:       Patient ID: Mercedes Laura is a 65 y.o. female.    Chief Complaint: No chief complaint on file.    Problem List[1]  Patient is here for a chronic conditions follow up.    Reviewed labs 4/2025  History of Present Illness    CHIEF COMPLAINT:  Ms. Laura presents today for follow up of breast calcifications.    BREAST HEALTH:  CT Scan showed suspicious-looking calcifications. Subsequent biopsy returned benign results, though she experienced significant pain and bruising during the procedure. Follow-up at a high-risk breast clinic has been recommended for monitoring.    SLEEP APNEA:  She was diagnosed with mild hypopnea/obstructive sleep apnea via sleep study. During CPAP trial, she experienced severe headache, sinus pain, tooth pain (which she attributes to deep sinus pockets with deep tooth roots confirmed by CT), gas, and diarrhea. She reports inability to achieve deep sleep during the trial, resulting in significant fatigue. She has consulted ENT regarding Inspire procedure as an alternative to CPAP, though effectiveness for her case was uncertain.    MUSCULOSKELETAL:  She is scheduled for left knee surgery. She demonstrates asymmetry between her knees, noting this is not a revision of her previous right knee procedure.    MEDICAL HISTORY:  She has history of Wolf-Barr virus (EBV), fibromyalgia with associated chronic fatigue, and deviated septum. Pneumonia vaccine administered in 2022.      ROS:  ROS findings as noted in HPI. Planning TKR left  5/27/25       Review of Systems   Constitutional:  Negative for fatigue and unexpected weight change.   Respiratory:  Negative for chest tightness and shortness of breath.    Cardiovascular:  Negative for chest pain, palpitations and leg swelling.   Gastrointestinal:  Negative for abdominal pain.   Musculoskeletal:  Positive for arthralgias.   Neurological:  Negative for dizziness, syncope, light-headedness and headaches.      Relevant History:  GYN   [facility] 1/2025 suspicious right breast calcifications s/p biopsy . Path 1/2025 -MICROCALCIFICATIONS ASSOCIATED WITH FIBROADENOMATOID CHANGE   PAP 2016 neg, HPV HR neg     Pulm /Sleep Teaches high school gifted english. C/o feeling exhausted all the time. Falls asleep early every day. No energy.  Concerned it may be reactivated EBV or fibromyalgia. Sleep study + JAZIEL. Needs cpap machine    Pain Dr. Vogel back pain       Ortho Dr. Whitman right knee arthritis s/p TKR 5/28/24.  DId PT at [facility] 2024. Still having pain, swelling and increased heat.  Referred to pain mgmt.       Derm Dr. Crane acne vulgaris      GI Heartburn and dyspepsia.  Zantac and carafate helped in past. Has mild nausea. GI Dr. Stinson h/o colon polyps -colonoscopy 2021 on 5 year surveillance     ENT Dr. Schultz Allergic rhinitis -Feels ear stuffiness. Using pataday for allergy eye.  Mometasone NS and claritin for allergies. Ent dr. Schultz.       Psych Lots of stress at home. Caring for demented mother who is being placed in memory care this week.      Endocrine Prediabetes A1c 5.7 -diet controlled. HPL Not on statin        Objective:      Physical Exam  Vitals and nursing note reviewed.   Constitutional:       Appearance: She is well-developed.   Cardiovascular:      Rate and Rhythm: Normal rate and regular rhythm.      Heart sounds: Normal heart sounds.   Pulmonary:      Effort: Pulmonary effort is normal.      Breath sounds: Normal breath sounds.   Skin:     General: Skin is warm and dry.   Neurological:      Mental Status: She is alert and oriented to person, place, and time.         Assessment:       ICD-10-CM ICD-9-CM    1. Annual physical exam  Z00.00 V70.0 CBC Auto Differential      Comprehensive Metabolic Panel      Lipid Panel      Hemoglobin A1C      2. JAZIEL on CPAP  G47.33 327.23       3. Breast calcification seen on mammogram  R92.1 793.89 Ambulatory referral/consult to High Risk Clinic      4. Essential hypertension  I10 401.9        5. Hyperlipidemia, unspecified hyperlipidemia type  E78.5 272.4 Lipid Panel      6. Prediabetes  R73.03 790.29 Hemoglobin A1C      7. Good hypertension control  I10 401.9 olmesartan-hydrochlorothiazide (BENICAR HCT) 40-12.5 mg Tab      8. Severe obesity (BMI 35.0-39.9) with comorbidity  E66.01 278.01          Plan:   1. Annual physical exam (Primary)  Discussed health maintenance guidelines appropriate for age.      - CBC Auto Differential; Future  - Comprehensive Metabolic Panel; Future  - Lipid Panel; Future  - Hemoglobin A1C; Future    2. JAZIEL on CPAP  Recommend CPAP but patient refused. Discussed alternatives such as Inspire. Declined that and did already discuss with her ENT. Will try to lose weight to remedy mild JAZIEL    3. Breast calcification seen on mammogram  Refer for monitoring and mgmt  - Ambulatory referral/consult to High Risk Clinic; Future    4. Essential hypertension  Controlled on current medications.  Continue current medications.      5. Hyperlipidemia, unspecified hyperlipidemia type  Improved with diet.   I recommend a heart healthy diet rich in fiber, fresh vegetables and fruit and low in saturated fats (fried foods, red meat, etc.).  I also recommend regular exercise including a minimum of 150 minutes of cardio exercise per week and 2-30 minute workouts of strength training like light weights, yoga, pilates, etc.  You should work toward a body mass index of < 25.        - Lipid Panel; Future    6. Prediabetes   Your blood sugar is borderline high.  This means you are at risk for developing type 2 diabetes mellitus.  To lessen your risk you should exercise regularly, avoid excess carbohydrates and work toward a body mass index of less than 25.      - Hemoglobin A1C; Future    7. Good hypertension control  Controlled on current medications.  Continue current medications.    - olmesartan-hydrochlorothiazide (BENICAR HCT) 40-12.5 mg Tab; Take 1 tablet by mouth once daily.  Dispense: 90 tablet;  "Refill: 3    8. Severe obesity (BMI 35.0-39.9) with comorbidity  Counseled patient on his ideal body weight, health consequences of being obese and current recommendations including weekly exercise and a heart healthy diet.  Current BMI is:Estimated body mass index is 37.43 kg/m² as calculated from the following:    Height as of this encounter: 5' 6" (1.676 m).    Weight as of this encounter: 105.2 kg (231 lb 14.8 oz)..  Patient is aware that ideal BMI < 25 or Weight in (lb) to have BMI = 25: 154.6.  Assessment & Plan    - Explained significance of sleep apnea diagnosis and potential treatment options, including CPAP and Inspire device.  - Discussed relationship between weight loss and potential improvement in sleep apnea, glucose levels, and overall health.  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  - Ms. Laura to continue current efforts to improve diet, including homemade sourdough bread and fruit smoothies.  - Continued BP medication.  - Ordered high-risk breast clinic referral for follow-up on breast calcifications.  - Referred to Infectious Disease for consultation regarding EBV.  - Follow up in 6 months with labs to be completed beforehand.  - Contact the office if high-risk breast clinic does not call to schedule appointment.  - Contact the office if Infectious Disease department does not call to schedule consultation.           Time spent with patient: 20 minutes  Patient with be reevaluated in 6 months or sooner prn  Greater than 50% of this visit was spent counseling as described in above documentation:Yes  This note was generated with the assistance of ambient listening technology. Verbal consent was obtained by the patient and accompanying visitor(s) for the recording of patient appointment to facilitate this note. I attest to having reviewed and edited the generated note for accuracy, though some syntax or " spelling errors may persist. Please contact the author of this note for any clarification.            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

## 2025-04-29 ENCOUNTER — PATIENT MESSAGE (OUTPATIENT)
Dept: ORTHOPEDICS | Facility: CLINIC | Age: 65
End: 2025-04-29
Payer: COMMERCIAL

## 2025-05-14 ENCOUNTER — OFFICE VISIT (OUTPATIENT)
Dept: FAMILY MEDICINE | Facility: CLINIC | Age: 65
End: 2025-05-14
Payer: COMMERCIAL

## 2025-05-14 ENCOUNTER — HOSPITAL ENCOUNTER (OUTPATIENT)
Dept: RADIOLOGY | Facility: HOSPITAL | Age: 65
Discharge: HOME | End: 2025-05-14
Attending: ORTHOPAEDIC SURGERY
Payer: COMMERCIAL

## 2025-05-14 ENCOUNTER — HOSPITAL ENCOUNTER (OUTPATIENT)
Dept: PREADMISSION TESTING | Facility: HOSPITAL | Age: 65
Discharge: HOME | End: 2025-05-14
Attending: ORTHOPAEDIC SURGERY
Payer: COMMERCIAL

## 2025-05-14 VITALS
DIASTOLIC BLOOD PRESSURE: 72 MMHG | SYSTOLIC BLOOD PRESSURE: 118 MMHG | TEMPERATURE: 98 F | HEIGHT: 66 IN | WEIGHT: 236.75 LBS | BODY MASS INDEX: 38.05 KG/M2 | HEART RATE: 74 BPM | OXYGEN SATURATION: 96 %

## 2025-05-14 DIAGNOSIS — Z01.818 PRE-OP EVALUATION: Primary | ICD-10-CM

## 2025-05-14 DIAGNOSIS — I10 GOOD HYPERTENSION CONTROL: ICD-10-CM

## 2025-05-14 DIAGNOSIS — M17.12 PRIMARY OSTEOARTHRITIS OF LEFT KNEE: ICD-10-CM

## 2025-05-14 DIAGNOSIS — R73.03 PREDIABETES: ICD-10-CM

## 2025-05-14 DIAGNOSIS — Z01.818 PREOP TESTING: Primary | ICD-10-CM

## 2025-05-14 DIAGNOSIS — Z01.818 PREOP TESTING: ICD-10-CM

## 2025-05-14 DIAGNOSIS — E78.2 MIXED HYPERLIPIDEMIA: ICD-10-CM

## 2025-05-14 DIAGNOSIS — Z01.818 PRE-OP TESTING: ICD-10-CM

## 2025-05-14 LAB — MRSA PCR SCRN (SMH): NOT DETECTED

## 2025-05-14 PROCEDURE — 3074F SYST BP LT 130 MM HG: CPT | Mod: CPTII,S$GLB,,

## 2025-05-14 PROCEDURE — 3008F BODY MASS INDEX DOCD: CPT | Mod: CPTII,S$GLB,,

## 2025-05-14 PROCEDURE — 73700 CT LOWER EXTREMITY W/O DYE: CPT | Mod: 26,LT,, | Performed by: RADIOLOGY

## 2025-05-14 PROCEDURE — 1159F MED LIST DOCD IN RCRD: CPT | Mod: CPTII,S$GLB,,

## 2025-05-14 PROCEDURE — G2211 COMPLEX E/M VISIT ADD ON: HCPCS | Mod: S$GLB,,,

## 2025-05-14 PROCEDURE — 73700 CT LOWER EXTREMITY W/O DYE: CPT | Mod: TC,LT

## 2025-05-14 PROCEDURE — 99999 PR PBB SHADOW E&M-EST. PATIENT-LVL V: CPT | Mod: PBBFAC,,,

## 2025-05-14 PROCEDURE — 1160F RVW MEDS BY RX/DR IN RCRD: CPT | Mod: CPTII,S$GLB,,

## 2025-05-14 PROCEDURE — 3078F DIAST BP <80 MM HG: CPT | Mod: CPTII,S$GLB,,

## 2025-05-14 PROCEDURE — 87641 MR-STAPH DNA AMP PROBE: CPT | Performed by: ORTHOPAEDIC SURGERY

## 2025-05-14 PROCEDURE — 3044F HG A1C LEVEL LT 7.0%: CPT | Mod: CPTII,S$GLB,,

## 2025-05-14 PROCEDURE — 99214 OFFICE O/P EST MOD 30 MIN: CPT | Mod: S$GLB,,,

## 2025-05-14 NOTE — PRE ADMISSION SCREENING
JOINT CAMP ASSESSMENT    Name Mercedes Laura   MRN [id number]    Age/Sex 65 y.o. female    Surgeon Dr. Chu Whitman   Joint Camp Date 5/14/2025   Surgery Date 5/27/2025   Procedure Left Knee Arthroplasty   Insurance Payor: [facility] BLUE [facility] / Plan: BCBS OF [facility] EPO / Product Type: Commercial /    Care Team Patient Care Team:  Tory Llamas MD as PCP - General (Family Medicine)  MERI Schultz MD as Consulting Physician (Otolaryngology)  Anaya Caldera MD as Obstetrician (Obstetrics)    Pharmacy   [facility] - [place], [place] - [address]  [address] [address]  Phone: [phone] Fax: [phone]    [id number] - [place], [place] - [address]  [address]  [place] [address]  Phone: [phone] Fax: [phone]     AM-PAC Score   24   Risk Assessment Score 4     Past Medical History:   Diagnosis Date    Anxiety     this came on since lap kadeem sgy    Arthritis     Colon polyp     Deviated septum     GERD (gastroesophageal reflux disease)     Hypertension     Leg cramps     PAC (premature atrial contraction)     Plantar fasciitis     PMB (postmenopausal bleeding) 05/2018    Seasonal allergies     Sinus disorder     Uterine leiomyoma 05/2018    Varicose veins of both lower extremities        Past Surgical History:   Procedure Laterality Date    BREAST BIOPSY Left 2015    benign    CHOLECYSTECTOMY  6-1-2015    Dr Ashraf    COLONOSCOPY N/A 11/23/2015    Procedure: COLONOSCOPY;  Surgeon: Theo Stinson MD;  Location: [facility];  Service: Endoscopy;  Laterality: N/A; repeat in 5 years for surveillance    COLONOSCOPY N/A 7/14/2021    Procedure: COLONOSCOPY;  Surgeon: Theo Stinson MD;  Location: [facility];  Service: Endoscopy;  Laterality: N/A;    CYSTOSCOPY N/A 6/7/2018    Procedure: CYSTOSCOPY;  Surgeon: Anaya Caldera MD;  Location: [facility];  Service: OB/GYN;  Laterality: N/A;    ESOPHAGOGASTRODUODENOSCOPY  N/A 3/4/2019    Procedure: EGD (ESOPHAGOGASTRODUODENOSCOPY);  Surgeon: Theo Stinson MD;  Location: [facility];  Service: Endoscopy;  Laterality: N/A;    HYSTERECTOMY      KNEE ARTHROSCOPY W/ MENISCECTOMY  11/15/2000    right     LAPAROSCOPIC SALPINGO-OOPHORECTOMY Bilateral 6/7/2018    Procedure: [id number];  Surgeon: Anaya Caldera MD;  Location: [facility] OR;  Service: OB/GYN;  Laterality: Bilateral;    LAPAROSCOPIC TOTAL HYSTERECTOMY N/A 6/7/2018    Procedure: HYSTERECTOMY-TOTAL LAPAROSCOPIC (TLH);  Surgeon: Anaya Caldera MD;  Location: [facility] OR;  Service: OB/GYN;  Laterality: N/A;    OOPHORECTOMY      ROBOTIC ARTHROPLASTY, KNEE Right 5/28/2024    Procedure: ROBOTIC ARTHROPLASTY, KNEE, TOTAL;  Surgeon: Chu Whitman MD;  Location: [facility] OR;  Service: Orthopedics;  Laterality: Right;  maribell    UPPER GASTROINTESTINAL ENDOSCOPY  06/22/2015    Dr. Stinson         Home Enviroment     Living Arrangement: Lives with spouse  Home Environment: 2-story house, number of outside stairs: 15 with a railing, number of inside stairs: 14 with a railing, bedroom on 2nd floor, bathroom on 2nd floor, tub-shower  Home Safety Concerns: Pets in the home: dogs (2).    DISCHARGE CAREGIVER/SUPPORT SYSTEM     Identified post-op caregiver: Patient has spouse / significant other.  Patient's caregiver(s) will be able to provide physical assistance. Patient will have someone to assist overnight.      Caregiver present at pre-op interview:  No      PRE-OPERATIVE FUNCTIONAL STATUS     Employment: Employed full time    Pre-op Functional Status: Patient is independent with mobility/ambulation, transfers, ADL's, IADL's.    Use of assistive device for ambulation: none  ADL: self care  ADL Limitations: difficulty with walking  Medical Restrictions: Unstable ambulation and Decreased range of motions in extremities    POTENTIAL BARRIERS TO DISCHARGE/POTENTIAL POST-OP COMPLICATIONS     Patient with hx of  HTN. Patient had right knee arthroplasty on 05/28/2024 and had to return to ER for pain control X 1. POSSIBLE SAME DAY DISCHARGE.    DISCHARGE PLAN     Expected LOS of 1 days or less for joint replacement discussed with patient. We also discussed a discharge path of HH for approximately two weeks with a transition to outpatient PT on the third week given no post-op complications.      Patient in agreement with discharge plan: Yes    Discharge to: Discharge home with home health (PT/OT) x2 weeks with transition to outpatient PT     HH:  [facility] ([place], [place]).  Patient disclosure form completed and sent to case management for upload to the medical record.      OP PT: [facility] (Todd Chappell.)     Home DME: rolling walker, single point cane, bedside commode, and tub transfer bench    Needed DME at D/C: none     Rx: Per Dr. Whitman at discharge     Meds to Beds: Yes  Patient expected to discharge on Aspirin 81mg by mouth twice daily for DVT prophylaxis.

## 2025-05-14 NOTE — PROGRESS NOTES
Patient ID: Mercedes Laura is a 65 y.o. female.    Chief Complaint: Pre-op Exam      Mercedes Laura is a 65 y.o. female with as past medical history of HTN, HLD, prediabetes, and JAZIEL who presents for a preoperative examination. She is scheduled for left robotic assisted total knee arthroplasty on 05/27/2025. Patient has been doing well overall with no acute complaints. She states that she had a R TKA last year with no significant complications and is scheduled for left TKA at the end of the month. She currently denies any chest pain or shortness of breath. She denies any significant adverse reaction with anesthesia in the past. Chronic conditions are controlled at this time.           Past Medical History:   Diagnosis Date    Anxiety     this came on since lap kadeem sgy    Arthritis     Colon polyp     Deviated septum     GERD (gastroesophageal reflux disease)     Hypertension     Leg cramps     PAC (premature atrial contraction)     Plantar fasciitis     PMB (postmenopausal bleeding) 05/2018    Seasonal allergies     Sinus disorder     Uterine leiomyoma 05/2018    Varicose veins of both lower extremities        Current Medications[1]    The 10-year ASCVD risk score (Debby STERLING, et al., 2019) is: 5.4%    Values used to calculate the score:      Age: 65 years      Sex: Female      Is Non- : No      Diabetic: No      Tobacco smoker: No      Systolic Blood Pressure: 118 mmHg      Is BP treated: Yes      HDL Cholesterol: 52 mg/dL      Total Cholesterol: 208 mg/dL     Wt Readings from Last 3 Encounters:   05/14/25 107.4 kg (236 lb 12.4 oz)   04/16/25 105.2 kg (231 lb 14.8 oz)   01/23/25 105.2 kg (231 lb 14.8 oz)     Temp Readings from Last 3 Encounters:   05/14/25 98.4 °F (36.9 °C) (Oral)   10/11/24 98 °F (36.7 °C) (Oral)   05/29/24 97.1 °F (36.2 °C) (Oral)     BP Readings from Last 3 Encounters:   05/14/25 118/72   04/16/25 118/80   10/11/24 130/80     Pulse Readings from Last 3 Encounters:    05/14/25 74   04/16/25 108   10/11/24 69     Resp Readings from Last 3 Encounters:   04/16/25 18   10/11/24 17   08/22/24 16     PF Readings from Last 3 Encounters:   No data found for PF     SpO2 Readings from Last 3 Encounters:   05/14/25 96%   04/16/25 96%   10/11/24 97%        Lab Results   Component Value Date    HGBA1C 5.8 (H) 05/14/2025    HGBA1C 5.7 (H) 04/05/2025    HGBA1C 5.5 10/05/2024     Lab Results   Component Value Date    LDLCALC 137.6 05/14/2025    CREATININE 0.8 05/14/2025       Review of Systems   Constitutional:  Negative for chills and fever.   Respiratory:  Negative for shortness of breath.    Cardiovascular:  Negative for chest pain.   Gastrointestinal:  Negative for abdominal pain, diarrhea, nausea and vomiting.           Objective:      Physical Exam  Constitutional:       General: She is not in acute distress.     Appearance: Normal appearance. She is not ill-appearing or toxic-appearing.   HENT:      Head: Normocephalic and atraumatic.      Right Ear: Tympanic membrane is not erythematous, retracted or bulging.      Left Ear: Tympanic membrane is not erythematous, retracted or bulging.      Nose: Nose normal.      Right Sinus: No maxillary sinus tenderness or frontal sinus tenderness.      Left Sinus: No maxillary sinus tenderness or frontal sinus tenderness.      Mouth/Throat:      Pharynx: No pharyngeal swelling or posterior oropharyngeal erythema.   Cardiovascular:      Rate and Rhythm: Normal rate and regular rhythm.      Pulses: Normal pulses.      Heart sounds: Normal heart sounds. No murmur heard.     No friction rub.   Pulmonary:      Effort: Pulmonary effort is normal. No respiratory distress.      Breath sounds: Normal breath sounds. No wheezing.   Abdominal:      General: Abdomen is flat. Bowel sounds are normal. There is no distension.      Tenderness: There is no abdominal tenderness. There is no guarding or rebound.   Musculoskeletal:      Cervical back: Normal range of  motion.      Right lower leg: No edema.      Left lower leg: No edema.   Skin:     General: Skin is warm and dry.      Capillary Refill: Capillary refill takes less than 2 seconds.   Neurological:      General: No focal deficit present.      Mental Status: She is alert and oriented to person, place, and time.      Cranial Nerves: No cranial nerve deficit.   Psychiatric:         Mood and Affect: Mood normal.           Screening recommendations appropriate to age and health status were reviewed.    Pre-op evaluation  Comments:  Labs and EKG ordered by orthopedist. Labs are within normal limits.    Good hypertension control  Comments:  Well-controlled. Continue medication as prescribed.    Mixed hyperlipidemia  Comments:  Slightly elevated. Continue high fiber and low fat diet.    Prediabetes  Comments:  A1C at 5.8. Continue healthy diet and implementing exercise into daily routine.      RCRI for pre-op risk:   Revised Cardiac Risk Index for Pre-Operative Risk from [employer]  on 5/14/2025  ** All calculations should be rechecked by clinician prior to use **    RESULT SUMMARY:  0 points  RCRI Score    3.9 %  Risk of major cardiac event    INPUTS:  High-risk surgery --> 0 = No  History of ischemic heart disease --> 0 = No  History of congestive heart failure --> 0 = No  History of cerebrovascular disease --> 0 = No  Pre-operative treatment with insulin --> 0 = No  Pre-operative creatinine >2 mg/dL / 176.8 µmol/L --> 0 = No    Patient denies any symptoms (as per HPI) concerning for undiagnosed lung disease including JAZIEL.  Patient is a non-smoker. We discussed the benefits of early mobilization and deep breathing after surgery.      Screened patient for alcohol misuse, use of illicit drugs, and personal or family history of anesthetic complications or bleeding diathesis and no substantial concerns were identified.     All current medications were reviewed and at this time no changes to medications are recommended prior to  surgery.     Labs:   Reviewed labs on   H/H: 13.6/41.7  Platelets: 267  Cr: 0.8  A1C: 5.8  EK/14 pending, 2024 - NSR with no abnormalities    I recommend use of standard pre-op and post-op precautions for this patient. In my opinion, she is medically optimized for this procedure, and can proceed without further evaluation.          [1]   Current Outpatient Medications:     acetaminophen (TYLENOL) 500 MG tablet, Take 2 tablets (1,000 mg total) by mouth every 8 (eight) hours as needed for Pain., Disp: 30 tablet, Rfl: 0    acetaminophen (TYLENOL) 650 MG TbSR, 650 mg., Disp: , Rfl:     ascorbic acid/zinc (ZINC WITH VITAMIN C ORAL), Take by mouth., Disp: , Rfl:     aspirin (VAZALORE) 325 mg Cap, Take by mouth. PRN, Disp: , Rfl:     b complex vitamins capsule, Take 1 capsule by mouth once daily., Disp: , Rfl:     biotin 10 mg Tab, Take 100 mg by mouth once daily. , Disp: , Rfl:     calcium carbonate (TUMS) 200 mg calcium (500 mg) chewable tablet, Take 1 tablet by mouth daily as needed for Heartburn., Disp: , Rfl:     econazole nitrate 1 % cream, Apply to inner thighs and buttocks BID PRN flare, Disp: 85 g, Rfl: 2    enzymes,digestive (DIGESTIVE ENZYMES ORAL), Take by mouth., Disp: , Rfl:     estradioL (ESTRACE) 0.01 % (0.1 mg/gram) vaginal cream, INSERT 1 GRAM VAGINALLY EVERY MONDAY AND THURSDAY, Disp: 42.5 g, Rfl: 1    ibuprofen (ADVIL,MOTRIN) 600 MG tablet, Take 1 tablet (600 mg total) by mouth 3 (three) times daily as needed for Pain., Disp: 30 tablet, Rfl: 0    lactobacillus combination no.4 (PROBIOTIC) 3 billion cell Cap, Take 1 application by mouth 2 (two) times a day., Disp: , Rfl:     loratadine (CLARITIN) 10 mg tablet, Take 10 mg by mouth daily as needed for Allergies., Disp: , Rfl:     magnesium glycinate 100 mg Tab, Take by mouth every evening., Disp: , Rfl:     meloxicam (MOBIC) 7.5 MG tablet, Take 1 tablet (7.5 mg total) by mouth once daily., Disp: 30 tablet, Rfl: 0    mometasone (NASONEX) 50  mcg/actuation nasal spray, 2 sprays by Nasal route daily as needed (rhinitis)., Disp: 51 g, Rfl: prn    multivitamin (THERAGRAN) per tablet, Take 1 tablet by mouth once daily., Disp: , Rfl:     NON FORMULARY MEDICATION, THERA TEARS EYE NUTRITION OMEGA 3, Disp: , Rfl:     olmesartan-hydrochlorothiazide (BENICAR HCT) 40-12.5 mg Tab, Take 1 tablet by mouth once daily., Disp: 90 tablet, Rfl: 3    olopatadine (PATANOL) 0.1 % ophthalmic solution, 1 drop., Disp: , Rfl:     ondansetron (ZOFRAN) 4 MG tablet, Take 1 tablet (4 mg total) by mouth every 6 (six) hours as needed for Nausea., Disp: 30 tablet, Rfl: 0    potassium chloride (MICRO-K) 10 MEQ CpSR, TAKE 1 CAPSULE DAILY, Disp: 90 capsule, Rfl: 3    RABEprazole (ACIPHEX) 20 mg tablet, Take 1 tablet (20 mg total) by mouth once daily., Disp: 90 tablet, Rfl: 3    sucralfate (CARAFATE) 100 mg/mL suspension, Take 10 mLs (1 g total) by mouth 4 (four) times daily before meals and nightly., Disp: 414 mL, Rfl: 0    tretinoin (RETIN-A) 0.05 % cream, Thin film to entire face at bedtime, Disp: 45 g, Rfl: 5    valacyclovir HCl (VALTREX ORAL), Take by mouth., Disp: , Rfl:     vit C,N-Hx-hixcb-lutein-zeaxan (PRESERVISION AREDS-2) 250-90-40-1 mg Cap, Take by mouth., Disp: , Rfl:     vitamin D (VITAMIN D3) 1000 units Tab, Take 2 Units by mouth once daily. , Disp: , Rfl:     acyclovir 5% (ZOVIRAX) 5 % Crea, Apply topically 5 (five) times daily. (Patient not taking: Reported on 5/14/2025), Disp: 5 g, Rfl: prn  No current facility-administered medications for this visit.

## 2025-05-14 NOTE — DISCHARGE INSTRUCTIONS
To confirm, Your doctor has instructed you that surgery is scheduled for: 5/27/25    Please report to [facility], Registration the morning of surgery. You must check-in and receive a wristband before going to your procedure.  [address] [place] [address] [place] [id number]    Pre-Op will call the afternoon prior to surgery between 1:00 and 6:00 PM with the final arrival time.  Phone number: [phone]    PLEASE NOTE:  The surgery schedule has many variables which may affect the time of your surgery case.  Family members should be available if your surgery time changes.  Plan to be here the day of your procedure between 4-6 hours.    MEDICATIONS:  TAKE ONLY THESE MEDICATIONS WITH A SMALL SIP OF WATER THE MORNING OF YOUR PROCEDURE:    SEE MED LIST          DO NOT TAKE THESE MEDICATIONS 5-7 DAYS PRIOR to your procedure or per your surgeon's request:   ASPIRIN, ALEVE, ADVIL, IBUPROFEN, FISH OIL VITAMIN E, HERBALS  (May take Tylenol)    ONLY if you are prescribed any types of blood thinners such as:  Aspirin, Coumadin, Plavix, Pradaxa, Xarelto, Aggrenox, Effient, Eliquis, Savasya, Brilinta, or any other, ask your surgeon whether you should stop taking them and how long before surgery you should stop.  You may also need to verify with the prescribing physician if it is ok to stop your medication.      INSTRUCTIONS IMPORTANT!!  Do not eat or drink anything between midnight and the time of your procedure- this includes gum, mints, and candy.  EXCEPT: you may have clear liquids such as:  WATER, BLACK COFFEE, UNSWEET TEA, OR GATORADE (NO RED OR PURPLE) UP TO 2 HOURS PRIOR TO YOUR ARRIVAL TIME.  Do not smoke or drink alcoholic beverages 24 hours prior to your procedure.  Shower the night before AND the morning of your procedure with a Chlorhexidine wash such as Hibiclens or Dial antibacterial soap from the neck down.  Do not get it on your face or in your eyes.  You may use your own shampoo and face wash. This  helps your skin to be as bacteria free as possible.    If you wear contact lenses, dentures, hearing aids or glasses, bring a container to put them in during surgery and give to a family member for safe keeping.  Please leave all jewelry, piercing's and valuables at home. You must remove your false eyelashes prior to surgery.    DO NOT remove hair from the surgery site.  Do not shave the incision site unless you are given specific instructions to do so.    ONLY if you have been diagnosed with sleep apnea please bring your C-PAP machine.  ONLY if you wear home oxygen please bring your portable oxygen tank the day of your procedure.  ONLY if you have a history of OPEN HEART SURGERY you will need a clearance from your Cardiologist per Anesthesia.      ONLY for patients requiring bowel prep, written instructions will be given by your doctor's office.  ONLY if you have any type of stimulator implant or any type of implanted device with a remote control.  Please bring the controller with you the morning of surgery  If your doctor has scheduled you for an overnight stay, bring a small overnight bag with any personal items you need.  Make arrangements in advance for transportation home by a responsible adult. You can not go home in an uber or a cab per hospital policy.  It is not safe to drive a vehicle during the 24 hours after anesthesia.          All  facilities and properties are tobacco free.  Smoking is NOT allowed.   If you have any questions about these instructions, call Pre-Op Admit  Nursing at [phone] or the Pre-Op Day Surgery Unit at [phone].

## 2025-05-14 NOTE — PRE ADMISSION SCREENING
"               [facility] Risk Assessment Scale    Patient Name: Mercedes Laura  YOB: 1960  MRN: [id number]            RIsk Factor Measure Recommendation Patient Data Scale/Score   BMI >40 Reconsider surgery, weight loss   Estimated body mass index is 37.43 kg/m² as calculated from the following:    Height as of 4/16/25: 5' 6" (1.676 m).    Weight as of 4/16/25: 105.2 kg (231 lb 14.8 oz).   [] 0 = 1 - 24.9  [] 1 = 25-29.9  [] 2 = 30-34.9  [x] 3 = 35-39.9  [] 4 = 40-44.9  [] 5 = 45-99.9   Hemoglobin AIC (if applicable) >9 Delay surgery until DM under control  Refer for:  Nutrition Therapy  Exercise   Medication    Lab Results   Component Value Date    HGBA1C 5.8 (H) 05/14/2025       Lab Results   Component Value Date     05/14/2025      [] 0 = 4.0-5.6  [x] 1 = 5.7-6.4  [] 2 = 6.5-6.9  [] 3 = 7.0-7.9  [] 4 = 8.0-8.9  [] 5 = 9.0-12   Hemoglobin (Anemia) <9 Delay surgery   Correct anemia Lab Results   Component Value Date    HGB 13.6 05/14/2025    [] 20 - <9.0                    Albumin <3 Delay surgery &Workup Lab Results   Component Value Date    ALBUMIN 4.4 05/14/2025    [] 20 - <3.0   Smoking Cessation >4 Weeks Delay Surgery  Refer to OP Cessation Class    Never Smoker [] 20 - current smoker                                _____ PPD                    Hx of MI, PE, Arrhythmia, CVA, DVT <30 Days Delay Surgery    N/A [] 20      Infection Variable Delay surgery and re-evaluate   N/A [] 20 - recent/current infection     Depression (PHQ) >10 out of 27 Delay Surgery and re-evaluate  Medication  Counseling              [x] 0     []1     []2     []3      []4      [] 5                    (1-4)      (5-9)  (10-14)  (15-19)   (20-27)     Memory Impairment & Memory loss (Mini-Cog Screening Tool) Advanced dementia and/or Parkinson's Reconsider surgery     [x] 0     []1     []2     []3     []4     [] 5     Physical Conditioning (Modified AM-PAC Per Physical Therapy at [facility]) Unable to ambulate on day of surgery Delay " surgery and re-evaluate  Pre-Rehabilitation   (PT evaluation)       [x]  0   []4       []8     []12        []16     []20       (<20%)   (<40%)   (<60%)   (<80% )    (>80%)     Home Environment/Caregiver support  (Per /Navigator Interview)    Availability of basic services and/or approprate assistance during post-operative period Delay surgery and re-evaluate  Safe home environment  Health   1 week post-surgery  Transportation  availability  Ability to obtain DME/Medications post-op    [x] 0     []1     []2     []3     []4     [] 5  [x] 0     []1     []2     []3     []4     [] 5  [x] 0     []1     []2     []3     []4     [] 5  [x] 0     []1     []2     []3     []4     [] 5         MD Contact: Dr. Whitman Comments:  Total Score:  4

## 2025-05-14 NOTE — PRE ADMISSION SCREENING
Patient Name: Mercedes Laura  YOB: 1960   MRN: [id number]     [facility]   Basic Mobility Inpatient Short Form 6 Clicks         How much difficulty does the patient currently have  Unable  A Lot  A Little  None      1. Turning over in bed (including adjusting bedclothes, sheets and blankets)?     1 []    2 []    3 []    4 [x]        2. Sitting down on and standing up from a chair with arms (e.g., wheelchair, bedside commode, etc.)     1 []  2 []  3 []     4 [x]      3. Moving from lying on back to sitting on the side of the bed?     1 []  2 []  3 []    4 [x]    How much help from another person does the patient currently need  Total  A Lot  A Little  None      4. Moving to and from a bed to a chair (including a wheelchair)?    1 []  2 []  3 []    4 [x]      5. Need to walk in hospital room?    1 []  2 []  3 []    4 [x]      6. Climbing 3-5 steps with a railing?    1 []  2 []  3 []    4 [x]       Raw Score:     24             CMS 0-100% Score:      0      %   Standardized Score:    61.14           CMS Modifier:      [facility] AMPAC   Basic Mobility Inpatient Short Form 6 Clicks Score Conversion Table*         *Use this form to convert -PAC Basic Mobility Inpatient Raw Scores.   [facility] Inpatient Basic Mobility Short Form Scoring Example   1. Add the number values associated with the response to each item. For example, items totals yield a Raw Score of 21.   2. Match the raw score to the t-Scale scores (t-Scale score = 50.25, SE = 4.69).   3. Find the associated CMS % (CMS % = 28.97%).   4. Locate the correct CMS Functional Modifier Code, or G Code (G code = CJ)     NOTE: Each -PAC Short Form has a separate conversion table. Make sure that you use the correct conversion table.       Instruction Manual - page 45 contains conversion table

## 2025-05-19 ENCOUNTER — OFFICE VISIT (OUTPATIENT)
Dept: DERMATOLOGY | Facility: CLINIC | Age: 65
End: 2025-05-19
Payer: COMMERCIAL

## 2025-05-19 VITALS — BODY MASS INDEX: 38.22 KG/M2 | WEIGHT: 236.75 LBS

## 2025-05-19 DIAGNOSIS — D22.9 MULTIPLE BENIGN NEVI: Primary | ICD-10-CM

## 2025-05-19 DIAGNOSIS — L73.8 SEBACEOUS HYPERPLASIA OF FACE: ICD-10-CM

## 2025-05-19 DIAGNOSIS — L57.8 ACTINIC SKIN DAMAGE: ICD-10-CM

## 2025-05-19 DIAGNOSIS — Z12.83 SKIN CANCER SCREENING: ICD-10-CM

## 2025-05-19 DIAGNOSIS — L98.9 SKIN EROSION: ICD-10-CM

## 2025-05-19 DIAGNOSIS — L81.4 SOLAR LENTIGO: ICD-10-CM

## 2025-05-19 DIAGNOSIS — L82.1 SEBORRHEIC KERATOSES: ICD-10-CM

## 2025-05-19 PROCEDURE — 1160F RVW MEDS BY RX/DR IN RCRD: CPT | Mod: CPTII,S$GLB,, | Performed by: DERMATOLOGY

## 2025-05-19 PROCEDURE — 3008F BODY MASS INDEX DOCD: CPT | Mod: CPTII,S$GLB,, | Performed by: DERMATOLOGY

## 2025-05-19 PROCEDURE — 99213 OFFICE O/P EST LOW 20 MIN: CPT | Mod: S$GLB,,, | Performed by: DERMATOLOGY

## 2025-05-19 PROCEDURE — 1159F MED LIST DOCD IN RCRD: CPT | Mod: CPTII,S$GLB,, | Performed by: DERMATOLOGY

## 2025-05-19 PROCEDURE — 3044F HG A1C LEVEL LT 7.0%: CPT | Mod: CPTII,S$GLB,, | Performed by: DERMATOLOGY

## 2025-05-19 RX ORDER — MUPIROCIN 20 MG/G
OINTMENT TOPICAL
Qty: 22 G | Refills: 1 | Status: SHIPPED | OUTPATIENT
Start: 2025-05-19

## 2025-05-19 NOTE — PROGRESS NOTES
Subjective:      Patient ID:  Mercedes Laura is a 65 y.o. female who presents for   Chief Complaint   Patient presents with    Skin Check     [facility] 04/04/2024  wart, sk, lentigo    Patient is coming in [facility].   No area of concern    Derm Hx:  Denies Phx of NMSC  Fhx of MM - Father      Current Outpatient Medications:   ·  acetaminophen (TYLENOL) 500 MG tablet, Take 2 tablets (1,000 mg total) by mouth every 8 (eight) hours as needed for Pain., Disp: 30 tablet, Rfl: 0  ·  acetaminophen (TYLENOL) 650 MG TbSR, 650 mg., Disp: , Rfl:   ·  ascorbic acid/zinc (ZINC WITH VITAMIN C ORAL), Take by mouth., Disp: , Rfl:   ·  aspirin (VAZALORE) 325 mg Cap, Take by mouth. PRN, Disp: , Rfl:   ·  b complex vitamins capsule, Take 1 capsule by mouth once daily., Disp: , Rfl:   ·  biotin 10 mg Tab, Take 100 mg by mouth once daily. , Disp: , Rfl:   ·  calcium carbonate (TUMS) 200 mg calcium (500 mg) chewable tablet, Take 1 tablet by mouth daily as needed for Heartburn., Disp: , Rfl:   ·  econazole nitrate 1 % cream, Apply to inner thighs and buttocks BID PRN flare, Disp: 85 g, Rfl: 2  ·  enzymes,digestive (DIGESTIVE ENZYMES ORAL), Take by mouth., Disp: , Rfl:   ·  estradioL (ESTRACE) 0.01 % (0.1 mg/gram) vaginal cream, INSERT 1 GRAM VAGINALLY EVERY MONDAY AND THURSDAY, Disp: 42.5 g, Rfl: 1  ·  ibuprofen (ADVIL,MOTRIN) 600 MG tablet, Take 1 tablet (600 mg total) by mouth 3 (three) times daily as needed for Pain., Disp: 30 tablet, Rfl: 0  ·  lactobacillus combination no.4 (PROBIOTIC) 3 billion cell Cap, Take 1 application by mouth 2 (two) times a day., Disp: , Rfl:   ·  loratadine (CLARITIN) 10 mg tablet, Take 10 mg by mouth daily as needed for Allergies., Disp: , Rfl:   ·  magnesium glycinate 100 mg Tab, Take by mouth every evening., Disp: , Rfl:   ·  meloxicam (MOBIC) 7.5 MG tablet, Take 1 tablet (7.5 mg total) by mouth once daily., Disp: 30 tablet, Rfl: 0  ·  mometasone (NASONEX) 50 mcg/actuation nasal spray, 2 sprays by Nasal  route daily as needed (rhinitis)., Disp: 51 g, Rfl: prn  ·  multivitamin (THERAGRAN) per tablet, Take 1 tablet by mouth once daily., Disp: , Rfl:   ·  NON FORMULARY MEDICATION, THERA TEARS EYE NUTRITION OMEGA 3, Disp: , Rfl:   ·  olmesartan-hydrochlorothiazide (BENICAR HCT) 40-12.5 mg Tab, Take 1 tablet by mouth once daily., Disp: 90 tablet, Rfl: 3  ·  olopatadine (PATANOL) 0.1 % ophthalmic solution, 1 drop., Disp: , Rfl:   ·  ondansetron (ZOFRAN) 4 MG tablet, Take 1 tablet (4 mg total) by mouth every 6 (six) hours as needed for Nausea., Disp: 30 tablet, Rfl: 0  ·  potassium chloride (MICRO-K) 10 MEQ CpSR, TAKE 1 CAPSULE DAILY, Disp: 90 capsule, Rfl: 3  ·  RABEprazole (ACIPHEX) 20 mg tablet, Take 1 tablet (20 mg total) by mouth once daily., Disp: 90 tablet, Rfl: 3  ·  sucralfate (CARAFATE) 100 mg/mL suspension, Take 10 mLs (1 g total) by mouth 4 (four) times daily before meals and nightly., Disp: 414 mL, Rfl: 0  ·  tretinoin (RETIN-A) 0.05 % cream, Thin film to entire face at bedtime, Disp: 45 g, Rfl: 5  ·  valacyclovir HCl (VALTREX ORAL), Take by mouth., Disp: , Rfl:   ·  vit C,M-Yb-mrapx-lutein-zeaxan (PRESERVISION AREDS-2) 250-90-40-1 mg Cap, Take by mouth., Disp: , Rfl:   ·  vitamin D (VITAMIN D3) 1000 units Tab, Take 2 Units by mouth once daily. , Disp: , Rfl:   ·  acyclovir 5% (ZOVIRAX) 5 % Crea, Apply topically 5 (five) times daily. (Patient not taking: Reported on 5/19/2025), Disp: 5 g, Rfl: prn         Review of Systems   Constitutional:  Negative for fever, chills and fatigue.   Respiratory:  Negative for cough and shortness of breath.    Skin:  Positive for dry skin, daily sunscreen use and activity-related sunscreen use. Negative for itching and rash.   Hematologic/Lymphatic: Bruises/bleeds easily (asa).       Objective:   Physical Exam   Constitutional: She appears well-developed and well-nourished. No distress.   Neurological: She is alert and oriented to person, place, and time. She is not  disoriented.   Psychiatric: She has a normal mood and affect.   Skin:   Areas Examined (abnormalities noted in diagram):   Scalp / Hair Palpated and Inspected  Head / Face Inspection Performed  Neck Inspection Performed  Chest / Axilla Inspection Performed  Abdomen Inspection Performed  Genitals / Buttocks / Groin Inspection Performed  Back Inspection Performed  RUE Inspected  LUE Inspection Performed  RLE Inspected  LLE Inspection Performed  Nails and Digits Inspection Performed                         Diagram Legend     Erythematous scaling macule/papule c/w actinic keratosis       Vascular papule c/w angioma      Pigmented verrucoid papule/plaque c/w seborrheic keratosis      Yellow umbilicated papule c/w sebaceous hyperplasia      Irregularly shaped tan macule c/w lentigo     1-2 mm smooth white papules consistent with Milia      Movable subcutaneous cyst with punctum c/w epidermal inclusion cyst      Subcutaneous movable cyst c/w pilar cyst      Firm pink to brown papule c/w dermatofibroma      Pedunculated fleshy papule(s) c/w skin tag(s)      Evenly pigmented macule c/w junctional nevus     Mildly variegated pigmented, slightly irregular-bordered macule c/w mildly atypical nevus      Flesh colored to evenly pigmented papule c/w intradermal nevus       Pink pearly papule/plaque c/w basal cell carcinoma      Erythematous hyperkeratotic cursted plaque c/w SCC      Surgical scar with no sign of skin cancer recurrence      Open and closed comedones      Inflammatory papules and pustules      Verrucoid papule consistent consistent with wart     Erythematous eczematous patches and plaques     Dystrophic onycholytic nail with subungual debris c/w onychomycosis     Umbilicated papule    Erythematous-base heme-crusted tan verrucoid plaque consistent with inflamed seborrheic keratosis     Erythematous Silvery Scaling Plaque c/w Psoriasis     See annotation      Assessment / Plan:        Multiple benign nevi  Careful  dermoscopy evaluation of nevi performed with none identified as needing biopsy today  Monitor for new mole or moles that are becoming bigger, darker, irritated, or developing irregular borders.     Seborrheic keratoses  These are benign inherited growths without a malignant potential. Reassurance given to patient. No treatment is necessary.     Solar lentigo  This is a benign hyperpigmented sun induced lesion. Daily sun protection will reduce the number of new lesions. Treatment of these benign lesions are considered cosmetic.    Sebaceous hyperplasia of face  This is a common condition representing benign enlargement of the sebaceous lobule. It typically occurs in adulthood. Reassurance given to patient.     Skin erosion  -     mupirocin (BACTROBAN) 2 % ointment; Apply to wounds on arms BID  Dispense: 22 g; Refill: 1  Francis, dog claws  Hibiclens scrub to celan wounds  Requests mupirocin ointment, ok to use but not broad coverage    Skin cancer screening  Total body skin examination performed today including at least 12 points as noted in physical examination. No lesions suspicious for malignancy noted.    Actinic skin damage  Patient instructed in importance in daily broad spectrum sun protection of at least spf 30. Mineral sunscreen ingredients preferred (Zinc +/- Titanium) and can be found OTC.   Patient encouraged to wear hat for all outdoor exposure.   Also discussed sun avoidance and use of protective clothing.             No follow-ups on file.

## 2025-05-21 DIAGNOSIS — M17.12 PRIMARY OSTEOARTHRITIS OF LEFT KNEE: Primary | ICD-10-CM

## 2025-05-26 ENCOUNTER — ANESTHESIA EVENT (OUTPATIENT)
Dept: SURGERY | Facility: HOSPITAL | Age: 65
End: 2025-05-26
Payer: COMMERCIAL

## 2025-05-26 DIAGNOSIS — M17.12 PRIMARY OSTEOARTHRITIS OF LEFT KNEE: Primary | ICD-10-CM

## 2025-05-26 RX ORDER — OXYCODONE AND ACETAMINOPHEN 5; 325 MG/1; MG/1
1 TABLET ORAL EVERY 6 HOURS PRN
Qty: 28 TABLET | Refills: 0 | Status: SHIPPED | OUTPATIENT
Start: 2025-05-26

## 2025-05-26 RX ORDER — ACETAMINOPHEN 500 MG
1000 TABLET ORAL EVERY 8 HOURS PRN
Qty: 30 TABLET | Refills: 0 | Status: SHIPPED | OUTPATIENT
Start: 2025-05-26

## 2025-05-26 RX ORDER — CYCLOBENZAPRINE HCL 10 MG
10 TABLET ORAL 3 TIMES DAILY PRN
Qty: 30 TABLET | Refills: 0 | Status: SHIPPED | OUTPATIENT
Start: 2025-05-26 | End: 2025-06-05

## 2025-05-26 RX ORDER — ONDANSETRON 4 MG/1
4 TABLET, FILM COATED ORAL EVERY 6 HOURS PRN
Qty: 30 TABLET | Refills: 0 | Status: SHIPPED | OUTPATIENT
Start: 2025-05-26

## 2025-05-26 RX ORDER — IBUPROFEN 600 MG/1
600 TABLET, FILM COATED ORAL 3 TIMES DAILY PRN
Qty: 30 TABLET | Refills: 0 | Status: SHIPPED | OUTPATIENT
Start: 2025-05-26

## 2025-05-26 RX ORDER — ASPIRIN 81 MG/1
81 TABLET ORAL 2 TIMES DAILY
Qty: 56 TABLET | Refills: 0 | Status: SHIPPED | OUTPATIENT
Start: 2025-05-26 | End: 2025-06-23

## 2025-05-27 ENCOUNTER — ANESTHESIA (OUTPATIENT)
Dept: SURGERY | Facility: HOSPITAL | Age: 65
End: 2025-05-27
Payer: COMMERCIAL

## 2025-05-27 ENCOUNTER — HOSPITAL ENCOUNTER (OUTPATIENT)
Facility: HOSPITAL | Age: 65
Discharge: HOME | End: 2025-05-27
Attending: ORTHOPAEDIC SURGERY | Admitting: ORTHOPAEDIC SURGERY
Payer: COMMERCIAL

## 2025-05-27 DIAGNOSIS — M17.12 PRIMARY OSTEOARTHRITIS OF LEFT KNEE: Primary | ICD-10-CM

## 2025-05-27 DIAGNOSIS — Z01.818 PRE-OP TESTING: ICD-10-CM

## 2025-05-27 PROCEDURE — 37000008 HC ANESTHESIA 1ST 15 MINUTES: Performed by: ORTHOPAEDIC SURGERY

## 2025-05-27 PROCEDURE — C1776 JOINT DEVICE (IMPLANTABLE): HCPCS | Performed by: ORTHOPAEDIC SURGERY

## 2025-05-27 PROCEDURE — 37000009 HC ANESTHESIA EA ADD 15 MINS: Performed by: ORTHOPAEDIC SURGERY

## 2025-05-27 PROCEDURE — 63600175 PHARM REV CODE 636 W HCPCS: Performed by: REGISTERED NURSE

## 2025-05-27 PROCEDURE — C1713 ANCHOR/SCREW BN/BN,TIS/BN: HCPCS | Performed by: ORTHOPAEDIC SURGERY

## 2025-05-27 PROCEDURE — 63600175 PHARM REV CODE 636 W HCPCS: Performed by: ANESTHESIOLOGY

## 2025-05-27 PROCEDURE — 25000003 PHARM REV CODE 250: Performed by: ANESTHESIOLOGY

## 2025-05-27 PROCEDURE — 25000003 PHARM REV CODE 250: Performed by: REGISTERED NURSE

## 2025-05-27 PROCEDURE — 63600175 PHARM REV CODE 636 W HCPCS: Performed by: ORTHOPAEDIC SURGERY

## 2025-05-27 PROCEDURE — 97161 PT EVAL LOW COMPLEX 20 MIN: CPT

## 2025-05-27 PROCEDURE — 0055T BONE SRGRY CMPTR CT/MRI IMAG: CPT | Mod: ,,, | Performed by: ORTHOPAEDIC SURGERY

## 2025-05-27 PROCEDURE — 71000015 HC POSTOP RECOV 1ST HR: Performed by: ORTHOPAEDIC SURGERY

## 2025-05-27 PROCEDURE — 27200688 HC TRAY, SPINAL-HYPER/ ISOBARIC: Performed by: ANESTHESIOLOGY

## 2025-05-27 PROCEDURE — 71000039 HC RECOVERY, EACH ADD'L HOUR: Performed by: ORTHOPAEDIC SURGERY

## 2025-05-27 PROCEDURE — C1769 GUIDE WIRE: HCPCS | Performed by: ORTHOPAEDIC SURGERY

## 2025-05-27 PROCEDURE — 25000003 PHARM REV CODE 250: Performed by: ORTHOPAEDIC SURGERY

## 2025-05-27 PROCEDURE — 36000712 HC OR TIME LEV V 1ST 15 MIN: Performed by: ORTHOPAEDIC SURGERY

## 2025-05-27 PROCEDURE — 27200750 HC INSULATED NEEDLE/ STIMUPLEX: Performed by: ANESTHESIOLOGY

## 2025-05-27 PROCEDURE — 27201423 OPTIME MED/SURG SUP & DEVICES STERILE SUPPLY: Performed by: ORTHOPAEDIC SURGERY

## 2025-05-27 PROCEDURE — 71000016 HC POSTOP RECOV ADDL HR: Performed by: ORTHOPAEDIC SURGERY

## 2025-05-27 PROCEDURE — 64447 NJX AA&/STRD FEMORAL NRV IMG: CPT | Performed by: ANESTHESIOLOGY

## 2025-05-27 PROCEDURE — 97110 THERAPEUTIC EXERCISES: CPT

## 2025-05-27 PROCEDURE — 27447 TOTAL KNEE ARTHROPLASTY: CPT | Mod: LT,,, | Performed by: ORTHOPAEDIC SURGERY

## 2025-05-27 PROCEDURE — 94799 UNLISTED PULMONARY SVC/PX: CPT

## 2025-05-27 PROCEDURE — 36000713 HC OR TIME LEV V EA ADD 15 MIN: Performed by: ORTHOPAEDIC SURGERY

## 2025-05-27 PROCEDURE — 63600175 PHARM REV CODE 636 W HCPCS

## 2025-05-27 PROCEDURE — 71000033 HC RECOVERY, INTIAL HOUR: Performed by: ORTHOPAEDIC SURGERY

## 2025-05-27 PROCEDURE — 97530 THERAPEUTIC ACTIVITIES: CPT

## 2025-05-27 PROCEDURE — 97116 GAIT TRAINING THERAPY: CPT

## 2025-05-27 DEVICE — CEMENT BONE ANTIBIO SIMPLEX P: Type: IMPLANTABLE DEVICE | Site: KNEE | Status: FUNCTIONAL

## 2025-05-27 DEVICE — PATELLA
Type: IMPLANTABLE DEVICE | Site: KNEE | Status: FUNCTIONAL
Brand: TRIATHLON

## 2025-05-27 DEVICE — PRIMARY TIBIAL BASEPLATE
Type: IMPLANTABLE DEVICE | Site: KNEE | Status: FUNCTIONAL
Brand: TRIATHLON

## 2025-05-27 DEVICE — TIBIAL BEARING INSERT
Type: IMPLANTABLE DEVICE | Site: KNEE | Status: FUNCTIONAL
Brand: TRIATHLON

## 2025-05-27 DEVICE — CRUCIATE RETAINING FEMORAL
Type: IMPLANTABLE DEVICE | Site: KNEE | Status: FUNCTIONAL
Brand: TRIATHLON

## 2025-05-27 RX ORDER — ACETAMINOPHEN 500 MG
1000 TABLET ORAL
Status: COMPLETED | OUTPATIENT
Start: 2025-05-27 | End: 2025-05-27

## 2025-05-27 RX ORDER — SODIUM CHLORIDE 9 MG/ML
INJECTION, SOLUTION INTRAVENOUS CONTINUOUS
Status: DISCONTINUED | OUTPATIENT
Start: 2025-05-27 | End: 2025-05-27 | Stop reason: HOSPADM

## 2025-05-27 RX ORDER — METOCLOPRAMIDE HYDROCHLORIDE 5 MG/ML
10 INJECTION INTRAMUSCULAR; INTRAVENOUS EVERY 10 MIN PRN
Status: DISCONTINUED | OUTPATIENT
Start: 2025-05-27 | End: 2025-05-27 | Stop reason: HOSPADM

## 2025-05-27 RX ORDER — KETAMINE HYDROCHLORIDE 100 MG/ML
INJECTION, SOLUTION INTRAMUSCULAR; INTRAVENOUS
Status: DISCONTINUED | OUTPATIENT
Start: 2025-05-27 | End: 2025-05-27

## 2025-05-27 RX ORDER — FENTANYL CITRATE 50 UG/ML
100 INJECTION, SOLUTION INTRAMUSCULAR; INTRAVENOUS SEE ADMIN INSTRUCTIONS
Status: DISCONTINUED | OUTPATIENT
Start: 2025-05-27 | End: 2025-05-27 | Stop reason: HOSPADM

## 2025-05-27 RX ORDER — MIDAZOLAM HYDROCHLORIDE 1 MG/ML
INJECTION INTRAMUSCULAR; INTRAVENOUS
Status: DISCONTINUED | OUTPATIENT
Start: 2025-05-27 | End: 2025-05-27

## 2025-05-27 RX ORDER — CELECOXIB 100 MG/1
400 CAPSULE ORAL
Status: DISCONTINUED | OUTPATIENT
Start: 2025-05-27 | End: 2025-05-27 | Stop reason: HOSPADM

## 2025-05-27 RX ORDER — MUPIROCIN 20 MG/G
OINTMENT TOPICAL
Status: DISCONTINUED | OUTPATIENT
Start: 2025-05-27 | End: 2025-05-27 | Stop reason: HOSPADM

## 2025-05-27 RX ORDER — PROPOFOL 10 MG/ML
VIAL (ML) INTRAVENOUS CONTINUOUS PRN
Status: DISCONTINUED | OUTPATIENT
Start: 2025-05-27 | End: 2025-05-27

## 2025-05-27 RX ORDER — DEXAMETHASONE SODIUM PHOSPHATE 4 MG/ML
INJECTION, SOLUTION INTRA-ARTICULAR; INTRALESIONAL; INTRAMUSCULAR; INTRAVENOUS; SOFT TISSUE
Status: DISCONTINUED | OUTPATIENT
Start: 2025-05-27 | End: 2025-05-27

## 2025-05-27 RX ORDER — LIDOCAINE HYDROCHLORIDE 10 MG/ML
1 INJECTION, SOLUTION EPIDURAL; INFILTRATION; INTRACAUDAL; PERINEURAL ONCE
Status: DISCONTINUED | OUTPATIENT
Start: 2025-05-27 | End: 2025-05-27 | Stop reason: HOSPADM

## 2025-05-27 RX ORDER — LIDOCAINE HYDROCHLORIDE 20 MG/ML
INJECTION INTRAVENOUS
Status: DISCONTINUED | OUTPATIENT
Start: 2025-05-27 | End: 2025-05-27

## 2025-05-27 RX ORDER — SODIUM CHLORIDE, SODIUM LACTATE, POTASSIUM CHLORIDE, CALCIUM CHLORIDE 600; 310; 30; 20 MG/100ML; MG/100ML; MG/100ML; MG/100ML
INJECTION, SOLUTION INTRAVENOUS CONTINUOUS
Status: DISCONTINUED | OUTPATIENT
Start: 2025-05-27 | End: 2025-05-27 | Stop reason: HOSPADM

## 2025-05-27 RX ORDER — BUPIVACAINE HYDROCHLORIDE 7.5 MG/ML
INJECTION, SOLUTION EPIDURAL; RETROBULBAR
Status: DISCONTINUED | OUTPATIENT
Start: 2025-05-27 | End: 2025-05-27

## 2025-05-27 RX ORDER — OXYCODONE HCL 10 MG/1
10 TABLET, FILM COATED, EXTENDED RELEASE ORAL
Status: COMPLETED | OUTPATIENT
Start: 2025-05-27 | End: 2025-05-27

## 2025-05-27 RX ORDER — BUPIVACAINE HYDROCHLORIDE 5 MG/ML
INJECTION, SOLUTION EPIDURAL; INTRACAUDAL; PERINEURAL
Status: DISCONTINUED | OUTPATIENT
Start: 2025-05-27 | End: 2025-05-27

## 2025-05-27 RX ORDER — FENTANYL CITRATE 50 UG/ML
25 INJECTION, SOLUTION INTRAMUSCULAR; INTRAVENOUS EVERY 5 MIN PRN
Status: DISCONTINUED | OUTPATIENT
Start: 2025-05-27 | End: 2025-05-27 | Stop reason: HOSPADM

## 2025-05-27 RX ORDER — MIDAZOLAM HYDROCHLORIDE 1 MG/ML
2 INJECTION, SOLUTION INTRAMUSCULAR; INTRAVENOUS
Status: DISCONTINUED | OUTPATIENT
Start: 2025-05-27 | End: 2025-05-27 | Stop reason: HOSPADM

## 2025-05-27 RX ORDER — CEFAZOLIN 2 G/1
2 INJECTION, POWDER, FOR SOLUTION INTRAMUSCULAR; INTRAVENOUS
Status: COMPLETED | OUTPATIENT
Start: 2025-05-27 | End: 2025-05-27

## 2025-05-27 RX ORDER — PROPOFOL 10 MG/ML
VIAL (ML) INTRAVENOUS
Status: DISCONTINUED | OUTPATIENT
Start: 2025-05-27 | End: 2025-05-27

## 2025-05-27 RX ORDER — ONDANSETRON HYDROCHLORIDE 2 MG/ML
INJECTION, SOLUTION INTRAVENOUS
Status: DISCONTINUED | OUTPATIENT
Start: 2025-05-27 | End: 2025-05-27

## 2025-05-27 RX ORDER — OXYCODONE HYDROCHLORIDE 5 MG/1
5 TABLET ORAL ONCE AS NEEDED
Status: COMPLETED | OUTPATIENT
Start: 2025-05-27 | End: 2025-05-27

## 2025-05-27 RX ORDER — BUPIVACAINE 13.3 MG/ML
INJECTION, SUSPENSION, LIPOSOMAL INFILTRATION
Status: DISCONTINUED | OUTPATIENT
Start: 2025-05-27 | End: 2025-05-27

## 2025-05-27 RX ORDER — SODIUM CHLORIDE, SODIUM LACTATE, POTASSIUM CHLORIDE, CALCIUM CHLORIDE 600; 310; 30; 20 MG/100ML; MG/100ML; MG/100ML; MG/100ML
INJECTION, SOLUTION INTRAVENOUS CONTINUOUS
Status: CANCELLED | OUTPATIENT
Start: 2025-05-27

## 2025-05-27 RX ADMIN — ONDANSETRON 4 MG: 2 INJECTION INTRAMUSCULAR; INTRAVENOUS at 10:05

## 2025-05-27 RX ADMIN — BUPIVACAINE HYDROCHLORIDE 1.4 ML: 7.5 INJECTION, SOLUTION EPIDURAL; RETROBULBAR at 09:05

## 2025-05-27 RX ADMIN — BUPIVACAINE 20 ML: 13.3 INJECTION, SUSPENSION, LIPOSOMAL INFILTRATION at 09:05

## 2025-05-27 RX ADMIN — DEXAMETHASONE SODIUM PHOSPHATE 4 MG: 4 INJECTION, SOLUTION INTRA-ARTICULAR; INTRALESIONAL; INTRAMUSCULAR; INTRAVENOUS; SOFT TISSUE at 10:05

## 2025-05-27 RX ADMIN — PROPOFOL 30 MG: 10 INJECTION, EMULSION INTRAVENOUS at 09:05

## 2025-05-27 RX ADMIN — CEFAZOLIN 2 G: 2 INJECTION, POWDER, FOR SOLUTION INTRAMUSCULAR; INTRAVENOUS at 10:05

## 2025-05-27 RX ADMIN — BUPIVACAINE HYDROCHLORIDE 10 ML: 5 INJECTION, SOLUTION EPIDURAL; INTRACAUDAL; PERINEURAL at 09:05

## 2025-05-27 RX ADMIN — GLYCOPYRROLATE 0.2 MG: 0.2 INJECTION, SOLUTION INTRAMUSCULAR; INTRAVITREAL at 09:05

## 2025-05-27 RX ADMIN — MIDAZOLAM HYDROCHLORIDE 2 MG: 1 INJECTION, SOLUTION INTRAMUSCULAR; INTRAVENOUS at 09:05

## 2025-05-27 RX ADMIN — PHENYLEPHRINE HYDROCHLORIDE 0.3 MCG/KG/MIN: 10 INJECTION INTRAVENOUS at 09:05

## 2025-05-27 RX ADMIN — OXYCODONE 5 MG: 5 TABLET ORAL at 02:05

## 2025-05-27 RX ADMIN — KETAMINE HYDROCHLORIDE 10 MG: 100 INJECTION, SOLUTION, CONCENTRATE INTRAMUSCULAR; INTRAVENOUS at 10:05

## 2025-05-27 RX ADMIN — TRANEXAMIC ACID 1000 MG: 100 INJECTION, SOLUTION INTRAVENOUS at 11:05

## 2025-05-27 RX ADMIN — SODIUM CHLORIDE, SODIUM GLUCONATE, SODIUM ACETATE, POTASSIUM CHLORIDE AND MAGNESIUM CHLORIDE: 526; 502; 368; 37; 30 INJECTION, SOLUTION INTRAVENOUS at 08:05

## 2025-05-27 RX ADMIN — ACETAMINOPHEN 1000 MG: 500 TABLET ORAL at 08:05

## 2025-05-27 RX ADMIN — ROPIVACAINE HYDROCHLORIDE: 5 INJECTION EPIDURAL; INFILTRATION; PERINEURAL at 09:05

## 2025-05-27 RX ADMIN — PROPOFOL 50 MCG/KG/MIN: 10 INJECTION, EMULSION INTRAVENOUS at 09:05

## 2025-05-27 RX ADMIN — KETAMINE HYDROCHLORIDE 5 MG: 100 INJECTION, SOLUTION, CONCENTRATE INTRAMUSCULAR; INTRAVENOUS at 10:05

## 2025-05-27 RX ADMIN — FENTANYL CITRATE 50 MCG: 50 INJECTION, SOLUTION INTRAMUSCULAR; INTRAVENOUS at 09:05

## 2025-05-27 RX ADMIN — TRANEXAMIC ACID 1000 MG: 100 INJECTION, SOLUTION INTRAVENOUS at 10:05

## 2025-05-27 RX ADMIN — SODIUM CHLORIDE, SODIUM GLUCONATE, SODIUM ACETATE, POTASSIUM CHLORIDE AND MAGNESIUM CHLORIDE: 526; 502; 368; 37; 30 INJECTION, SOLUTION INTRAVENOUS at 11:05

## 2025-05-27 RX ADMIN — LIDOCAINE HYDROCHLORIDE 20 MG: 20 INJECTION, SOLUTION INTRAVENOUS at 09:05

## 2025-05-27 RX ADMIN — OXYCODONE HYDROCHLORIDE 10 MG: 10 TABLET, FILM COATED, EXTENDED RELEASE ORAL at 08:05

## 2025-05-27 NOTE — ANESTHESIA POSTPROCEDURE EVALUATION
Anesthesia Post Evaluation    Patient: Mercedes Laura    Procedure(s) Performed: Procedure(s) (LRB):  ROBOTIC ARTHROPLASTY, KNEE, TOTAL (Left)    Final Anesthesia Type: spinal      Patient location during evaluation: PACU  Patient participation: Yes- Able to Participate  Level of consciousness: awake and alert  Post-procedure vital signs: reviewed and stable  Pain management: adequate  Airway patency: patent    PONV status at discharge: No PONV  Anesthetic complications: no      Cardiovascular status: hemodynamically stable  Respiratory status: unassisted and room air  Hydration status: euvolemic  Follow-up not needed.              Vitals Value Taken Time   /69 05/27/25 12:08     05/27/25 12:09   Pulse 62 05/27/25 12:09   Resp 9 05/27/25 12:09   SpO2 98 % 05/27/25 12:09   Vitals shown include unfiled device data.      No case tracking events are documented in the log.      Pain/Isabela Score: Pain Rating Prior to Med Admin: 2 (5/27/2025  8:40 AM)  Isabela Score: 8 (5/27/2025 11:45 AM)           10

## 2025-05-27 NOTE — PT/OT/SLP PROGRESS
Physical Therapy Treatment    Patient Name:  Mercedes Laura   MRN:  [id number]    Recommendations:     Discharge Recommendations: Low Intensity Therapy  Discharge Equipment Recommendations: none  Barriers to discharge: None    Assessment:     Mercedes Laura is a 65 y.o. female admitted with a medical diagnosis of <principal problem not specified>.  She presents with the following impairments/functional limitations: weakness, impaired endurance, impaired functional mobility, gait instability, decreased lower extremity function, pain, orthopedic precautions .    Pt seen BID today. Progressed to gait training 250ft with RW with 1 seated rest min assist and another person following with chair. 1 episode of giveaway while walking. Pt attempted stair training , took 1 step with leg giveaway and allowed to sit in chair. Pt stated will use lift. Pt taken to bathroom and voided with extra time. Pt ambulated another 50ft back to room. Pt and spouse provided with gait belt and educated and instructed on use.  LIT.    Rehab Prognosis: Good; patient would benefit from acute skilled PT services to address these deficits and reach maximum level of function.    Recent Surgery: Procedure(s) (LRB):  ROBOTIC ARTHROPLASTY, KNEE, TOTAL (Left) * Day of Surgery *    Plan:     During this hospitalization, patient to be seen BID to address the identified rehab impairments via gait training, therapeutic activities, therapeutic exercises, neuromuscular re-education and progress toward the following goals:    Plan of Care Expires:  05/30/25    Subjective     Chief Complaint: pain thigh and posterior knee. Numbness of buttocks  Patient/Family Comments/goals: get stronger  Pain/Comfort:  Pain Rating 1:  (not rated)  Location - Side 1: Left  Location 1: thigh  Pain Addressed 1: Reposition, Distraction, Cessation of Activity, Nurse notified      Objective:     Communicated with nurse Lucas prior to session.  Patient found up in chair with   upon PT  entry to room.     General Precautions: Standard, fall  Orthopedic Precautions: LLE weight bearing as tolerated  Braces: N/A  Respiratory Status: Room air     Functional Mobility:  Transfers:     Sit to Stand:  minimum assistance with rolling walker  Toilet Transfer: minimum assistance with  rolling walker  using  Stand Pivot  Gait: 250ft, 50ft with RW min assist and another person following with chair with 1 leg giveaway  Attempted stair training and ascended 1 step only with giveaway- pt assisted to chair. Advised to use lift to enter home and train with HHPT stairs tomorrow or next day to bedroom on second floor      AM-PAC 6 CLICK MOBILITY  Turning over in bed (including adjusting bedclothes, sheets and blankets)?: 3  Sitting down on and standing up from a chair with arms (e.g., wheelchair, bedside commode, etc.): 3  Moving from lying on back to sitting on the side of the bed?: 3  Moving to and from a bed to a chair (including a wheelchair)?: 3  Need to walk in hospital room?: 3  Climbing 3-5 steps with a railing?: 1  Basic Mobility Total Score: 16       Treatment & Education:  HEP with AP,QS/GS  Gait belt provided and educated on use      Patient left up in chair with all lines intact, call button in reach, nurse shae notified, and spouse present..    GOALS:   Multidisciplinary Problems       Physical Therapy Goals          Problem: Physical Therapy    Goal Priority Disciplines Outcome Interventions   Physical Therapy Goal     PT, PT/OT Progressing    Description: Goals to be met by: 2025     Patient will increase functional independence with mobility by performin. Supine to sit with Contact Guard Assistance  2. Sit to stand transfer with Contact Guard Assistance  3. Bed to chair transfer with Contact Guard Assistance using Rolling Walker  4. Gait  x 250 feet with Contact Guard Assistance using Rolling Walker.   5. Lower extremity exercise program x20 reps                       DME  Justifications:  No DME recommended requiring DME justifications    Time Tracking:     PT Received On: 05/27/25  PT Start Time: 1526     PT Stop Time: 1610  PT Total Time (min): 44 min     Billable Minutes: Gait Training 30 and Therapeutic Activity 14    Treatment Type: Treatment  PT/PTA: PT     Number of PTA visits since last PT visit: 0     05/27/2025

## 2025-05-27 NOTE — ANESTHESIA PROCEDURE NOTES
Peripheral Block    Patient location during procedure: pre-op   Block not for primary anesthetic.  Reason for block: at surgeon's request and post-op pain management   Post-op Pain Location: left knee   Start time: 5/27/2025 9:05 AM  Timeout: 5/27/2025 9:05 AM   End time: 5/27/2025 9:10 AM    Staffing  Authorizing Provider: Fritz Grimaldo MD  Performing Provider: Fritz Grimaldo MD    Staffing  Performed by: Fritz Grimaldo MD  Authorized by: Fritz Grimaldo MD    Preanesthetic Checklist  Completed: patient identified, IV checked, site marked, risks and benefits discussed, surgical consent, monitors and equipment checked, pre-op evaluation and timeout performed  Peripheral Block  Patient position: supine  Prep: ChloraPrep  Patient monitoring: heart rate, cardiac monitor, continuous pulse ox, continuous capnometry and frequent blood pressure checks  Block type: adductor canal  Laterality: left  Injection technique: single shot  Needle  Needle type: Stimuplex   Needle gauge: 21 G  Needle length: 4 in  Needle localization: anatomical landmarks and ultrasound guidance   -ultrasound image captured on disc.  Assessment  Injection assessment: negative aspiration, negative parasthesia and local visualized surrounding nerve  Paresthesia pain: none  Heart rate change: no  Slow fractionated injection: yes  Pain Tolerance: comfortable throughout block and no complaints  Medications:    Medications: BUPivacaine liposome (PF) 1.3 % (13.3 mg/mL) suspension - Injection   20 mL - 5/27/2025 9:10:00 AM  bupivacaine (pf) (MARCAINE) injection 0.5% - Perineural   10 mL - 5/27/2025 9:10:00 AM    Additional Notes  VSS.  [facility] RN monitoring vitals throughout procedure.  Patient tolerated procedure well.

## 2025-05-27 NOTE — DISCHARGE INSTRUCTIONS
"Discharge Instructions: After Your Surgery/Procedure  Youve just had surgery. During surgery you were given medicine called anesthesia to keep you relaxed and free of pain. After surgery you may have some pain or nausea. This is common. Here are some tips for feeling better and getting well after surgery.     Stay on schedule with your medication.   Going home  Your doctor or nurse will show you how to take care of yourself when you go home. He or she will also answer your questions. Have an adult family member or friend drive you home.      For your safety we recommend these precaution for the first 24 hours after your procedure:  Do not drive or use heavy equipment.  Do not make important decisions or sign legal papers.  Do not drink alcohol.  Have someone stay with you, if needed. He or she can watch for problems and help keep you safe.  Your concentration, balance, coordination, and judgement may be impaired for many hours after anesthesia.  Use caution when ambulating or standing up.     You may feel weak and "washed out" after anesthesia and surgery.      Subtle residual effects of general anesthesia or sedation with regional / local anesthesia can last more than 24 hours.  Rest for the remainder of the day or longer if your Doctor/Surgeon has advised you to do so.  Although you may feel normal within the first 24 hours, your reflexes and mental ability may be impaired without you realizing it.  You may feel dizzy, lightheaded or sleepy for 24 hours or longer.      Be sure to go to all follow-up visits with your doctor. And rest after your surgery for as long as your doctor tells you to.  Coping with pain  If you have pain after surgery, pain medicine will help you feel better. Take it as told, before pain becomes severe. Also, ask your doctor or pharmacist about other ways to control pain. This might be with heat, ice, or relaxation. And follow any other instructions your surgeon or nurse gives you.  Tips " for taking pain medicine  To get the best relief possible, remember these points:  Pain medicines can upset your stomach. Taking them with a little food may help.  Most pain relievers taken by mouth need at least 20 to 30 minutes to start to work.  Taking medicine on a schedule can help you remember to take it. Try to time your medicine so that you can take it before starting an activity. This might be before you get dressed, go for a walk, or sit down for dinner.  Constipation is a common side effect of pain medicines. Call your doctor before taking any medicines such as laxatives or stool softeners to help ease constipation. Also ask if you should skip any foods. Drinking lots of fluids and eating foods such as fruits and vegetables that are high in fiber can also help. Remember, do not take laxatives unless your surgeon has prescribed them.  Drinking alcohol and taking pain medicine can cause dizziness and slow your breathing. It can even be deadly. Do not drink alcohol while taking pain medicine.  Pain medicine can make you react more slowly to things. Do not drive or run machinery while taking pain medicine.  Your health care provider may tell you to take acetaminophen to help ease your pain. Ask him or her how much you are supposed to take each day. Acetaminophen or other pain relievers may interact with your prescription medicines or other over-the-counter (OTC) drugs. Some prescription medicines have acetaminophen and other ingredients. Using both prescription and OTC acetaminophen for pain can cause you to overdose. Read the labels on your OTC medicines with care. This will help you to clearly know the list of ingredients, how much to take, and any warnings. It may also help you not take too much acetaminophen. If you have questions or do not understand the information, ask your pharmacist or health care provider to explain it to you before you take the OTC medicine.  Managing nausea  Some people have an  upset stomach after surgery. This is often because of anesthesia, pain, or pain medicine, or the stress of surgery. These tips will help you handle nausea and eat healthy foods as you get better. If you were on a special food plan before surgery, ask your doctor if you should follow it while you get better. These tips may help:  Do not push yourself to eat. Your body will tell you when to eat and how much.  Start off with clear liquids and soup. They are easier to digest.  Next try semi-solid foods, such as mashed potatoes, applesauce, and gelatin, as you feel ready.  Slowly move to solid foods. Dont eat fatty, rich, or spicy foods at first.  Do not force yourself to have 3 large meals a day. Instead eat smaller amounts more often.  Take pain medicines with a small amount of solid food, such as crackers or toast, to avoid nausea.     Call your surgeon if  You still have pain an hour after taking medicine. The medicine may not be strong enough.  You feel too sleepy, dizzy, or groggy. The medicine may be too strong.  You have side effects like nausea, vomiting, or skin changes, such as rash, itching, or hives.       If you have obstructive sleep apnea  You were given anesthesia medicine during surgery to keep you comfortable and free of pain. After surgery, you may have more apnea spells because of this medicine and other medicines you were given. The spells may last longer than usual.   At home:  Keep using the continuous positive airway pressure (CPAP) device when you sleep. Unless your health care provider tells you not to, use it when you sleep, day or night. CPAP is a common device used to treat obstructive sleep apnea.  Talk with your provider before taking any pain medicine, muscle relaxants, or sedatives. Your provider will tell you about the possible dangers of taking these medicines.  © 7360-0595 [employer]. [address] [place] [address], [place] [address]. All rights reserved. This information is  not intended as a substitute for professional medical care. Always follow your healthcare professional's instructions.              Using an Incentive Spirometer    An incentive spirometer is a device that helps you do deep breathing exercises. These exercises expand your lungs, aid in circulation, and help prevent pneumonia. Deep breathing exercises also help you breathe better and improve the function of your lungs by:  Keeping your lungs clear  Strengthening your breathing muscles  Helping prevent respiratory complications or problems  The incentive spirometer gives you a way to take an active part in recover. A nurse or therapist will teach you breathing exercises. To do these exercises, you will breathe in through your mouth and not your nose. The incentive spirometer only works correctly if you breathe in through your mouth.  Steps to clear lungs  Step 1. Exhale normally. Then, inhale normally.  Relax and breathe out.  Step 2. Place your lips tightly around the mouthpiece.  Make sure the device is upright and not tilted.  Step 3. Inhale as much air as you can through the mouthpiece (don't breath through your nose).  Inhale slowly and deeply.  Hold your breath long enough to keep the balls or disk raised for at least 3 to 5 seconds, or as instructed by your healthcare provider.  Some spirometers have an indicator to let you know that you are breathing in too fast. If the indicator goes off, breathe in more slowly.  Step 4. Repeat the exercise regularly.  Do this exercise every hour while you're awake, or as instructed by your healthcare provider.  If you were taught deep breathing and coughing exercises, do them regularly as instructed by your healthcare provider.           Post op instructions for prevention of DVT  What is deep vein thrombosis?  Deep vein thrombosis (DVT) is the medical term for blood clots in the deep veins of the leg.  These blood clots can be dangerous.  A DVT can block a blood vessel and  keep blood from getting where it needs to go.  Another problem is that the clot can travel to other parts of the body such as the lungs.  A clot that travels to the lungs is called a pulmonary embolus (PE) and can cause serious problems with breathing which can lead to death.  Am I at risk for DVT/PE?  If you are not very active, you are at risk of DVT.  Anyone confined to bed, sitting for long periods of time, recovering from surgery, etc. increases the risk of DVT.  Other risk factors are cancer diagnosis, certain medications, estrogen replacement in any form,older age, obesity, pregnancy, smoking, history of clotting disorders, and dehydration.  How will I know if I have a DVT?  Swelling in the lower leg  Pain  Warmth, redness, hardness or bulging of the vein  If you have any of these symptoms, call your doctors office right away.  Some people will not have any symptoms until the clot moves to the lungs.  What are the symptoms of a PE?  Panting, shortness of breath, or trouble breathing  Sharp, knife-like chest pain when you breathe  Coughing or coughing up blood  Rapid heartbeat  If you have any of these symptoms or get worse quickly, call 911 for emergency treatment.  How can I prevent a DVT?  Avoid long periods of inactivity and dont cross your legs--get up and walk around every hour or so.  Stay active--walking after surgery is highly encouraged.  This means you should get out of the house and walk in the neighborhood.  Going up and down stairs will not impair healing (unless advised against such activity by your doctor).    Drink plenty of noncaffeinated, nonalcoholic fluids each day to prevent dehydration.  Wear special support stockings, if they have been advised by your doctor.  If you travel, stop at least once an hour and walk around.  Avoid smoking (assistance with stopping is available through your healthcare provider)  Always notify your doctor if you are not able to follow the post operative  instructions that are given to you at the time of discharge.  It may be necessary to prescribe one of the medications available to prevent DVT.          Exparel(bupivacaine) has been injected to provide approximately 72 hours of reduced pain after your surgery.  Do not remove the bracelet for five days.  Report to your doctor as soon as possible if you experience any of the following:   Restlessness   Anxiety   Speech problems    Lightheadedness   Numbness and tingling of the mouth and lips   Seizures    Metallic taste   Blurred vision   Tremors    Twitching   Depression   Extreme drowsiness  Avoid additional use of local anesthetics (such as dental procedures) for five days (96 hours).          We hope your stay was comfortable as you heal now, mend and rest.    For we have enjoyed taking care of you by giving your our best.    And as you get better, by regaining your health and strength;   We count it as a privilege to have served you and hope your time at [facility] was well spent.      Thank  You!!!

## 2025-05-27 NOTE — ANESTHESIA PREPROCEDURE EVALUATION
05/27/2025  Mercedes Laura is a 65 y.o., female.      Pre-op Assessment    I have reviewed the Patient Summary Reports.     I have reviewed the Nursing Notes. I have reviewed the NPO Status.   I have reviewed the Medications.     Review of Systems  Anesthesia Hx:  No problems with previous Anesthesia                Social:  Non-Smoker       Cardiovascular:     Hypertension           hyperlipidemia                         Hypertension         Hepatic/GI:    Hiatal Hernia, GERD         Gerd    Hernia, Hiatal Hernia      Musculoskeletal:  Arthritis   Left knee osteoarthritis             Neurological:    Neuromuscular Disease,                                 Neuromuscular Disease   Psych:   anxiety                 Physical Exam  General: Well nourished, Cooperative, Oriented and Alert    Airway:  Mallampati: II   Mouth Opening: Normal  TM Distance: Normal  Tongue: Normal  Neck ROM: Normal ROM    Dental:  Intact    Chest/Lungs:  Normal Respiratory Rate    Heart:  Rate: Normal        Anesthesia Plan  Type of Anesthesia, risks & benefits discussed:    Anesthesia Type: Spinal  Intra-op Monitoring Plan: Standard ASA Monitors  Post Op Pain Control Plan: multimodal analgesia, IV/PO Opioids PRN and peripheral nerve block  Informed Consent: Informed consent signed with the Patient and all parties understand the risks and agree with anesthesia plan.  All questions answered.   ASA Score: 2  Day of Surgery Review of History & Physical: H&P Update referred to the surgeon/provider.    Ready For Surgery From Anesthesia Perspective.     .

## 2025-05-27 NOTE — PLAN OF CARE
PT at bedside.      1440--Patient up to chair from stretcher per PT.      1525--PT at bedside again.      1530--Patient ambulating in hallway per PT.

## 2025-05-27 NOTE — ANESTHESIA PROCEDURE NOTES
Spinal    Diagnosis: osteoarthritis  Patient location during procedure: OR  Start time: 5/27/2025 9:52 AM  Timeout: 5/27/2025 9:52 AM  End time: 5/27/2025 9:57 AM    Staffing  Authorizing Provider: Fritz Grimaldo MD  Performing Provider: Firtz Grimaldo MD    Staffing  Performed by: Fritz Grimaldo MD  Authorized by: Fritz Grimaldo MD    Preanesthetic Checklist  Completed: patient identified, IV checked, site marked, risks and benefits discussed, surgical consent, monitors and equipment checked, pre-op evaluation and timeout performed  Spinal Block  Patient position: sitting  Prep: ChloraPrep  Patient monitoring: heart rate, cardiac monitor, continuous pulse ox, continuous capnometry and frequent blood pressure checks  Approach: midline  Location: L4-5  Injection technique: single shot  CSF Fluid: clear free-flowing CSF  Needle  Needle type: pencil-tip   Needle gauge: 25 G  Needle length: 3.5 in  Additional Documentation: incremental injection, negative aspiration for heme and no paresthesia on injection  Needle localization: anatomical landmarks  Assessment  Sensory level: T10   Dermatomal levels determined by alcohol wipe  Ease of block: easy  Patient's tolerance of the procedure: comfortable throughout block and no complaints  Medications:    Medications: bupivacaine (pf) (MARCAINE) injection 0.75% - Intraspinal   1.4 mL - 5/27/2025 9:57:00 AM

## 2025-05-27 NOTE — OP NOTE
[facility]  Orthopedic Surgery  Operative Note    SUMMARY     Date of Procedure: 5/27/2025     Procedure: Procedure(s) (LRB):  ROBOTIC ARTHROPLASTY, KNEE, TOTAL (Left)       Surgeons and Role:     * Chu Whitman MD - Primary    Assistant: Joni SANCHEZ    Pre-Operative Diagnosis: Primary osteoarthritis of left knee [M17.12]  Pre-op testing [Z01.818]    Post-Operative Diagnosis: Post-Op Diagnosis Codes:     * Primary osteoarthritis of left knee [M17.12]     * Pre-op testing [Z01.818]    Anesthesia: Choice    Complications: No    Estimated Blood Loss (EBL): * No values recorded between 5/27/2025  9:41 AM and 5/27/2025 11:13 AM *           Implants:   Implant Name Type Inv. Item Serial No.  Lot No. LRB No. Used Action   PIN BONE [id number] - [id number]  PIN BONE [id number]  [employer]. [id number] Left 1 Implanted and Explanted   PIN FIXATION BONE 140X3.2MM - [id number]  PIN FIXATION BONE 140X3.2MM  [employer]. [id number] Left 1 Implanted and Explanted   CEMENT BONE ANTIBIO SIMPLEX P - [id number]  CEMENT BONE ANTIBIO SIMPLEX P  [employer]. [id number] Left 1 Implanted   FEMORAL CRUC RTN ZAIDA SZ 4 LEFT - [id number]  FEMORAL CRUC RTN ZAIDA SZ 4 LEFT  [employer]. [id number] Left 1 Implanted   PATELLA TRIATHLON 29X8 SYMTRC - [id number]  PATELLA TRIATHLON 29X8 SYMTRC  [employer]. [id number] Left 1 Implanted   BASEPLATE TIB ZAIDA PRIM SZ 4 - [id number]  BASEPLATE TIB ZAIDA PRIM SZ 4  [employer]. [id number] Left 1 Implanted   INSERT TRIATHLON CS TIB 9MM 4 - [id number]  INSERT TRIATHLON CS TIB 9MM 4  [employer]. [id number] Left 1 Implanted       Tourniquet time: 45min at 300mmHg    Specimens:   Specimen (24h ago, onward)      None                    Condition: Good    Disposition: PACU - hemodynamically stable.    Attestation: I was present and scrubbed for the entire procedure.    INDICATIONS FOR THE PROCEDURE: A 65F with a history of chronic   Left  knee arthritis, had failed all conservative measures including cortisone   injections, PT, viscosupplementation and activity modification. After a long   discussion, the patient wished to proceed with the procedure above.     PROCEDURE IN DETAIL: Risks, benefits and alternatives of the procedure were   explained to the patient including, but not limited to damage to nerves,   arteries or blood vessels. Also explained risk of infection, stiffness, DVT, PE,   polyethylene wear as well as anesthetic complications including seizure, stroke,   heart attack and death, understood this and signed informed consent. The   patient's Left knee was marked prior to coming to the Operating Room. The patient was brought to the operating room, placed on the operating table in a supine position.A  formal timeout was done in which correct patient, procedure and op site were all   correctly identified and confirmed by the entire operating team.  2gm Ancef was given prior to surgical incision. Spinal anesthesia was induced. The patient'sLeft  lower extremity was prepped and   draped in normal sterile fashion. TheLeft  leg was exsanguinated with an   Esmarch. Tourniquet was inflated up 300 mmHg. Standard anterior approach to   the knee was made. Medial parapatellar arthrotomy was made, leaving about 1/8   inch of tissue for later repair. Proximal medial tibial release was performed,   making sure not to release any of the MCL. We then   everted the patella and reflexed the knee. Fat pad was excised as well as some   of the ACL. Soft tissue off the distal anterior femur was removed for   visualization, so as to help prevent notching.  We started off by placing our femoral pins.  Two pins were placed about 2 centimeters proximal and 1 centimeter anterior to the medial epicondyle.  We then retracted down to the level of the tibial tubercle.    Two pins were placed just short of bicortically in the tibia.  We then hooked up our arays to our  pins.  We placed 2 checkpoints.  One in the tibia and 1 in the femur.  We then took the leg through range of motion.  We then began by marking off our bony points.  We did this 1st on the femur and then on the tibia.  After this we did ligament balancing of the knee 1st extension and then in flexion by using some spoons and an osteotome.  We then adjusted our bone cuts on the computer and once we liked our plan began by making our cuts.  The [employer] robotic assisted cutting arm was then brought in and then we made our femoral cuts and then turned our attention to the tibia.  Tibial cuts were made.  All of bone was removed as well as excess soft tissue.  Posterior osteophytes removed as well.  We then began to trial.  We placed a size 4 femur and a size 4 tibia with a size 9 polyethylene.  We were within 1 millimeter between our medial and lateral compartments in both flexion and extension.        We turned our attention to patella, caliper was used on the patella where it   measured 24. We set guide at 16 and made our 8-mm cut for polyethylene component, 29 was selected. Then drill holes were placed and the patellar button was placed.   This had good tracking. No need for a lateral release and with no hand tests,   it stayed centered the whole time. We then marked our tibial rotation. We then drilled our femoral lugs.  We then   removed everything except the size 4 tibial tray. We then checked our tibial tray   with a drop demario again and then marked our rotation and pinned it into place then   drilled, and then punched for our keel. Robot and pins were all removed. We then started preparing final   components on the back table. Anterior, medial and lateral structures were injected with our local   Cocktail. Bony surfaces   copiously irrigated with Pulsavac and then thoroughly dried. Once the cement   was the appropriate consistency, first the tibia and then the femur were   cemented into place, removing excess cement. A  9 trial was placed. The knee   was held in compression and then the patella was placed. Cement was allowed to   cure. Once the cement was cured, we then removed excess cement. Again trialed   one final time, again seeing a 9. We then tapped into place the   final 9 meniscal bearing into place. After this was done, we then did our   diluted iodine soak for 3 minutes and then proceeded with closing.  Arthrotomy was closed using our StrataFix.   Subcutaneous tissue was closed using 2-0 Vicryl and skin was using a running 3-0   StrataFix and Dermabond.Instrument, sponge, and needle counts were correct prior to wound closure and at the conclusion of the case.  Sterile dressing was applied.   They were extubated, awakened and transferred from the Operating Room to the   Recovery Room in stable condition.

## 2025-05-27 NOTE — H&P
Past Medical History:   Diagnosis Date    Anxiety       this came on since lap kadeem sgy    Arthritis      Colon polyp      Deviated septum      GERD (gastroesophageal reflux disease)      Hypertension      Leg cramps      PAC (premature atrial contraction)      Plantar fasciitis      PMB (postmenopausal bleeding) 05/2018    Seasonal allergies      Sinus disorder      Uterine leiomyoma 05/2018    Varicose veins of both lower extremities                 Past Surgical History:   Procedure Laterality Date    BREAST BIOPSY Left 2015     benign    CHOLECYSTECTOMY   6-1-2015     Dr Ashraf    COLONOSCOPY N/A 11/23/2015     Procedure: COLONOSCOPY;  Surgeon: Theo Stinson MD;  Location: [facility];  Service: Endoscopy;  Laterality: N/A; repeat in 5 years for surveillance    COLONOSCOPY N/A 7/14/2021     Procedure: COLONOSCOPY;  Surgeon: Theo Stinson MD;  Location: [facility];  Service: Endoscopy;  Laterality: N/A;    CYSTOSCOPY N/A 6/7/2018     Procedure: CYSTOSCOPY;  Surgeon: Anaya Caldera MD;  Location: [facility] OR;  Service: OB/GYN;  Laterality: N/A;    ESOPHAGOGASTRODUODENOSCOPY N/A 3/4/2019     Procedure: EGD (ESOPHAGOGASTRODUODENOSCOPY);  Surgeon: Theo Stinson MD;  Location: [facility];  Service: Endoscopy;  Laterality: N/A;    HYSTERECTOMY        KNEE ARTHROSCOPY W/ MENISCECTOMY   11/15/2000     right     LAPAROSCOPIC SALPINGO-OOPHORECTOMY Bilateral 6/7/2018     Procedure: [id number];  Surgeon: Anaya Caldera MD;  Location: [facility] OR;  Service: OB/GYN;  Laterality: Bilateral;    LAPAROSCOPIC TOTAL HYSTERECTOMY N/A 6/7/2018     Procedure: HYSTERECTOMY-TOTAL LAPAROSCOPIC (TLH);  Surgeon: Anaya Caldera MD;  Location: [facility] OR;  Service: OB/GYN;  Laterality: N/A;    OOPHORECTOMY        ROBOTIC ARTHROPLASTY, KNEE Right 5/28/2024     Procedure: ROBOTIC ARTHROPLASTY, KNEE, TOTAL;  Surgeon: Chu Whitman MD;  Location: [facility] OR;  Service: Orthopedics;  Laterality:  Right;  [place]    UPPER GASTROINTESTINAL ENDOSCOPY   06/22/2015     Dr. Cabrera              Current Outpatient Medications   Medication Sig    acetaminophen (TYLENOL) 500 MG tablet Take 2 tablets (1,000 mg total) by mouth every 8 (eight) hours as needed for Pain.    acetaminophen (TYLENOL) 650 MG TbSR 650 mg.    acyclovir 5% (ZOVIRAX) 5 % Crea Apply topically 5 (five) times daily.    ascorbic acid/zinc (ZINC WITH VITAMIN C ORAL) Take by mouth.    aspirin (VAZALORE) 325 mg Cap Take by mouth. PRN    b complex vitamins capsule Take 1 capsule by mouth once daily.    biotin 10 mg Tab Take 100 mg by mouth once daily.     calcium carbonate (TUMS) 200 mg calcium (500 mg) chewable tablet Take 1 tablet by mouth daily as needed for Heartburn.    econazole nitrate 1 % cream Apply to inner thighs and buttocks BID PRN flare    enzymes,digestive (DIGESTIVE ENZYMES ORAL) Take by mouth.    estradioL (ESTRACE) 0.01 % (0.1 mg/gram) vaginal cream Place 1 g vaginally every Monday and Thursday.    ibuprofen (ADVIL,MOTRIN) 600 MG tablet Take 1 tablet (600 mg total) by mouth 3 (three) times daily as needed for Pain.    lactobacillus combination no.4 (PROBIOTIC) 3 billion cell Cap Take 1 application by mouth 2 (two) times a day.    loratadine (CLARITIN) 10 mg tablet Take 10 mg by mouth daily as needed for Allergies.    magnesium glycinate 100 mg Tab Take by mouth every evening.    meloxicam (MOBIC) 7.5 MG tablet Take 1 tablet (7.5 mg total) by mouth once daily.    mometasone (NASONEX) 50 mcg/actuation nasal spray 2 sprays by Nasal route daily as needed (rhinitis).    multivitamin (THERAGRAN) per tablet Take 1 tablet by mouth once daily.    NON FORMULARY MEDICATION THERA TEARS EYE NUTRITION OMEGA 3    olmesartan-hydrochlorothiazide (BENICAR HCT) 40-12.5 mg Tab TAKE 1 TABLET DAILY    olopatadine (PATANOL) 0.1 % ophthalmic solution 1 drop.    ondansetron (ZOFRAN) 4 MG tablet Take 1 tablet (4 mg total) by mouth every 6 (six) hours as needed  for Nausea.    potassium chloride (MICRO-K) 10 MEQ CpSR TAKE 1 CAPSULE DAILY    RABEprazole (ACIPHEX) 20 mg tablet TAKE 1 TABLET DAILY    sucralfate (CARAFATE) 100 mg/mL suspension Take 10 mLs (1 g total) by mouth 4 (four) times daily before meals and nightly.    tretinoin (RETIN-A) 0.05 % cream Thin film to entire face at bedtime    valacyclovir HCl (VALTREX ORAL) Take by mouth.    vit C,G-Hy-xvmhz-lutein-zeaxan (PRESERVISION AREDS-2) 250-90-40-1 mg Cap Take by mouth.    vitamin D (VITAMIN D3) 1000 units Tab Take 2 Units by mouth once daily.           Current Facility-Administered Medications   Medication    (VFC) influenza (Flulaval, Fluzone, Fluarix) 45 mcg/0.5 mL IM vaccine (> or = 6 mo) 0.5 mL               Review of patient's allergies indicates:   Allergen Reactions    Augmentin [amoxicillin-pot clavulanate] Nausea And Vomiting       Gi upset    Bentyl [dicyclomine] Other (See Comments)       Worsening anxiety    Naprosyn [naproxen] Other (See Comments)       Makes her tired and loopy    Omeprazole         Other reaction(s): ABDOMINAL PAIN    Adhesive Rash    Lexapro [escitalopram oxalate] Anxiety       Bad dreams, feeling of doom, sweats    Sulfa (sulfonamide antibiotics) Rash       Other reaction(s): measle like rash                Family History   Problem Relation Name Age of Onset    Hypertension Mother        Arthritis Mother        Hypertension Father        Cancer Father             melanoma    Melanoma Father        COPD Father        Heart disease Father             chf    Diverticulitis Maternal Grandmother        Arthritis Maternal Aunt        Diabetes Maternal Aunt        Hypertension Maternal Aunt        Arthritis Maternal Uncle        Breast cancer Neg Hx        Ovarian cancer Neg Hx        Psoriasis Neg Hx        Lupus Neg Hx        Eczema Neg Hx        Colon cancer Neg Hx        Colon polyps Neg Hx        Crohn's disease Neg Hx        Esophageal cancer Neg Hx        Stomach cancer Neg Hx              Social History            Socioeconomic History    Marital status:    Tobacco Use    Smoking status: Never    Smokeless tobacco: Never   Substance and Sexual Activity    Alcohol use: Yes       Comment: rarely / few times per yr    Drug use: No    Sexual activity: Yes       Partners: Male       Birth control/protection: None, Post-menopausal      Social Drivers of Health           Physical Activity: Insufficiently Active (9/1/2020)     Exercise Vital Sign      Days of Exercise per Week: 7 days      Minutes of Exercise per Session: 10 min   Stress: No Stress Concern Present (9/1/2020)     [employer] - Occupational Stress Questionnaire      Feeling of Stress : Not at all         Chief Complaint:   No chief complaint on file.        Date of surgery:  May 28, 2024 right total knee arthroplasty     History of present illness:  64-year-old female status post right robotic assisted total knee arthroplasty.  Pain is a 2/10 today.  Complaining more of left knee pain.  Pain is a 6/10.        Review of Systems:     Musculoskeletal:  See HPI           Physical Examination:     Vital Signs:    There were no vitals filed for this visit.        There is no height or weight on file to calculate BMI.     This a well-developed, well nourished patient in no acute distress.  They are alert and oriented and cooperative to examination.  Pt. walks with a slow gait without a lot of antalgia or       Examination of the right knee shows well-healed surgical incision.  No erythema drainage.   Range of motion is about 0° to 125°.  Pain in the medial quad.  Pain over the IT band as well.     Examination of the left knee shows no rashes or erythema. There are no masses ecchymosis or effusion. Patient has full range of motion from 0-130°. Patient is moderately tender to palpation over lateral joint line and nontender to palpation over the medial joint line. Patient has a - Lachman exam, - anterior  drawer exam, and - posterior drawer exam. - Apley exam. Knee is stable to varus and valgus stress. 5 out of 5 motor strength. Palpable distal pulses. Intact light touch sensation. Negative Patellofemoral crepitus.  Obvious valgus alignment     Heart is regular rate without obvious murmurs   Normal respiratory effort without audible wheezing  Abdomen is soft and nontender         X-rays:  X-rays of both knees are reviewed which show well-aligned right right total knee arthroplasty without complication.  Some soft tissue swelling noted.  Severe lateral joint space narrowing noted of the left knee     Assessment::  Status post right total knee arthroplasty  Severe left valgus knee arthritis     Plan:  Continue with the home exercises.  Continue working on stretching.  Agreed to inject her left knee today.  We will also get her set up for left robotic assisted total knee arthroplasty when school lets out in May.  Risks, benefits, and alternatives to the procedure were explained to the patient including but not limited to damage to nerves, arteries, blood vessels, bones, tendons, ligaments, stiffness, instability, infection, permanent limb dysfunction, DVT, PE, as well as general anesthetic complications including seizure, stroke, heart attack and even death. The patient understood these risks and wished to proceed and signed the informed consent.         This note was created using M Modal voice recognition software that occasionally misinterpreted phrases or words.

## 2025-05-27 NOTE — DISCHARGE SUMMARY
[facility]  Discharge Note  Short Stay    Procedure(s) (LRB):  ROBOTIC ARTHROPLASTY, KNEE, TOTAL (Left)      OUTCOME: Patient tolerated treatment/procedure well without complication and is now ready for discharge.    DISPOSITION: Home or Self Care    FINAL DIAGNOSIS:  <principal problem not specified>    FOLLOWUP: In clinic    DISCHARGE INSTRUCTIONS:    Discharge Procedure Orders   Diet general     Leave dressing on - Keep it clean, dry, and intact until clinic visit     Change dressing (specify)   Order Comments: Dressing change: If dressing loses its seal, change daily.     Call MD for:  temperature >100.4     Call MD for:  persistent nausea and vomiting     Call MD for:  severe uncontrolled pain     Call MD for:  difficulty breathing, headache or visual disturbances     Call MD for:  redness, tenderness, or signs of infection (pain, swelling, redness, odor or green/yellow discharge around incision site)     Call MD for:  hives     Call MD for:  persistent dizziness or light-headedness     Call MD for:  extreme fatigue     Keep surgical extremity elevated     Ice to affected area   Order Comments: using barrier between ice and skin (specify duration&frequency)     Weight bearing as tolerated     Activity as tolerated     Shower on day dressing removed (No bath)        TIME SPENT ON DISCHARGE: 5 minutes

## 2025-05-27 NOTE — PLAN OF CARE
Patient reporting pain to L knee, requesting pain medicine. Dr. Grimaldo notified. Per Dr. Grimaldo, OK to give ordered oxycodone. See MAR for order. Will implement order and reassess. Ice pack applied to L knee per patient request.      1525--Patient reports her pain has improved. Patient denies any needs at this time.

## 2025-05-27 NOTE — PLAN OF CARE
Problem: Physical Therapy  Goal: Physical Therapy Goal  Description: Goals to be met by: 2025     Patient will increase functional independence with mobility by performin. Supine to sit with Contact Guard Assistance  2. Sit to stand transfer with Contact Guard Assistance  3. Bed to chair transfer with Contact Guard Assistance using Rolling Walker  4. Gait  x 250 feet with Contact Guard Assistance using Rolling Walker.   5. Lower extremity exercise program x20 reps  Outcome: Progressing   Pt seen at post 08. Pt c/o LE's hypoesthesia. Thera ex in supine. Min/mod assist standing and few steps to chair. Will require second visit

## 2025-05-27 NOTE — TRANSFER OF CARE
Anesthesia Transfer of Care Note    Patient: Mercedes Laura    Procedure(s) Performed: Procedure(s) (LRB):  ROBOTIC ARTHROPLASTY, KNEE, TOTAL (Left)    Patient location: PACU    Anesthesia Type: spinal    Transport from OR: Transported from OR on 6-10 L/min O2 by face mask with adequate spontaneous ventilation    Post pain: adequate analgesia    Post assessment: tolerated procedure well and no apparent anesthetic complications    Post vital signs: stable    Level of consciousness: sedated    Nausea/Vomiting: no nausea/vomiting    Complications: none    Transfer of care protocol was followed      Last vitals: Visit Vitals  /73   Pulse (!) 53   Temp 36.4 °C (97.5 °F) (Skin)   Resp 14   Wt 107 kg (236 lb)   LMP 06/07/2018   SpO2 96%   Breastfeeding No   BMI 38.09 kg/m²

## 2025-05-27 NOTE — PT/OT/SLP EVAL
Physical Therapy Evaluation    Patient Name:  Mercedes Laura   MRN:  [id number]    Recommendations:     Discharge Recommendations: Low Intensity Therapy   Discharge Equipment Recommendations: none   Barriers to discharge: None    Assessment:     Mercedes Laura is a 65 y.o. female admitted with a medical diagnosis of <principal problem not specified>.  She presents with the following impairments/functional limitations: weakness, impaired endurance, impaired functional mobility, gait instability, decreased lower extremity function, pain, orthopedic precautions .    Pt seen at post 08, alert and agreeable to PT. C/o L thigh and posterior knee pain. Thera ex in supine. Min assist to sit EOB. Stood with RW min/mod assist and few steps to chair with c/o LE's weakness. OOB chair and reclined. Encouraged thera ex..  Pt will require second visit    Rehab Prognosis: Good; patient would benefit from acute skilled PT services to address these deficits and reach maximum level of function.    Recent Surgery: Procedure(s) (LRB):  ROBOTIC ARTHROPLASTY, KNEE, TOTAL (Left) * Day of Surgery *    Plan:     During this hospitalization, patient to be seen BID to address the identified rehab impairments via gait training, therapeutic activities, therapeutic exercises, neuromuscular re-education and progress toward the following goals:    Plan of Care Expires:  05/30/25    Subjective     Chief Complaint: pain L thigh and posterior knee  C/o LE's weakness  Patient/Family Comments/goals: get well  Pain/Comfort:  Pain Rating 1:  (not rated)  Location - Side 1: Left  Location 1: thigh  Pain Addressed 1: Reposition, Distraction, Cessation of Activity, Nurse notified    Patients cultural, spiritual, Advent conflicts given the current situation:      Living Environment:  Home with spouse- has 15 steps to enter with lift. 14 steps to bedroom but will stay on first floor  Prior to admission, patients level of function was amb/indep.  Equipment used at  home: none.  DME owned (not currently used): rolling walker.  Upon discharge, patient will have assistance from family.    Objective:     Communicated with nurse Gonzalez prior to session.  Patient found HOB elevated with    upon PT entry to room.    General Precautions: Standard, fall  Orthopedic Precautions:LLE weight bearing as tolerated   Braces: N/A  Respiratory Status: Room air    Exams:  Postural Exam:  Patient presented with the following abnormalities:    -       Rounded shoulders  -       Forward head  -       BMI 38.09  RLE ROM: WFL  RLE Strength: Deficits: 3/5  LLE ROM: LK flexion ~80*  LLE Strength: Deficits: 3/5    Functional Mobility:  Bed Mobility:     Scooting: minimum assistance  Supine to Sit: minimum assistance  Transfers:     Sit to Stand:  minimum assistance with rolling walker  Bed to Chair: minimum assistance and moderate assistance with  rolling walker and taking few steps to chair  using  Stand Pivot      AM-PAC 6 CLICK MOBILITY  Total Score:13       Treatment & Education:  Patient was educated on the importance of OOB activity and functional mobility to negate negative effects of prolonged bed rest during hospitalization, safe transfers and ambulation, and D/C planning   Thera ex with AP,QS/GS,SLR  OOB chair min/mod assist with RW    Patient left up in chair with all lines intact, call button in reach, nurse Shayy notified, and spouse present.    GOALS:   Multidisciplinary Problems       Physical Therapy Goals          Problem: Physical Therapy    Goal Priority Disciplines Outcome Interventions   Physical Therapy Goal     PT, PT/OT Progressing    Description: Goals to be met by: 2025     Patient will increase functional independence with mobility by performin. Supine to sit with Contact Guard Assistance  2. Sit to stand transfer with Contact Guard Assistance  3. Bed to chair transfer with Contact Guard Assistance using Rolling Walker  4. Gait  x 250 feet with  Contact Guard Assistance using Rolling Walker.   5. Lower extremity exercise program x20 reps                       DME Justifications:  No DME recommended requiring DME justifications    History:     Past Medical History:   Diagnosis Date    Anxiety     this came on since lap kadeem sgy    Arthritis     Colon polyp     Deviated septum     GERD (gastroesophageal reflux disease)     Hypertension     Leg cramps     PAC (premature atrial contraction)     Plantar fasciitis     PMB (postmenopausal bleeding) 05/2018    Seasonal allergies     Sinus disorder     Uterine leiomyoma 05/2018    Varicose veins of both lower extremities        Past Surgical History:   Procedure Laterality Date    BREAST BIOPSY Left 2015    benign    CHOLECYSTECTOMY  6-1-2015    Dr Ashraf    COLONOSCOPY N/A 11/23/2015    Procedure: COLONOSCOPY;  Surgeon: Theo Stinson MD;  Location: [facility];  Service: Endoscopy;  Laterality: N/A; repeat in 5 years for surveillance    COLONOSCOPY N/A 7/14/2021    Procedure: COLONOSCOPY;  Surgeon: Theo Stinson MD;  Location: [facility];  Service: Endoscopy;  Laterality: N/A;    CYSTOSCOPY N/A 6/7/2018    Procedure: CYSTOSCOPY;  Surgeon: Anaya Caldera MD;  Location: [facility] OR;  Service: OB/GYN;  Laterality: N/A;    ESOPHAGOGASTRODUODENOSCOPY N/A 3/4/2019    Procedure: EGD (ESOPHAGOGASTRODUODENOSCOPY);  Surgeon: Theo Stinson MD;  Location: [facility];  Service: Endoscopy;  Laterality: N/A;    HYSTERECTOMY      KNEE ARTHROSCOPY W/ MENISCECTOMY  11/15/2000    right     LAPAROSCOPIC SALPINGO-OOPHORECTOMY Bilateral 6/7/2018    Procedure: [id number];  Surgeon: Anaya Caldera MD;  Location: [facility] OR;  Service: OB/GYN;  Laterality: Bilateral;    LAPAROSCOPIC TOTAL HYSTERECTOMY N/A 6/7/2018    Procedure: HYSTERECTOMY-TOTAL LAPAROSCOPIC (TLH);  Surgeon: Anaya Caldera MD;  Location: [facility] OR;  Service: OB/GYN;  Laterality: N/A;    OOPHORECTOMY      ROBOTIC ARTHROPLASTY, KNEE  Right 5/28/2024    Procedure: ROBOTIC ARTHROPLASTY, KNEE, TOTAL;  Surgeon: Chu Whitman MD;  Location: [facility];  Service: Orthopedics;  Laterality: Right;  [place]    UPPER GASTROINTESTINAL ENDOSCOPY  06/22/2015    Dr. Cabrera       Time Tracking:     PT Received On: 05/27/25  PT Start Time: 1419     PT Stop Time: 1441  PT Total Time (min): 22 min     Billable Minutes: Evaluation 10 and Therapeutic Exercise 12      05/27/2025

## 2025-05-28 ENCOUNTER — NURSE TRIAGE (OUTPATIENT)
Dept: ADMINISTRATIVE | Facility: CLINIC | Age: 65
End: 2025-05-28
Payer: COMMERCIAL

## 2025-05-28 ENCOUNTER — RESULTS FOLLOW-UP (OUTPATIENT)
Dept: FAMILY MEDICINE | Facility: CLINIC | Age: 65
End: 2025-05-28

## 2025-05-28 VITALS
HEART RATE: 60 BPM | TEMPERATURE: 98 F | WEIGHT: 236 LBS | OXYGEN SATURATION: 95 % | BODY MASS INDEX: 38.09 KG/M2 | DIASTOLIC BLOOD PRESSURE: 75 MMHG | SYSTOLIC BLOOD PRESSURE: 141 MMHG | RESPIRATION RATE: 16 BRPM

## 2025-05-28 NOTE — TELEPHONE ENCOUNTER
Pt reports she had a total knee replacement yesterday. Pt reports fever ranging from 100.2-100.7 with last temperature of 100.7. pt also reports redness to face and itchy eyes. Pt reports she had spinal block while in surgery yesterday and is asking if that could cause some of her symptoms. Care advice to see physician within 4 hours or PCP triage. On call orthopedics, Dewey Shearer, contacted regarding pt. MD verbalized pt should not be concerned with fever because low grade fever can occur 24-48 hours after surgery. MD verbalized other symptoms could be from an allergic reaction and pt can take Benadryl 50 mg now then 25 mg after first dose. Pt made aware and verbalizes understanding.   Reason for Disposition   Fever > 100.4 F (38.0 C)    Additional Information   Negative: SEVERE difficulty breathing (e.g., struggling for each breath, speaks in single words)   Negative: [1] Sudden-onset knee pain AND [2] unable to bear weight   Negative: Sounds like a life-threatening emergency to the triager   Negative: [1] Incision is gaping open AND [2] can see deep tissues (bone, implant)   Negative: [1] Bleeding from incision AND [2] won't stop after 10 minutes of direct pressure   Negative: [1] Widespread rash AND [2] bright red, sunburn-like   Negative: Sounds like a serious complication to the triager   Negative: [1] SEVERE headache (i.e., pain scale 8-10, excruciating) AND [2] after spinal (epidural) anesthesia   Negative: [1] Drinking very little AND [2] dehydration suspected (e.g., no urine > 12 hours, very dry mouth, very lightheaded)   Negative: [1] Incision gaping open AND [2] length of opening > 2 inches (5 cm)    Protocols used: Post-Op Total Knee Replacement Follow-up Call-A-PRANEETH

## 2025-06-03 DIAGNOSIS — M17.12 PRIMARY OSTEOARTHRITIS OF LEFT KNEE: Primary | ICD-10-CM

## 2025-06-09 ENCOUNTER — OFFICE VISIT (OUTPATIENT)
Dept: ORTHOPEDICS | Facility: CLINIC | Age: 65
End: 2025-06-09
Payer: COMMERCIAL

## 2025-06-09 ENCOUNTER — HOSPITAL ENCOUNTER (OUTPATIENT)
Dept: RADIOLOGY | Facility: HOSPITAL | Age: 65
Discharge: HOME | End: 2025-06-09
Attending: ORTHOPAEDIC SURGERY
Payer: COMMERCIAL

## 2025-06-09 VITALS — RESPIRATION RATE: 18 BRPM | HEIGHT: 66 IN | WEIGHT: 235.88 LBS | BODY MASS INDEX: 37.91 KG/M2

## 2025-06-09 DIAGNOSIS — Z96.652 STATUS POST TOTAL KNEE REPLACEMENT USING CEMENT, LEFT: Primary | ICD-10-CM

## 2025-06-09 DIAGNOSIS — M17.12 PRIMARY OSTEOARTHRITIS OF LEFT KNEE: ICD-10-CM

## 2025-06-09 PROCEDURE — 1160F RVW MEDS BY RX/DR IN RCRD: CPT | Mod: CPTII,S$GLB,, | Performed by: ORTHOPAEDIC SURGERY

## 2025-06-09 PROCEDURE — 1159F MED LIST DOCD IN RCRD: CPT | Mod: CPTII,S$GLB,, | Performed by: ORTHOPAEDIC SURGERY

## 2025-06-09 PROCEDURE — 73560 X-RAY EXAM OF KNEE 1 OR 2: CPT | Mod: 26,LT,, | Performed by: RADIOLOGY

## 2025-06-09 PROCEDURE — 99999 PR PBB SHADOW E&M-EST. PATIENT-LVL IV: CPT | Mod: PBBFAC,,, | Performed by: ORTHOPAEDIC SURGERY

## 2025-06-09 PROCEDURE — 73560 X-RAY EXAM OF KNEE 1 OR 2: CPT | Mod: TC,PO,LT

## 2025-06-09 PROCEDURE — 3044F HG A1C LEVEL LT 7.0%: CPT | Mod: CPTII,S$GLB,, | Performed by: ORTHOPAEDIC SURGERY

## 2025-06-09 PROCEDURE — 99024 POSTOP FOLLOW-UP VISIT: CPT | Mod: S$GLB,,, | Performed by: ORTHOPAEDIC SURGERY

## 2025-06-09 NOTE — PROGRESS NOTES
Past Medical History:   Diagnosis Date    Anxiety     this came on since lap kadeem sgy    Arthritis     Colon polyp     Deviated septum     GERD (gastroesophageal reflux disease)     Hypertension     Leg cramps     PAC (premature atrial contraction)     Plantar fasciitis     PMB (postmenopausal bleeding) 05/2018    Seasonal allergies     Sinus disorder     Uterine leiomyoma 05/2018    Varicose veins of both lower extremities        Past Surgical History:   Procedure Laterality Date    BREAST BIOPSY Left 2015    benign    CHOLECYSTECTOMY  6-1-2015    Dr Ashraf    COLONOSCOPY N/A 11/23/2015    Procedure: COLONOSCOPY;  Surgeon: Theo Stinson MD;  Location: [facility];  Service: Endoscopy;  Laterality: N/A; repeat in 5 years for surveillance    COLONOSCOPY N/A 7/14/2021    Procedure: COLONOSCOPY;  Surgeon: Theo Stinson MD;  Location: [facility];  Service: Endoscopy;  Laterality: N/A;    CYSTOSCOPY N/A 6/7/2018    Procedure: CYSTOSCOPY;  Surgeon: Anaya Caldera MD;  Location: [facility] OR;  Service: OB/GYN;  Laterality: N/A;    ESOPHAGOGASTRODUODENOSCOPY N/A 3/4/2019    Procedure: EGD (ESOPHAGOGASTRODUODENOSCOPY);  Surgeon: Theo Stinson MD;  Location: [facility];  Service: Endoscopy;  Laterality: N/A;    HYSTERECTOMY      KNEE ARTHROSCOPY W/ MENISCECTOMY  11/15/2000    right     LAPAROSCOPIC SALPINGO-OOPHORECTOMY Bilateral 6/7/2018    Procedure: [id number];  Surgeon: Anaya Caldera MD;  Location: [facility] OR;  Service: OB/GYN;  Laterality: Bilateral;    LAPAROSCOPIC TOTAL HYSTERECTOMY N/A 6/7/2018    Procedure: HYSTERECTOMY-TOTAL LAPAROSCOPIC (TLH);  Surgeon: Anaya Caldera MD;  Location: [facility] OR;  Service: OB/GYN;  Laterality: N/A;    OOPHORECTOMY      ROBOTIC ARTHROPLASTY, KNEE Right 5/28/2024    Procedure: ROBOTIC ARTHROPLASTY, KNEE, TOTAL;  Surgeon: Chu Whitman MD;  Location: [facility] OR;  Service: Orthopedics;  Laterality: Right;  [facility]  ARTHROPLASTY, KNEE Left 5/27/2025    Procedure: ROBOTIC ARTHROPLASTY, KNEE, TOTAL;  Surgeon: Chu Whitman MD;  Location: [facility];  Service: Orthopedics;  Laterality: Left;  maribell    UPPER GASTROINTESTINAL ENDOSCOPY  06/22/2015    Dr. Cabrera       Current Outpatient Medications   Medication Sig    acetaminophen (TYLENOL) 500 MG tablet Take 2 tablets (1,000 mg total) by mouth every 8 (eight) hours as needed for Pain.    acetaminophen (TYLENOL) 500 MG tablet Take 2 tablets (1,000 mg total) by mouth every 8 (eight) hours as needed for Pain.    acetaminophen (TYLENOL) 650 MG TbSR 650 mg.    ascorbic acid/zinc (ZINC WITH VITAMIN C ORAL) Take by mouth.    aspirin (ECOTRIN) 81 MG EC tablet Take 1 tablet (81 mg total) by mouth 2 (two) times daily.    aspirin (VAZALORE) 325 mg Cap Take by mouth. PRN    b complex vitamins capsule Take 1 capsule by mouth once daily.    biotin 10 mg Tab Take 100 mg by mouth once daily.     calcium carbonate (TUMS) 200 mg calcium (500 mg) chewable tablet Take 1 tablet by mouth daily as needed for Heartburn.    econazole nitrate 1 % cream Apply to inner thighs and buttocks BID PRN flare    enzymes,digestive (DIGESTIVE ENZYMES ORAL) Take by mouth.    estradioL (ESTRACE) 0.01 % (0.1 mg/gram) vaginal cream INSERT 1 GRAM VAGINALLY EVERY MONDAY AND THURSDAY    ibuprofen (ADVIL,MOTRIN) 600 MG tablet Take 1 tablet (600 mg total) by mouth 3 (three) times daily as needed for Pain.    ibuprofen (ADVIL,MOTRIN) 600 MG tablet Take 1 tablet (600 mg total) by mouth 3 (three) times daily as needed for Pain.    lactobacillus combination no.4 (PROBIOTIC) 3 billion cell Cap Take 1 application by mouth 2 (two) times a day.    loratadine (CLARITIN) 10 mg tablet Take 10 mg by mouth daily as needed for Allergies.    magnesium glycinate 100 mg Tab Take by mouth every evening.    meloxicam (MOBIC) 7.5 MG tablet Take 1 tablet (7.5 mg total) by mouth once daily.    mometasone (NASONEX) 50 mcg/actuation nasal  spray 2 sprays by Nasal route daily as needed (rhinitis).    multivitamin (THERAGRAN) per tablet Take 1 tablet by mouth once daily.    mupirocin (BACTROBAN) 2 % ointment Apply to wounds on arms BID    NON FORMULARY MEDICATION THERA TEARS EYE NUTRITION OMEGA 3    olmesartan-hydrochlorothiazide (BENICAR HCT) 40-12.5 mg Tab Take 1 tablet by mouth once daily.    olopatadine (PATANOL) 0.1 % ophthalmic solution 1 drop.    ondansetron (ZOFRAN) 4 MG tablet Take 1 tablet (4 mg total) by mouth every 6 (six) hours as needed for Nausea.    ondansetron (ZOFRAN) 4 MG tablet Take 1 tablet (4 mg total) by mouth every 6 (six) hours as needed for Nausea.    oxyCODONE-acetaminophen (PERCOCET) 5-325 mg per tablet Take 1 tablet by mouth every 6 (six) hours as needed for Pain.    potassium chloride (MICRO-K) 10 MEQ CpSR TAKE 1 CAPSULE DAILY    RABEprazole (ACIPHEX) 20 mg tablet Take 1 tablet (20 mg total) by mouth once daily.    sucralfate (CARAFATE) 100 mg/mL suspension Take 10 mLs (1 g total) by mouth 4 (four) times daily before meals and nightly.    tretinoin (RETIN-A) 0.05 % cream Thin film to entire face at bedtime    valacyclovir HCl (VALTREX ORAL) Take by mouth.    vit C,J-Gc-rqskv-lutein-zeaxan (PRESERVISION AREDS-2) 250-90-40-1 mg Cap Take by mouth.    vitamin D (VITAMIN D3) 1000 units Tab Take 2 Units by mouth once daily.     acyclovir 5% (ZOVIRAX) 5 % Crea Apply topically 5 (five) times daily. (Patient not taking: Reported on 5/14/2025)     No current facility-administered medications for this visit.       Review of patient's allergies indicates:   Allergen Reactions    Augmentin [amoxicillin-pot clavulanate] Nausea And Vomiting     Gi upset    Adhesive Rash    Bentyl [dicyclomine] Other (See Comments)     Worsening anxiety    Celebrex [celecoxib]      Patient states was told not to take a long time ago    Lexapro [escitalopram oxalate] Anxiety     Bad dreams, feeling of doom, sweats    Naprosyn [naproxen] Other (See Comments)      Makes her tired and loopy    Omeprazole Other (See Comments)     Other reaction(s): ABDOMINAL PAIN    Sulfa (sulfonamide antibiotics) Rash     Other reaction(s): measle like rash       Family History   Problem Relation Name Age of Onset    Hypertension Mother      Arthritis Mother      Hypertension Father      Cancer Father          melanoma    Melanoma Father      COPD Father      Heart disease Father          chf    Diverticulitis Maternal Grandmother      Arthritis Maternal Aunt      Diabetes Maternal Aunt      Hypertension Maternal Aunt      Arthritis Maternal Uncle      Breast cancer Neg Hx      Ovarian cancer Neg Hx      Psoriasis Neg Hx      Lupus Neg Hx      Eczema Neg Hx      Colon cancer Neg Hx      Colon polyps Neg Hx      Crohn's disease Neg Hx      Esophageal cancer Neg Hx      Stomach cancer Neg Hx         Social History[1]    Chief Complaint:   Chief Complaint   Patient presents with    Post-op Evaluation     S/p left TKA. Dos 5/27/25       Date of surgery:  May 27, 2025    History of present illness:  65-year-old female underwent left total knee arthroplasty.  She is doing well.  Has a little bruising and swelling.  Pain is a 2/10.  Patient is eager to start outpatient physical therapy.      Review of Systems:    Musculoskeletal:  See HPI        Physical Examination:    Vital Signs:    Vitals:    06/09/25 0935   Resp: 18       Body mass index is 38.07 kg/m².    This a well-developed, well nourished patient in no acute distress.  They are alert and oriented and cooperative to examination.  Pt. walks without an antalgic gait.      Examination left knee shows well-healing surgical incision.  No erythema drainage.  Mild swelling.  Range of motion is about 10° to 90°.  No calf pain    X-rays:  Two views left knee are ordered and review which show well-aligned total knee arthroplasty components without complication     Assessment::  status post left total knee arthroplasty    Plan:  I reviewed the  x-ray with her today.  We will transition outpatient physical therapy.  Patient may get the incision wet.  Follow up in a month.    This note was created using [employer] voice recognition software that occasionally misinterpreted phrases or words.           [1]   Social History  Socioeconomic History    Marital status:    Tobacco Use    Smoking status: Never    Smokeless tobacco: Never   Substance and Sexual Activity    Alcohol use: Yes     Comment: rarely / few times per yr    Drug use: No    Sexual activity: Yes     Partners: Male     Birth control/protection: None, Post-menopausal     Social Drivers of Health     Physical Activity: Insufficiently Active (9/1/2020)    Exercise Vital Sign     Days of Exercise per Week: 7 days     Minutes of Exercise per Session: 10 min   Stress: No Stress Concern Present (9/1/2020)    [employer] - Occupational Stress Questionnaire     Feeling of Stress : Not at all

## 2025-06-16 ENCOUNTER — PATIENT MESSAGE (OUTPATIENT)
Dept: FAMILY MEDICINE | Facility: CLINIC | Age: 65
End: 2025-06-16
Payer: COMMERCIAL

## 2025-06-17 ENCOUNTER — EXTERNAL HOME HEALTH (OUTPATIENT)
Dept: HOME HEALTH SERVICES | Facility: HOSPITAL | Age: 65
End: 2025-06-17
Payer: COMMERCIAL

## 2025-06-23 ENCOUNTER — TELEPHONE (OUTPATIENT)
Facility: CLINIC | Age: 65
End: 2025-06-23
Payer: COMMERCIAL

## 2025-06-23 ENCOUNTER — PATIENT MESSAGE (OUTPATIENT)
Dept: ORTHOPEDICS | Facility: CLINIC | Age: 65
End: 2025-06-23
Payer: COMMERCIAL

## 2025-06-23 ENCOUNTER — TELEPHONE (OUTPATIENT)
Dept: ORTHOPEDICS | Facility: CLINIC | Age: 65
End: 2025-06-23
Payer: COMMERCIAL

## 2025-06-23 ENCOUNTER — HOSPITAL ENCOUNTER (OUTPATIENT)
Dept: RADIOLOGY | Facility: HOSPITAL | Age: 65
Discharge: HOME | End: 2025-06-23
Attending: ORTHOPAEDIC SURGERY
Payer: COMMERCIAL

## 2025-06-23 DIAGNOSIS — M79.89 SWELLING OF LEFT LOWER EXTREMITY: ICD-10-CM

## 2025-06-23 DIAGNOSIS — Z91.89 AT HIGH RISK FOR BREAST CANCER: Primary | ICD-10-CM

## 2025-06-23 DIAGNOSIS — M79.89 SWELLING OF LEFT LOWER EXTREMITY: Primary | ICD-10-CM

## 2025-06-23 PROCEDURE — 93971 EXTREMITY STUDY: CPT | Mod: TC,LT

## 2025-06-23 PROCEDURE — 93971 EXTREMITY STUDY: CPT | Mod: 26,LT,, | Performed by: RADIOLOGY

## 2025-06-23 NOTE — TELEPHONE ENCOUNTER
Called and spoke to Mar at [facility].  Reports swelling and post op TKA lower leg.  Called and discussed with patient.  Agreed to US lower ext to r/o DVT.     Asa

## 2025-06-23 NOTE — TELEPHONE ENCOUNTER
----- Message from Casey sent at 6/23/2025 12:56 PM CDT -----  Mar / [facility] calling:    Patient's left post op leg is swelling--Muscle tightness    Caller-- [phone]

## 2025-06-23 NOTE — NURSING
Oncology Navigation   Intake  Cancer Type: Breast (high risk breast)  Type of Referral: External  Date of Referral: 06/23/25  Initial Nurse Navigator Contact: 06/23/25  Referral to Initial Contact Timeline (days): 0  Appointment Date: 06/30/25     Treatment                              Acuity      Follow Up  No follow-ups on file.     Spoke with pt in regards to referral to high risk breast clinic from Dr. Llamas. Appt date time and location was given to pt. She verbalized understanding.

## 2025-06-27 NOTE — PROGRESS NOTES
History:     Reason For Consultation:   Abnormal mammogram and Breast Biopsy    Referring Provider:   Melissa Mulligan, MICA  [address] [facility]  [address] [place] [id number]    Records Obtained: Records of the patients history including those obtained from the referring provider were reviewed and summarized in detail.    HPI:   Mercedes Laura presents for consultation of abnormal right breast mammogram and breast calcifications. She is postmenopausal.  Patient had an abnormal screening mammogram in Jan 2025 which led to a biopsy on the right breast.but revealed a Tyrer-Cuzick score of 7.98%.     Recent left knee replacement still healing.    Never smoker; Second hand smoking; No regular ETOH; PT 2-3 days a week.  Breast cancer specific history:  Periods started at age: 14  Number of pregnancies: 2; age of first live birth: 22; 26  Number of prior breast biopsies: Right breast    BREAST, RIGHT, CALCIFICATIONS, CORE BIOPSY:   -MICROCALCIFICATIONS ASSOCIATED WITH FIBROADENOMATOID CHANGE_   June 30, 2016 Left breast Mass- Fibroadenoma  History of breast feeding: Yes - 9 mths  Family members with breast or ovarian cancer: Maternal Grandmother breast cancer; Maternal Grandfather lung cancer; Father Melanoma;   Uterus and ovaries intact: No: Full Hyst  Supplemental hormone therapy: No    Past Medical   Past Medical History:   Diagnosis Date    Anxiety     this came on since lap kadeem sgy    Arthritis     Colon polyp     Deviated septum     GERD (gastroesophageal reflux disease)     Hypertension     Leg cramps     PAC (premature atrial contraction)     Plantar fasciitis     PMB (postmenopausal bleeding) 05/2018    Seasonal allergies     Sinus disorder     Uterine leiomyoma 05/2018    Varicose veins of both lower extremities    Problem List[1]  Social History   Social History[2]  Family History  Family History   Problem Relation Name Age of Onset    Hypertension Mother      Arthritis Mother      Hypertension Father       Cancer Father          melanoma    Melanoma Father      COPD Father      Heart disease Father          chf    Arthritis Maternal Aunt      Diabetes Maternal Aunt      Hypertension Maternal Aunt      Arthritis Maternal Uncle      Diverticulitis Maternal Grandmother      Lung cancer Maternal Grandfather      Breast cancer Neg Hx      Ovarian cancer Neg Hx      Psoriasis Neg Hx      Lupus Neg Hx      Eczema Neg Hx      Colon cancer Neg Hx      Colon polyps Neg Hx      Crohn's disease Neg Hx      Esophageal cancer Neg Hx      Stomach cancer Neg Hx       Medications  Current Medications[3]  Allergies  Review of patient's allergies indicates:   Allergen Reactions    Augmentin [amoxicillin-pot clavulanate] Nausea And Vomiting     Gi upset    Adhesive Rash    Bentyl [dicyclomine] Other (See Comments)     Worsening anxiety    Celebrex [celecoxib]      Patient states was told not to take a long time ago    Lexapro [escitalopram oxalate] Anxiety     Bad dreams, feeling of doom, sweats    Naprosyn [naproxen] Other (See Comments)     Makes her tired and loopy    Omeprazole Other (See Comments)     Other reaction(s): ABDOMINAL PAIN    Sulfa (sulfonamide antibiotics) Rash     Other reaction(s): measle like rash     Review of Systems  Review of Systems   Constitutional:  Negative for fever and unexpected weight change.   HENT:  Negative for postnasal drip and sore throat.    Eyes:  Negative for visual disturbance.   Respiratory:  Negative for cough and shortness of breath.    Cardiovascular:  Negative for chest pain and leg swelling.   Gastrointestinal:  Negative for abdominal pain, blood in stool, constipation, diarrhea, nausea and vomiting.   Genitourinary:  Negative for dysuria and hematuria.   Musculoskeletal:  Positive for arthralgias, gait problem and joint swelling. Negative for neck stiffness.        Left knee replacement healing   Skin:  Negative for color change and rash.   Neurological:  Negative for headaches.    Hematological:  Does not bruise/bleed easily.       Objective:     Vitals:    06/30/25 1344   BP: (!) 152/71   Pulse: (!) 59   Resp: 16   Temp: 98.6 °F (37 °C)   Weight: 106.5 kg (234 lb 12.8 oz)     Body surface area is 2.23 meters squared.  Physical Exam  Constitutional:       General: She is not in acute distress.     Appearance: Normal appearance. She is well-developed.   HENT:      Head: Normocephalic and atraumatic.      Right Ear: Hearing, tympanic membrane, ear canal and external ear normal.      Left Ear: Hearing, tympanic membrane, ear canal and external ear normal.      Nose: Nose normal.      Mouth/Throat:      Mouth: Mucous membranes are moist.      Pharynx: Uvula midline.   Eyes:      Conjunctiva/sclera: Conjunctivae normal.      Pupils: Pupils are equal, round, and reactive to light.   Neck:      Thyroid: No thyroid mass or thyromegaly.      Trachea: Trachea normal.   Cardiovascular:      Rate and Rhythm: Normal rate and regular rhythm.      Pulses: Normal pulses.      Heart sounds: Normal heart sounds, S1 normal and S2 normal.   Pulmonary:      Breath sounds: Normal breath sounds. No wheezing, rhonchi or rales.   Abdominal:      General: Bowel sounds are normal. There is no distension.      Tenderness: There is no guarding or rebound.   Musculoskeletal:      Right shoulder: No deformity or crepitus. Normal range of motion.      Left shoulder: No deformity or crepitus. Normal range of motion.      Cervical back: Neck supple.   Lymphadenopathy:      Cervical: No cervical adenopathy.      Upper Body:      Right upper body: No supraclavicular adenopathy.      Left upper body: No supraclavicular adenopathy.   Skin:     General: Skin is warm and dry.      Capillary Refill: Capillary refill takes less than 2 seconds.      Findings: No lesion or rash.      Nails: There is no clubbing.   Neurological:      Mental Status: She is alert and oriented to person, place, and time.      Sensory: No sensory deficit.       Motor: No tremor.   Psychiatric:         Speech: Speech normal.         Behavior: Behavior normal.           Objective   Labs and Imaging  Results for orders placed or performed in visit on 05/14/25   Comprehensive Metabolic Panel    Collection Time: 05/14/25  8:42 AM   Result Value Ref Range    Sodium 139 136 - 145 mmol/L    Potassium 4.2 3.5 - 5.1 mmol/L    Chloride 105 95 - 110 mmol/L    CO2 25 23 - 29 mmol/L    Glucose 103 70 - 110 mg/dL    BUN 23 8 - 23 mg/dL    Creatinine 0.8 0.5 - 1.4 mg/dL    Calcium 9.9 8.7 - 10.5 mg/dL    Protein Total 7.6 6.0 - 8.4 gm/dL    Albumin 4.4 3.5 - 5.2 g/dL    Bilirubin Total 0.6 0.1 - 1.0 mg/dL    ALP 64 40 - 150 unit/L    AST 19 11 - 45 unit/L    ALT 17 10 - 44 unit/L    Anion Gap 9 8 - 16 mmol/L    eGFR >60 >60 mL/min/1.73/m2   Lipid Panel    Collection Time: 05/14/25  8:42 AM   Result Value Ref Range    Cholesterol Total 208 (H) 120 - 199 mg/dL    Triglyceride 92 30 - 150 mg/dL    HDL Cholesterol 52 40 - 75 mg/dL    LDL Cholesterol 137.6 63.0 - 159.0 mg/dL    HDL/Cholesterol Ratio 25.0 20.0 - 50.0 %    Cholesterol/HDL Ratio 4.0 2.0 - 5.0    Non HDL Cholesterol 156 mg/dL   Hemoglobin A1C    Collection Time: 05/14/25  8:42 AM   Result Value Ref Range    Hemoglobin A1c 5.8 (H) 4.0 - 5.6 %    Estimated Average Glucose 120 68 - 131 mg/dL   CBC with Differential    Collection Time: 05/14/25  8:42 AM   Result Value Ref Range    WBC 6.37 3.90 - 12.70 K/uL    RBC 4.75 4.00 - 5.40 M/uL    Hgb 13.6 12.0 - 16.0 gm/dL    Hct 41.7 37.0 - 48.5 %    MCV 88 82 - 98 fL    MCH 28.6 27.0 - 31.0 pg    MCHC 32.6 32.0 - 36.0 g/dL    RDW 12.9 11.5 - 14.5 %    Platelet Count 267 150 - 450 K/uL    MPV 9.5 9.2 - 12.9 fL    Nucleated RBC 0 <=0 /100 WBC    Neut % 52.5 38 - 73 %    Lymph % 36.7 18 - 48 %    Mono % 8.0 4 - 15 %    Eos % 2.0 0 - 8 %    Basophil % 0.5 <=1.9 %    Imm Grans % 0.3 0.0 - 0.5 %    Neut # 3.3 1.8 - 7.7 K/uL    Lymph # 2.34 1 - 4.8 K/uL    Mono # 0.51 0.3 - 1 K/uL    Eos #  0.13 <=0.5 K/uL    Baso # 0.03 <=0.2 K/uL    Imm Grans # 0.02 0.00 - 0.04 K/uL       Diagnostic Mammogram 1/7/2025:  Impression:  Right  Calcifications: Right breast calcifications at the outer position. Assessment: 0 - Incomplete. Special Views: Magnification View is recommended.   Left  There is no mammographic evidence of malignancy in the left breast.  BI-RADS Category:   Overall: 0 - Incomplete: Needs Additional Imaging Evaluation   Recommendation:  Diagnostic mammogram including magnification views is recommended.    Screening Mammogram 1/2/2025:  Impression:  Right  Calcifications: Right breast calcifications at the outer position. Assessment: 0 - Incomplete. Special Views: Magnification View is recommended.   Left  There is no mammographic evidence of malignancy in the left breast.  BI-RADS Category:   Overall: 0 - Incomplete: Needs Additional Imaging Evaluation   Recommendation:  Diagnostic mammogram including magnification views is recommended.     Breast pathology and imaging as above.     Assessment:   1. Abnormal Mammogram Right breast biopsy   * Tyrer-Cuzick (TC) lifetime risk of 7.98%    * Reviewed Lifestyle modifications which have shown benefit:  Limit alcohol consumption to less than 1 drink per day (1 ounce liquor, 6 oz wine, 8 oz beer)  Exercise at least 150 minutes per week of moderate intensity aerobic activity or at least 75 minutes of vigorous activity. Exercise can lower the relative risk of breast cancer by ~18-20%.  Maintain healthy weight and avoid post-menopausal weight gain.  We also discussed that obesity is linked to a higher risk of breast cancer; therefore, exercise is very important. I recommended proper nutrition with fresh fruits and vegetables and lean meats and avoidance of processed foods.       Plan:     1. Abnormal mammogram of right breast  Mammo Digital Diagnostic W JOSÉ MIGUEL W Contrast Right (XPD)      2. Breast calcification, right  Ambulatory referral to Breast Clinic         Abnormal mammogram of right breast  Screening recommended with annual 3D mammogram and clinical breast exam every 6 months Repeat Mammogram with Jacob and CT Contrast now for 6 mths follow up.  Regular self breast exams.  Lifestyle modifications as detailed above.     Breast calcification, right  Screening recommended with annual 3D mammogram and clinical breast exam every 6 months Repeat Mammogram with Jacob and CT Contrast now for 6 mths follow up.  Regular self breast exams.  Lifestyle modifications as detailed above.     Plan:      Follow up in about 6 months (around 12/30/2025).    Electronically signed by Keyla Mulligan, MSN, APRN, AGNP-C, OCN           [1]   Patient Active Problem List  Diagnosis    Arthritis    Pedal edema    Hyperglycemia    Vitamin D deficiency    Hiatal hernia with gastroesophageal reflux    Panic attacks    Good hypertension control    Anxiety    GERD (gastroesophageal reflux disease)    Cardiovascular risk factor, ASCVD 10-year risk 1.9%, 2015    Venous reflux    Raygoza's neuroma of third interspace of left foot    Hx of colonic polyps    Severe obesity (BMI 35.0-39.9) with comorbidity    Hyperlipidemia    Abnormal mammogram of right breast    Breast calcification, right   [2]   Social History  Tobacco Use    Smoking status: Never    Smokeless tobacco: Never   Substance Use Topics    Alcohol use: Yes     Comment: rarely / few times per yr    Drug use: No   [3]   Current Outpatient Medications:     acetaminophen (TYLENOL) 500 MG tablet, Take 2 tablets (1,000 mg total) by mouth every 8 (eight) hours as needed for Pain., Disp: 30 tablet, Rfl: 0    acetaminophen (TYLENOL) 500 MG tablet, Take 2 tablets (1,000 mg total) by mouth every 8 (eight) hours as needed for Pain., Disp: 30 tablet, Rfl: 0    acetaminophen (TYLENOL) 650 MG TbSR, 650 mg., Disp: , Rfl:     ascorbic acid/zinc (ZINC WITH VITAMIN C ORAL), Take by mouth., Disp: , Rfl:     aspirin (VAZALORE) 325 mg Cap, Take by mouth. PRN,  Disp: , Rfl:     b complex vitamins capsule, Take 1 capsule by mouth once daily., Disp: , Rfl:     biotin 10 mg Tab, Take 100 mg by mouth once daily. , Disp: , Rfl:     calcium carbonate (TUMS) 200 mg calcium (500 mg) chewable tablet, Take 1 tablet by mouth daily as needed for Heartburn., Disp: , Rfl:     econazole nitrate 1 % cream, Apply to inner thighs and buttocks BID PRN flare, Disp: 85 g, Rfl: 2    enzymes,digestive (DIGESTIVE ENZYMES ORAL), Take by mouth., Disp: , Rfl:     estradioL (ESTRACE) 0.01 % (0.1 mg/gram) vaginal cream, INSERT 1 GRAM VAGINALLY EVERY MONDAY AND THURSDAY, Disp: 42.5 g, Rfl: 1    ibuprofen (ADVIL,MOTRIN) 600 MG tablet, Take 1 tablet (600 mg total) by mouth 3 (three) times daily as needed for Pain., Disp: 30 tablet, Rfl: 0    ibuprofen (ADVIL,MOTRIN) 600 MG tablet, Take 1 tablet (600 mg total) by mouth 3 (three) times daily as needed for Pain., Disp: 30 tablet, Rfl: 0    lactobacillus combination no.4 (PROBIOTIC) 3 billion cell Cap, Take 1 application by mouth 2 (two) times a day., Disp: , Rfl:     loratadine (CLARITIN) 10 mg tablet, Take 10 mg by mouth daily as needed for Allergies., Disp: , Rfl:     magnesium glycinate 100 mg Tab, Take by mouth every evening., Disp: , Rfl:     meloxicam (MOBIC) 7.5 MG tablet, Take 1 tablet (7.5 mg total) by mouth once daily., Disp: 30 tablet, Rfl: 0    mometasone (NASONEX) 50 mcg/actuation nasal spray, 2 sprays by Nasal route daily as needed (rhinitis)., Disp: 51 g, Rfl: prn    multivitamin (THERAGRAN) per tablet, Take 1 tablet by mouth once daily., Disp: , Rfl:     mupirocin (BACTROBAN) 2 % ointment, Apply to wounds on arms BID, Disp: 22 g, Rfl: 1    NON FORMULARY MEDICATION, THERA TEARS EYE NUTRITION OMEGA 3, Disp: , Rfl:     olmesartan-hydrochlorothiazide (BENICAR HCT) 40-12.5 mg Tab, Take 1 tablet by mouth once daily., Disp: 90 tablet, Rfl: 3    olopatadine (PATANOL) 0.1 % ophthalmic solution, 1 drop., Disp: , Rfl:     oxyCODONE-acetaminophen  (PERCOCET) 5-325 mg per tablet, Take 1 tablet by mouth every 6 (six) hours as needed for Pain., Disp: 28 tablet, Rfl: 0    potassium chloride (MICRO-K) 10 MEQ CpSR, TAKE 1 CAPSULE DAILY, Disp: 90 capsule, Rfl: 3    RABEprazole (ACIPHEX) 20 mg tablet, Take 1 tablet (20 mg total) by mouth once daily., Disp: 90 tablet, Rfl: 3    sucralfate (CARAFATE) 100 mg/mL suspension, Take 10 mLs (1 g total) by mouth 4 (four) times daily before meals and nightly., Disp: 414 mL, Rfl: 0    tretinoin (RETIN-A) 0.05 % cream, Thin film to entire face at bedtime, Disp: 45 g, Rfl: 5    valacyclovir HCl (VALTREX ORAL), Take by mouth., Disp: , Rfl:     vit C,R-Ml-qtwed-lutein-zeaxan (PRESERVISION AREDS-2) 250-90-40-1 mg Cap, Take by mouth., Disp: , Rfl:     vitamin D (VITAMIN D3) 1000 units Tab, Take 2 Units by mouth once daily. , Disp: , Rfl:

## 2025-06-30 ENCOUNTER — OFFICE VISIT (OUTPATIENT)
Facility: CLINIC | Age: 65
End: 2025-06-30
Payer: COMMERCIAL

## 2025-06-30 VITALS
HEART RATE: 59 BPM | SYSTOLIC BLOOD PRESSURE: 152 MMHG | DIASTOLIC BLOOD PRESSURE: 71 MMHG | WEIGHT: 234.81 LBS | BODY MASS INDEX: 37.9 KG/M2 | RESPIRATION RATE: 16 BRPM | TEMPERATURE: 99 F

## 2025-06-30 DIAGNOSIS — R92.8 ABNORMAL MAMMOGRAM OF RIGHT BREAST: Primary | ICD-10-CM

## 2025-06-30 DIAGNOSIS — R92.1 BREAST CALCIFICATION, RIGHT: ICD-10-CM

## 2025-06-30 PROCEDURE — 99205 OFFICE O/P NEW HI 60 MIN: CPT | Mod: S$GLB,,, | Performed by: NURSE PRACTITIONER

## 2025-06-30 PROCEDURE — G2211 COMPLEX E/M VISIT ADD ON: HCPCS | Mod: S$GLB,,, | Performed by: NURSE PRACTITIONER

## 2025-06-30 PROCEDURE — 3008F BODY MASS INDEX DOCD: CPT | Mod: CPTII,S$GLB,, | Performed by: NURSE PRACTITIONER

## 2025-06-30 PROCEDURE — 3044F HG A1C LEVEL LT 7.0%: CPT | Mod: CPTII,S$GLB,, | Performed by: NURSE PRACTITIONER

## 2025-06-30 PROCEDURE — 3078F DIAST BP <80 MM HG: CPT | Mod: CPTII,S$GLB,, | Performed by: NURSE PRACTITIONER

## 2025-06-30 PROCEDURE — 1160F RVW MEDS BY RX/DR IN RCRD: CPT | Mod: CPTII,S$GLB,, | Performed by: NURSE PRACTITIONER

## 2025-06-30 PROCEDURE — 3077F SYST BP >= 140 MM HG: CPT | Mod: CPTII,S$GLB,, | Performed by: NURSE PRACTITIONER

## 2025-06-30 PROCEDURE — 99999 PR PBB SHADOW E&M-EST. PATIENT-LVL V: CPT | Mod: PBBFAC,,, | Performed by: NURSE PRACTITIONER

## 2025-06-30 PROCEDURE — 1159F MED LIST DOCD IN RCRD: CPT | Mod: CPTII,S$GLB,, | Performed by: NURSE PRACTITIONER

## 2025-06-30 NOTE — ASSESSMENT & PLAN NOTE
Screening recommended with annual 3D mammogram and clinical breast exam every 6 months Repeat Mammogram with Jacob and CT Contrast now for 6 mths follow up.  Regular self breast exams.  Lifestyle modifications as detailed above.

## 2025-07-17 ENCOUNTER — OFFICE VISIT (OUTPATIENT)
Dept: ORTHOPEDICS | Facility: CLINIC | Age: 65
End: 2025-07-17
Payer: COMMERCIAL

## 2025-07-17 VITALS — BODY MASS INDEX: 37.74 KG/M2 | WEIGHT: 234.81 LBS | HEIGHT: 66 IN | RESPIRATION RATE: 18 BRPM

## 2025-07-17 DIAGNOSIS — Z96.652 STATUS POST TOTAL KNEE REPLACEMENT USING CEMENT, LEFT: Primary | ICD-10-CM

## 2025-07-17 PROCEDURE — 1159F MED LIST DOCD IN RCRD: CPT | Mod: CPTII,S$GLB,, | Performed by: ORTHOPAEDIC SURGERY

## 2025-07-17 PROCEDURE — 99024 POSTOP FOLLOW-UP VISIT: CPT | Mod: S$GLB,,, | Performed by: ORTHOPAEDIC SURGERY

## 2025-07-17 PROCEDURE — 3044F HG A1C LEVEL LT 7.0%: CPT | Mod: CPTII,S$GLB,, | Performed by: ORTHOPAEDIC SURGERY

## 2025-07-17 PROCEDURE — 99999 PR PBB SHADOW E&M-EST. PATIENT-LVL IV: CPT | Mod: PBBFAC,,, | Performed by: ORTHOPAEDIC SURGERY

## 2025-07-17 NOTE — PROGRESS NOTES
Past Medical History:   Diagnosis Date    Anxiety     this came on since lap kadeem sgy    Arthritis     Colon polyp     Deviated septum     GERD (gastroesophageal reflux disease)     Hypertension     Leg cramps     PAC (premature atrial contraction)     Plantar fasciitis     PMB (postmenopausal bleeding) 05/2018    Seasonal allergies     Sinus disorder     Uterine leiomyoma 05/2018    Varicose veins of both lower extremities        Past Surgical History:   Procedure Laterality Date    BREAST BIOPSY Left 2015    benign    CHOLECYSTECTOMY  6-1-2015    Dr Ashraf    COLONOSCOPY N/A 11/23/2015    Procedure: COLONOSCOPY;  Surgeon: Theo Stinson MD;  Location: [facility];  Service: Endoscopy;  Laterality: N/A; repeat in 5 years for surveillance    COLONOSCOPY N/A 7/14/2021    Procedure: COLONOSCOPY;  Surgeon: Theo Stinson MD;  Location: [facility];  Service: Endoscopy;  Laterality: N/A;    CYSTOSCOPY N/A 6/7/2018    Procedure: CYSTOSCOPY;  Surgeon: Anaya Caldera MD;  Location: [facility] OR;  Service: OB/GYN;  Laterality: N/A;    ESOPHAGOGASTRODUODENOSCOPY N/A 3/4/2019    Procedure: EGD (ESOPHAGOGASTRODUODENOSCOPY);  Surgeon: Theo Stinson MD;  Location: [facility];  Service: Endoscopy;  Laterality: N/A;    HYSTERECTOMY      KNEE ARTHROSCOPY W/ MENISCECTOMY  11/15/2000    right     LAPAROSCOPIC SALPINGO-OOPHORECTOMY Bilateral 6/7/2018    Procedure: [id number];  Surgeon: Anaya Caldera MD;  Location: [facility] OR;  Service: OB/GYN;  Laterality: Bilateral;    LAPAROSCOPIC TOTAL HYSTERECTOMY N/A 6/7/2018    Procedure: HYSTERECTOMY-TOTAL LAPAROSCOPIC (TLH);  Surgeon: Anaya Caldera MD;  Location: [facility] OR;  Service: OB/GYN;  Laterality: N/A;    OOPHORECTOMY      ROBOTIC ARTHROPLASTY, KNEE Right 5/28/2024    Procedure: ROBOTIC ARTHROPLASTY, KNEE, TOTAL;  Surgeon: Chu Whitman MD;  Location: [facility] OR;  Service: Orthopedics;  Laterality: Right;  [employer]  Patient tripped over leash this AM at 0800, states she cannot place weight on her L leg, no LOC ARTHROPLASTY, KNEE Left 5/27/2025    Procedure: ROBOTIC ARTHROPLASTY, KNEE, TOTAL;  Surgeon: Chu Whitman MD;  Location: [facility];  Service: Orthopedics;  Laterality: Left;  maribell    UPPER GASTROINTESTINAL ENDOSCOPY  06/22/2015    Dr. Cabrera       Current Outpatient Medications   Medication Sig    acetaminophen (TYLENOL) 500 MG tablet Take 2 tablets (1,000 mg total) by mouth every 8 (eight) hours as needed for Pain.    acetaminophen (TYLENOL) 500 MG tablet Take 2 tablets (1,000 mg total) by mouth every 8 (eight) hours as needed for Pain.    acetaminophen (TYLENOL) 650 MG TbSR 650 mg.    ascorbic acid/zinc (ZINC WITH VITAMIN C ORAL) Take by mouth.    aspirin (VAZALORE) 325 mg Cap Take by mouth. PRN    b complex vitamins capsule Take 1 capsule by mouth once daily.    biotin 10 mg Tab Take 100 mg by mouth once daily.     calcium carbonate (TUMS) 200 mg calcium (500 mg) chewable tablet Take 1 tablet by mouth daily as needed for Heartburn.    econazole nitrate 1 % cream Apply to inner thighs and buttocks BID PRN flare    enzymes,digestive (DIGESTIVE ENZYMES ORAL) Take by mouth.    estradioL (ESTRACE) 0.01 % (0.1 mg/gram) vaginal cream INSERT 1 GRAM VAGINALLY EVERY MONDAY AND THURSDAY    ibuprofen (ADVIL,MOTRIN) 600 MG tablet Take 1 tablet (600 mg total) by mouth 3 (three) times daily as needed for Pain.    ibuprofen (ADVIL,MOTRIN) 600 MG tablet Take 1 tablet (600 mg total) by mouth 3 (three) times daily as needed for Pain.    lactobacillus combination no.4 (PROBIOTIC) 3 billion cell Cap Take 1 application by mouth 2 (two) times a day.    loratadine (CLARITIN) 10 mg tablet Take 10 mg by mouth daily as needed for Allergies.    magnesium glycinate 100 mg Tab Take by mouth every evening.    meloxicam (MOBIC) 7.5 MG tablet Take 1 tablet (7.5 mg total) by mouth once daily.    mometasone (NASONEX) 50 mcg/actuation nasal spray 2 sprays by Nasal route daily as needed (rhinitis).    multivitamin (THERAGRAN) per tablet  Take 1 tablet by mouth once daily.    mupirocin (BACTROBAN) 2 % ointment Apply to wounds on arms BID    NON FORMULARY MEDICATION THERA TEARS EYE NUTRITION OMEGA 3    olmesartan-hydrochlorothiazide (BENICAR HCT) 40-12.5 mg Tab Take 1 tablet by mouth once daily.    olopatadine (PATANOL) 0.1 % ophthalmic solution 1 drop.    oxyCODONE-acetaminophen (PERCOCET) 5-325 mg per tablet Take 1 tablet by mouth every 6 (six) hours as needed for Pain.    potassium chloride (MICRO-K) 10 MEQ CpSR TAKE 1 CAPSULE DAILY    RABEprazole (ACIPHEX) 20 mg tablet Take 1 tablet (20 mg total) by mouth once daily.    sucralfate (CARAFATE) 100 mg/mL suspension Take 10 mLs (1 g total) by mouth 4 (four) times daily before meals and nightly.    tretinoin (RETIN-A) 0.05 % cream Thin film to entire face at bedtime    valacyclovir HCl (VALTREX ORAL) Take by mouth.    vit C,W-Yz-hnnvh-lutein-zeaxan (PRESERVISION AREDS-2) 250-90-40-1 mg Cap Take by mouth.    vitamin D (VITAMIN D3) 1000 units Tab Take 2 Units by mouth once daily.      No current facility-administered medications for this visit.       Review of patient's allergies indicates:   Allergen Reactions    Augmentin [amoxicillin-pot clavulanate] Nausea And Vomiting     Gi upset    Adhesive Rash    Bentyl [dicyclomine] Other (See Comments)     Worsening anxiety    Celebrex [celecoxib]      Patient states was told not to take a long time ago    Lexapro [escitalopram oxalate] Anxiety     Bad dreams, feeling of doom, sweats    Naprosyn [naproxen] Other (See Comments)     Makes her tired and loopy    Omeprazole Other (See Comments)     Other reaction(s): ABDOMINAL PAIN    Sulfa (sulfonamide antibiotics) Rash     Other reaction(s): measle like rash       Family History   Problem Relation Name Age of Onset    Hypertension Mother      Arthritis Mother      Hypertension Father      Cancer Father          melanoma    Melanoma Father      COPD Father      Heart disease Father          chf    Arthritis  Maternal Aunt      Diabetes Maternal Aunt      Hypertension Maternal Aunt      Arthritis Maternal Uncle      Diverticulitis Maternal Grandmother      Lung cancer Maternal Grandfather      Breast cancer Neg Hx      Ovarian cancer Neg Hx      Psoriasis Neg Hx      Lupus Neg Hx      Eczema Neg Hx      Colon cancer Neg Hx      Colon polyps Neg Hx      Crohn's disease Neg Hx      Esophageal cancer Neg Hx      Stomach cancer Neg Hx         Social History[1]    Chief Complaint:   Chief Complaint   Patient presents with    Post-op Evaluation       S/p left TKA. Dos 5/27/25           Date of surgery:  May 27, 2025    History of present illness:  65-year-old female underwent left total knee arthroplasty.  She is doing well.  Still doing some PT at [facility].  No real pain.  Still having little swelling in the knee and leg.      Review of Systems:    Musculoskeletal:  See HPI        Physical Examination:    Vital Signs:    Vitals:    07/17/25 1047   Resp: 18       Body mass index is 37.9 kg/m².    This a well-developed, well nourished patient in no acute distress.  They are alert and oriented and cooperative to examination.  Pt. walks without an antalgic gait.      Examination left knee shows well-healed surgical incision.  No erythema drainage.  Mild swelling.  Range of motion is about 0° to 110°.  No calf pain    X-rays:  Two views left knee are review which show well-aligned left total knee arthroplasty components without complication     Assessment::  status post left total knee arthroplasty    Plan:  I reviewed the x-ray with her today.  Continue with the outpatient physical therapy.  Follow up in 8 weeks with x-rays of both knees.    This note was created using [employer] voice recognition software that occasionally misinterpreted phrases or words.             [1]   Social History  Socioeconomic History    Marital status:    Tobacco Use    Smoking status: Never    Smokeless tobacco: Never   Substance and Sexual Activity     Alcohol use: Yes     Comment: rarely / few times per yr    Drug use: No    Sexual activity: Yes     Partners: Male     Birth control/protection: None, Post-menopausal     Social Drivers of Health     Physical Activity: Insufficiently Active (9/1/2020)    Exercise Vital Sign     Days of Exercise per Week: 7 days     Minutes of Exercise per Session: 10 min   Stress: No Stress Concern Present (9/1/2020)    [employer] - Occupational Stress Questionnaire     Feeling of Stress : Not at all

## (undated) DEVICE — KIT VIZADISC KNEE TRACKING

## (undated) DEVICE — APPLICATOR CHLORAPREP ORN 26ML

## (undated) DEVICE — KIT DRAPE RIO ONE PIECE W/POCK

## (undated) DEVICE — WRAP KNEE ACCU THERM GEL PACK

## (undated) DEVICE — DRESSING MEPILEX 4X10IN

## (undated) DEVICE — PAD ABDOMINAL STERILE 8X10IN

## (undated) DEVICE — BNDG COFLEX FOAM LF2 ST 6X5YD

## (undated) DEVICE — BONE PINS (3.2MM X 110MM)
Type: IMPLANTABLE DEVICE | Site: KNEE | Status: NON-FUNCTIONAL
Removed: 2025-05-27

## (undated) DEVICE — TOURNIQUET SB QC DP 34X4IN

## (undated) DEVICE — KIT LEG POSITIONER DISPOSABLES

## (undated) DEVICE — BONE PINS (3.2MM X 110MM)
Type: IMPLANTABLE DEVICE | Site: KNEE | Status: NON-FUNCTIONAL
Removed: 2024-05-28

## (undated) DEVICE — CATH URETHRAL RED RUBBER 18FR

## (undated) DEVICE — KIT TRIATHLON TIBIAL SIZER

## (undated) DEVICE — BLADE SURG CARBON STEEL SZ11

## (undated) DEVICE — PACK EXTREMITY ORTHOMAX

## (undated) DEVICE — NDL SPINAL 18GX3.5 SPINOCAN

## (undated) DEVICE — TOGA FLYTE PEEL AWAY XLARGE

## (undated) DEVICE — GLOVE SENSICARE PI GRN 8

## (undated) DEVICE — DRESSING GAUZE OIL EMUL 3X8

## (undated) DEVICE — SUT 2/0 18IN COATED VICRYL

## (undated) DEVICE — DRAPE STERI U-SHAPED 47X51IN

## (undated) DEVICE — BLADE TONGUE DEPRESSOR STRL

## (undated) DEVICE — BLADE SURG CARBON STEEL #10

## (undated) DEVICE — BLADE SAG DUAL 18MMX1.27MMX90M

## (undated) DEVICE — SUT STRATAFIX SPRL PS-2 3-0

## (undated) DEVICE — BLADE MAKO NARROW

## (undated) DEVICE — DRAPE INCISE IOBAN 2 23X17IN

## (undated) DEVICE — GLOVE SENSICARE PI ALOE 6

## (undated) DEVICE — BANDAGE ESMARK ELASTIC ST 6X9

## (undated) DEVICE — KIT TRIATHLON CR TIB PREP SZ4

## (undated) DEVICE — SOL NORMAL USPCA 0.9%

## (undated) DEVICE — ELECTRODE MEGADYNE RETURN DUAL

## (undated) DEVICE — MANIFOLD 4 PORT

## (undated) DEVICE — PADDING CAST SPECIALIST 6X4YD

## (undated) DEVICE — GOWN TOGA SYS PEELWY ZIP 2 XL

## (undated) DEVICE — BNDG COFLEX FOAM LF2 ST 4X5YD

## (undated) DEVICE — KIT CHECKPOINT MAKO

## (undated) DEVICE — PACK CUSTOM UNIV BASIN SLI

## (undated) DEVICE — ADHESIVE DERMABOND ADVANCED

## (undated) DEVICE — LINER SUCTION 3000CC

## (undated) DEVICE — SOL NACL IRR 3000ML

## (undated) DEVICE — SOL POVIDONE PREP IODINE 4 OZ

## (undated) DEVICE — SOL NACL IRR 1000ML BTL

## (undated) DEVICE — PADDING WYTEX UNDRCST 6INX4YD

## (undated) DEVICE — DRAPE THREE-QTR REINF 53X77IN

## (undated) DEVICE — INTERPULSE SET

## (undated) DEVICE — SUT STRATAFIX PDS 1 CTX 18IN

## (undated) DEVICE — ELECTRODE BLD ULTRA 4IN STRL

## (undated) DEVICE — SUT VICRYL PLUS 2-0 CT1 18

## (undated) DEVICE — KIT TRIATHLON CR FEM PREP SZ4

## (undated) DEVICE — TOWEL OR DISP STRL BLUE 4/PK

## (undated) DEVICE — DRAPE ORTH SPLIT 77X108IN

## (undated) DEVICE — ALCOHOL 70% ANTISEPTIC ISO 4OZ

## (undated) DEVICE — DRESSING MEPILEX 4X12IN

## (undated) DEVICE — GLOVE SENSICARE PI GRN 6.5

## (undated) DEVICE — TAPE SILK 3IN

## (undated) DEVICE — PACK SIRUS BASIC V SURG STRL

## (undated) DEVICE — BANDAGE MATRIX HK LOOP 6IN 5YD

## (undated) DEVICE — YANKAUER FLEX NO VENT HI CAP

## (undated) DEVICE — BONE PINS (3.2MM X 140MM)
Type: IMPLANTABLE DEVICE | Site: KNEE | Status: NON-FUNCTIONAL
Removed: 2025-05-27

## (undated) DEVICE — GLOVE SENSICARE PI GRN 7

## (undated) DEVICE — GLOVE SENSICARE PI ALOE 7.5

## (undated) DEVICE — SPONGE BULKEE II ABSRB 6X6.75

## (undated) DEVICE — DECANTER FLUID TRNSF WHITE 9IN

## (undated) DEVICE — SYR 50CC LL

## (undated) DEVICE — BANDAGE ACE DOUBLE STER 6IN

## (undated) DEVICE — WRAP PROTECTIVE LEG POS STRL

## (undated) DEVICE — PAD CAST SPECIALIST STRL 6

## (undated) DEVICE — DRAPE STERI INSTRUMENT 1018

## (undated) DEVICE — ALCOHOL RUBBING 70% ISO 4OZ

## (undated) DEVICE — BONE PINS (4MM X 140MM)
Type: IMPLANTABLE DEVICE | Site: KNEE | Status: NON-FUNCTIONAL
Removed: 2024-05-28

## (undated) DEVICE — COVER TABLE 44X90 STERILE

## (undated) DEVICE — GLOVE SENSICARE PI ALOE 6.5

## (undated) DEVICE — STRAP OR TABLE 5IN X 72IN

## (undated) DEVICE — PENCIL SMK EVAC CONNECTOR 10FT

## (undated) DEVICE — SLEEVE SCD EXPRESS KNEE MEDIUM

## (undated) DEVICE — ELECTRODE REM PLYHSV RETURN 9